# Patient Record
Sex: MALE | Race: WHITE | NOT HISPANIC OR LATINO | Employment: OTHER | URBAN - METROPOLITAN AREA
[De-identification: names, ages, dates, MRNs, and addresses within clinical notes are randomized per-mention and may not be internally consistent; named-entity substitution may affect disease eponyms.]

---

## 2017-01-09 ENCOUNTER — OFFICE VISIT (OUTPATIENT)
Dept: CARDIOLOGY | Facility: CLINIC | Age: 72
End: 2017-01-09

## 2017-01-09 VITALS
HEIGHT: 68 IN | HEART RATE: 61 BPM | BODY MASS INDEX: 25.76 KG/M2 | DIASTOLIC BLOOD PRESSURE: 80 MMHG | SYSTOLIC BLOOD PRESSURE: 120 MMHG | WEIGHT: 170 LBS

## 2017-01-09 DIAGNOSIS — I10 HYPERTENSION, ESSENTIAL: ICD-10-CM

## 2017-01-09 DIAGNOSIS — I77.810 DILATED AORTIC ROOT (HCC): Primary | ICD-10-CM

## 2017-01-09 DIAGNOSIS — I34.1 MVP (MITRAL VALVE PROLAPSE): ICD-10-CM

## 2017-01-09 DIAGNOSIS — I34.0 NON-RHEUMATIC MITRAL REGURGITATION: ICD-10-CM

## 2017-01-09 PROCEDURE — 93000 ELECTROCARDIOGRAM COMPLETE: CPT | Performed by: INTERNAL MEDICINE

## 2017-01-09 PROCEDURE — 99213 OFFICE O/P EST LOW 20 MIN: CPT | Performed by: INTERNAL MEDICINE

## 2017-01-09 NOTE — PROGRESS NOTES
Date of Office Visit: 2017  Encounter Provider: Adriana Vaughn MD  Place of Service: [facility] CARDIOLOGY  Patient Name: Mirza Zimmer  :1945    Chief complaint   follow-up of mitral valve prolapse thoracic aortic aneurysm     History of Present Illness  Patient is a delightful 71-year-old gentleman with hypertension, hyperlipidemia diagnosed with mitral valve prolapse over 15 years ago.  In 2015 he had an echocardiogram revealed normal left ventricle size and function with an ejection fraction 57% with moderate severe prolapse of the posterior leaflets with severe antral directed mitral regurgitation.  Aortic root was 4.2 cm.  By CT angiography was 4.1 cm. There was also aneurysmal enlargement of the left common iliac artery.  In 2016 he had a follow-up stress echocardiogram during which there was no evidence of ischemia and good left ventricular reserve at a good workload there is severe prolapse of the posterior mitral valve leaflet with severe regurgitation and anterior directed manner.  Left ventricular size was normal as was systolic function.  There is grade 2 diastolic dysfunction.    Since last visit he feels well he denies any chest pain, shortness of breath, palpitations, syncope.  He is doing 30 minutes on the bike on a regular basis occasionally walks 2 miles.  His blood pressures been controlled as it is today.  He saw Dr. Yost last month and an ultrasound of his left iliac artery was performed      Past Medical History   Diagnosis Date   • Dilated aortic root    • Heart murmur    • Heart murmur    • Hyperlipidemia    • Mitral regurgitation    • Mitral valve insufficiency    • Muscular aches    • MVP (mitral valve prolapse)        Past Surgical History   Procedure Laterality Date   • Tonsillectomy     • Colonoscopy     • Eye surgery       mole removed form eye       Current Outpatient Prescriptions on File Prior to Visit   Medication Sig  Dispense Refill   • aspirin 81 MG tablet Take by mouth daily.     • glucosamine-chondroitin 500-400 MG capsule capsule Take  by mouth. 2 po daily     • losartan (COZAAR) 25 MG tablet Take 1 tablet by mouth Daily. 90 tablet 1   • Multiple Vitamins-Minerals (MULTI COMPLETE PO) Take 1 capsule by mouth daily.     • simvastatin (ZOCOR) 20 MG tablet TAKE ONE TABLET BY MOUTH ONCE NIGHTLY 90 tablet 0     No current facility-administered medications on file prior to visit.      Allergies as of 01/09/2017 - Augustine as Reviewed 01/09/2017   Allergen Reaction Noted   • Sulfa antibiotics  02/18/2016     Social History     Social History   • Marital status:      Spouse name: N/A   • Number of children: N/A   • Years of education: N/A     Occupational History   • Not on file.     Social History Main Topics   • Smoking status: Never Smoker   • Smokeless tobacco: Never Used      Comment: Caffeine use   • Alcohol use Yes      Comment: social occasional use   • Drug use: No   • Sexual activity: Defer     Other Topics Concern   • Not on file     Social History Narrative     Family History   Problem Relation Age of Onset   • Diabetes Father    • Sudden death Father    • Cancer Sister      malignant neoplasm of breast     Review of Systems   Constitution: Negative for fever, malaise/fatigue, weight gain and weight loss.   HENT: Negative for ear pain, hearing loss, nosebleeds and sore throat.    Eyes: Negative for double vision, pain, vision loss in left eye and vision loss in right eye.   Cardiovascular:        See history of present illness.   Respiratory: Negative for cough, shortness of breath, sleep disturbances due to breathing, snoring and wheezing.    Endocrine: Negative for cold intolerance, heat intolerance and polyuria.   Skin: Negative for itching, poor wound healing and rash.   Musculoskeletal: Positive for joint pain. Negative for joint swelling and myalgias.   Gastrointestinal: Negative for abdominal pain, diarrhea,  "hematochezia, nausea and vomiting.   Genitourinary: Negative for hematuria and hesitancy.   Neurological: Negative for numbness, paresthesias and seizures.   Psychiatric/Behavioral: Negative for depression. The patient is not nervous/anxious.      Objective:     Vitals:    01/09/17 1031   BP: 120/80   Pulse: 61   Weight: 170 lb (77.1 kg)   Height: 68\" (172.7 cm)     Body mass index is 25.85 kg/(m^2).    Physical Exam   Constitutional: He is oriented to person, place, and time. He appears well-developed and well-nourished.   HENT:   Head: Normocephalic.   Nose: Nose normal.   Mouth/Throat: Oropharynx is clear and moist.   Eyes: Conjunctivae and EOM are normal. Pupils are equal, round, and reactive to light. Right eye exhibits no discharge. No scleral icterus.   Neck: Normal range of motion. Neck supple. No JVD present. No thyromegaly present.   Cardiovascular: Normal rate, regular rhythm, normal heart sounds and intact distal pulses.  Exam reveals no gallop and no friction rub.    No murmur heard.  Pulses:       Carotid pulses are 2+ on the right side, and 2+ on the left side.       Radial pulses are 2+ on the right side, and 2+ on the left side.        Femoral pulses are 2+ on the right side, and 2+ on the left side.       Popliteal pulses are 2+ on the right side, and 2+ on the left side.        Dorsalis pedis pulses are 2+ on the right side, and 2+ on the left side.        Posterior tibial pulses are 2+ on the right side, and 2+ on the left side.   Pulmonary/Chest: Effort normal and breath sounds normal. No respiratory distress. He has no wheezes. He has no rales.   Abdominal: Soft. Bowel sounds are normal. He exhibits no distension. There is no hepatosplenomegaly. There is no tenderness. There is no rebound.   Musculoskeletal: Normal range of motion. He exhibits no edema or tenderness.   Neurological: He is alert and oriented to person, place, and time.   Skin: Skin is warm and dry. No rash noted. No erythema. "   Psychiatric: He has a normal mood and affect. His behavior is normal. Judgment and thought content normal.   Vitals reviewed.    Lab Review:     ECG 12 Lead  Date/Time: 1/10/2017 9:40 PM  Performed by: SAMUEL RIVAS  Authorized by: SAMUEL RIVAS   Comparison: compared with previous ECG   Similar to previous ECG  Rhythm: sinus rhythm  Conduction comments: QTc = 411 msec  QRS axis: left  Other findings: PRWP  Clinical impression: abnormal ECG          Assessment:       Diagnosis Plan   1. Dilated aortic root     2. Hypertension, essential     3. Non-rheumatic mitral regurgitation     4. MVP (mitral valve prolapse)       Plan:       1.  Mitral prolapse with severe mitral regurgitation.  Clinically doing well murmur slightly improved we'll observe for now follow-up in 6 months at which time we'll consider an echocardiogram at rest.  Depending on these findings may consider stress imaging  2.  Dilated aortic root, plan on a resting echocardiogram in 6 months and depending on these results may need further CT imaging  3.  Dyslipidemia  4.  Left iliac artery aneurysm followed by Mirza Marshall   Home Medication Instructions TALITA:    Printed on:01/10/17 9435   Medication Information                      aspirin 81 MG tablet  Take by mouth daily.             glucosamine-chondroitin 500-400 MG capsule capsule  Take  by mouth. 2 po daily             losartan (COZAAR) 25 MG tablet  Take 1 tablet by mouth Daily.             Multiple Vitamins-Minerals (MULTI COMPLETE PO)  Take 1 capsule by mouth daily.             simvastatin (ZOCOR) 20 MG tablet  TAKE ONE TABLET BY MOUTH ONCE NIGHTLY               EMR Dragon/Transcription disclaimer:   Much of this encounter note is an electronic transcription/translation of spoken language to printed text. The electronic translation of spoken language may permit erroneous, or at times, nonsensical words or phrases to be inadvertently transcribed; Although I have reviewed the note  for such errors, some may still exist.

## 2017-01-10 PROBLEM — I77.810 DILATED AORTIC ROOT: Status: ACTIVE | Noted: 2017-01-10

## 2017-01-11 RX ORDER — SIMVASTATIN 20 MG
TABLET ORAL
Qty: 90 TABLET | Refills: 1 | Status: SHIPPED | OUTPATIENT
Start: 2017-01-11 | End: 2017-10-23 | Stop reason: SDUPTHER

## 2017-04-24 RX ORDER — LOSARTAN POTASSIUM 25 MG/1
TABLET ORAL
Qty: 90 TABLET | Refills: 0 | Status: SHIPPED | OUTPATIENT
Start: 2017-04-24 | End: 2017-07-24 | Stop reason: SDUPTHER

## 2017-05-01 ENCOUNTER — LAB (OUTPATIENT)
Dept: LAB | Facility: HOSPITAL | Age: 72
End: 2017-05-01

## 2017-05-01 DIAGNOSIS — E78.5 HYPERLIPIDEMIA, UNSPECIFIED HYPERLIPIDEMIA TYPE: ICD-10-CM

## 2017-05-01 DIAGNOSIS — Z00.00 HEALTH CARE MAINTENANCE: ICD-10-CM

## 2017-05-01 DIAGNOSIS — N40.0 BENIGN NON-NODULAR PROSTATIC HYPERPLASIA WITHOUT LOWER URINARY TRACT SYMPTOMS: Primary | ICD-10-CM

## 2017-05-01 DIAGNOSIS — E78.5 HYPERLIPIDEMIA, UNSPECIFIED HYPERLIPIDEMIA TYPE: Primary | ICD-10-CM

## 2017-05-01 LAB
ALBUMIN SERPL-MCNC: 3.9 G/DL (ref 3.5–5.2)
ALBUMIN/GLOB SERPL: 1.3 G/DL
ALP SERPL-CCNC: 67 U/L (ref 39–117)
ALT SERPL W P-5'-P-CCNC: 16 U/L (ref 1–41)
ANION GAP SERPL CALCULATED.3IONS-SCNC: 12 MMOL/L
AST SERPL-CCNC: 21 U/L (ref 1–40)
BASOPHILS # BLD AUTO: 0.01 10*3/MM3 (ref 0–0.2)
BASOPHILS NFR BLD AUTO: 0.2 % (ref 0–1.5)
BILIRUB SERPL-MCNC: 0.6 MG/DL (ref 0.1–1.2)
BUN BLD-MCNC: 13 MG/DL (ref 8–23)
BUN/CREAT SERPL: 14.8 (ref 7–25)
CALCIUM SPEC-SCNC: 9.1 MG/DL (ref 8.6–10.5)
CHLORIDE SERPL-SCNC: 103 MMOL/L (ref 98–107)
CHOLEST SERPL-MCNC: 174 MG/DL (ref 0–200)
CO2 SERPL-SCNC: 26 MMOL/L (ref 22–29)
CREAT BLD-MCNC: 0.88 MG/DL (ref 0.76–1.27)
DEPRECATED RDW RBC AUTO: 42.4 FL (ref 37–54)
EOSINOPHIL # BLD AUTO: 0.07 10*3/MM3 (ref 0–0.7)
EOSINOPHIL NFR BLD AUTO: 1.6 % (ref 0.3–6.2)
ERYTHROCYTE [DISTWIDTH] IN BLOOD BY AUTOMATED COUNT: 12.6 % (ref 11.5–14.5)
GFR SERPL CREATININE-BSD FRML MDRD: 85 ML/MIN/1.73
GLOBULIN UR ELPH-MCNC: 2.9 GM/DL
GLUCOSE BLD-MCNC: 95 MG/DL (ref 65–99)
HCT VFR BLD AUTO: 41.8 % (ref 40.4–52.2)
HDLC SERPL-MCNC: 46 MG/DL (ref 40–60)
HGB BLD-MCNC: 14.1 G/DL (ref 13.7–17.6)
IMM GRANULOCYTES # BLD: 0 10*3/MM3 (ref 0–0.03)
IMM GRANULOCYTES NFR BLD: 0 % (ref 0–0.5)
LDLC SERPL CALC-MCNC: 110 MG/DL (ref 0–100)
LDLC/HDLC SERPL: 2.4 {RATIO}
LYMPHOCYTES # BLD AUTO: 1.17 10*3/MM3 (ref 0.9–4.8)
LYMPHOCYTES NFR BLD AUTO: 27.4 % (ref 19.6–45.3)
MCH RBC QN AUTO: 31 PG (ref 27–32.7)
MCHC RBC AUTO-ENTMCNC: 33.7 G/DL (ref 32.6–36.4)
MCV RBC AUTO: 91.9 FL (ref 79.8–96.2)
MONOCYTES # BLD AUTO: 0.5 10*3/MM3 (ref 0.2–1.2)
MONOCYTES NFR BLD AUTO: 11.7 % (ref 5–12)
NEUTROPHILS # BLD AUTO: 2.52 10*3/MM3 (ref 1.9–8.1)
NEUTROPHILS NFR BLD AUTO: 59.1 % (ref 42.7–76)
PLATELET # BLD AUTO: 207 10*3/MM3 (ref 140–500)
PMV BLD AUTO: 10.1 FL (ref 6–12)
POTASSIUM BLD-SCNC: 4.2 MMOL/L (ref 3.5–5.2)
PROT SERPL-MCNC: 6.8 G/DL (ref 6–8.5)
PSA SERPL-MCNC: 3.89 NG/ML (ref 0–4)
RBC # BLD AUTO: 4.55 10*6/MM3 (ref 4.6–6)
SODIUM BLD-SCNC: 141 MMOL/L (ref 136–145)
TRIGL SERPL-MCNC: 88 MG/DL (ref 0–150)
VLDLC SERPL-MCNC: 17.6 MG/DL (ref 5–40)
WBC NRBC COR # BLD: 4.27 10*3/MM3 (ref 4.5–10.7)

## 2017-05-01 PROCEDURE — 80061 LIPID PANEL: CPT

## 2017-05-01 PROCEDURE — 80053 COMPREHEN METABOLIC PANEL: CPT | Performed by: INTERNAL MEDICINE

## 2017-05-01 PROCEDURE — 36415 COLL VENOUS BLD VENIPUNCTURE: CPT

## 2017-05-01 PROCEDURE — 84153 ASSAY OF PSA TOTAL: CPT | Performed by: INTERNAL MEDICINE

## 2017-05-01 PROCEDURE — 85025 COMPLETE CBC W/AUTO DIFF WBC: CPT | Performed by: INTERNAL MEDICINE

## 2017-05-08 ENCOUNTER — OFFICE VISIT (OUTPATIENT)
Dept: FAMILY MEDICINE CLINIC | Facility: CLINIC | Age: 72
End: 2017-05-08

## 2017-05-08 VITALS
DIASTOLIC BLOOD PRESSURE: 60 MMHG | OXYGEN SATURATION: 96 % | BODY MASS INDEX: 25.55 KG/M2 | SYSTOLIC BLOOD PRESSURE: 116 MMHG | HEIGHT: 68 IN | WEIGHT: 168.6 LBS | HEART RATE: 60 BPM | RESPIRATION RATE: 16 BRPM | TEMPERATURE: 97.8 F

## 2017-05-08 DIAGNOSIS — R97.20 INCREASED PROSTATE SPECIFIC ANTIGEN (PSA) VELOCITY: ICD-10-CM

## 2017-05-08 DIAGNOSIS — Z00.00 HEALTHCARE MAINTENANCE: Primary | ICD-10-CM

## 2017-05-08 DIAGNOSIS — I10 HYPERTENSION, ESSENTIAL: ICD-10-CM

## 2017-05-08 DIAGNOSIS — E78.5 HYPERLIPIDEMIA, UNSPECIFIED HYPERLIPIDEMIA TYPE: ICD-10-CM

## 2017-05-08 PROCEDURE — 99397 PER PM REEVAL EST PAT 65+ YR: CPT | Performed by: INTERNAL MEDICINE

## 2017-05-08 PROCEDURE — G0009 ADMIN PNEUMOCOCCAL VACCINE: HCPCS | Performed by: INTERNAL MEDICINE

## 2017-05-08 PROCEDURE — 90670 PCV13 VACCINE IM: CPT | Performed by: INTERNAL MEDICINE

## 2017-07-18 ENCOUNTER — OFFICE VISIT (OUTPATIENT)
Dept: CARDIOLOGY | Facility: CLINIC | Age: 72
End: 2017-07-18

## 2017-07-18 VITALS
HEART RATE: 58 BPM | HEIGHT: 68 IN | BODY MASS INDEX: 24.55 KG/M2 | SYSTOLIC BLOOD PRESSURE: 104 MMHG | DIASTOLIC BLOOD PRESSURE: 68 MMHG | WEIGHT: 162 LBS

## 2017-07-18 DIAGNOSIS — I34.0 NON-RHEUMATIC MITRAL REGURGITATION: ICD-10-CM

## 2017-07-18 DIAGNOSIS — I34.1 MVP (MITRAL VALVE PROLAPSE): Primary | ICD-10-CM

## 2017-07-18 DIAGNOSIS — I51.89 OTHER ILL-DEFINED HEART DISEASES: ICD-10-CM

## 2017-07-18 DIAGNOSIS — I77.810 DILATED AORTIC ROOT (HCC): ICD-10-CM

## 2017-07-18 PROCEDURE — 99213 OFFICE O/P EST LOW 20 MIN: CPT | Performed by: INTERNAL MEDICINE

## 2017-07-18 PROCEDURE — 93000 ELECTROCARDIOGRAM COMPLETE: CPT | Performed by: INTERNAL MEDICINE

## 2017-07-18 RX ORDER — FINASTERIDE 5 MG/1
TABLET, FILM COATED ORAL
COMMUNITY
Start: 2017-06-27 | End: 2018-09-27 | Stop reason: ALTCHOICE

## 2017-07-18 NOTE — PROGRESS NOTES
Date of Office Visit: 2017  Encounter Provider: Adriana Vaughn MD  Place of Service: [facility] CARDIOLOGY  Patient Name: Mirza Zimmer  :1945    Chief complaint  follow-up of mitral valve prolapse thoracic aortic aneurysm    History of Present Illness  Patient is a delightful 89-year-old gentleman with hypertension, hyperlipidemia diagnosed with mitral valve prolapse over 15 years ago.  In 2015 he had an echocardiogram revealed normal left ventricle size and function with an ejection fraction 57% with moderate severe prolapse of the posterior leaflets with severe antral directed mitral regurgitation.  Aortic root was 4.2 cm.  By CT angiography was 4.1 cm. There was also aneurysmal enlargement of the left common iliac artery.  In 2016 he had a follow-up stress echocardiogram during which there was no evidence of ischemia and good left ventricular reserve at a good workload there is severe prolapse of the posterior mitral valve leaflet with severe regurgitation and anterior directed manner.  Left ventricular size was normal as was systolic function.  There is grade 2 diastolic dysfunction.    Since last visit he is active and plays golf and walks regularly and works out on the treadmill for 30 minutes at a time. He denies any chest pain, shortness of breath, palpitations syncope near syncope.  Blood pressure home has been slightly higher than it is today    Past Medical History:   Diagnosis Date   • Dilated aortic root    • Hyperlipidemia    • Mitral regurgitation    • Mitral valve insufficiency    • Muscular aches    • MVP (mitral valve prolapse)      Past Surgical History:   Procedure Laterality Date   • COLONOSCOPY  2013   • EYE SURGERY      mole removed form eye   • TONSILLECTOMY       Outpatient Medications Prior to Visit   Medication Sig Dispense Refill   • aspirin 81 MG tablet Take 81 mg by mouth Daily.     • glucosamine-chondroitin 500-400 MG  capsule capsule Take  by mouth. 2 po daily     • losartan (COZAAR) 25 MG tablet TAKE ONE TABLET BY MOUTH DAILY 90 tablet 0   • Multiple Vitamins-Minerals (MULTI COMPLETE PO) Take 1 capsule by mouth daily.     • simvastatin (ZOCOR) 20 MG tablet TAKE ONE TABLET BY MOUTH ONCE NIGHTLY 90 tablet 1     No facility-administered medications prior to visit.      Allergies as of 07/18/2017 - Augustine as Reviewed 07/18/2017   Allergen Reaction Noted   • Sulfa antibiotics  02/18/2016     Social History     Social History   • Marital status:      Spouse name: N/A   • Number of children: N/A   • Years of education: N/A     Occupational History   • Not on file.     Social History Main Topics   • Smoking status: Never Smoker   • Smokeless tobacco: Never Used      Comment: Caffeine use   • Alcohol use Yes      Comment: social occasional use   • Drug use: No   • Sexual activity: Defer     Other Topics Concern   • Not on file     Social History Narrative     Family History   Problem Relation Age of Onset   • Diabetes Father    • Sudden death Father    • Cancer Sister      malignant neoplasm of breast     Review of Systems   Constitution: Negative for fever, malaise/fatigue, weight gain and weight loss.   HENT: Negative for ear pain, hearing loss, nosebleeds and sore throat.    Eyes: Negative for double vision, pain, vision loss in left eye and vision loss in right eye.   Cardiovascular:        See history of present illness.   Respiratory: Negative for cough, shortness of breath, sleep disturbances due to breathing, snoring and wheezing.    Endocrine: Negative for cold intolerance, heat intolerance and polyuria.   Skin: Negative for itching, poor wound healing and rash.   Musculoskeletal: Negative for joint pain, joint swelling and myalgias.   Gastrointestinal: Negative for abdominal pain, diarrhea, hematochezia, nausea and vomiting.   Genitourinary: Negative for hematuria and hesitancy.   Neurological: Negative for numbness,  "paresthesias and seizures.   Psychiatric/Behavioral: Negative for depression. The patient is not nervous/anxious.      Objective:     Vitals:    07/18/17 0802   BP: 104/68   Pulse: 58   Weight: 162 lb (73.5 kg)   Height: 68\" (172.7 cm)     Body mass index is 24.63 kg/(m^2).    Physical Exam   Constitutional: He is oriented to person, place, and time. He appears well-developed and well-nourished.   HENT:   Head: Normocephalic.   Nose: Nose normal.   Mouth/Throat: Oropharynx is clear and moist.   Eyes: Conjunctivae and EOM are normal. Pupils are equal, round, and reactive to light. Right eye exhibits no discharge. No scleral icterus.   Neck: Normal range of motion. Neck supple. No JVD present. No thyromegaly present.   Cardiovascular: Normal rate, regular rhythm and intact distal pulses.  Exam reveals no gallop and no friction rub.    Murmur heard.  High-pitched blowing holosystolic murmur is present with a grade of 2/6  at the apex radiating to the axilla  Pulses:       Carotid pulses are 2+ on the right side, and 2+ on the left side.       Radial pulses are 2+ on the right side, and 2+ on the left side.        Femoral pulses are 2+ on the right side, and 2+ on the left side.       Popliteal pulses are 2+ on the right side, and 2+ on the left side.        Dorsalis pedis pulses are 2+ on the right side, and 2+ on the left side.        Posterior tibial pulses are 2+ on the right side, and 2+ on the left side.   Pulmonary/Chest: Effort normal and breath sounds normal. No respiratory distress. He has no wheezes. He has no rales.   Abdominal: Soft. Bowel sounds are normal. He exhibits no distension. There is no hepatosplenomegaly. There is no tenderness. There is no rebound.   Musculoskeletal: Normal range of motion. He exhibits no edema or tenderness.   Neurological: He is alert and oriented to person, place, and time.   Skin: Skin is warm and dry. No rash noted. No erythema.   Psychiatric: He has a normal mood and " affect. His behavior is normal. Judgment and thought content normal.   Vitals reviewed.    Lab Review:     ECG 12 Lead  Date/Time: 7/18/2017 9:37 PM  Performed by: SAMUEL RIVAS  Authorized by: SAMUEL RIVAS   Comparison: compared with previous ECG   Similar to previous ECG  Rhythm: sinus rhythm  Conduction comments: QTc = 417msec  Clinical impression: normal ECG          Assessment:       Diagnosis Plan   1. MVP (mitral valve prolapse)  Adult Stress Echo With Color & Doppler   2. Non-rheumatic mitral regurgitation  Adult Stress Echo With Color & Doppler   3. Dilated aortic root  MRI Angiogram Chest With Contrast   4. Other ill-defined heart diseases   Adult Stress Echo With Color & Doppler     Plan:       1.  Mitral prolapse with severe mitral regurgitation.  Still sounds moderate to severe.  We'll check a stress echocardiogram to assess exercise tolerance and worsening regurgitation  2.  Dilated aortic root, we'll check an MRI angiogram of his chest  3.  Dyslipidemia  4.  Left iliac artery aneurysm followed by Dr. Yost  5.  Hypertension.  Blood pressure under good control     Mirza Zimmer   Home Medication Instructions TALITA:    Printed on:07/20/17 8535   Medication Information                      aspirin 81 MG tablet  Take 81 mg by mouth Daily.             finasteride (PROSCAR) 5 MG tablet               glucosamine-chondroitin 500-400 MG capsule capsule  Take  by mouth. 2 po daily             losartan (COZAAR) 25 MG tablet  TAKE ONE TABLET BY MOUTH DAILY             Multiple Vitamins-Minerals (MULTI COMPLETE PO)  Take 1 capsule by mouth daily.             simvastatin (ZOCOR) 20 MG tablet  TAKE ONE TABLET BY MOUTH ONCE NIGHTLY               Dictated utilizing Dragon dictation

## 2017-07-20 PROBLEM — I51.89 OTHER ILL-DEFINED HEART DISEASES: Status: ACTIVE | Noted: 2017-07-20

## 2017-07-24 RX ORDER — LOSARTAN POTASSIUM 25 MG/1
25 TABLET ORAL DAILY
Qty: 90 TABLET | Refills: 1 | Status: SHIPPED | OUTPATIENT
Start: 2017-07-24 | End: 2018-01-09 | Stop reason: SDUPTHER

## 2017-07-25 ENCOUNTER — HOSPITAL ENCOUNTER (OUTPATIENT)
Dept: MRI IMAGING | Facility: HOSPITAL | Age: 72
Discharge: HOME | End: 2017-07-25
Attending: INTERNAL MEDICINE | Admitting: INTERNAL MEDICINE

## 2017-07-25 DIAGNOSIS — I77.810 DILATED AORTIC ROOT (HCC): ICD-10-CM

## 2017-07-25 PROCEDURE — A9577 INJ MULTIHANCE: HCPCS | Performed by: INTERNAL MEDICINE

## 2017-07-25 PROCEDURE — C8911 MRA W/O FOL W/CONT, CHEST: HCPCS

## 2017-07-25 PROCEDURE — 0 GADOBENATE DIMEGLUMINE 529 MG/ML SOLUTION: Performed by: INTERNAL MEDICINE

## 2017-07-25 PROCEDURE — 82565 ASSAY OF CREATININE: CPT

## 2017-07-25 RX ADMIN — GADOBENATE DIMEGLUMINE 14 ML: 529 INJECTION, SOLUTION INTRAVENOUS at 07:30

## 2017-07-26 LAB — CREAT BLDA-MCNC: 0.9 MG/DL (ref 0.6–1.3)

## 2017-07-27 ENCOUNTER — HOSPITAL ENCOUNTER (OUTPATIENT)
Dept: CARDIOLOGY | Facility: HOSPITAL | Age: 72
Discharge: HOME | End: 2017-07-27
Attending: INTERNAL MEDICINE | Admitting: INTERNAL MEDICINE

## 2017-07-27 VITALS
HEART RATE: 60 BPM | HEIGHT: 68 IN | SYSTOLIC BLOOD PRESSURE: 138 MMHG | WEIGHT: 150 LBS | BODY MASS INDEX: 22.73 KG/M2 | DIASTOLIC BLOOD PRESSURE: 82 MMHG

## 2017-07-27 DIAGNOSIS — I34.0 NON-RHEUMATIC MITRAL REGURGITATION: ICD-10-CM

## 2017-07-27 DIAGNOSIS — I34.1 MVP (MITRAL VALVE PROLAPSE): ICD-10-CM

## 2017-07-27 DIAGNOSIS — I51.89 OTHER ILL-DEFINED HEART DISEASES: ICD-10-CM

## 2017-07-27 LAB
ASCENDING AORTA: 3.5 CM
BH CV ECHO MEAS - ACS: 2.3 CM
BH CV ECHO MEAS - AO MAX PG: 6.5 MMHG
BH CV ECHO MEAS - AO ROOT AREA (BSA CORRECTED): 2.3
BH CV ECHO MEAS - AO ROOT AREA: 14.1 CM^2
BH CV ECHO MEAS - AO ROOT DIAM: 4.2 CM
BH CV ECHO MEAS - AO V2 MAX: 127.6 CM/SEC
BH CV ECHO MEAS - ASC AORTA: 3.5 CM
BH CV ECHO MEAS - BSA(HAYCOCK): 1.8 M^2
BH CV ECHO MEAS - BSA: 1.8 M^2
BH CV ECHO MEAS - BZI_BMI: 22.8 KILOGRAMS/M^2
BH CV ECHO MEAS - BZI_METRIC_HEIGHT: 172.7 CM
BH CV ECHO MEAS - BZI_METRIC_WEIGHT: 68 KG
BH CV ECHO MEAS - CONTRAST EF 4CH: 61.9 ML/M^2
BH CV ECHO MEAS - EDV(MOD-SP4): 113 ML
BH CV ECHO MEAS - EDV(TEICH): 149.5 ML
BH CV ECHO MEAS - EF(CUBED): 72 %
BH CV ECHO MEAS - EF(MOD-SP4): 61.9 %
BH CV ECHO MEAS - EF(TEICH): 63.1 %
BH CV ECHO MEAS - ESV(MOD-SP4): 43 ML
BH CV ECHO MEAS - ESV(TEICH): 55.2 ML
BH CV ECHO MEAS - FS: 34.6 %
BH CV ECHO MEAS - IVS/LVPW: 1
BH CV ECHO MEAS - IVSD: 1.2 CM
BH CV ECHO MEAS - LAT PEAK E' VEL: 10 CM/SEC
BH CV ECHO MEAS - LV DIASTOLIC VOL/BSA (35-75): 62.5 ML/M^2
BH CV ECHO MEAS - LV MASS(C)D: 281.5 GRAMS
BH CV ECHO MEAS - LV MASS(C)DI: 155.6 GRAMS/M^2
BH CV ECHO MEAS - LV SYSTOLIC VOL/BSA (12-30): 23.8 ML/M^2
BH CV ECHO MEAS - LVIDD: 5.5 CM
BH CV ECHO MEAS - LVIDS: 3.6 CM
BH CV ECHO MEAS - LVLD AP4: 8.2 CM
BH CV ECHO MEAS - LVLS AP4: 6.8 CM
BH CV ECHO MEAS - LVPWD: 1.2 CM
BH CV ECHO MEAS - MED PEAK E' VEL: 7 CM/SEC
BH CV ECHO MEAS - MR MAX PG: 132.5 MMHG
BH CV ECHO MEAS - MR MAX VEL: 575.5 CM/SEC
BH CV ECHO MEAS - MV A DUR: 0.09 SEC
BH CV ECHO MEAS - MV A MAX VEL: 74.9 CM/SEC
BH CV ECHO MEAS - MV DEC SLOPE: 393.4 CM/SEC^2
BH CV ECHO MEAS - MV DEC TIME: 0.21 SEC
BH CV ECHO MEAS - MV E MAX VEL: 89.7 CM/SEC
BH CV ECHO MEAS - MV E/A: 1.2
BH CV ECHO MEAS - MV MAX PG: 5.1 MMHG
BH CV ECHO MEAS - MV MEAN PG: 1.7 MMHG
BH CV ECHO MEAS - MV P1/2T MAX VEL: 90.3 CM/SEC
BH CV ECHO MEAS - MV P1/2T: 67.2 MSEC
BH CV ECHO MEAS - MV V2 MAX: 112.7 CM/SEC
BH CV ECHO MEAS - MV V2 MEAN: 59.2 CM/SEC
BH CV ECHO MEAS - MV V2 VTI: 39.4 CM
BH CV ECHO MEAS - MVA P1/2T LCG: 2.4 CM^2
BH CV ECHO MEAS - MVA(P1/2T): 3.3 CM^2
BH CV ECHO MEAS - PULM A REVS DUR: 0.1 SEC
BH CV ECHO MEAS - PULM A REVS VEL: 32.5 CM/SEC
BH CV ECHO MEAS - PULM DIAS VEL: 36.9 CM/SEC
BH CV ECHO MEAS - PULM S/D: 1.8
BH CV ECHO MEAS - PULM SYS VEL: 66.5 CM/SEC
BH CV ECHO MEAS - SI(CUBED): 67.4 ML/M^2
BH CV ECHO MEAS - SI(MOD-SP4): 38.7 ML/M^2
BH CV ECHO MEAS - SI(TEICH): 52.1 ML/M^2
BH CV ECHO MEAS - SV(CUBED): 121.9 ML
BH CV ECHO MEAS - SV(MOD-SP4): 70 ML
BH CV ECHO MEAS - SV(TEICH): 94.3 ML
BH CV ECHO MEAS - TAPSE (>1.6): 2.6 CM2
BH CV ECHO MEAS - TR MAX VEL: 213.1 CM/SEC
BH CV STRESS BP STAGE 1: NORMAL
BH CV STRESS BP STAGE 2: NORMAL
BH CV STRESS BP STAGE 3: NORMAL
BH CV STRESS BP STAGE 4: NORMAL
BH CV STRESS DURATION MIN STAGE 1: 3
BH CV STRESS DURATION MIN STAGE 2: 3
BH CV STRESS DURATION MIN STAGE 3: 3
BH CV STRESS DURATION MIN STAGE 4: 3
BH CV STRESS DURATION MIN STAGE 5: 1
BH CV STRESS DURATION SEC STAGE 1: 0
BH CV STRESS DURATION SEC STAGE 2: 0
BH CV STRESS DURATION SEC STAGE 3: 0
BH CV STRESS DURATION SEC STAGE 4: 0
BH CV STRESS DURATION SEC STAGE 5: 30
BH CV STRESS ECHO POST STRESS EJECTION FRACTION EF: 79 %
BH CV STRESS GRADE STAGE 1: 10
BH CV STRESS GRADE STAGE 2: 12
BH CV STRESS GRADE STAGE 3: 14
BH CV STRESS GRADE STAGE 4: 16
BH CV STRESS GRADE STAGE 5: 18
BH CV STRESS HR STAGE 1: 80
BH CV STRESS HR STAGE 2: 90
BH CV STRESS HR STAGE 3: 103
BH CV STRESS HR STAGE 4: 120
BH CV STRESS HR STAGE 5: 136
BH CV STRESS METS STAGE 1: 5
BH CV STRESS METS STAGE 2: 7.5
BH CV STRESS METS STAGE 3: 10
BH CV STRESS METS STAGE 4: 13.5
BH CV STRESS METS STAGE 5: 15
BH CV STRESS PROTOCOL 1: NORMAL
BH CV STRESS SPEED STAGE 1: 1.7
BH CV STRESS SPEED STAGE 2: 2.5
BH CV STRESS SPEED STAGE 3: 3.4
BH CV STRESS SPEED STAGE 4: 4.2
BH CV STRESS SPEED STAGE 5: 5
BH CV STRESS STAGE 1: 1
BH CV STRESS STAGE 2: 2
BH CV STRESS STAGE 3: 3
BH CV STRESS STAGE 4: 4
BH CV STRESS STAGE 5: 5
BH CV XLRA - RV BASE: 2.7 CM
BH CV XLRA - TDI S': 12 CM/SEC
E/E' RATIO: 12
LEFT ATRIUM VOLUME INDEX: 26 ML/M2
MAXIMAL PREDICTED HEART RATE: 148 BPM
PERCENT MAX PREDICTED HR: 91.89 %
SINUS: 4 CM
STJ: 3.3 CM
STRESS BASELINE BP: NORMAL MMHG
STRESS BASELINE HR: 60 BPM
STRESS PERCENT HR: 108 %
STRESS POST ESTIMATED WORKLOAD: 14 METS
STRESS POST EXERCISE DUR MIN: 13 MIN
STRESS POST EXERCISE DUR SEC: 30 SEC
STRESS POST PEAK BP: NORMAL MMHG
STRESS POST PEAK HR: 136 BPM
STRESS TARGET HR: 126 BPM

## 2017-07-27 PROCEDURE — 93320 DOPPLER ECHO COMPLETE: CPT | Performed by: INTERNAL MEDICINE

## 2017-07-27 PROCEDURE — 93017 CV STRESS TEST TRACING ONLY: CPT

## 2017-07-27 PROCEDURE — 93320 DOPPLER ECHO COMPLETE: CPT

## 2017-07-27 PROCEDURE — 76376 3D RENDER W/INTRP POSTPROCES: CPT | Performed by: INTERNAL MEDICINE

## 2017-07-27 PROCEDURE — 93325 DOPPLER ECHO COLOR FLOW MAPG: CPT

## 2017-07-27 PROCEDURE — 93350 STRESS TTE ONLY: CPT

## 2017-07-27 PROCEDURE — 93325 DOPPLER ECHO COLOR FLOW MAPG: CPT | Performed by: INTERNAL MEDICINE

## 2017-07-27 PROCEDURE — 93350 STRESS TTE ONLY: CPT | Performed by: INTERNAL MEDICINE

## 2017-07-27 PROCEDURE — 93018 CV STRESS TEST I&R ONLY: CPT | Performed by: INTERNAL MEDICINE

## 2017-07-27 PROCEDURE — 93016 CV STRESS TEST SUPVJ ONLY: CPT | Performed by: INTERNAL MEDICINE

## 2017-08-03 ENCOUNTER — TELEPHONE (OUTPATIENT)
Dept: CARDIOLOGY | Facility: CLINIC | Age: 72
End: 2017-08-03

## 2017-08-03 NOTE — TELEPHONE ENCOUNTER
I talked to patient regarding echo and MRI results.  Stable aneurysm, stable mitral valve prolapse mitral regurgitation.  We'll continue with the current regimen and keep routine follow-up unless he develops new symptoms. nette

## 2017-08-08 ENCOUNTER — CLINICAL SUPPORT (OUTPATIENT)
Dept: FAMILY MEDICINE CLINIC | Facility: CLINIC | Age: 72
End: 2017-08-08

## 2017-08-08 DIAGNOSIS — Z23 NEED FOR PROPHYLACTIC VACCINATION AND INOCULATION AGAINST CHOLERA ALONE: Primary | ICD-10-CM

## 2017-08-08 DIAGNOSIS — Z23 NEED FOR TDAP VACCINATION: ICD-10-CM

## 2017-08-08 PROCEDURE — 90715 TDAP VACCINE 7 YRS/> IM: CPT | Performed by: INTERNAL MEDICINE

## 2017-08-08 PROCEDURE — 90471 IMMUNIZATION ADMIN: CPT | Performed by: INTERNAL MEDICINE

## 2017-10-23 RX ORDER — SIMVASTATIN 20 MG
20 TABLET ORAL NIGHTLY
Qty: 90 TABLET | Refills: 1 | Status: SHIPPED | OUTPATIENT
Start: 2017-10-23 | End: 2018-07-25 | Stop reason: SDUPTHER

## 2018-01-09 RX ORDER — LOSARTAN POTASSIUM 25 MG/1
25 TABLET ORAL DAILY
Qty: 90 TABLET | Refills: 1 | Status: SHIPPED | OUTPATIENT
Start: 2018-01-09 | End: 2018-07-11 | Stop reason: SDUPTHER

## 2018-04-18 ENCOUNTER — TELEPHONE (OUTPATIENT)
Dept: FAMILY MEDICINE CLINIC | Facility: CLINIC | Age: 73
End: 2018-04-18

## 2018-07-11 RX ORDER — LOSARTAN POTASSIUM 25 MG/1
25 TABLET ORAL DAILY
Qty: 90 TABLET | Refills: 0 | Status: SHIPPED | OUTPATIENT
Start: 2018-07-11 | End: 2018-10-19 | Stop reason: SDUPTHER

## 2018-07-25 RX ORDER — SIMVASTATIN 20 MG
20 TABLET ORAL NIGHTLY
Qty: 90 TABLET | Refills: 0 | Status: SHIPPED | OUTPATIENT
Start: 2018-07-25 | End: 2018-10-19 | Stop reason: SDUPTHER

## 2018-09-27 ENCOUNTER — OFFICE VISIT (OUTPATIENT)
Dept: CARDIOLOGY | Facility: CLINIC | Age: 73
End: 2018-09-27

## 2018-09-27 VITALS — DIASTOLIC BLOOD PRESSURE: 76 MMHG | SYSTOLIC BLOOD PRESSURE: 122 MMHG

## 2018-09-27 DIAGNOSIS — I34.0 NON-RHEUMATIC MITRAL REGURGITATION: ICD-10-CM

## 2018-09-27 DIAGNOSIS — I71.20 THORACIC AORTIC ANEURYSM WITHOUT RUPTURE (HCC): Primary | ICD-10-CM

## 2018-09-27 DIAGNOSIS — R06.09 DYSPNEA ON EXERTION: ICD-10-CM

## 2018-09-27 PROCEDURE — 99214 OFFICE O/P EST MOD 30 MIN: CPT | Performed by: INTERNAL MEDICINE

## 2018-09-27 NOTE — PROGRESS NOTES
Date of Office Visit: 2018  Encounter Provider: Adriana Vaughn MD  Place of Service: [facility] CARDIOLOGY  Patient Name: Mirza Zimmer  :1945    Chief complaint  followup of aortic aneurysm    History of Present Illness  Patient is a delightful 73-year-old gentleman with hypertension, hyperlipidemia diagnosed with mitral valve prolapse over 15 years ago.  In 2015 he had an echocardiogram revealed normal left ventricle size and function with an ejection fraction 57% with moderate severe prolapse of the posterior leaflets with severe antral directed mitral regurgitation.  Aortic root was 4.2 cm.  By CT angiography was 4.1 cm. There was also aneurysmal enlargement of the left common iliac artery.  In 2016 he had a follow-up stress echocardiogram during which there was no evidence of ischemia and good left ventricular reserve at a good workload there is severe prolapse of the posterior mitral valve leaflet with severe regurgitation and anterior directed manner.  Left ventricular size was normal as was systolic function.  There is grade 2 diastolic dysfunction.    Since the last visit he has noticed mild dyspnea on exertion the past 3-4 months ago that lasted for a month.  He also had an irregular pulse at the time.  He decrease his caffeine intake and both have resolved.  He is back to exercising regularly.     Past Medical History:   Diagnosis Date   • Dilated aortic root (CMS/HCC)    • Hyperlipidemia    • Mitral regurgitation    • Mitral valve insufficiency    • Muscular aches    • MVP (mitral valve prolapse)      Past Surgical History:   Procedure Laterality Date   • COLONOSCOPY  2013   • EYE SURGERY      mole removed form eye   • TONSILLECTOMY       Outpatient Medications Prior to Visit   Medication Sig Dispense Refill   • aspirin 81 MG tablet Take 81 mg by mouth Daily.     • glucosamine-chondroitin 500-400 MG capsule capsule Take  by mouth. 2 po daily      • losartan (COZAAR) 25 MG tablet Take 1 tablet by mouth Daily. Pt needs appointment asap with new provider no more refill after this 90 tablet 0   • Multiple Vitamins-Minerals (MULTI COMPLETE PO) Take 1 capsule by mouth daily.     • simvastatin (ZOCOR) 20 MG tablet Take 1 tablet by mouth Every Night. 90 tablet 0   • finasteride (PROSCAR) 5 MG tablet        No facility-administered medications prior to visit.        Allergies as of 09/27/2018 - Reviewed 09/27/2018   Allergen Reaction Noted   • Sulfa antibiotics  02/18/2016     Social History     Social History   • Marital status:      Spouse name: N/A   • Number of children: N/A   • Years of education: N/A     Occupational History   • Not on file.     Social History Main Topics   • Smoking status: Never Smoker   • Smokeless tobacco: Never Used      Comment: Caffeine use   • Alcohol use Yes      Comment: social occasional use   • Drug use: No   • Sexual activity: Defer     Other Topics Concern   • Not on file     Social History Narrative   • No narrative on file     Family History   Problem Relation Age of Onset   • Diabetes Father    • Sudden death Father    • Cancer Sister         malignant neoplasm of breast     Review of Systems   Constitution: Negative for fever, malaise/fatigue, weight gain and weight loss.   HENT: Negative for ear pain, hearing loss, nosebleeds and sore throat.    Eyes: Negative for double vision, pain, vision loss in left eye and vision loss in right eye.   Cardiovascular:        See history of present illness.   Respiratory: Negative for cough, shortness of breath, sleep disturbances due to breathing, snoring and wheezing.    Endocrine: Negative for cold intolerance, heat intolerance and polyuria.   Skin: Negative for itching, poor wound healing and rash.   Musculoskeletal: Positive for joint pain. Negative for joint swelling and myalgias.   Gastrointestinal: Negative for abdominal pain, diarrhea, hematochezia, nausea and vomiting.    Genitourinary: Negative for hematuria and hesitancy.   Neurological: Negative for numbness, paresthesias and seizures.   Psychiatric/Behavioral: Negative for depression. The patient is not nervous/anxious.         Objective:     Vitals:    09/27/18 1412   BP: 122/76     There is no height or weight on file to calculate BMI.    Physical Exam   Constitutional: He is oriented to person, place, and time. He appears well-developed and well-nourished.   HENT:   Head: Normocephalic.   Nose: Nose normal.   Mouth/Throat: Oropharynx is clear and moist.   Eyes: Pupils are equal, round, and reactive to light. Conjunctivae and EOM are normal. Right eye exhibits no discharge. No scleral icterus.   Neck: Normal range of motion. Neck supple. No JVD present. No thyromegaly present.   Cardiovascular: Normal rate, regular rhythm, normal heart sounds and intact distal pulses.  Exam reveals no gallop and no friction rub.    No murmur heard.  Pulses:       Carotid pulses are 2+ on the right side, and 2+ on the left side.       Radial pulses are 2+ on the right side, and 2+ on the left side.        Femoral pulses are 2+ on the right side, and 2+ on the left side.       Popliteal pulses are 2+ on the right side, and 2+ on the left side.        Dorsalis pedis pulses are 2+ on the right side, and 2+ on the left side.        Posterior tibial pulses are 2+ on the right side, and 2+ on the left side.   Pulmonary/Chest: Effort normal and breath sounds normal. No respiratory distress. He has no wheezes. He has no rales.   Abdominal: Soft. Bowel sounds are normal. He exhibits no distension. There is no hepatosplenomegaly. There is no tenderness. There is no rebound.   Musculoskeletal: Normal range of motion. He exhibits no edema or tenderness.   Neurological: He is alert and oriented to person, place, and time.   Skin: Skin is warm and dry. No rash noted. No erythema.   Psychiatric: He has a normal mood and affect. His behavior is normal.  Judgment and thought content normal.   Vitals reviewed.    Lab Review:     ECG 12 Lead  Date/Time: 9/30/2018 10:39 PM  Performed by: SAMUEL RIVAS  Authorized by: SAMUEL RIVAS   Comparison: compared with previous ECG   Similar to previous ECG  Rhythm: sinus rhythm  Conduction: 1st degree  Clinical impression: normal ECG          Assessment:       Diagnosis Plan   1. Thoracic aortic aneurysm without rupture (CMS/HCC)  MRI Angiogram Chest With & Without Contrast   2. Non-rheumatic mitral regurgitation  Adult Stress Echo W/ Cont or Stress Agent if Necessary Per Protocol   3. Dyspnea on exertion   Adult Stress Echo W/ Cont or Stress Agent if Necessary Per Protocol     Plan:       1.  Mitral prolapse with severe mitral regurgitation.  Will check a stress echocardiogram to assess further especially given recent dyspnea on exertion  2.  Dilated aortic root, we'll check an MRI angiogram of his chest  3.  Dyslipidemia  4.  Left iliac artery aneurysm followed by Dr. Yost  5.  Hypertension.  Blood pressure under good control       Your medication list          Accurate as of 9/27/18 11:59 PM. If you have any questions, ask your nurse or doctor.               CONTINUE taking these medications      Instructions Last Dose Given Next Dose Due   aspirin 81 MG tablet      Take 81 mg by mouth Daily.       glucosamine-chondroitin 500-400 MG capsule capsule      Take  by mouth. 2 po daily       losartan 25 MG tablet  Commonly known as:  COZAAR      Take 1 tablet by mouth Daily. Pt needs appointment asap with new provider no more refill after this       MULTI COMPLETE PO      Take 1 capsule by mouth daily.       simvastatin 20 MG tablet  Commonly known as:  ZOCOR      Take 1 tablet by mouth Every Night.          STOP taking these medications    finasteride 5 MG tablet  Commonly known as:  PROSCAR  Stopped by:  Samuel VILLAFUERTE. MD Roderick             Patient is no longer taking proscar.  I corrected the med list to reflect this.  I did not stop these  medications.    Dictated utilizing Dragon dictation

## 2018-09-30 PROCEDURE — 93000 ELECTROCARDIOGRAM COMPLETE: CPT | Performed by: INTERNAL MEDICINE

## 2018-10-08 RX ORDER — LOSARTAN POTASSIUM 25 MG/1
TABLET ORAL
Qty: 90 TABLET | Refills: 0 | OUTPATIENT
Start: 2018-10-08

## 2018-10-10 ENCOUNTER — HOSPITAL ENCOUNTER (OUTPATIENT)
Dept: MRI IMAGING | Facility: HOSPITAL | Age: 73
Discharge: HOME | End: 2018-10-10
Attending: INTERNAL MEDICINE | Admitting: INTERNAL MEDICINE

## 2018-10-10 DIAGNOSIS — I71.20 THORACIC AORTIC ANEURYSM WITHOUT RUPTURE (HCC): ICD-10-CM

## 2018-10-10 PROCEDURE — C8911 MRA W/O FOL W/CONT, CHEST: HCPCS

## 2018-10-10 PROCEDURE — 0 GADOBENATE DIMEGLUMINE 529 MG/ML SOLUTION: Performed by: INTERNAL MEDICINE

## 2018-10-10 PROCEDURE — A9577 INJ MULTIHANCE: HCPCS | Performed by: INTERNAL MEDICINE

## 2018-10-10 PROCEDURE — 82565 ASSAY OF CREATININE: CPT

## 2018-10-10 RX ADMIN — GADOBENATE DIMEGLUMINE 14 ML: 529 INJECTION, SOLUTION INTRAVENOUS at 15:43

## 2018-10-11 ENCOUNTER — HOSPITAL ENCOUNTER (OUTPATIENT)
Dept: CARDIOLOGY | Facility: HOSPITAL | Age: 73
Discharge: HOME | End: 2018-10-11
Attending: INTERNAL MEDICINE | Admitting: INTERNAL MEDICINE

## 2018-10-11 VITALS
DIASTOLIC BLOOD PRESSURE: 70 MMHG | HEART RATE: 68 BPM | HEIGHT: 68 IN | SYSTOLIC BLOOD PRESSURE: 132 MMHG | WEIGHT: 158 LBS | BODY MASS INDEX: 23.95 KG/M2

## 2018-10-11 DIAGNOSIS — R06.09 DYSPNEA ON EXERTION: ICD-10-CM

## 2018-10-11 DIAGNOSIS — I34.0 NON-RHEUMATIC MITRAL REGURGITATION: ICD-10-CM

## 2018-10-11 LAB — CREAT BLDA-MCNC: 0.9 MG/DL (ref 0.6–1.3)

## 2018-10-11 PROCEDURE — 93350 STRESS TTE ONLY: CPT

## 2018-10-11 PROCEDURE — 93325 DOPPLER ECHO COLOR FLOW MAPG: CPT | Performed by: INTERNAL MEDICINE

## 2018-10-11 PROCEDURE — 93320 DOPPLER ECHO COMPLETE: CPT | Performed by: INTERNAL MEDICINE

## 2018-10-11 PROCEDURE — 93016 CV STRESS TEST SUPVJ ONLY: CPT | Performed by: INTERNAL MEDICINE

## 2018-10-11 PROCEDURE — 93018 CV STRESS TEST I&R ONLY: CPT | Performed by: INTERNAL MEDICINE

## 2018-10-11 PROCEDURE — 93350 STRESS TTE ONLY: CPT | Performed by: INTERNAL MEDICINE

## 2018-10-11 PROCEDURE — 93017 CV STRESS TEST TRACING ONLY: CPT

## 2018-10-11 PROCEDURE — 93325 DOPPLER ECHO COLOR FLOW MAPG: CPT

## 2018-10-11 PROCEDURE — 93320 DOPPLER ECHO COMPLETE: CPT

## 2018-10-12 LAB
ASCENDING AORTA: 3.3 CM
BH CV ECHO MEAS - ACS: 2.4 CM
BH CV ECHO MEAS - AO MAX PG: 11.3 MMHG
BH CV ECHO MEAS - AO MEAN PG: 6.1 MMHG
BH CV ECHO MEAS - AO ROOT AREA (BSA CORRECTED): 2.3
BH CV ECHO MEAS - AO ROOT AREA: 14.4 CM^2
BH CV ECHO MEAS - AO ROOT DIAM: 4.3 CM
BH CV ECHO MEAS - AO V2 MAX: 167.8 CM/SEC
BH CV ECHO MEAS - AO V2 MEAN: 115.1 CM/SEC
BH CV ECHO MEAS - AO V2 VTI: 36.5 CM
BH CV ECHO MEAS - BSA(HAYCOCK): 1.9 M^2
BH CV ECHO MEAS - BSA: 1.9 M^2
BH CV ECHO MEAS - BZI_BMI: 24.3 KILOGRAMS/M^2
BH CV ECHO MEAS - BZI_METRIC_HEIGHT: 172.7 CM
BH CV ECHO MEAS - BZI_METRIC_WEIGHT: 72.6 KG
BH CV ECHO MEAS - EDV(CUBED): 200.6 ML
BH CV ECHO MEAS - EDV(MOD-SP2): 90 ML
BH CV ECHO MEAS - EDV(MOD-SP4): 113 ML
BH CV ECHO MEAS - EDV(TEICH): 170.1 ML
BH CV ECHO MEAS - EF(CUBED): 91.4 %
BH CV ECHO MEAS - EF(MOD-BP): 68 %
BH CV ECHO MEAS - EF(MOD-SP2): 70 %
BH CV ECHO MEAS - EF(MOD-SP4): 69 %
BH CV ECHO MEAS - EF(TEICH): 85.8 %
BH CV ECHO MEAS - ESV(CUBED): 17.2 ML
BH CV ECHO MEAS - ESV(MOD-SP2): 27 ML
BH CV ECHO MEAS - ESV(MOD-SP4): 35 ML
BH CV ECHO MEAS - ESV(TEICH): 24.2 ML
BH CV ECHO MEAS - IVS/LVPW: 1
BH CV ECHO MEAS - IVSD: 1.2 CM
BH CV ECHO MEAS - LAT PEAK E' VEL: 11 CM/SEC
BH CV ECHO MEAS - LV DIASTOLIC VOL/BSA (35-75): 60.8 ML/M^2
BH CV ECHO MEAS - LV MASS(C)D: 293.2 GRAMS
BH CV ECHO MEAS - LV MASS(C)DI: 157.7 GRAMS/M^2
BH CV ECHO MEAS - LV SYSTOLIC VOL/BSA (12-30): 18.8 ML/M^2
BH CV ECHO MEAS - LVIDD: 5.9 CM
BH CV ECHO MEAS - LVIDS: 2.6 CM
BH CV ECHO MEAS - LVLD AP2: 7.9 CM
BH CV ECHO MEAS - LVLD AP4: 7.7 CM
BH CV ECHO MEAS - LVLS AP2: 6.6 CM
BH CV ECHO MEAS - LVLS AP4: 6.9 CM
BH CV ECHO MEAS - LVPWD: 1.2 CM
BH CV ECHO MEAS - MED PEAK E' VEL: 7 CM/SEC
BH CV ECHO MEAS - MR MAX PG: 58.5 MMHG
BH CV ECHO MEAS - MR MAX VEL: 382.5 CM/SEC
BH CV ECHO MEAS - MV A DUR: 0.14 SEC
BH CV ECHO MEAS - MV A MAX VEL: 79.5 CM/SEC
BH CV ECHO MEAS - MV DEC SLOPE: 363.4 CM/SEC^2
BH CV ECHO MEAS - MV DEC TIME: 0.24 SEC
BH CV ECHO MEAS - MV E MAX VEL: 87.8 CM/SEC
BH CV ECHO MEAS - MV E/A: 1.1
BH CV ECHO MEAS - MV P1/2T MAX VEL: 89.7 CM/SEC
BH CV ECHO MEAS - MV P1/2T: 72.3 MSEC
BH CV ECHO MEAS - MVA P1/2T LCG: 2.5 CM^2
BH CV ECHO MEAS - MVA(P1/2T): 3 CM^2
BH CV ECHO MEAS - PI END-D VEL: 176.5 CM/SEC
BH CV ECHO MEAS - PULM A REVS DUR: 0.15 SEC
BH CV ECHO MEAS - PULM A REVS VEL: 28.6 CM/SEC
BH CV ECHO MEAS - PULM DIAS VEL: 26.7 CM/SEC
BH CV ECHO MEAS - PULM S/D: 2.5
BH CV ECHO MEAS - PULM SYS VEL: 65.5 CM/SEC
BH CV ECHO MEAS - RAP SYSTOLE: 8 MMHG
BH CV ECHO MEAS - RVSP: 23 MMHG
BH CV ECHO MEAS - SI(AO): 283.1 ML/M^2
BH CV ECHO MEAS - SI(CUBED): 98.7 ML/M^2
BH CV ECHO MEAS - SI(MOD-SP2): 33.9 ML/M^2
BH CV ECHO MEAS - SI(MOD-SP4): 42 ML/M^2
BH CV ECHO MEAS - SI(TEICH): 78.5 ML/M^2
BH CV ECHO MEAS - SV(AO): 526.2 ML
BH CV ECHO MEAS - SV(CUBED): 183.4 ML
BH CV ECHO MEAS - SV(MOD-SP2): 63 ML
BH CV ECHO MEAS - SV(MOD-SP4): 78 ML
BH CV ECHO MEAS - SV(TEICH): 146 ML
BH CV ECHO MEAS - TAPSE (>1.6): 2.4 CM2
BH CV ECHO MEAS - TR MAX VEL: 296.2 CM/SEC
BH CV ECHO MEASUREMENTS AVERAGE E/E' RATIO: 9.76
BH CV STRESS BP STAGE 1: NORMAL
BH CV STRESS BP STAGE 2: NORMAL
BH CV STRESS BP STAGE 3: NORMAL
BH CV STRESS BP STAGE 4: NORMAL
BH CV STRESS DURATION MIN STAGE 1: 3
BH CV STRESS DURATION MIN STAGE 2: 3
BH CV STRESS DURATION MIN STAGE 3: 3
BH CV STRESS DURATION MIN STAGE 4: 3
BH CV STRESS DURATION MIN STAGE 5: 1
BH CV STRESS DURATION SEC STAGE 1: 0
BH CV STRESS DURATION SEC STAGE 2: 0
BH CV STRESS DURATION SEC STAGE 3: 0
BH CV STRESS DURATION SEC STAGE 4: 0
BH CV STRESS DURATION SEC STAGE 5: 0
BH CV STRESS ECHO POST STRESS EJECTION FRACTION EF: 70 %
BH CV STRESS GRADE STAGE 1: 10
BH CV STRESS GRADE STAGE 2: 12
BH CV STRESS GRADE STAGE 3: 14
BH CV STRESS GRADE STAGE 4: 16
BH CV STRESS GRADE STAGE 5: 18
BH CV STRESS HR STAGE 1: 82
BH CV STRESS HR STAGE 2: 95
BH CV STRESS HR STAGE 3: 107
BH CV STRESS HR STAGE 4: 128
BH CV STRESS HR STAGE 5: 136
BH CV STRESS METS STAGE 1: 5
BH CV STRESS METS STAGE 2: 7.5
BH CV STRESS METS STAGE 3: 10
BH CV STRESS METS STAGE 4: 13.5
BH CV STRESS METS STAGE 5: 15
BH CV STRESS PROTOCOL 1: NORMAL
BH CV STRESS SPEED STAGE 1: 1.7
BH CV STRESS SPEED STAGE 2: 2.5
BH CV STRESS SPEED STAGE 3: 3.4
BH CV STRESS SPEED STAGE 4: 4.2
BH CV STRESS SPEED STAGE 5: 5
BH CV STRESS STAGE 1: 1
BH CV STRESS STAGE 2: 2
BH CV STRESS STAGE 3: 3
BH CV STRESS STAGE 4: 4
BH CV STRESS STAGE 5: 5
BH CV XLRA - RV BASE: 3.3 CM
BH CV XLRA - TDI S': 15 CM/SEC
LEFT ATRIUM VOLUME INDEX: 18 ML/M2
LEFT ATRIUM VOLUME: 33 CM3
LV EF 2D ECHO EST: 68 %
MAXIMAL PREDICTED HEART RATE: 147 BPM
PERCENT MAX PREDICTED HR: 92.52 %
SINUS: 3.3 CM
STJ: 3 CM
STRESS BASELINE BP: NORMAL MMHG
STRESS BASELINE HR: 68 BPM
STRESS PERCENT HR: 109 %
STRESS POST ESTIMATED WORKLOAD: 14 METS
STRESS POST EXERCISE DUR MIN: 13 MIN
STRESS POST EXERCISE DUR SEC: 0 SEC
STRESS POST PEAK BP: NORMAL MMHG
STRESS POST PEAK HR: 136 BPM
STRESS TARGET HR: 125 BPM

## 2018-10-18 ENCOUNTER — TELEPHONE (OUTPATIENT)
Dept: CARDIOLOGY | Facility: CLINIC | Age: 73
End: 2018-10-18

## 2018-10-18 NOTE — TELEPHONE ENCOUNTER
I talked to patient regarding stress echo results.  We'll defer further invasive testing or surgical options at this time.  He will pursue adequate blood pressure control and continue with a regular exercise regimen.  Will follow-up in 6 months and plan on repeat stress echo imaging in 1 year.    Okay to fill losartan and simvastatin appointment in January with Dr. Borrego. nette

## 2018-10-18 NOTE — TELEPHONE ENCOUNTER
Pt would like to know his results of the below echo, he also would like to know if you are willing to refill his losartan and simvastatin up until his appointment with his new PCP in January, his current PCP is retiring and this is the closest appointment he could find.

## 2018-10-18 NOTE — TELEPHONE ENCOUNTER
"1. Thoracic aortic aneurysm without rupture (CMS/HCC)  MRI Angiogram Chest With & Without Contrast   2. Non-rheumatic mitral regurgitation  Adult Stress Echo W/ Cont or Stress Agent if Necessary Per Protocol   3. Dyspnea on exertion   Adult Stress Echo W/ Cont or Stress Agent if Necessary Per Protocol      Plan:       1.  Mitral prolapse with severe mitral regurgitation.  Will check a stress echocardiogram to assess further especially given recent dyspnea on exertion  2.  Dilated aortic root, we'll check an MRI angiogram of his chest  3.  Dyslipidemia  4.  Left iliac artery aneurysm followed by Dr. Yost  5.  Hypertension.  Blood pressure under good control                       Mirza Zimmer MRN  [id number] Sex  Male  (Age)  1945 (73 y.o.)   Patient Hx Of Height, Weight, and Vitals     Height Weight BSA (Calculated - sq m) BMI (kg/m2) Pulse BP   172.7 cm (68\") 71.7 kg (158 lb) 1.85 sq meters 24.07 68 132/70   Patient Vitals     BP Pulse   132/70 68   Reason For Exam     Dyspnea   Dx: Dyspnea on exertion  [R06.09 (ICD-10-CM)]; Non-rheumatic mitral regurgitation [I34.0 (ICD-10-CM)]   Cardiac History     Diagnosis Date Comment Source   Hyperlipidemia   Provider   Social History     Tobacco Use     Never smoked or used smokeless tobacco.   Caffeine use   Family Cardiac History as of 10/11/2018     Problem Relation Age of Onset   Diabetes Father    Interpretation Summary     · Left ventricular systolic function is normal. Calculated EF = 68%. Estimated EF was in agreement with the calculated EF. Estimated EF appears to be in the range of 66 - 70%. Estimated EF = 68%. Normal left ventricular cavity size and wall thickness noted. All left ventricular wall segments contract normally. Left ventricular diastolic function is normal.  · There is severe prolapse located in the lateral scallop (P3) of the posterior leaflet present. There is mild anterior leaflet prolapse. . Moderate-to-severe mitral valve " regurgitation is present with an anteriorly-directed jet noted. No significant mitral valve stenosis is present.  · Mild tricuspid valve regurgitation is present. Estimated right ventricular systolic pressure from tricuspid regurgitation is normal (<35 mmHg). Calculated right ventricular systolic pressure from tricuspid regurgitation is 23 mmHg.  · Normal stress echo with no significant echocardiographic evidence for myocardial ischemia. The psot exercise LVEF is 70%.  · Calculated EXERCISE right ventricular systolic pressure from tricuspid regurgitation is 43 mmHg.      Study Description     A two-dimensional stress echocardiogram with Doppler and color flow was performed. Echocardiographic images were obtained before and after stress The study is technically good for diagnosis.REST EF 67%  POST EF 70%.   Echocardiogram Findings     Left Ventricle Left ventricular systolic function is normal. Calculated EF = 68%. Estimated EF was in agreement with the calculated EF. Estimated EF appears to be in the range of 66 - 70%. Estimated EF = 68%. Normal left ventricular cavity size and wall thickness noted. All left ventricular wall segments contract normally. Left ventricular diastolic function is normal.      Right Ventricle Normal right ventricular cavity size, wall thickness, systolic function and septal motion noted.      Left Atrium Normal left atrial volume noted.      Right Atrium Normal right atrial size noted. The inferior vena cava is normally sized. Partial IVC inspiratory collapse of less than 50% noted.      Aortic Valve The aortic valve is structurally normal. The valve appears trileaflet. No aortic valve regurgitation is present. No aortic valve stenosis is present.      Mitral Valve The mitral valve is abnormal in structure. There is severe prolapse located in the lateral scallop (P3) of the posterior leaflet present. There is mild anterior leaflet prolapse. . Moderate-to-severe mitral valve regurgitation  is present with an anteriorly-directed jet noted. No significant mitral valve stenosis is present.      Tricuspid Valve The tricuspid valve is normal. Mild tricuspid valve regurgitation is present. Estimated right ventricular systolic pressure from tricuspid regurgitation is normal (<35 mmHg). Calculated right ventricular systolic pressure from tricuspid regurgitation is 23 mmHg.      Pulmonic Valve The pulmonic valve is structurally normal. There is mild-to-moderate pulmonic valve regurgitation present.      Greater Vessels No dilation of the aortic root is present. No dilation of the proximal aorta is present. The aortic arch was not assessed. The inferior vena cava is dilated. Partial IVC inspiratory collapse of less than 50% noted.      Pericardium The pericardium is normal. There is no evidence of pericardial effusion.      Stress Procedure     Rest ECG Baseline ECG of normal sinus rhythm noted. Normal baseline ECG noted.   HI interval = 160 ms. QRS complex = 80 ms. There was no ST segment deviation noted.      Stress ECG Stress ECG of normal sinus rhythm noted. Normal ECG with no significant stress induced changes noted.   There was no ST segment deviation noted during stress.   Arrhythmias during stress: occasional PACs, occasional PVCs.   Arrhythmias during recovery: occasional PACs.   Arrhythmias were not significant.   ECG was interpretable and indicates a normal stress ECG.   Normal ECG stress ECG interpretation.      Stress Description A stress test was performed following the Shahab protocol.   The patient achieved the target heart rate. The patient requested the test to be stopped because the patient experienced fatigue.   The patient reported expected effects during the stress test. The patient experienced no angina during the stress test.   Low risk for ischemic heart disease.   Blood pressure demonstrated a normal response to stress. Heart rate demonstrated a normal response to stress. Overall, the  patient's exercise capacity was normal.      Stress Echo Findings     Ventricle Size / Description Segment augmentation had a normal response to stress. Cavity size behaved normally in response to stress. Left ventricular function is normal. Septal wall motion is normal.      Stress Echo - Peak Stress Findings Post stress images with adequate visualization of myocardium to assess for ischemia. Normal stress echo with no significant echocardiographic evidence for myocardial ischemia.      Stress Data     Stage Heart Rate (BPM) Blood Pressure (mmHg) Minutes Seconds Grade (%) Speed (MPH)   1        82        150/74        3        0        10        1.7          2        95        156/72        3        0        12        2.5          3        107        160/78        3        0        14        3.4          4        128        168/76        3        0        16        4.2          5        136            1        0        18        5          Stress Measurements     Baseline Vitals   Baseline HR 68 bpm      Baseline /70 mmHg       Peak Stress Vitals   Peak  bpm      Peak /76 mmHg       Exercise Data   Target HR (85%) 125 bpm      Max. Pred. HR (100%) 147 bpm      Percent Max Pred HR 92.52 %      Exercise duration (min) 13 min      Exercise duration (sec) 0 sec      Estimated workload 14 METS          LV Measurements     Dimensions   LVIDd 5.9 cm      IVSd 1.2 cm      ESV(cubed) 17.2 ml      EDV(cubed) 200.6 ml      EDV(MOD-sp2) 90 ml      ESV(MOD-sp2) 27 ml      Diastolic Filling   MV E max mitchell 87.8 cm/sec      MV A max mitchell 79.5 cm/sec      MV dec time 0.24 sec      MV E/A 1.1       Pulm A Revs Dur 0.15 sec      LA Volume Index 18 mL/m2      Med Peak E' Mitchell 7 cm/sec      Lat Peak E' Mitchell 11 cm/sec      Avg E/e' ratio 9.76        Dimensions   LVIDs 2.6 cm      LVPWd 1.2 cm      IVS/LVPW 1       LV Sys Vol (BSA corrected) 18.8 ml/m^2      LV Durham Vol (BSA corrected) 60.8 ml/m^2      LV mass(C)d 293.2  grams      EDV(MOD-sp4) 113 ml      ESV(MOD-sp4) 35 ml      Systolic Function   SV(MOD-sp2) 63 ml      SV(MOD-sp4) 78 ml      SI(MOD-sp2) 33.9 ml/m^2      SI(MOD-sp4) 42 ml/m^2      EF(MOD-sp2) 70 %      EF(MOD-sp4) 69 %      EF(MOD-bp) 68 %         Right Ventricle Measurements     TAPSE (>1.6) 2.4 cm2      RV Base 3.3 cm      TDI S' 15 cm/sec         LA Measurements     LA Dimensions   LA volume 33 cm3      LA Volume Index 18 mL/m2      Pulmonary Veins   Pulm Sys Mitchell 65.5 cm/sec      Pulm Durham Mitchell 26.7 cm/sec      Pulm S/D 2.5       Pulm A Revs Mitchell 28.6 cm/sec      Pulm A Revs Dur 0.15 sec         Aortic Valve Measurements     Stenosis   Ao pk mitchell 167.8 cm/sec       Stenosis   Ao max PG 11.3 mmHg      Ao mean PG 6.1 mmHg      Ao V2 VTI 36.5 cm         Mitral Valve Measurements     Stenosis   MV P1/2t 72.3 msec      MVA(P1/2t) 3 cm^2      MV dec slope 363.4 cm/sec^2      MV dec time 0.24 sec       Regurgitation   MR max mitchell 382.5 cm/sec      MR max PG 58.5 mmHg         Tricuspid Valve Measurements     Regurgitation   TR max mitchell 296.2 cm/sec      RVSP(TR) 23 mmHg      RAP systole 8 mmHg      PISA   TR max mitchell 296.2 cm/sec          Pulmonic Valve Measurements     Regurgitation   PI end-d mitchell 176.5 cm/sec          Greater Vessels Measurements     Ao root diam 4.3 cm      Ao root area (BSA corrected) 2.3       ACS 2.4 cm      Sinus 3.3 cm      STJ 3 cm      Ascending aorta 3.3 cm         Wall Scoring     Resting Score Index: 1.000 Percent Normal: 100.0%            The left ventricular wall motion is normal.         Stress Score Index: 1.000 Percent Normal: 100.0%            The left ventricular wall motion is normal.

## 2018-10-19 RX ORDER — LOSARTAN POTASSIUM 25 MG/1
25 TABLET ORAL DAILY
Qty: 90 TABLET | Refills: 0 | Status: SHIPPED | OUTPATIENT
Start: 2018-10-19 | End: 2019-01-14 | Stop reason: SDUPTHER

## 2018-10-19 RX ORDER — SIMVASTATIN 20 MG
20 TABLET ORAL NIGHTLY
Qty: 90 TABLET | Refills: 0 | Status: SHIPPED | OUTPATIENT
Start: 2018-10-19 | End: 2019-01-14 | Stop reason: SDUPTHER

## 2019-01-17 RX ORDER — SIMVASTATIN 20 MG
20 TABLET ORAL NIGHTLY
Qty: 90 TABLET | Refills: 0 | Status: SHIPPED | OUTPATIENT
Start: 2019-01-17 | End: 2019-04-29 | Stop reason: SDUPTHER

## 2019-01-17 RX ORDER — LOSARTAN POTASSIUM 25 MG/1
TABLET ORAL
Qty: 90 TABLET | Refills: 0 | Status: SHIPPED | OUTPATIENT
Start: 2019-01-17 | End: 2019-04-29 | Stop reason: SDUPTHER

## 2019-01-31 ENCOUNTER — CONSULT (OUTPATIENT)
Dept: ORTHOPEDIC SURGERY | Facility: CLINIC | Age: 74
End: 2019-01-31

## 2019-01-31 VITALS — BODY MASS INDEX: 24.25 KG/M2 | TEMPERATURE: 98.1 F | HEIGHT: 68 IN | WEIGHT: 160 LBS

## 2019-01-31 DIAGNOSIS — M25.561 ACUTE PAIN OF RIGHT KNEE: Primary | ICD-10-CM

## 2019-01-31 PROCEDURE — 20610 DRAIN/INJ JOINT/BURSA W/O US: CPT | Performed by: ORTHOPAEDIC SURGERY

## 2019-01-31 PROCEDURE — 73562 X-RAY EXAM OF KNEE 3: CPT | Performed by: ORTHOPAEDIC SURGERY

## 2019-01-31 PROCEDURE — 99203 OFFICE O/P NEW LOW 30 MIN: CPT | Performed by: ORTHOPAEDIC SURGERY

## 2019-01-31 RX ORDER — METHYLPREDNISOLONE ACETATE 80 MG/ML
80 INJECTION, SUSPENSION INTRA-ARTICULAR; INTRALESIONAL; INTRAMUSCULAR; SOFT TISSUE
Status: COMPLETED | OUTPATIENT
Start: 2019-01-31 | End: 2019-01-31

## 2019-01-31 RX ORDER — LIDOCAINE HYDROCHLORIDE 10 MG/ML
4 INJECTION, SOLUTION EPIDURAL; INFILTRATION; INTRACAUDAL; PERINEURAL
Status: COMPLETED | OUTPATIENT
Start: 2019-01-31 | End: 2019-01-31

## 2019-01-31 RX ADMIN — METHYLPREDNISOLONE ACETATE 80 MG: 80 INJECTION, SUSPENSION INTRA-ARTICULAR; INTRALESIONAL; INTRAMUSCULAR; SOFT TISSUE at 09:26

## 2019-01-31 RX ADMIN — LIDOCAINE HYDROCHLORIDE 4 ML: 10 INJECTION, SOLUTION EPIDURAL; INFILTRATION; INTRACAUDAL; PERINEURAL at 09:26

## 2019-01-31 NOTE — PROGRESS NOTES
New Right Knee      Patient: Mirza Zimmer        YOB: 1945    Medical Record Number: [id number]        Chief Complaints: right knee pain  Chief Complaint   Patient presents with   • Right Knee - Establish Care           History of Present Illness: This is a 73-year-old retired  presents complaining of right knee pain states he was on a safari 3 months ago when he missed a step but is unsure if that is really what bother it.  A couple days later did have some swelling and pain currently no swelling no night pain does have pain if he sits for prolonged period of time and then goes to get up he deftly hurts more with more weightbearing activity he rides his bike a lot and that does not bother him.  His current symptoms are moderate intermittent stabbing aching with clicking worse with sitting and getting up some better with anti-inflammatories past medical history smart for cardiac disease/mitral valve prolapse      Allergies:   Allergies   Allergen Reactions   • Sulfa Antibiotics        Medications:   Home Medications:  Current Outpatient Medications on File Prior to Visit   Medication Sig   • aspirin 81 MG tablet Take 81 mg by mouth Daily.   • glucosamine-chondroitin 500-400 MG capsule capsule Take  by mouth. 2 po daily   • losartan (COZAAR) 25 MG tablet TAKE 1 TABLET BY MOUTH DAILY   • Multiple Vitamins-Minerals (MULTI COMPLETE PO) Take 1 capsule by mouth daily.   • simvastatin (ZOCOR) 20 MG tablet TAKE 1 TABLET BY MOUTH EVERY NIGHT     No current facility-administered medications on file prior to visit.      Current Medications:  Scheduled Meds:  Continuous Infusions:  No current facility-administered medications for this visit.   PRN Meds:.    Past Medical History:   Diagnosis Date   • Dilated aortic root (CMS/HCC)    • Hyperlipidemia    • Mitral regurgitation    • Mitral valve insufficiency    • Muscular aches    • MVP (mitral valve prolapse)         Past Surgical History:   Procedure  Laterality Date   • COLONOSCOPY  05/28/2013   • EYE SURGERY      mole removed form eye   • TONSILLECTOMY          Social History     Occupational History   • Not on file   Tobacco Use   • Smoking status: Never Smoker   • Smokeless tobacco: Never Used   • Tobacco comment: Caffeine use   Substance and Sexual Activity   • Alcohol use: Yes     Comment: social occasional use   • Drug use: No   • Sexual activity: Defer    Social History     Social History Narrative   • Not on file        Family History   Problem Relation Age of Onset   • Diabetes Father    • Sudden death Father    • Cancer Sister         malignant neoplasm of breast             Review of Systems: 14 point review of systems are remarkable for the pertinent positives listed in the chart by the patient the remainder are negative    Review of Systems      Physical Exam: 73 y.o. male  General Appearance:    Alert, cooperative, in no acute distress                 There were no vitals filed for this visit.   Patient is alert and read ×3 no acute distress appears her above-listed at height weight and age.  Affect is normal respiratory rate is normal unlabored. Heart rate regular rate rhythm, sclera, dentition and hearing are normal for the purpose of this exam.        Ortho Exam  Physical exam of the right  knee reveals no effusion no redness.  The patient does have tenderness about the medial l joint line.  No tenderness about the lateral l joint line.  A negative bounce home and a positive l medial [place]akan.    Patient has a stable ligamentous exam.  The patient has a negative Lachman and negative anterior drawer and a negative pivot shift.  Quads are reasonable and symmetric bilaterally.  Calf is soft and nontender.  There is no overlying skin changes no lymphedema lymphadenopathy.  Patient has good hip range of motion full symmetric and asymptomatic and a normal ankle exam.  She has good distal pulses and sensation distally is intact      Large Joint  Arthrocentesis: R knee  Date/Time: 1/31/2019 9:26 AM  Consent given by: patient  Site marked: site marked  Timeout: Immediately prior to procedure a time out was called to verify the correct patient, procedure, equipment, support staff and site/side marked as required   Supporting Documentation  Indications: pain   Procedure Details  Location: knee - R knee  Preparation: Patient was prepped and draped in the usual sterile fashion  Needle size: 22 G  Approach: anteromedial  Medications administered: 80 mg methylPREDNISolone acetate 80 MG/ML; 4 mL lidocaine PF 1% 1 %  Patient tolerance: patient tolerated the procedure well with no immediate complications                   Radi no significant Oes have some very mild medial narrowing this is mild he does have some lipping of his lateral femoral condyle some squaring off the lateral tibial plateau moderate patellofemoral OA  Imaging Results (most recent)     Procedure Component Value Units Date/Time    XR Knee 3 View Right [[id number]] Resulted:  01/31/19 0851     Updated:  01/31/19 0851    Impression:       Ordering physician's impression is located in the Encounter Note dated 01/31/19. X-ray performed in the DR room.          Assessment/Plan:    Right knee pain my differential would be degenerative changes.  This could be degenerative from his patellofemoral joint could also be meniscal pathology plan is to proceed with an injection as a diagnostic and therapeutic to fails to improve we will pursue other means of testing

## 2019-03-27 ENCOUNTER — HOSPITAL ENCOUNTER (OUTPATIENT)
Dept: CARDIOLOGY | Facility: HOSPITAL | Age: 74
Discharge: HOME | End: 2019-03-27
Admitting: INTERNAL MEDICINE

## 2019-03-27 ENCOUNTER — OFFICE VISIT (OUTPATIENT)
Dept: CARDIOLOGY | Facility: CLINIC | Age: 74
End: 2019-03-27

## 2019-03-27 VITALS
HEIGHT: 68 IN | HEART RATE: 56 BPM | DIASTOLIC BLOOD PRESSURE: 78 MMHG | BODY MASS INDEX: 25.31 KG/M2 | WEIGHT: 167 LBS | SYSTOLIC BLOOD PRESSURE: 130 MMHG

## 2019-03-27 DIAGNOSIS — I34.1 MVP (MITRAL VALVE PROLAPSE): ICD-10-CM

## 2019-03-27 DIAGNOSIS — I34.0 NON-RHEUMATIC MITRAL REGURGITATION: Primary | ICD-10-CM

## 2019-03-27 DIAGNOSIS — I34.0 NON-RHEUMATIC MITRAL REGURGITATION: ICD-10-CM

## 2019-03-27 DIAGNOSIS — I77.810 DILATED AORTIC ROOT (HCC): ICD-10-CM

## 2019-03-27 DIAGNOSIS — I10 HYPERTENSION, ESSENTIAL: ICD-10-CM

## 2019-03-27 LAB
BH CV ECHO MEAS - ACS: 2.3 CM
BH CV ECHO MEAS - AO ROOT AREA (BSA CORRECTED): 2.1
BH CV ECHO MEAS - AO ROOT AREA: 11.9 CM^2
BH CV ECHO MEAS - AO ROOT DIAM: 3.9 CM
BH CV ECHO MEAS - BSA(HAYCOCK): 1.9 M^2
BH CV ECHO MEAS - BSA: 1.9 M^2
BH CV ECHO MEAS - BZI_BMI: 24.3 KILOGRAMS/M^2
BH CV ECHO MEAS - BZI_METRIC_HEIGHT: 172.7 CM
BH CV ECHO MEAS - BZI_METRIC_WEIGHT: 72.6 KG
BH CV ECHO MEAS - EDV(CUBED): 203 ML
BH CV ECHO MEAS - EDV(MOD-SP4): 99 ML
BH CV ECHO MEAS - EDV(TEICH): 171.7 ML
BH CV ECHO MEAS - EF(CUBED): 76.8 %
BH CV ECHO MEAS - EF(MOD-SP4): 60.6 %
BH CV ECHO MEAS - EF(TEICH): 68.1 %
BH CV ECHO MEAS - ESV(CUBED): 47.1 ML
BH CV ECHO MEAS - ESV(MOD-SP4): 39 ML
BH CV ECHO MEAS - ESV(TEICH): 54.8 ML
BH CV ECHO MEAS - FS: 38.6 %
BH CV ECHO MEAS - IVS/LVPW: 1.1
BH CV ECHO MEAS - IVSD: 1.1 CM
BH CV ECHO MEAS - LV DIASTOLIC VOL/BSA (35-75): 53.3 ML/M^2
BH CV ECHO MEAS - LV MASS(C)D: 249.9 GRAMS
BH CV ECHO MEAS - LV MASS(C)DI: 134.5 GRAMS/M^2
BH CV ECHO MEAS - LV SYSTOLIC VOL/BSA (12-30): 21 ML/M^2
BH CV ECHO MEAS - LVIDD: 5.9 CM
BH CV ECHO MEAS - LVIDS: 3.6 CM
BH CV ECHO MEAS - LVLD AP4: 7.7 CM
BH CV ECHO MEAS - LVLS AP4: 6.5 CM
BH CV ECHO MEAS - LVPWD: 1 CM
BH CV ECHO MEAS - PI END-D VEL: 120.8 CM/SEC
BH CV ECHO MEAS - RAP SYSTOLE: 3 MMHG
BH CV ECHO MEAS - RVSP: 27 MMHG
BH CV ECHO MEAS - SI(CUBED): 83.9 ML/M^2
BH CV ECHO MEAS - SI(MOD-SP4): 32.3 ML/M^2
BH CV ECHO MEAS - SI(TEICH): 62.9 ML/M^2
BH CV ECHO MEAS - SV(CUBED): 155.9 ML
BH CV ECHO MEAS - SV(MOD-SP4): 60 ML
BH CV ECHO MEAS - SV(TEICH): 116.8 ML
BH CV ECHO MEAS - TR MAX VEL: 245.7 CM/SEC
LV EF 2D ECHO EST: 60 %

## 2019-03-27 PROCEDURE — 99214 OFFICE O/P EST MOD 30 MIN: CPT | Performed by: INTERNAL MEDICINE

## 2019-03-27 PROCEDURE — 93321 DOPPLER ECHO F-UP/LMTD STD: CPT

## 2019-03-27 PROCEDURE — 93325 DOPPLER ECHO COLOR FLOW MAPG: CPT | Performed by: INTERNAL MEDICINE

## 2019-03-27 PROCEDURE — 93308 TTE F-UP OR LMTD: CPT

## 2019-03-27 PROCEDURE — 93000 ELECTROCARDIOGRAM COMPLETE: CPT | Performed by: INTERNAL MEDICINE

## 2019-03-27 PROCEDURE — 93321 DOPPLER ECHO F-UP/LMTD STD: CPT | Performed by: INTERNAL MEDICINE

## 2019-03-27 PROCEDURE — 93325 DOPPLER ECHO COLOR FLOW MAPG: CPT

## 2019-03-27 PROCEDURE — 93308 TTE F-UP OR LMTD: CPT | Performed by: INTERNAL MEDICINE

## 2019-03-27 NOTE — PROGRESS NOTES
Date of Office Visit: 2019  Encounter Provider: Adriana Vaughn MD  Place of Service: [facility] CARDIOLOGY  Patient Name: Mirza Zimmer  :1945    Chief complaint  Dilated aortic root and mitral valve prolapse with mitral regurgitation    History of Present Illness  Patient is a delightful 73-year-old gentleman with hypertension, hyperlipidemia diagnosed with mitral valve prolapse over 15 years ago.  In 2015 he had an echocardiogram revealed normal left ventricle size and function with an ejection fraction 57% with moderate severe prolapse of the posterior leaflets with severe antral directed mitral regurgitation.  Aortic root was 4.2 cm.  By CT angiography was 4.1 cm. There was also aneurysmal enlargement of the left common iliac artery.  MR angiogram of the chest showed a mildly dilated a sending thoracic aorta measuring 4.1 cm and was felt to be unchanged from 2017.  In 2018 he had a stress echocardiogram that showed normal left ventricular size and systolic function with an ejection fraction of 68% with severe prolapse of the posterior leaflet and mild prolapse of the anterior mitral leaflet.  There was moderate to severe anterior directed mitral regurgitation.  The resting RV systolic pressure was 23 mmHg.  Post exercise RV systolic pressure increased to 43 mmHg though he demonstrated excellent exercise tolerance and no ischemia at 14 METS    Since last visit he has had slightly more noticeable episodes of palpitations with exertion.  He denies any chest pain, palpitations syncope near syncope.  He has not been checking his blood pressure consistently at home    Past Medical History:   Diagnosis Date   • Dilated aortic root (CMS/HCC)    • Dyspnea on exertion    • Hyperlipidemia    • Mitral regurgitation    • Mitral valve insufficiency    • Muscular aches    • MVP (mitral valve prolapse)    • Thoracic aortic aneurysm without rupture (CMS/HCC)      Past  Surgical History:   Procedure Laterality Date   • COLONOSCOPY  05/28/2013   • EYE SURGERY      mole removed form eye   • TONSILLECTOMY       Outpatient Medications Prior to Visit   Medication Sig Dispense Refill   • aspirin 81 MG tablet Take 81 mg by mouth Daily.     • Fexofenadine HCl (ALLERGY 24-HR PO) Take  by mouth As Needed.     • glucosamine-chondroitin 500-400 MG capsule capsule Take  by mouth. 2 po daily     • losartan (COZAAR) 25 MG tablet TAKE 1 TABLET BY MOUTH DAILY 90 tablet 0   • simvastatin (ZOCOR) 20 MG tablet TAKE 1 TABLET BY MOUTH EVERY NIGHT 90 tablet 0   • Tadalafil (CIALIS PO) Take  by mouth As Needed.     • Multiple Vitamins-Minerals (MULTI COMPLETE PO) Take 1 capsule by mouth daily.       No facility-administered medications prior to visit.        Allergies as of 03/27/2019 - Reviewed 03/27/2019   Allergen Reaction Noted   • Sulfa antibiotics  02/18/2016     Social History     Socioeconomic History   • Marital status:      Spouse name: Not on file   • Number of children: Not on file   • Years of education: Not on file   • Highest education level: Not on file   Tobacco Use   • Smoking status: Never Smoker   • Smokeless tobacco: Never Used   • Tobacco comment: Caffeine use   Substance and Sexual Activity   • Alcohol use: Yes     Comment: social occasional use   • Drug use: No   • Sexual activity: Defer   Lifestyle   • Physical activity:     Days per week: 4 days     Minutes per session: 40 min   • Stress: Not on file     Family History   Problem Relation Age of Onset   • Diabetes Father    • Sudden death Father    • Cancer Sister         malignant neoplasm of breast     Review of Systems   Constitution: Negative for fever, malaise/fatigue, weight gain and weight loss.   HENT: Negative for ear pain, hearing loss, nosebleeds and sore throat.    Eyes: Negative for double vision, pain, vision loss in left eye and vision loss in right eye.   Cardiovascular:        See history of present  "illness.   Respiratory: Negative for cough, shortness of breath, sleep disturbances due to breathing, snoring and wheezing.    Endocrine: Negative for cold intolerance, heat intolerance and polyuria.   Skin: Negative for itching, poor wound healing and rash.   Musculoskeletal: Positive for joint pain. Negative for joint swelling and myalgias.   Gastrointestinal: Negative for abdominal pain, diarrhea, hematochezia, nausea and vomiting.   Genitourinary: Negative for hematuria and hesitancy.   Neurological: Negative for numbness, paresthesias and seizures.   Psychiatric/Behavioral: Negative for depression. The patient is not nervous/anxious.         Objective:     Vitals:    03/27/19 1149   BP: 130/78   Pulse: 56   Weight: 75.8 kg (167 lb)   Height: 172.7 cm (68\")     Body mass index is 25.39 kg/m².    Physical Exam   Constitutional: He is oriented to person, place, and time. He appears well-developed and well-nourished.   HENT:   Head: Normocephalic.   Nose: Nose normal.   Mouth/Throat: Oropharynx is clear and moist.   Eyes: Conjunctivae and EOM are normal. Pupils are equal, round, and reactive to light. Right eye exhibits no discharge. No scleral icterus.   Neck: Normal range of motion. Neck supple. No JVD present. No thyromegaly present.   Cardiovascular: Normal rate, regular rhythm and intact distal pulses. Exam reveals no gallop and no friction rub.   Murmur heard.  High-pitched blowing holosystolic murmur is present with a grade of 3/6 at the apex.  Pulses:       Carotid pulses are 2+ on the right side, and 2+ on the left side.       Radial pulses are 2+ on the right side, and 2+ on the left side.        Femoral pulses are 2+ on the right side, and 2+ on the left side.       Popliteal pulses are 2+ on the right side, and 2+ on the left side.        Dorsalis pedis pulses are 2+ on the right side, and 2+ on the left side.        Posterior tibial pulses are 2+ on the right side, and 2+ on the left side. "   Pulmonary/Chest: Effort normal and breath sounds normal. No respiratory distress. He has no wheezes. He has no rales.   Abdominal: Soft. Bowel sounds are normal. He exhibits no distension. There is no hepatosplenomegaly. There is no tenderness. There is no rebound.   Musculoskeletal: Normal range of motion. He exhibits no edema or tenderness.   Neurological: He is alert and oriented to person, place, and time.   Skin: Skin is warm and dry. No rash noted. No erythema.   Psychiatric: He has a normal mood and affect. His behavior is normal. Judgment and thought content normal.   Vitals reviewed.    Lab Review:     ECG 12 Lead  Date/Time: 3/27/2019 3:41 AM  Performed by: Adriana Vaughn MD  Authorized by: Adriana Vaughn MD   Comparison: compared with previous ECG   Similar to previous ECG  Rhythm: sinus rhythm  QRS axis: left  Other findings: non-specific ST-T wave changes    Clinical impression: abnormal EKG          Assessment:       Diagnosis Plan   1. Non-rheumatic mitral regurgitation  Adult Transthoracic Echo Limited W/ Cont if Necessary Per Protocol   2. MVP (mitral valve prolapse)     3. Hypertension, essential     4. Dilated aortic root (CMS/HCC)       Plan:           1.  Mitral prolapse with severe mitral regurgitation with mild exercise-induced pulmonary hypertension.  Check a limited echocardiogram.  Okay to stop aspirin at this time.  2.  Dilated aortic root, we'll check an MRI angiogram of his chest  3.  Dyslipidemia  4.  Left iliac artery aneurysm followed by Dr. Yost  5.  Hypertension.  He will check his blood pressure more frequently at home.       Your medication list           Accurate as of 3/27/19 11:59 PM. If you have any questions, ask your nurse or doctor.               CONTINUE taking these medications      Instructions Last Dose Given Next Dose Due   ALLERGY 24-HR PO      Take  by mouth As Needed.       aspirin 81 MG tablet      Take 81 mg by mouth Daily.       CIALIS PO      Take  by mouth As  Needed.       glucosamine-chondroitin 500-400 MG capsule capsule      Take  by mouth. 2 po daily       losartan 25 MG tablet  Commonly known as:  COZAAR      TAKE 1 TABLET BY MOUTH DAILY       simvastatin 20 MG tablet  Commonly known as:  ZOCOR      TAKE 1 TABLET BY MOUTH EVERY NIGHT          STOP taking these medications    MULTI COMPLETE PO  Stopped by:  Adriana Vaughn MD               Dictated utilizing Dragon dictation

## 2019-04-02 ENCOUNTER — TELEPHONE (OUTPATIENT)
Dept: CARDIOLOGY | Facility: CLINIC | Age: 74
End: 2019-04-02

## 2019-04-03 NOTE — TELEPHONE ENCOUNTER
Please let the patient know the heart looks strong and the mitral valve regurgitation is not well-seen and is at least moderate.  The pressures in his lungs are normal.  (previously elevated).  Please let him know to stay on the same regimen and call if blood pressure is greater than 130/80 or if he develops worsening shortness of breath palpitations or edema or chest pain otherwise stay on the same regimen.

## 2019-04-05 NOTE — TELEPHONE ENCOUNTER
Have made several attempts to reach this patient.    Breanna can you please send a letter?  Thanks  Yuliana Medina RN  Triage nurse

## 2019-04-09 NOTE — TELEPHONE ENCOUNTER
Yuliana  Pt returned your call regarding test results.  He states he has just returned from vacation from the past 2 weeks.  Thanks--Andressa    # [phone]

## 2019-04-09 NOTE — TELEPHONE ENCOUNTER
Called and discussed results.    In your note you mentioned that the pressure in his lungs was normal, they were abnormal in the past.  He is asking for value for the lung pressure.    Yuliana Medina RN  Triage nurse

## 2019-04-10 NOTE — TELEPHONE ENCOUNTER
On his last stress echo with exercises RV pressure increased to 43mmHg. On this echocardiogram the resting pressure is normal.  Had been concerned it would be higher and I am happy it is not.  Please let him know this mkd

## 2019-04-23 ENCOUNTER — OFFICE VISIT (OUTPATIENT)
Dept: INTERNAL MEDICINE | Facility: CLINIC | Age: 74
End: 2019-04-23

## 2019-04-23 VITALS
OXYGEN SATURATION: 98 % | WEIGHT: 163 LBS | BODY MASS INDEX: 24.71 KG/M2 | DIASTOLIC BLOOD PRESSURE: 70 MMHG | HEART RATE: 65 BPM | HEIGHT: 68 IN | SYSTOLIC BLOOD PRESSURE: 128 MMHG

## 2019-04-23 DIAGNOSIS — Z00.00 HEALTHCARE MAINTENANCE: Primary | ICD-10-CM

## 2019-04-23 DIAGNOSIS — Z11.59 ENCOUNTER FOR HEPATITIS C SCREENING TEST FOR LOW RISK PATIENT: ICD-10-CM

## 2019-04-23 DIAGNOSIS — Z12.5 SCREENING PSA (PROSTATE SPECIFIC ANTIGEN): ICD-10-CM

## 2019-04-23 DIAGNOSIS — E78.5 HYPERLIPIDEMIA, UNSPECIFIED HYPERLIPIDEMIA TYPE: ICD-10-CM

## 2019-04-23 DIAGNOSIS — N40.0 PROSTATIC HYPERTROPHY: ICD-10-CM

## 2019-04-23 DIAGNOSIS — R06.09 DOE (DYSPNEA ON EXERTION): ICD-10-CM

## 2019-04-23 DIAGNOSIS — I10 HYPERTENSION, ESSENTIAL: ICD-10-CM

## 2019-04-23 DIAGNOSIS — K62.1 RECTAL POLYP: ICD-10-CM

## 2019-04-23 DIAGNOSIS — I34.1 MVP (MITRAL VALVE PROLAPSE): ICD-10-CM

## 2019-04-23 DIAGNOSIS — R97.20 INCREASED PROSTATE SPECIFIC ANTIGEN (PSA) VELOCITY: ICD-10-CM

## 2019-04-23 DIAGNOSIS — I34.0 NON-RHEUMATIC MITRAL REGURGITATION: ICD-10-CM

## 2019-04-23 PROCEDURE — G0439 PPPS, SUBSEQ VISIT: HCPCS | Performed by: INTERNAL MEDICINE

## 2019-04-23 PROCEDURE — 99214 OFFICE O/P EST MOD 30 MIN: CPT | Performed by: INTERNAL MEDICINE

## 2019-04-24 LAB
ALBUMIN SERPL-MCNC: 4.6 G/DL (ref 3.5–5.2)
ALBUMIN/GLOB SERPL: 2 G/DL
ALP SERPL-CCNC: 68 U/L (ref 39–117)
ALT SERPL-CCNC: 19 U/L (ref 1–41)
APPEARANCE UR: CLEAR
AST SERPL-CCNC: 25 U/L (ref 1–40)
BACTERIA #/AREA URNS HPF: NORMAL /HPF
BASOPHILS # BLD AUTO: 0.04 10*3/MM3 (ref 0–0.2)
BASOPHILS NFR BLD AUTO: 1 % (ref 0–1.5)
BILIRUB SERPL-MCNC: 0.2 MG/DL (ref 0.2–1.2)
BILIRUB UR QL STRIP: NEGATIVE
BUN SERPL-MCNC: 19 MG/DL (ref 8–23)
BUN/CREAT SERPL: 23.5 (ref 7–25)
CALCIUM SERPL-MCNC: 9.5 MG/DL (ref 8.6–10.5)
CHLORIDE SERPL-SCNC: 101 MMOL/L (ref 98–107)
CHOLEST SERPL-MCNC: 163 MG/DL (ref 0–200)
CO2 SERPL-SCNC: 27 MMOL/L (ref 22–29)
COLOR UR: YELLOW
CREAT SERPL-MCNC: 0.81 MG/DL (ref 0.76–1.27)
EOSINOPHIL # BLD AUTO: 0.1 10*3/MM3 (ref 0–0.4)
EOSINOPHIL NFR BLD AUTO: 2.4 % (ref 0.3–6.2)
EPI CELLS #/AREA URNS HPF: NORMAL /HPF
ERYTHROCYTE [DISTWIDTH] IN BLOOD BY AUTOMATED COUNT: 12.7 % (ref 12.3–15.4)
GLOBULIN SER CALC-MCNC: 2.3 GM/DL
GLUCOSE SERPL-MCNC: 97 MG/DL (ref 65–99)
GLUCOSE UR QL: NEGATIVE
HCT VFR BLD AUTO: 44.1 % (ref 37.5–51)
HCV AB S/CO SERPL IA: <0.1 S/CO RATIO (ref 0–0.9)
HDLC SERPL-MCNC: 54 MG/DL (ref 40–60)
HGB BLD-MCNC: 14.2 G/DL (ref 13–17.7)
HGB UR QL STRIP: NEGATIVE
IMM GRANULOCYTES # BLD AUTO: 0.01 10*3/MM3 (ref 0–0.05)
IMM GRANULOCYTES NFR BLD AUTO: 0.2 % (ref 0–0.5)
KETONES UR QL STRIP: NEGATIVE
LDLC SERPL CALC-MCNC: 90 MG/DL (ref 0–100)
LDLC/HDLC SERPL: 1.67 {RATIO}
LEUKOCYTE ESTERASE UR QL STRIP: NEGATIVE
LYMPHOCYTES # BLD AUTO: 1.11 10*3/MM3 (ref 0.7–3.1)
LYMPHOCYTES NFR BLD AUTO: 26.5 % (ref 19.6–45.3)
MCH RBC QN AUTO: 31.3 PG (ref 26.6–33)
MCHC RBC AUTO-ENTMCNC: 32.2 G/DL (ref 31.5–35.7)
MCV RBC AUTO: 97.4 FL (ref 79–97)
MICRO URNS: NORMAL
MICRO URNS: NORMAL
MONOCYTES # BLD AUTO: 0.5 10*3/MM3 (ref 0.1–0.9)
MONOCYTES NFR BLD AUTO: 11.9 % (ref 5–12)
MUCOUS THREADS URNS QL MICRO: PRESENT /HPF
NEUTROPHILS # BLD AUTO: 2.43 10*3/MM3 (ref 1.7–7)
NEUTROPHILS NFR BLD AUTO: 58 % (ref 42.7–76)
NITRITE UR QL STRIP: NEGATIVE
NRBC BLD AUTO-RTO: 0 /100 WBC (ref 0–0.2)
PH UR STRIP: 5.5 [PH] (ref 5–7.5)
PLATELET # BLD AUTO: 168 10*3/MM3 (ref 140–450)
POTASSIUM SERPL-SCNC: 4.4 MMOL/L (ref 3.5–5.2)
PROT SERPL-MCNC: 6.9 G/DL (ref 6–8.5)
PROT UR QL STRIP: NEGATIVE
PSA SERPL-MCNC: 3.26 NG/ML (ref 0–4)
RBC # BLD AUTO: 4.53 10*6/MM3 (ref 4.14–5.8)
RBC #/AREA URNS HPF: NORMAL /HPF
SODIUM SERPL-SCNC: 141 MMOL/L (ref 136–145)
SP GR UR: 1.03 (ref 1–1.03)
TRIGL SERPL-MCNC: 93 MG/DL (ref 0–150)
TSH SERPL DL<=0.005 MIU/L-ACNC: 1.76 MIU/ML (ref 0.27–4.2)
URINALYSIS REFLEX: NORMAL
UROBILINOGEN UR STRIP-MCNC: 0.2 MG/DL (ref 0.2–1)
VLDLC SERPL CALC-MCNC: 18.6 MG/DL
WBC # BLD AUTO: 4.19 10*3/MM3 (ref 3.4–10.8)
WBC #/AREA URNS HPF: NORMAL /HPF

## 2019-04-25 NOTE — PROGRESS NOTES
Your laboratory results are NORMAL. We will discuss these results in detail at your next office visit. Please call with any questions or concerns.  Sincerely,  Radha Rollins MD

## 2019-04-29 ENCOUNTER — TELEPHONE (OUTPATIENT)
Dept: INTERNAL MEDICINE | Facility: CLINIC | Age: 74
End: 2019-04-29

## 2019-04-29 RX ORDER — TADALAFIL 5 MG/1
5 TABLET ORAL DAILY
Qty: 30 TABLET | Refills: 5 | Status: SHIPPED | OUTPATIENT
Start: 2019-04-29 | End: 2019-05-03 | Stop reason: SDUPTHER

## 2019-04-29 RX ORDER — LOSARTAN POTASSIUM 25 MG/1
TABLET ORAL
Qty: 90 TABLET | Refills: 1 | Status: SHIPPED | OUTPATIENT
Start: 2019-04-29 | End: 2019-10-21 | Stop reason: SDUPTHER

## 2019-04-30 RX ORDER — SIMVASTATIN 20 MG
20 TABLET ORAL NIGHTLY
Qty: 90 TABLET | Refills: 0 | Status: SHIPPED | OUTPATIENT
Start: 2019-04-30 | End: 2019-07-29 | Stop reason: SDUPTHER

## 2019-05-03 RX ORDER — TADALAFIL 5 MG/1
5 TABLET ORAL DAILY
Qty: 30 TABLET | Refills: 5 | Status: SHIPPED | OUTPATIENT
Start: 2019-05-03 | End: 2020-02-10

## 2019-05-09 ENCOUNTER — TELEPHONE (OUTPATIENT)
Dept: INTERNAL MEDICINE | Facility: CLINIC | Age: 74
End: 2019-05-09

## 2019-05-09 NOTE — TELEPHONE ENCOUNTER
He could try reducing to 1/2 tablet daily or even every other day  If symptoms persist then I would advise stopping the med

## 2019-05-09 NOTE — TELEPHONE ENCOUNTER
Called pt about his PA for Tadalafil  Pt bought it with [facility]  However while taking it it did give him a slight HA with indigestion.  Said it didn't last long but it did happen everyday. He is out of town and is not taking it at this time. He is asking should be resume when he returns and maybe the sx with ease of should he not take it at all?

## 2019-05-21 ENCOUNTER — OFFICE VISIT (OUTPATIENT)
Dept: SURGERY | Facility: CLINIC | Age: 74
End: 2019-05-21

## 2019-05-21 VITALS
DIASTOLIC BLOOD PRESSURE: 72 MMHG | WEIGHT: 163.9 LBS | HEART RATE: 59 BPM | HEIGHT: 68 IN | BODY MASS INDEX: 24.84 KG/M2 | SYSTOLIC BLOOD PRESSURE: 140 MMHG | OXYGEN SATURATION: 98 % | TEMPERATURE: 98 F

## 2019-05-21 DIAGNOSIS — Z86.010 HISTORY OF COLON POLYPS: ICD-10-CM

## 2019-05-21 DIAGNOSIS — K64.4 EXTERNAL HEMORRHOIDS WITH COMPLICATION: Primary | ICD-10-CM

## 2019-05-21 PROBLEM — Z86.0100 HISTORY OF COLON POLYPS: Status: ACTIVE | Noted: 2019-05-21

## 2019-05-21 PROCEDURE — 99204 OFFICE O/P NEW MOD 45 MIN: CPT | Performed by: COLON & RECTAL SURGERY

## 2019-05-21 RX ORDER — SODIUM CHLORIDE, SODIUM LACTATE, POTASSIUM CHLORIDE, CALCIUM CHLORIDE 600; 310; 30; 20 MG/100ML; MG/100ML; MG/100ML; MG/100ML
30 INJECTION, SOLUTION INTRAVENOUS CONTINUOUS
Status: CANCELLED | OUTPATIENT
Start: 2019-08-28

## 2019-05-21 NOTE — PROGRESS NOTES
Mirza Zimmer is a 74 y.o. male who is seen as a consult at the request of Radha Rollins MD for abnormal anal exam    HPI:    Pt went to PCP Dr. Radha Rollins, who found anorectal polyp on exam    He denies blood per rectum  No anorectal pain    He has regular, daily BMs  He does not take any fiber supplements or stool softener    He has not been applying any creams to his bottom    Most recent colonoscopy 2013 Dr. Conley: +small adenomatous polyp 50 cm from anus    FamHx: no known hx colon polyps or colon cancer    Cards Dr. Rivas  Vascular surgery Dr. Edgard Lee monitoring iliac artery aneurysm    He is a retired .  Considering going to [facility] in the fall    Past Medical History:   Diagnosis Date   • Arthritis    • BPH (benign prostatic hyperplasia)     FOLLOWED BY DR. MARK BALL   • Cardiomegaly     FOLLOWED BY DR. SAMUEL RIVAS   • Chronic prostatitis     FOLLOWED BY DR. MARK BALL   • Colon polyps    • Dilated aortic root (CMS/HCC)    • Dyspnea on exertion    • Elevated PSA    • Heart murmur    • Hyperlipidemia    • Hypertension    • Iliac artery aneurysm (CMS/HCC) 03/2018    FOLLOWED BY DR. EDGARD LEE   • Mitral regurgitation    • Mitral valve insufficiency    • Muscular aches    • MVP (mitral valve prolapse)    • Seborrheic keratosis    • Thoracic aortic aneurysm without rupture (CMS/HCC)    • Thoracic aortic ectasia (CMS/HCC) 07/2017       Past Surgical History:   Procedure Laterality Date   • COLONOSCOPY W/ POLYPECTOMY N/A 09/03/2004    5 MM ADENOMATOUS POLYP IN SIGMOID, OTHERWISE WNL, RESCOPE IN 3 YRS, DR. ANGI CONLEY AT [facility]   • COLONOSCOPY W/ POLYPECTOMY N/A 05/28/2013    3 MM SESSILE HYPERPLASTIC POLYP 50CM FROM ANUS, A FEW SMALL DIVERTICULA IN SIGMOID, NON BLEEDING INTERNAL HEMORRHOIDS GRADE II, DR. ANGI CONLEY AT [facility]   • COLONOSCOPY W/ POLYPECTOMY N/A 02/29/2008    5 MM HYPERPLASTIC POLYP AT 60 CM FROM ANUS, RESCOPE IN 5 YRS, DR. ANGI CONLEY AT [facility]   • EYE SURGERY  2003    ATYPICAL  MOLE EXCSION   • OTHER SURGICAL HISTORY N/A 08/15/2018    UROLIFT, PROSTATE VOLUME 48CC, NO ABNORMALITIES, DR. COLLIN GARVIN   • TONSILLECTOMY Bilateral 1958       Social History:   reports that he has never smoked. He has never used smokeless tobacco. He reports that he drinks alcohol. He reports that he does not use drugs.    Marriage status:     Family History   Problem Relation Age of Onset   • Dementia Mother    • Diabetes Father    • Sudden death Father    • Heart attack Father    • Cancer Sister         malignant neoplasm of breast   • Breast cancer Sister          Current Outpatient Medications:   •  glucosamine-chondroitin 500-400 MG capsule capsule, Take  by mouth. 2 po daily, Disp: , Rfl:   •  losartan (COZAAR) 25 MG tablet, TAKE 1 TABLET BY MOUTH DAILY, Disp: 90 tablet, Rfl: 1  •  simvastatin (ZOCOR) 20 MG tablet, TAKE 1 TABLET BY MOUTH EVERY NIGHT, Disp: 90 tablet, Rfl: 0  •  tadalafil (CIALIS) 5 MG tablet, Take 1 tablet by mouth Daily., Disp: 30 tablet, Rfl: 5    Allergy  Sulfa antibiotics    Review of Systems   Constitution: Negative for decreased appetite, weakness and weight gain.   HENT: Negative for congestion, hearing loss and hoarse voice.    Eyes: Negative for blurred vision, discharge and visual disturbance.   Cardiovascular: Negative for chest pain, cyanosis and leg swelling.   Respiratory: Negative for cough, shortness of breath, sleep disturbances due to breathing and snoring.    Endocrine: Negative for cold intolerance and heat intolerance.   Hematologic/Lymphatic: Does not bruise/bleed easily.   Skin: Negative for itching, poor wound healing and skin cancer.   Musculoskeletal: Positive for arthritis and joint pain. Negative for back pain and joint swelling.   Gastrointestinal: Negative for abdominal pain, change in bowel habit, bowel incontinence and constipation.   Genitourinary: Negative for bladder incontinence, dysuria and hematuria.   Neurological: Negative for brief  paralysis, excessive daytime sleepiness, dizziness, focal weakness, headaches and light-headedness.   Psychiatric/Behavioral: Negative for altered mental status and hallucinations. The patient does not have insomnia.    Allergic/Immunologic: Negative for HIV exposure and persistent infections.   All other systems reviewed and are negative.      Vitals:    05/21/19 1059   BP: 140/72   Pulse: 59   Temp: 98 °F (36.7 °C)   SpO2: 98%     Body mass index is 24.92 kg/m².    Physical Exam   Constitutional: He is oriented to person, place, and time. He appears well-developed and well-nourished. No distress.   HENT:   Head: Normocephalic and atraumatic.   Nose: Nose normal.   Mouth/Throat: Oropharynx is clear and moist.   Eyes: Conjunctivae and EOM are normal. Pupils are equal, round, and reactive to light.   Neck: Normal range of motion. No tracheal deviation present.   Pulmonary/Chest: Effort normal and breath sounds normal. No respiratory distress.   Abdominal: Soft. Bowel sounds are normal. He exhibits no distension.   Genitourinary:   Genitourinary Comments: Perianal exam: external hem - slightly enlarged RA hemorrhoid  PAMELA- good tone, no masses   Musculoskeletal: Normal range of motion. He exhibits no edema or deformity.   Neurological: He is alert and oriented to person, place, and time. No cranial nerve deficit. Coordination and gait normal.   Skin: Skin is warm and dry.   Psychiatric: He has a normal mood and affect. His behavior is normal. Judgment normal.       Review of Medical Record:  I reviewed PCP Dr. Rollins records: found anorectal polyp on exam    Assessment:  1. External hemorrhoids with complication    2. History of colon polyps        Plan:    Discussion with patient that extra tissue at anus is a benign external hemorrhoid; no surgical intervention needed.    For hx colon polyps, I recommend doing a colonoscopy.  I described the patient risks benefits and alternatives and he wishes to  proceed.      Scribed for Carolin Mercado MD by Renée Fisher PA-C 5/21/2019  This patient was evaluated by me, recommendations made, documentation reviewed, edited, and revised by me, Carolin Mercado MD

## 2019-07-23 ENCOUNTER — OFFICE VISIT (OUTPATIENT)
Dept: INTERNAL MEDICINE | Facility: CLINIC | Age: 74
End: 2019-07-23

## 2019-07-23 VITALS
HEART RATE: 56 BPM | DIASTOLIC BLOOD PRESSURE: 70 MMHG | OXYGEN SATURATION: 98 % | WEIGHT: 164 LBS | SYSTOLIC BLOOD PRESSURE: 130 MMHG | BODY MASS INDEX: 24.94 KG/M2

## 2019-07-23 DIAGNOSIS — R35.0 BENIGN PROSTATIC HYPERPLASIA WITH URINARY FREQUENCY: Primary | ICD-10-CM

## 2019-07-23 DIAGNOSIS — N52.9 ERECTILE DYSFUNCTION, UNSPECIFIED ERECTILE DYSFUNCTION TYPE: ICD-10-CM

## 2019-07-23 DIAGNOSIS — N40.1 BENIGN PROSTATIC HYPERPLASIA WITH URINARY FREQUENCY: Primary | ICD-10-CM

## 2019-07-23 DIAGNOSIS — I10 HYPERTENSION, ESSENTIAL: ICD-10-CM

## 2019-07-23 DIAGNOSIS — E78.5 HYPERLIPIDEMIA, UNSPECIFIED HYPERLIPIDEMIA TYPE: ICD-10-CM

## 2019-07-23 PROCEDURE — 99214 OFFICE O/P EST MOD 30 MIN: CPT | Performed by: INTERNAL MEDICINE

## 2019-07-23 NOTE — PROGRESS NOTES
Chief Complaint   Patient presents with   • Hypertension   • Hyperlipidemia   • Benign Prostatic Hypertrophy       History of Present Illness   Mirza Zimmer is a 74 y.o. male presents for follow up evaluation. Patient has hypertension, hyperlipidemia, BPH, and ED. He is doing fairly well today. He has urinary frequency. Has tried flomax and finasteride without much relief. He also has some challenges with ED. He started cialis 5 mg daily. Developed a headache with this. Reduced to 1/2 tablet a night. Continued urinary frequency about 2-3 times nightly. He has medically managed hypertension and hyperlipidemia. He is at goal for both bp and lipids. Labs from April reviewed and wnl.       The following portions of the patient's history were reviewed and updated as appropriate: allergies, current medications, past family history, past medical history, past social history, past surgical history and problem list.  Current Outpatient Medications on File Prior to Visit   Medication Sig Dispense Refill   • glucosamine-chondroitin 500-400 MG capsule capsule Take  by mouth. 2 po daily     • losartan (COZAAR) 25 MG tablet TAKE 1 TABLET BY MOUTH DAILY 90 tablet 1   • simvastatin (ZOCOR) 20 MG tablet TAKE 1 TABLET BY MOUTH EVERY NIGHT 90 tablet 0   • tadalafil (CIALIS) 5 MG tablet Take 1 tablet by mouth Daily. 30 tablet 5     No current facility-administered medications on file prior to visit.      Review of Systems   Constitutional: Negative.    HENT: Negative.    Eyes: Negative.    Respiratory: Negative.    Cardiovascular: Negative.    Gastrointestinal: Negative.    Endocrine: Negative.    Genitourinary:        Nocturia     Musculoskeletal: Negative.    Skin: Negative.    Allergic/Immunologic: Negative.    Neurological: Negative.    Hematological: Negative.    Psychiatric/Behavioral: Negative.        Objective   Physical Exam   Constitutional: He is oriented to person, place, and time. He appears well-developed and  well-nourished.   HENT:   Head: Normocephalic and atraumatic.   Right Ear: External ear normal.   Left Ear: External ear normal.   Nose: Nose normal.   Mouth/Throat: Oropharynx is clear and moist.   Eyes: Conjunctivae and EOM are normal. Pupils are equal, round, and reactive to light.   Neck: Normal range of motion. Neck supple.   Cardiovascular: Normal rate, regular rhythm, normal heart sounds and intact distal pulses.   Pulmonary/Chest: Effort normal and breath sounds normal.   Abdominal: Soft. Bowel sounds are normal.   Musculoskeletal: Normal range of motion.   Neurological: He is alert and oriented to person, place, and time.   Skin: Skin is warm and dry.   Psychiatric: He has a normal mood and affect. His behavior is normal. Judgment and thought content normal.   Nursing note and vitals reviewed.       /70   Pulse 56   Wt 74.4 kg (164 lb)   SpO2 98%   BMI 24.94 kg/m²     Assessment/Plan   Diagnoses and all orders for this visit:    Benign prostatic hyperplasia with urinary frequency    Erectile dysfunction, unspecified erectile dysfunction type    Hypertension, essential    Hyperlipidemia, unspecified hyperlipidemia type      Patient with BPH and ED. He will try dosing up cialis to 5 mg nightly. If this is not effective may try myrbetriq 25- 50 mg nightly. He will monitor bp and lipids and continue current medications for this. May need to follow up w/ urology in the future. psa wnl as well. He will follow up in 10 months or prn.

## 2019-07-29 RX ORDER — SIMVASTATIN 20 MG
20 TABLET ORAL NIGHTLY
Qty: 90 TABLET | Refills: 0 | OUTPATIENT
Start: 2019-07-29

## 2019-07-29 RX ORDER — SIMVASTATIN 20 MG
20 TABLET ORAL NIGHTLY
Qty: 90 TABLET | Refills: 0 | Status: SHIPPED | OUTPATIENT
Start: 2019-07-29 | End: 2019-10-19 | Stop reason: SDUPTHER

## 2019-08-26 ENCOUNTER — TELEPHONE (OUTPATIENT)
Dept: INTERNAL MEDICINE | Facility: CLINIC | Age: 74
End: 2019-08-26

## 2019-08-26 NOTE — TELEPHONE ENCOUNTER
If we have the vaccine he may have it here. He may want to verify w/ his insurance carrier if the cost is greater in office or pharmacy.

## 2019-08-28 ENCOUNTER — HOSPITAL ENCOUNTER (OUTPATIENT)
Facility: HOSPITAL | Age: 74
Setting detail: HOSPITAL OUTPATIENT SURGERY
Discharge: HOME | End: 2019-08-28
Attending: COLON & RECTAL SURGERY | Admitting: COLON & RECTAL SURGERY

## 2019-08-28 ENCOUNTER — ANESTHESIA (OUTPATIENT)
Dept: GASTROENTEROLOGY | Facility: HOSPITAL | Age: 74
End: 2019-08-28

## 2019-08-28 ENCOUNTER — ANESTHESIA EVENT (OUTPATIENT)
Dept: GASTROENTEROLOGY | Facility: HOSPITAL | Age: 74
End: 2019-08-28

## 2019-08-28 VITALS
TEMPERATURE: 97.7 F | SYSTOLIC BLOOD PRESSURE: 117 MMHG | RESPIRATION RATE: 16 BRPM | HEIGHT: 68 IN | WEIGHT: 159.19 LBS | OXYGEN SATURATION: 98 % | HEART RATE: 59 BPM | DIASTOLIC BLOOD PRESSURE: 73 MMHG | BODY MASS INDEX: 24.13 KG/M2

## 2019-08-28 DIAGNOSIS — Z86.010 HISTORY OF COLON POLYPS: ICD-10-CM

## 2019-08-28 PROCEDURE — 45385 COLONOSCOPY W/LESION REMOVAL: CPT | Performed by: COLON & RECTAL SURGERY

## 2019-08-28 PROCEDURE — 25010000002 PROPOFOL 10 MG/ML EMULSION: Performed by: NURSE ANESTHETIST, CERTIFIED REGISTERED

## 2019-08-28 PROCEDURE — 88305 TISSUE EXAM BY PATHOLOGIST: CPT | Performed by: COLON & RECTAL SURGERY

## 2019-08-28 RX ORDER — SODIUM CHLORIDE, SODIUM LACTATE, POTASSIUM CHLORIDE, CALCIUM CHLORIDE 600; 310; 30; 20 MG/100ML; MG/100ML; MG/100ML; MG/100ML
30 INJECTION, SOLUTION INTRAVENOUS CONTINUOUS
Status: DISCONTINUED | OUTPATIENT
Start: 2019-08-28 | End: 2019-08-28 | Stop reason: HOSPADM

## 2019-08-28 RX ORDER — PROPOFOL 10 MG/ML
VIAL (ML) INTRAVENOUS AS NEEDED
Status: DISCONTINUED | OUTPATIENT
Start: 2019-08-28 | End: 2019-08-28 | Stop reason: SURG

## 2019-08-28 RX ORDER — LIDOCAINE HYDROCHLORIDE 20 MG/ML
INJECTION, SOLUTION INFILTRATION; PERINEURAL AS NEEDED
Status: DISCONTINUED | OUTPATIENT
Start: 2019-08-28 | End: 2019-08-28 | Stop reason: SURG

## 2019-08-28 RX ORDER — PROPOFOL 10 MG/ML
VIAL (ML) INTRAVENOUS CONTINUOUS PRN
Status: DISCONTINUED | OUTPATIENT
Start: 2019-08-28 | End: 2019-08-28 | Stop reason: SURG

## 2019-08-28 RX ADMIN — PROPOFOL 250 MCG/KG/MIN: 10 INJECTION, EMULSION INTRAVENOUS at 10:27

## 2019-08-28 RX ADMIN — LIDOCAINE HYDROCHLORIDE 60 MG: 20 INJECTION, SOLUTION INFILTRATION; PERINEURAL at 10:27

## 2019-08-28 RX ADMIN — PROPOFOL 50 MG: 10 INJECTION, EMULSION INTRAVENOUS at 10:27

## 2019-08-28 RX ADMIN — SODIUM CHLORIDE, POTASSIUM CHLORIDE, SODIUM LACTATE AND CALCIUM CHLORIDE 30 ML/HR: 600; 310; 30; 20 INJECTION, SOLUTION INTRAVENOUS at 09:46

## 2019-08-28 NOTE — ANESTHESIA POSTPROCEDURE EVALUATION
Patient: Mirza Zimmer    Procedure Summary     Date:  08/28/19 Room / Location:  [facility] ENDOSCOPY 8 /  [facility]    Anesthesia Start:  1023 Anesthesia Stop:  1057    Procedure:  COLONOSCOPY to cecum with hot snare polypectomies (N/A ) Diagnosis:       History of colon polyps      (History of colon polyps [Z86.010])    Surgeon:  Carolin Mercado MD Provider:  Yuliana Meeks MD    Anesthesia Type:  MAC ASA Status:  2          Anesthesia Type: MAC  Last vitals  BP   117/73 (08/28/19 1126)   Temp   36.5 °C (97.7 °F) (08/28/19 0939)   Pulse   59 (08/28/19 1126)   Resp   16 (08/28/19 1126)     SpO2   98 % (08/28/19 1126)     Post Anesthesia Care and Evaluation    Patient location during evaluation: bedside  Patient participation: complete - patient participated  Level of consciousness: awake  Pain management: adequate  Airway patency: patent  Anesthetic complications: No anesthetic complications    Cardiovascular status: acceptable  Respiratory status: acceptable  Hydration status: acceptable

## 2019-08-28 NOTE — ANESTHESIA PREPROCEDURE EVALUATION
Anesthesia Evaluation                  Airway   Mallampati: II  TM distance: >3 FB  Neck ROM: full  No difficulty expected  Dental - normal exam     Pulmonary - normal exam   Cardiovascular - normal exam    (+) hypertension less than 2 medications, MOFFETT, PVD, hyperlipidemia,     ROS comment: Thoracic aortic aneurysm    Neuro/Psych  GI/Hepatic/Renal/Endo      Musculoskeletal     Abdominal    Substance History      OB/GYN          Other   (+) arthritis                     Anesthesia Plan    ASA 2     MAC     Anesthetic plan, all risks, benefits, and alternatives have been provided, discussed and informed consent has been obtained with: patient.

## 2019-08-29 LAB
CYTO UR: NORMAL
LAB AP CASE REPORT: NORMAL
PATH REPORT.FINAL DX SPEC: NORMAL
PATH REPORT.GROSS SPEC: NORMAL

## 2019-09-04 NOTE — PROGRESS NOTES
Subjective   AWV  Hypertension  hyperlipdiemia    Mirza Zimmer is a 74 y.o. male who presents to establish new patient care with this office. He was a former patient of another [facility] provider but is new to both myself and this office. He will have an annual wellness visit and discuss prior/ chronic medical issues. Patient is doing well today. He has a history of aortic root dilation, dyslipidemia, htn, mvp, mr. He follows with cardiology for this and had a recent echo and MRI of the chest that were wnl. He is normotensive. He is feeling well with some OA of the right knee. Recent injection w/ benefit. Finds that knee is less if he uses exercises outside of running and occasionally some discomfort w/ walking. History of elevated PSA. He went to urology (Dr. Retana) for this and prostatic hypertrophy. Prior record lists cystoscopy and prostate u/s but these are not available. He has taken finasteride with minimal benefit and then discontinued this.              Review of Systems   Constitutional: Negative.    HENT: Negative.    Eyes: Negative.    Respiratory: Negative.    Cardiovascular: Negative.    Gastrointestinal: Negative.    Endocrine: Negative.    Genitourinary: Positive for frequency.   Musculoskeletal: Positive for arthralgias.        Occasional knee pain   Skin: Negative.    Allergic/Immunologic: Negative.    Neurological: Negative.    Hematological: Negative.    Psychiatric/Behavioral: Negative.        The following portions of the patient's history were reviewed and updated as appropriate: allergies, current medications, past family history, past medical history, past social history, past surgical history and problem list.     Patient Active Problem List   Diagnosis   • Muscle pain   • Arthritis   • Hyperlipidemia   • Hypertension, essential   • MOFFETT (dyspnea on exertion)   • Mitral regurgitation   • MVP (mitral valve prolapse)   • Dilated aortic root (CMS/HCC)   • Increased prostate specific  antigen (PSA) velocity   • Other ill-defined heart diseases        Past Medical History:   Diagnosis Date   • Dilated aortic root (CMS/HCC)    • Dyspnea on exertion    • Hyperlipidemia    • Mitral regurgitation    • Mitral valve insufficiency    • Muscular aches    • MVP (mitral valve prolapse)    • Thoracic aortic aneurysm without rupture (CMS/HCC)        Past Surgical History:   Procedure Laterality Date   • COLONOSCOPY  05/28/2013   • EYE SURGERY      mole removed form eye   • TONSILLECTOMY         Family History   Problem Relation Age of Onset   • Diabetes Father    • Sudden death Father    • Cancer Sister         malignant neoplasm of breast       Social History     Socioeconomic History   • Marital status:      Spouse name: Not on file   • Number of children: Not on file   • Years of education: Not on file   • Highest education level: Not on file   Tobacco Use   • Smoking status: Never Smoker   • Smokeless tobacco: Never Used   • Tobacco comment: Caffeine use   Substance and Sexual Activity   • Alcohol use: Yes     Comment: social occasional use   • Drug use: No   • Sexual activity: Defer   Lifestyle   • Physical activity:     Days per week: 4 days     Minutes per session: 40 min   • Stress: Not on file       Current Outpatient Medications on File Prior to Visit   Medication Sig Dispense Refill   • Fexofenadine HCl (ALLERGY 24-HR PO) Take  by mouth As Needed.     • glucosamine-chondroitin 500-400 MG capsule capsule Take  by mouth. 2 po daily     • losartan (COZAAR) 25 MG tablet TAKE 1 TABLET BY MOUTH DAILY 90 tablet 0   • simvastatin (ZOCOR) 20 MG tablet TAKE 1 TABLET BY MOUTH EVERY NIGHT 90 tablet 0   • Tadalafil (CIALIS PO) Take  by mouth As Needed.     • aspirin 81 MG tablet Take 81 mg by mouth Daily.       No current facility-administered medications on file prior to visit.        Allergies   Allergen Reactions   • Sulfa Antibiotics        Immunization History   Administered Date(s) Administered   •  "Pneumococcal Conjugate 13-Valent (PCV13) 05/05/2016, 05/08/2017   • Tdap 08/08/2017       Objective     /70   Pulse 65   Ht 172.7 cm (68\")   Wt 73.9 kg (163 lb)   SpO2 98%   BMI 24.78 kg/m²     Physical Exam   Constitutional: He is oriented to person, place, and time. He appears well-developed and well-nourished.   HENT:   Head: Normocephalic and atraumatic.   Right Ear: External ear normal.   Left Ear: External ear normal.   Mouth/Throat: Oropharynx is clear and moist.   B cerumen impaction. Removed with a lighted loop.    Eyes: Conjunctivae and EOM are normal. Pupils are equal, round, and reactive to light.   Neck: Normal range of motion. Neck supple.   Cardiovascular: Normal rate, regular rhythm and normal heart sounds.   Pulmonary/Chest: Effort normal and breath sounds normal.   Abdominal: Soft. Bowel sounds are normal.   Genitourinary: Rectum normal and penis normal.   Genitourinary Comments: Prostatic smooth and enlarged.   Anal polyp v other   Musculoskeletal: Normal range of motion.   Neurological: He is alert and oriented to person, place, and time.   Skin: Skin is warm and dry.   Psychiatric: He has a normal mood and affect. His behavior is normal. Judgment and thought content normal.   Nursing note and vitals reviewed.      Assessment/Plan   Mirza was seen today for annual exam.    Diagnoses and all orders for this visit:    Healthcare maintenance  -     CBC & Differential  -     Comprehensive Metabolic Panel  -     Lipid Panel With LDL / HDL Ratio  -     PSA Screen  -     Hepatitis C Antibody    Rectal polyp  -     Ambulatory Referral to Colorectal Surgery    Prostatic hypertrophy  -     PSA Screen  -     Urinalysis With Culture If Indicated - Urine, Clean Catch    Hypertension, essential  -     CBC & Differential  -     Comprehensive Metabolic Panel  -     Urinalysis With Culture If Indicated - Urine, Clean Catch  -     TSH    Hyperlipidemia, unspecified hyperlipidemia type  -     Lipid " Panel With LDL / HDL Ratio  -     TSH    Non-rheumatic mitral regurgitation    MVP (mitral valve prolapse)    MOFFETT (dyspnea on exertion)    Increased prostate specific antigen (PSA) velocity    Encounter for hepatitis C screening test for low risk patient  -     Hepatitis C Antibody    Screening PSA (prostate specific antigen)  -     PSA Screen    Other orders  -     Microscopic Examination -        Discussion    AWV.    New patient to this office.   Direct observation of cognitive ability was evaluated.  Patient was given health advice or handouts on the following:  nutrition, fall prevention, exercise, weight management. He will have a screening PSA and Hep C antibody. He is referred for colon cancer screening and will have listed labs.    patient has hypertension. He is normotensive w current meds and will continue these medication and will test lipids and adjust meds if needed. He will continue to have routine follow up with his listed specialists. He will try cialis for BPH symptoms as well as ED and may f/u w/ urology if this is not effected. He has a small rectal polyp on exam that will have further evaluated. This is likely benign. He will continue to f/u w/ cardiology in regards to valvular dysfunction. He will f/u in 3 mo or prn. Total visit 60 min w/ 70% counseling.          Future Appointments   Date Time Provider Department Center   5/21/2019 10:50 AM Carolin Mercado MD [facility]  [facility] None   7/23/2019 10:15 AM Radha Rollins MD [facility] None   9/18/2019 11:30 AM Adriana Vaughn MD [facility] None          denies pain/discomfort

## 2019-09-18 ENCOUNTER — OFFICE VISIT (OUTPATIENT)
Dept: CARDIOLOGY | Facility: CLINIC | Age: 74
End: 2019-09-18

## 2019-09-18 VITALS
HEART RATE: 64 BPM | DIASTOLIC BLOOD PRESSURE: 68 MMHG | SYSTOLIC BLOOD PRESSURE: 128 MMHG | WEIGHT: 160 LBS | HEIGHT: 68 IN | BODY MASS INDEX: 24.25 KG/M2

## 2019-09-18 DIAGNOSIS — I34.1 MVP (MITRAL VALVE PROLAPSE): Primary | ICD-10-CM

## 2019-09-18 DIAGNOSIS — I71.20 THORACIC AORTIC ANEURYSM WITHOUT RUPTURE (HCC): ICD-10-CM

## 2019-09-18 DIAGNOSIS — I51.89 OTHER ILL-DEFINED HEART DISEASES: ICD-10-CM

## 2019-09-18 DIAGNOSIS — I34.0 NON-RHEUMATIC MITRAL REGURGITATION: ICD-10-CM

## 2019-09-18 PROCEDURE — 93000 ELECTROCARDIOGRAM COMPLETE: CPT | Performed by: INTERNAL MEDICINE

## 2019-09-18 PROCEDURE — 99214 OFFICE O/P EST MOD 30 MIN: CPT | Performed by: INTERNAL MEDICINE

## 2019-09-18 NOTE — PROGRESS NOTES
Date of Office Visit: 2019  Encounter Provider: Adriana Vaughn MD  Place of Service: [facility] CARDIOLOGY  Patient Name: Mirza Zimmer  :1945    Chief complaint  Dilated aortic root and mitral valve prolapse with mitral regurgitation    History of Present Illness  Patient is a delightful 74-year-old gentleman with hypertension, hyperlipidemia diagnosed with mitral valve prolapse over 15 years ago.  In 2015 he had an echocardiogram revealed normal left ventricle size and function with an ejection fraction 57% with moderate severe prolapse of the posterior leaflets with severe antral directed mitral regurgitation.  Aortic root was 4.2 cm.  By CT angiography was 4.1 cm. There was also aneurysmal enlargement of the left common iliac artery.  MR angiogram of the chest showed a mildly dilated a sending thoracic aorta measuring 4.1 cm and was felt to be unchanged from 2017.  In 2018 he had a stress echocardiogram that showed normal left ventricular size and systolic function with an ejection fraction of 68% with severe prolapse of the posterior leaflet and mild prolapse of the anterior mitral leaflet.  There was moderate to severe anterior directed mitral regurgitation.  The resting RV systolic pressure was 23 mmHg.  Post exercise RV systolic pressure increased to 43 mmHg though he demonstrated excellent exercise tolerance and no ischemia at 14 METS and he had a follow-up limited echocardiogram in 2019 that showed an ejection fraction of 60% with normal left ventricular cavity size with severe bileaflet prolapse with mild but likely underestimated mitral regurgitation.  RV systolic pressure was normal.  MR angiogram and 2018 showed an ascending aorta measuring 4.0 cm that was unchanged from study dated May 2017.  The aortic root measured 4.1 cm and was mildly dilated.  There was no dissection.    Since the last visit he is bicycling 30 minutes almost  daily achieving 9 miles he denies any chest pain, palpitations syncope near syncope.  His chronic dyspnea on exertion is unchanged.    Past Medical History:   Diagnosis Date   • Arthritis    • BPH (benign prostatic hyperplasia)     FOLLOWED BY DR. MARK BALL   • Cardiomegaly     FOLLOWED BY DR. SAMUEL RIVAS   • Chronic prostatitis     FOLLOWED BY DR. MARK BALL   • Colon polyps     FOLLOWED BY DR. KEVIN LUDWIG   • Dilated aortic root (CMS/HCC)    • Dyspnea on exertion    • Elevated PSA    • Heart murmur    • Hyperlipidemia    • Hypertension    • Iliac artery aneurysm (CMS/HCC) 03/2018    FOLLOWED BY DR. EDGARD LEE   • Mitral regurgitation    • Mitral valve insufficiency    • Muscular aches    • MVP (mitral valve prolapse)    • Seborrheic keratosis    • Thoracic aortic aneurysm without rupture (CMS/HCC)    • Thoracic aortic ectasia (CMS/HCC) 07/2017     Past Surgical History:   Procedure Laterality Date   • COLONOSCOPY N/A 8/28/2019    5 MM TUBULAR ADENOMA POLYP IN HEPATIC FLEXURE, 6 MM SERRATED ADENOMA POLYP IN TRANSVERSE, MULTIPLE DIVERTICULA IN SIGMOID, RESCOPE IN 2 YRS, DR. KEVIN LUDWIG AT [facility]   • COLONOSCOPY W/ POLYPECTOMY N/A 09/03/2004    5 MM ADENOMATOUS POLYP IN SIGMOID, OTHERWISE WNL, RESCOPE IN 3 YRS, DR. ANGI CONLEY AT [facility]   • COLONOSCOPY W/ POLYPECTOMY N/A 05/28/2013    3 MM SESSILE HYPERPLASTIC POLYP 50CM FROM ANUS, A FEW SMALL DIVERTICULA IN SIGMOID, NON BLEEDING INTERNAL HEMORRHOIDS GRADE II, DR. ANGI CONLEY AT [facility]   • COLONOSCOPY W/ POLYPECTOMY N/A 02/29/2008    5 MM HYPERPLASTIC POLYP AT 60 CM FROM ANUS, RESCOPE IN 5 YRS, DR. ANGI CONLEY AT [facility]   • EYE SURGERY  2003    ATYPICAL MOLE EXCSION   • OTHER SURGICAL HISTORY N/A 08/15/2018    UROLIFT, PROSTATE VOLUME 48CC, NO ABNORMALITIES, DR. COLLIN GARVIN   • TONSILLECTOMY Bilateral 1958     Outpatient Medications Prior to Visit   Medication Sig Dispense Refill   • glucosamine-chondroitin 500-400 MG capsule capsule Take  by mouth. 2 po daily     •  losartan (COZAAR) 25 MG tablet TAKE 1 TABLET BY MOUTH DAILY 90 tablet 1   • simvastatin (ZOCOR) 20 MG tablet TAKE 1 TABLET BY MOUTH EVERY NIGHT 90 tablet 0   • tadalafil (CIALIS) 5 MG tablet Take 1 tablet by mouth Daily. (Patient taking differently: Take 2.5 mg by mouth Daily.) 30 tablet 5     No facility-administered medications prior to visit.        Allergies as of 09/18/2019 - Reviewed 09/18/2019   Allergen Reaction Noted   • Sulfa antibiotics  02/18/2016     Social History     Socioeconomic History   • Marital status:      Spouse name: Not on file   • Number of children: Not on file   • Years of education: Not on file   • Highest education level: Not on file   Tobacco Use   • Smoking status: Never Smoker   • Smokeless tobacco: Never Used   • Tobacco comment: Caffeine use   Substance and Sexual Activity   • Alcohol use: Yes     Comment: social occasional use   • Drug use: No   • Sexual activity: Yes     Partners: Female   Lifestyle   • Physical activity:     Days per week: 4 days     Minutes per session: 40 min   • Stress: Not on file     Family History   Problem Relation Age of Onset   • Dementia Mother    • Diabetes Father    • Sudden death Father    • Heart attack Father    • Cancer Sister         malignant neoplasm of breast   • Breast cancer Sister      Review of Systems   Constitution: Negative for fever, malaise/fatigue, weight gain and weight loss.   HENT: Negative for ear pain, hearing loss, nosebleeds and sore throat.    Eyes: Negative for double vision, pain, vision loss in left eye and vision loss in right eye.   Cardiovascular:        See history of present illness.   Respiratory: Negative for cough, shortness of breath, sleep disturbances due to breathing, snoring and wheezing.    Endocrine: Negative for cold intolerance, heat intolerance and polyuria.   Skin: Negative for itching, poor wound healing and rash.   Musculoskeletal: Positive for joint pain. Negative for joint swelling and  "myalgias.   Gastrointestinal: Negative for abdominal pain, diarrhea, hematochezia, nausea and vomiting.   Genitourinary: Negative for hematuria and hesitancy.   Neurological: Positive for excessive daytime sleepiness. Negative for numbness, paresthesias and seizures.   Psychiatric/Behavioral: Negative for depression. The patient is not nervous/anxious.         Objective:     Vitals:    09/18/19 1151   BP: 128/68   Pulse: 64   Weight: 72.6 kg (160 lb)   Height: 172.7 cm (68\")     Body mass index is 24.33 kg/m².    Physical Exam   Constitutional: He is oriented to person, place, and time. He appears well-developed and well-nourished.   HENT:   Head: Normocephalic.   Nose: Nose normal.   Mouth/Throat: Oropharynx is clear and moist.   Eyes: Conjunctivae and EOM are normal. Pupils are equal, round, and reactive to light. Right eye exhibits no discharge. No scleral icterus.   Neck: Normal range of motion. Neck supple. No JVD present. No thyromegaly present.   Cardiovascular: Normal rate, regular rhythm and intact distal pulses. Exam reveals no gallop and no friction rub.   Murmur heard.  High-pitched blowing holosystolic murmur is present with a grade of 3/6 at the apex.  Pulses:       Carotid pulses are 2+ on the right side, and 2+ on the left side.       Radial pulses are 2+ on the right side, and 2+ on the left side.        Femoral pulses are 2+ on the right side, and 2+ on the left side.       Popliteal pulses are 2+ on the right side, and 2+ on the left side.        Dorsalis pedis pulses are 2+ on the right side, and 2+ on the left side.        Posterior tibial pulses are 2+ on the right side, and 2+ on the left side.   Pulmonary/Chest: Effort normal and breath sounds normal. No respiratory distress. He has no wheezes. He has no rales.   Abdominal: Soft. Bowel sounds are normal. He exhibits no distension. There is no hepatosplenomegaly. There is no tenderness. There is no rebound.   Musculoskeletal: Normal range of " motion. He exhibits no edema or tenderness.   Neurological: He is alert and oriented to person, place, and time.   Skin: Skin is warm and dry. No rash noted. No erythema.   Psychiatric: He has a normal mood and affect. His behavior is normal. Judgment and thought content normal.   Vitals reviewed.    Lab Review:     ECG 12 Lead  Date/Time: 9/18/2019 11:58 AM  Performed by: Adriana Vaughn MD  Authorized by: Adriana Vaughn MD   Comparison: compared with previous ECG   Similar to previous ECG  Rhythm: sinus rhythm    Clinical impression: normal ECG          Assessment:       Diagnosis Plan   1. MVP (mitral valve prolapse)  ECG 12 Lead    Adult Stress Echo W/ Cont or Stress Agent if Necessary Per Protocol   2. Non-rheumatic mitral regurgitation  ECG 12 Lead    Adult Stress Echo W/ Cont or Stress Agent if Necessary Per Protocol   3. Thoracic aortic aneurysm without rupture (CMS/HCC)     4. Other ill-defined heart diseases   Adult Stress Echo W/ Cont or Stress Agent if Necessary Per Protocol     Plan:       1.  Mitral prolapse with moderate to severe severe mitral regurgitation with mild exercise-induced pulmonary hypertension.  We will check a stress echocardiogram to reassess for pulmonary hypertension with exercise and contractile reserve.  The echocardiogram done in March likely underestimated severity regurgitation.  Clinically it is at least moderate and remains eccentric.  2.  Dilated aortic root, stable by MR imaging 1 year ago.  We will try to visualize on the stress echocardiogram as above  3.  Dyslipidemia.  Blood work in April 2019 showed improvement in LDL to 90.  HDL and triglycerides were acceptable  4.  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  5.  Hypertension.  Controlled       Your medication list           Accurate as of 9/18/19 11:59 PM. If you have any questions, ask your nurse or doctor.               CHANGE how you take these medications      Instructions Last Dose Given  Next Dose Due   tadalafil 5 MG tablet  Commonly known as:  CIALIS  What changed:  how much to take      Take 1 tablet by mouth Daily.          CONTINUE taking these medications      Instructions Last Dose Given Next Dose Due   glucosamine-chondroitin 500-400 MG capsule capsule      Take  by mouth. 2 po daily       losartan 25 MG tablet  Commonly known as:  COZAAR      TAKE 1 TABLET BY MOUTH DAILY       simvastatin 20 MG tablet  Commonly known as:  ZOCOR      TAKE 1 TABLET BY MOUTH EVERY NIGHT              Patient is no longer taking -.  I corrected the med list to reflect this.  I did not stop these medications.    Dictated utilizing Dragon dictation

## 2019-09-20 PROBLEM — I71.20 THORACIC AORTIC ANEURYSM WITHOUT RUPTURE (HCC): Status: ACTIVE | Noted: 2019-09-20

## 2019-09-24 ENCOUNTER — HOSPITAL ENCOUNTER (OUTPATIENT)
Dept: CARDIOLOGY | Facility: HOSPITAL | Age: 74
Discharge: HOME | End: 2019-09-24
Admitting: INTERNAL MEDICINE

## 2019-09-24 VITALS
HEIGHT: 68 IN | SYSTOLIC BLOOD PRESSURE: 120 MMHG | BODY MASS INDEX: 24.25 KG/M2 | WEIGHT: 160 LBS | DIASTOLIC BLOOD PRESSURE: 62 MMHG

## 2019-09-24 DIAGNOSIS — I51.89 OTHER ILL-DEFINED HEART DISEASES: ICD-10-CM

## 2019-09-24 DIAGNOSIS — I34.1 MVP (MITRAL VALVE PROLAPSE): ICD-10-CM

## 2019-09-24 DIAGNOSIS — I34.0 NON-RHEUMATIC MITRAL REGURGITATION: ICD-10-CM

## 2019-09-24 PROCEDURE — 93017 CV STRESS TEST TRACING ONLY: CPT

## 2019-09-24 PROCEDURE — 93350 STRESS TTE ONLY: CPT | Performed by: INTERNAL MEDICINE

## 2019-09-24 PROCEDURE — 93320 DOPPLER ECHO COMPLETE: CPT | Performed by: INTERNAL MEDICINE

## 2019-09-24 PROCEDURE — 93350 STRESS TTE ONLY: CPT

## 2019-09-24 PROCEDURE — 93325 DOPPLER ECHO COLOR FLOW MAPG: CPT

## 2019-09-24 PROCEDURE — 93018 CV STRESS TEST I&R ONLY: CPT | Performed by: INTERNAL MEDICINE

## 2019-09-24 PROCEDURE — 93016 CV STRESS TEST SUPVJ ONLY: CPT | Performed by: INTERNAL MEDICINE

## 2019-09-24 PROCEDURE — 93325 DOPPLER ECHO COLOR FLOW MAPG: CPT | Performed by: INTERNAL MEDICINE

## 2019-09-24 PROCEDURE — 93320 DOPPLER ECHO COMPLETE: CPT

## 2019-09-26 ENCOUNTER — TELEPHONE (OUTPATIENT)
Dept: CARDIOLOGY | Facility: CLINIC | Age: 74
End: 2019-09-26

## 2019-09-26 LAB
AORTIC ROOT ANNULUS: 2.2 CM
ASCENDING AORTA: 3.2 CM
BH CV ECHO MEAS - ACS: 2.5 CM
BH CV ECHO MEAS - AO MAX PG (FULL): 2.6 MMHG
BH CV ECHO MEAS - AO MAX PG: 8.2 MMHG
BH CV ECHO MEAS - AO MEAN PG (FULL): 1.2 MMHG
BH CV ECHO MEAS - AO MEAN PG: 5.2 MMHG
BH CV ECHO MEAS - AO ROOT AREA (BSA CORRECTED): 2.1
BH CV ECHO MEAS - AO ROOT AREA: 11.8 CM^2
BH CV ECHO MEAS - AO ROOT DIAM: 3.9 CM
BH CV ECHO MEAS - AO V2 MAX: 143.6 CM/SEC
BH CV ECHO MEAS - AO V2 MEAN: 107.8 CM/SEC
BH CV ECHO MEAS - AO V2 VTI: 33.8 CM
BH CV ECHO MEAS - AVA(I,A): 3.1 CM^2
BH CV ECHO MEAS - AVA(I,D): 3.1 CM^2
BH CV ECHO MEAS - AVA(V,A): 3.8 CM^2
BH CV ECHO MEAS - AVA(V,D): 3.8 CM^2
BH CV ECHO MEAS - BSA(HAYCOCK): 1.9 M^2
BH CV ECHO MEAS - BSA: 1.9 M^2
BH CV ECHO MEAS - BZI_BMI: 24.2 KILOGRAMS/M^2
BH CV ECHO MEAS - BZI_METRIC_HEIGHT: 172.7 CM
BH CV ECHO MEAS - BZI_METRIC_WEIGHT: 72.1 KG
BH CV ECHO MEAS - EDV(MOD-SP2): 120 ML
BH CV ECHO MEAS - EDV(MOD-SP4): 108 ML
BH CV ECHO MEAS - EDV(TEICH): 195.6 ML
BH CV ECHO MEAS - EF(CUBED): 75.8 %
BH CV ECHO MEAS - EF(MOD-BP): 58 %
BH CV ECHO MEAS - EF(MOD-SP2): 56.7 %
BH CV ECHO MEAS - EF(MOD-SP4): 75 %
BH CV ECHO MEAS - EF(TEICH): 66.7 %
BH CV ECHO MEAS - ESV(MOD-SP2): 52 ML
BH CV ECHO MEAS - ESV(MOD-SP4): 27 ML
BH CV ECHO MEAS - ESV(TEICH): 65.1 ML
BH CV ECHO MEAS - FS: 37.7 %
BH CV ECHO MEAS - IVS/LVPW: 0.88
BH CV ECHO MEAS - IVSD: 0.81 CM
BH CV ECHO MEAS - LAT PEAK E' VEL: 8 CM/SEC
BH CV ECHO MEAS - LV DIASTOLIC VOL/BSA (35-75): 58.3 ML/M^2
BH CV ECHO MEAS - LV MASS(C)D: 218.4 GRAMS
BH CV ECHO MEAS - LV MASS(C)DI: 117.8 GRAMS/M^2
BH CV ECHO MEAS - LV MAX PG: 5.7 MMHG
BH CV ECHO MEAS - LV MEAN PG: 4 MMHG
BH CV ECHO MEAS - LV SYSTOLIC VOL/BSA (12-30): 14.6 ML/M^2
BH CV ECHO MEAS - LV V1 MAX: 119.3 CM/SEC
BH CV ECHO MEAS - LV V1 MEAN: 95.6 CM/SEC
BH CV ECHO MEAS - LV V1 VTI: 23.2 CM
BH CV ECHO MEAS - LVIDD: 6.2 CM
BH CV ECHO MEAS - LVIDS: 3.9 CM
BH CV ECHO MEAS - LVLD AP2: 8.4 CM
BH CV ECHO MEAS - LVLD AP4: 7.5 CM
BH CV ECHO MEAS - LVLS AP2: 6.7 CM
BH CV ECHO MEAS - LVLS AP4: 5.4 CM
BH CV ECHO MEAS - LVOT AREA (M): 4.5 CM^2
BH CV ECHO MEAS - LVOT AREA: 4.6 CM^2
BH CV ECHO MEAS - LVOT DIAM: 2.4 CM
BH CV ECHO MEAS - LVPWD: 0.92 CM
BH CV ECHO MEAS - MED PEAK E' VEL: 8 CM/SEC
BH CV ECHO MEAS - MR MAX PG: 84 MMHG
BH CV ECHO MEAS - MR MAX VEL: 458.2 CM/SEC
BH CV ECHO MEAS - MV A DUR: 0.13 SEC
BH CV ECHO MEAS - MV A MAX VEL: 92.4 CM/SEC
BH CV ECHO MEAS - MV DEC SLOPE: 899.5 CM/SEC^2
BH CV ECHO MEAS - MV DEC TIME: 0.18 SEC
BH CV ECHO MEAS - MV E MAX VEL: 134 CM/SEC
BH CV ECHO MEAS - MV E/A: 1.4
BH CV ECHO MEAS - MV MAX PG: 11.4 MMHG
BH CV ECHO MEAS - MV MEAN PG: 2.2 MMHG
BH CV ECHO MEAS - MV P1/2T MAX VEL: 176.2 CM/SEC
BH CV ECHO MEAS - MV P1/2T: 57.4 MSEC
BH CV ECHO MEAS - MV V2 MAX: 168.9 CM/SEC
BH CV ECHO MEAS - MV V2 MEAN: 57.7 CM/SEC
BH CV ECHO MEAS - MV V2 VTI: 45.9 CM
BH CV ECHO MEAS - MVA P1/2T LCG: 1.2 CM^2
BH CV ECHO MEAS - MVA(P1/2T): 3.8 CM^2
BH CV ECHO MEAS - MVA(VTI): 2.3 CM^2
BH CV ECHO MEAS - PA MAX PG (FULL): 8.2 MMHG
BH CV ECHO MEAS - PA MAX PG: 8.7 MMHG
BH CV ECHO MEAS - PA V2 MAX: 147.2 CM/SEC
BH CV ECHO MEAS - PI END-D VEL: 102.6 CM/SEC
BH CV ECHO MEAS - PULM SYS VEL: 51.4 CM/SEC
BH CV ECHO MEAS - PVA(V,A): 1.6 CM^2
BH CV ECHO MEAS - PVA(V,D): 1.6 CM^2
BH CV ECHO MEAS - QP/QS: 0.48
BH CV ECHO MEAS - RAP SYSTOLE: 8 MMHG
BH CV ECHO MEAS - RV MAX PG: 0.42 MMHG
BH CV ECHO MEAS - RV MEAN PG: 0.27 MMHG
BH CV ECHO MEAS - RV V1 MAX: 32.5 CM/SEC
BH CV ECHO MEAS - RV V1 MEAN: 24 CM/SEC
BH CV ECHO MEAS - RV V1 VTI: 6.9 CM
BH CV ECHO MEAS - RVOT AREA: 7.3 CM^2
BH CV ECHO MEAS - RVOT DIAM: 3 CM
BH CV ECHO MEAS - RVSP: 32 MMHG
BH CV ECHO MEAS - SI(AO): 215.3 ML/M^2
BH CV ECHO MEAS - SI(CUBED): 98.5 ML/M^2
BH CV ECHO MEAS - SI(LVOT): 57.2 ML/M^2
BH CV ECHO MEAS - SI(MOD-SP2): 36.7 ML/M^2
BH CV ECHO MEAS - SI(MOD-SP4): 43.7 ML/M^2
BH CV ECHO MEAS - SI(TEICH): 70.4 ML/M^2
BH CV ECHO MEAS - SV(AO): 399.2 ML
BH CV ECHO MEAS - SV(CUBED): 182.6 ML
BH CV ECHO MEAS - SV(LVOT): 106 ML
BH CV ECHO MEAS - SV(MOD-SP2): 68 ML
BH CV ECHO MEAS - SV(MOD-SP4): 81 ML
BH CV ECHO MEAS - SV(RVOT): 50.5 ML
BH CV ECHO MEAS - SV(TEICH): 130.5 ML
BH CV ECHO MEAS - TAPSE (>1.6): 2.7 CM2
BH CV ECHO MEAS - TR MAX VEL: 244.3 CM/SEC
BH CV ECHO MEASUREMENTS AVERAGE E/E' RATIO: 16.75
BH CV STRESS BP STAGE 1: NORMAL
BH CV STRESS BP STAGE 2: NORMAL
BH CV STRESS BP STAGE 3: NORMAL
BH CV STRESS BP STAGE 4: NORMAL
BH CV STRESS DURATION MIN STAGE 1: 3
BH CV STRESS DURATION MIN STAGE 2: 3
BH CV STRESS DURATION MIN STAGE 3: 3
BH CV STRESS DURATION MIN STAGE 4: 3
BH CV STRESS DURATION SEC STAGE 1: 0
BH CV STRESS DURATION SEC STAGE 2: 0
BH CV STRESS DURATION SEC STAGE 3: 0
BH CV STRESS DURATION SEC STAGE 4: 0
BH CV STRESS ECHO POST STRESS EJECTION FRACTION EF: 66 %
BH CV STRESS GRADE STAGE 1: 10
BH CV STRESS GRADE STAGE 2: 12
BH CV STRESS GRADE STAGE 3: 14
BH CV STRESS GRADE STAGE 4: 16
BH CV STRESS HR STAGE 1: 76
BH CV STRESS HR STAGE 2: 90
BH CV STRESS HR STAGE 3: 104
BH CV STRESS HR STAGE 4: 127
BH CV STRESS METS STAGE 1: 5
BH CV STRESS METS STAGE 2: 7.5
BH CV STRESS METS STAGE 3: 10
BH CV STRESS METS STAGE 4: 13.5
BH CV STRESS PROTOCOL 1: NORMAL
BH CV STRESS SPEED STAGE 1: 1.7
BH CV STRESS SPEED STAGE 2: 2.5
BH CV STRESS SPEED STAGE 3: 3.4
BH CV STRESS SPEED STAGE 4: 4.2
BH CV STRESS STAGE 1: 1
BH CV STRESS STAGE 2: 2
BH CV STRESS STAGE 3: 3
BH CV STRESS STAGE 4: 4
BH CV XLRA - RV BASE: 4 CM
BH CV XLRA - TDI S': 18 CM/SEC
LEFT ATRIUM VOLUME INDEX: 35 ML/M2
LV EF 2D ECHO EST: 58 %
MAXIMAL PREDICTED HEART RATE: 146 BPM
PERCENT MAX PREDICTED HR: 86.99 %
SINUS: 3.6 CM
STJ: 3.5 CM
STRESS BASELINE BP: NORMAL MMHG
STRESS BASELINE HR: 57 BPM
STRESS PERCENT HR: 102 %
STRESS POST ESTIMATED WORKLOAD: 13 METS
STRESS POST EXERCISE DUR MIN: 12 MIN
STRESS POST EXERCISE DUR SEC: 0 SEC
STRESS POST PEAK BP: NORMAL MMHG
STRESS POST PEAK HR: 127 BPM
STRESS TARGET HR: 124 BPM

## 2019-09-26 NOTE — TELEPHONE ENCOUNTER
I talked to patient regarding stress echocardiogram results.  Exercise tolerance may be a little less than last year though he states he could have gone longer but was stopped by the technicians after reaching target heart rate.  He remains active without any symptoms.  However both ventricle is slightly larger as is the left atrium.  Diastolic function is worse.  Recommended that we consider further close follow-up and will return in 3 months to see me.  In addition we will present him in valve conference.  Overall I favor     Deniz, please arrange for follow-up with me in 3 months   Katie, I really need to present this patient at valve conference.Gabi

## 2019-09-30 NOTE — TELEPHONE ENCOUNTER
"Test was terminated   \"The patient achieved the target heart rate. The test was stopped because the patient complained of fatigue and shortness of breath.\"  He is on the list of for conference  "

## 2019-10-21 RX ORDER — SIMVASTATIN 20 MG
20 TABLET ORAL NIGHTLY
Qty: 90 TABLET | Refills: 0 | Status: SHIPPED | OUTPATIENT
Start: 2019-10-21 | End: 2020-01-17

## 2019-10-22 RX ORDER — LOSARTAN POTASSIUM 25 MG/1
TABLET ORAL
Qty: 90 TABLET | Refills: 1 | Status: SHIPPED | OUTPATIENT
Start: 2019-10-22 | End: 2020-05-05

## 2019-10-22 RX ORDER — LOSARTAN POTASSIUM 25 MG/1
TABLET ORAL
Qty: 90 TABLET | Refills: 1 | Status: SHIPPED | OUTPATIENT
Start: 2019-10-22 | End: 2019-10-22 | Stop reason: SDUPTHER

## 2020-01-17 RX ORDER — SIMVASTATIN 20 MG
20 TABLET ORAL NIGHTLY
Qty: 90 TABLET | Refills: 0 | Status: SHIPPED | OUTPATIENT
Start: 2020-01-17 | End: 2020-05-06

## 2020-02-10 ENCOUNTER — OFFICE VISIT (OUTPATIENT)
Dept: CARDIOLOGY | Facility: CLINIC | Age: 75
End: 2020-02-10

## 2020-02-10 VITALS
DIASTOLIC BLOOD PRESSURE: 78 MMHG | HEART RATE: 70 BPM | WEIGHT: 169 LBS | SYSTOLIC BLOOD PRESSURE: 132 MMHG | BODY MASS INDEX: 25.61 KG/M2 | HEIGHT: 68 IN

## 2020-02-10 DIAGNOSIS — I10 HYPERTENSION, ESSENTIAL: ICD-10-CM

## 2020-02-10 DIAGNOSIS — I77.810 DILATED AORTIC ROOT (HCC): ICD-10-CM

## 2020-02-10 DIAGNOSIS — I34.1 MVP (MITRAL VALVE PROLAPSE): Primary | ICD-10-CM

## 2020-02-10 DIAGNOSIS — I34.0 NON-RHEUMATIC MITRAL REGURGITATION: ICD-10-CM

## 2020-02-10 PROCEDURE — 99213 OFFICE O/P EST LOW 20 MIN: CPT | Performed by: INTERNAL MEDICINE

## 2020-02-10 PROCEDURE — 93000 ELECTROCARDIOGRAM COMPLETE: CPT | Performed by: INTERNAL MEDICINE

## 2020-02-10 RX ORDER — TADALAFIL 5 MG/1
TABLET ORAL
Qty: 60 TABLET | Refills: 4 | Status: SHIPPED | OUTPATIENT
Start: 2020-02-10 | End: 2020-05-26 | Stop reason: SDUPTHER

## 2020-02-10 NOTE — PROGRESS NOTES
Date of Office Visit: 02/10/2020  Encounter Provider: Adriana Vaughn MD  Place of Service: [facility] CARDIOLOGY  Patient Name: Mirza Zimmer  :1945    Chief complaint  Mitral valve regurgitation, aortic aneurysm    History of Present Illness  Patient is a pleasant 74-year-old gentleman history of hypertension, hyperlipidemia with a mitral valve prolapse mild regurgitation and aortic root dilatation.  In  an echocardiogram showed an ejection fraction of 57% with moderate severe prolapse the posterior leaflet with severe anterior directed mitral gravitation.  Aortic root measured 4.2 cm.  MR angiogram of the chest also showed a mildly dilated a sending aorta measuring 4.1 cm.  Last MR angiogram of the chest dated 2018 showed a mildly dilated aortic arch measuring 4.1 cm clinically he is doing well and we have opted to follow him with serial echocardiographic studies including stress imaging.  Last stress test in 2019 showed an ejection fraction 58% with moderately dilated left ventricle with grade 2 diastolic dysfunction, moderate left atrial margin, mild right atrial enlargement, severe prolapse of the posterior leaflet with severe anterior directed mitral regurgitation at rest.  There is moderate tricuspid regurgitation with an RV systolic pressure 32 mmHg.  Following exercise (12 minutes on the Shahab protocol achieving 13 METS) blood pressure response was appropriate and there was worse regurgitation diastolic function.  Unable to assess RV systolic pressure post exercise.  Ejection fraction did increased to 66%.    Since the last visit he has not been checking his blood pressure regularly.  He denies any chest pain, syncope near syncope shortness of breath or palpitations.  On occasion when he stands suddenly he gets lightheadedness.  He is active around his home.    Past Medical History:   Diagnosis Date   • Arthritis    • BPH (benign prostatic  hyperplasia)     FOLLOWED BY DR. MARK BALL   • Cardiomegaly     FOLLOWED BY DR. SAMUEL RIVAS   • Chronic prostatitis     FOLLOWED BY DR. MARK BALL   • Colon polyps     FOLLOWED BY DR. KEVIN LUDWIG   • Dilated aortic root (CMS/HCC)    • Dyspnea on exertion    • Elevated PSA    • Heart murmur    • Hyperlipidemia    • Hypertension    • Iliac artery aneurysm (CMS/HCC) 03/2018    FOLLOWED BY DR. EDGARD LEE   • Mitral regurgitation    • Mitral valve insufficiency    • Muscular aches    • MVP (mitral valve prolapse)    • Seborrheic keratosis    • Thoracic aortic aneurysm without rupture (CMS/HCC)    • Thoracic aortic ectasia (CMS/HCC) 07/2017     Past Surgical History:   Procedure Laterality Date   • COLONOSCOPY N/A 8/28/2019    5 MM TUBULAR ADENOMA POLYP IN HEPATIC FLEXURE, 6 MM SERRATED ADENOMA POLYP IN TRANSVERSE, MULTIPLE DIVERTICULA IN SIGMOID, RESCOPE IN 2 YRS, DR. KEVIN LUDWIG AT [facility]   • COLONOSCOPY W/ POLYPECTOMY N/A 09/03/2004    5 MM ADENOMATOUS POLYP IN SIGMOID, OTHERWISE WNL, RESCOPE IN 3 YRS, DR. ANGI CONLEY AT [facility]   • COLONOSCOPY W/ POLYPECTOMY N/A 05/28/2013    3 MM SESSILE HYPERPLASTIC POLYP 50CM FROM ANUS, A FEW SMALL DIVERTICULA IN SIGMOID, NON BLEEDING INTERNAL HEMORRHOIDS GRADE II, DR. ANGI CONLEY AT [facility]   • COLONOSCOPY W/ POLYPECTOMY N/A 02/29/2008    5 MM HYPERPLASTIC POLYP AT 60 CM FROM ANUS, RESCOPE IN 5 YRS, DR. ANGI CONLEY AT [facility]   • EYE SURGERY  2003    ATYPICAL MOLE EXCSION   • OTHER SURGICAL HISTORY N/A 08/15/2018    UROLIFT, PROSTATE VOLUME 48CC, NO ABNORMALITIES, DR. COLLIN GARVIN   • TONSILLECTOMY Bilateral 1958     Outpatient Medications Prior to Visit   Medication Sig Dispense Refill   • glucosamine-chondroitin 500-400 MG capsule capsule Take  by mouth. 2 po daily     • losartan (COZAAR) 25 MG tablet TAKE 1 TABLET BY MOUTH DAILY 90 tablet 1   • simvastatin (ZOCOR) 20 MG tablet TAKE 1 TABLET BY MOUTH EVERY NIGHT 90 tablet 0   • tadalafil (CIALIS) 5 MG tablet TAKE ONE TABLET BY MOUTH  DAILY 60 tablet 4     No facility-administered medications prior to visit.        Allergies as of 02/10/2020 - Reviewed 02/10/2020   Allergen Reaction Noted   • Sulfa antibiotics  02/18/2016     Social History     Socioeconomic History   • Marital status:      Spouse name: Not on file   • Number of children: Not on file   • Years of education: Not on file   • Highest education level: Not on file   Tobacco Use   • Smoking status: Never Smoker   • Smokeless tobacco: Never Used   • Tobacco comment: Caffeine use   Substance and Sexual Activity   • Alcohol use: Yes     Comment: social occasional use   • Drug use: No   • Sexual activity: Yes     Partners: Female   Lifestyle   • Physical activity:     Days per week: 4 days     Minutes per session: 40 min   • Stress: Not on file     Family History   Problem Relation Age of Onset   • Dementia Mother    • Diabetes Father    • Sudden death Father    • Heart attack Father    • Cancer Sister         malignant neoplasm of breast   • Breast cancer Sister      Review of Systems   Constitution: Negative for fever, malaise/fatigue, weight gain and weight loss.   HENT: Negative for ear pain, hearing loss, nosebleeds and sore throat.    Eyes: Negative for double vision, pain, vision loss in left eye and vision loss in right eye.   Cardiovascular:        See history of present illness.   Respiratory: Negative for cough, shortness of breath, sleep disturbances due to breathing, snoring and wheezing.    Endocrine: Negative for cold intolerance, heat intolerance and polyuria.   Skin: Negative for itching, poor wound healing and rash.   Musculoskeletal: Negative for joint pain, joint swelling and myalgias.   Gastrointestinal: Negative for abdominal pain, diarrhea, hematochezia, nausea and vomiting.   Genitourinary: Negative for hematuria and hesitancy.   Neurological: Negative for numbness, paresthesias and seizures.   Psychiatric/Behavioral: Negative for depression. The patient is  "not nervous/anxious.         Objective:     Vitals:    02/10/20 1349   BP: 132/78   BP Location: Right arm   Patient Position: Sitting   Pulse: 70   Weight: 76.7 kg (169 lb)   Height: 172.7 cm (68\")     Body mass index is 25.7 kg/m².    Physical Exam   Constitutional: He is oriented to person, place, and time. He appears well-developed and well-nourished.   HENT:   Head: Normocephalic.   Nose: Nose normal.   Mouth/Throat: Oropharynx is clear and moist.   Eyes: Pupils are equal, round, and reactive to light. Conjunctivae and EOM are normal. Right eye exhibits no discharge. No scleral icterus.   Neck: Normal range of motion. Neck supple. No JVD present. No thyromegaly present.   Cardiovascular: Normal rate, regular rhythm, normal heart sounds and intact distal pulses. Exam reveals no gallop and no friction rub.   No murmur heard.  Pulses:       Carotid pulses are 2+ on the right side, and 2+ on the left side.       Radial pulses are 2+ on the right side, and 2+ on the left side.        Femoral pulses are 2+ on the right side, and 2+ on the left side.       Popliteal pulses are 2+ on the right side, and 2+ on the left side.        Dorsalis pedis pulses are 2+ on the right side, and 2+ on the left side.        Posterior tibial pulses are 2+ on the right side, and 2+ on the left side.   Pulmonary/Chest: Effort normal and breath sounds normal. No respiratory distress. He has no wheezes. He has no rales.   Abdominal: Soft. Bowel sounds are normal. He exhibits no distension. There is no hepatosplenomegaly. There is no tenderness. There is no rebound.   Musculoskeletal: Normal range of motion. He exhibits no edema or tenderness.   Neurological: He is alert and oriented to person, place, and time.   Skin: Skin is warm and dry. No rash noted. No erythema.   Psychiatric: He has a normal mood and affect. His behavior is normal. Judgment and thought content normal.   Vitals reviewed.    Lab Review:     ECG 12 Lead  Date/Time: " 2/10/2020 11:08 PM  Performed by: Adriana Vaughn MD  Authorized by: Adriana Vaughn MD   Comparison: compared with previous ECG   Comparison to previous ECG: Alternate lead placement  Rhythm: sinus rhythm  Other findings comments: Consider prior anterior wall infarction    Clinical impression: abnormal EKG          Assessment:       Diagnosis Plan   1. MVP (mitral valve prolapse)  Adult Transthoracic Echo Complete W/ Cont if Necessary Per Protocol   2. Non-rheumatic mitral regurgitation  Adult Transthoracic Echo Complete W/ Cont if Necessary Per Protocol   3. Hypertension, essential     4. Dilated aortic root (CMS/HCC)       Plan:       1.  Mitral prolapse with moderate to severe severe mitral regurgitation.  Clinically stable and murmur actually sounds slightly softer.  We will proceed with echo to be done in the next 3 to 4 weeks.  2.  Dilated aortic root, stable by MR imaging 10/2018.   Reassess by echocardiography but may need MR imaging depending on how well visualized aorta.  3.  Dyslipidemia.  Work to be checked in the near future.  4.  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  5.  Hypertension.  Home readings are lower typically in the 120s.           Your medication list           Accurate as of February 10, 2020 11:59 PM. If you have any questions, ask your nurse or doctor.               CONTINUE taking these medications      Instructions Last Dose Given Next Dose Due   glucosamine-chondroitin 500-400 MG capsule capsule      Take  by mouth. 2 po daily       losartan 25 MG tablet  Commonly known as:  COZAAR      TAKE 1 TABLET BY MOUTH DAILY       simvastatin 20 MG tablet  Commonly known as:  ZOCOR      TAKE 1 TABLET BY MOUTH EVERY NIGHT       tadalafil 5 MG tablet  Commonly known as:  CIALIS      TAKE ONE TABLET BY MOUTH DAILY              Patient is no longer taking -.  I corrected the med list to reflect this.  I did not stop these medications.    Dictated utilizing Dragon  dictation

## 2020-03-10 ENCOUNTER — HOSPITAL ENCOUNTER (OUTPATIENT)
Dept: CARDIOLOGY | Facility: HOSPITAL | Age: 75
Discharge: HOME | End: 2020-03-10
Admitting: INTERNAL MEDICINE

## 2020-03-10 VITALS
WEIGHT: 169.09 LBS | HEIGHT: 68 IN | HEART RATE: 62 BPM | SYSTOLIC BLOOD PRESSURE: 110 MMHG | DIASTOLIC BLOOD PRESSURE: 60 MMHG | BODY MASS INDEX: 25.63 KG/M2

## 2020-03-10 DIAGNOSIS — I34.1 MVP (MITRAL VALVE PROLAPSE): ICD-10-CM

## 2020-03-10 DIAGNOSIS — I34.0 NON-RHEUMATIC MITRAL REGURGITATION: ICD-10-CM

## 2020-03-10 LAB
AORTIC ROOT ANNULUS: 2.4 CM
ASCENDING AORTA: 3.6 CM
BH CV ECHO MEAS - ACS: 2.2 CM
BH CV ECHO MEAS - AO MAX PG (FULL): 2.9 MMHG
BH CV ECHO MEAS - AO MAX PG: 10.3 MMHG
BH CV ECHO MEAS - AO MEAN PG (FULL): 0.97 MMHG
BH CV ECHO MEAS - AO MEAN PG: 5.7 MMHG
BH CV ECHO MEAS - AO ROOT AREA (BSA CORRECTED): 2.1
BH CV ECHO MEAS - AO ROOT AREA: 12.4 CM^2
BH CV ECHO MEAS - AO ROOT DIAM: 4 CM
BH CV ECHO MEAS - AO V2 MAX: 160.2 CM/SEC
BH CV ECHO MEAS - AO V2 MEAN: 110.1 CM/SEC
BH CV ECHO MEAS - AO V2 VTI: 26.2 CM
BH CV ECHO MEAS - AVA(I,A): 3 CM^2
BH CV ECHO MEAS - AVA(I,D): 3 CM^2
BH CV ECHO MEAS - AVA(V,A): 3 CM^2
BH CV ECHO MEAS - AVA(V,D): 3 CM^2
BH CV ECHO MEAS - BSA(HAYCOCK): 1.9 M^2
BH CV ECHO MEAS - BSA: 1.9 M^2
BH CV ECHO MEAS - BZI_BMI: 24.3 KILOGRAMS/M^2
BH CV ECHO MEAS - BZI_METRIC_HEIGHT: 172.7 CM
BH CV ECHO MEAS - BZI_METRIC_WEIGHT: 72.6 KG
BH CV ECHO MEAS - EDV(MOD-SP2): 85 ML
BH CV ECHO MEAS - EDV(MOD-SP4): 76 ML
BH CV ECHO MEAS - EDV(TEICH): 137.2 ML
BH CV ECHO MEAS - EF(CUBED): 81.8 %
BH CV ECHO MEAS - EF(MOD-BP): 56 %
BH CV ECHO MEAS - EF(MOD-SP2): 57.6 %
BH CV ECHO MEAS - EF(MOD-SP4): 53.9 %
BH CV ECHO MEAS - EF(TEICH): 74 %
BH CV ECHO MEAS - ESV(MOD-SP2): 36 ML
BH CV ECHO MEAS - ESV(MOD-SP4): 35 ML
BH CV ECHO MEAS - ESV(TEICH): 35.6 ML
BH CV ECHO MEAS - FS: 43.3 %
BH CV ECHO MEAS - IVS/LVPW: 0.9
BH CV ECHO MEAS - IVSD: 1.2 CM
BH CV ECHO MEAS - LAT PEAK E' VEL: 10 CM/SEC
BH CV ECHO MEAS - LV DIASTOLIC VOL/BSA (35-75): 40.9 ML/M^2
BH CV ECHO MEAS - LV MASS(C)D: 263.5 GRAMS
BH CV ECHO MEAS - LV MASS(C)DI: 141.7 GRAMS/M^2
BH CV ECHO MEAS - LV MAX PG: 7.3 MMHG
BH CV ECHO MEAS - LV MEAN PG: 4.7 MMHG
BH CV ECHO MEAS - LV SYSTOLIC VOL/BSA (12-30): 18.8 ML/M^2
BH CV ECHO MEAS - LV V1 MAX: 135.3 CM/SEC
BH CV ECHO MEAS - LV V1 MEAN: 102.4 CM/SEC
BH CV ECHO MEAS - LV V1 VTI: 21.6 CM
BH CV ECHO MEAS - LVIDD: 5.3 CM
BH CV ECHO MEAS - LVIDS: 3 CM
BH CV ECHO MEAS - LVLD AP2: 7.6 CM
BH CV ECHO MEAS - LVLD AP4: 6.7 CM
BH CV ECHO MEAS - LVLS AP2: 6.6 CM
BH CV ECHO MEAS - LVLS AP4: 6 CM
BH CV ECHO MEAS - LVOT AREA (M): 3.5 CM^2
BH CV ECHO MEAS - LVOT AREA: 3.6 CM^2
BH CV ECHO MEAS - LVOT DIAM: 2.1 CM
BH CV ECHO MEAS - LVPWD: 1.3 CM
BH CV ECHO MEAS - MED PEAK E' VEL: 6 CM/SEC
BH CV ECHO MEAS - MV A DUR: 0.1 SEC
BH CV ECHO MEAS - MV A MAX VEL: 85.9 CM/SEC
BH CV ECHO MEAS - MV DEC SLOPE: 556.3 CM/SEC^2
BH CV ECHO MEAS - MV DEC TIME: 0.27 SEC
BH CV ECHO MEAS - MV E MAX VEL: 92.7 CM/SEC
BH CV ECHO MEAS - MV E/A: 1.1
BH CV ECHO MEAS - MV MAX PG: 6.4 MMHG
BH CV ECHO MEAS - MV MEAN PG: 2.5 MMHG
BH CV ECHO MEAS - MV P1/2T MAX VEL: 130.3 CM/SEC
BH CV ECHO MEAS - MV P1/2T: 68.6 MSEC
BH CV ECHO MEAS - MV V2 MAX: 126.2 CM/SEC
BH CV ECHO MEAS - MV V2 MEAN: 72.5 CM/SEC
BH CV ECHO MEAS - MV V2 VTI: 42.1 CM
BH CV ECHO MEAS - MVA P1/2T LCG: 1.7 CM^2
BH CV ECHO MEAS - MVA(P1/2T): 3.2 CM^2
BH CV ECHO MEAS - MVA(VTI): 1.9 CM^2
BH CV ECHO MEAS - PA MAX PG (FULL): 6.7 MMHG
BH CV ECHO MEAS - PA MAX PG: 7.7 MMHG
BH CV ECHO MEAS - PA V2 MAX: 138.5 CM/SEC
BH CV ECHO MEAS - PI END-D VEL: 104.5 CM/SEC
BH CV ECHO MEAS - PULM A REVS DUR: 0.1 SEC
BH CV ECHO MEAS - PULM A REVS VEL: 36.2 CM/SEC
BH CV ECHO MEAS - PULM DIAS VEL: 42.8 CM/SEC
BH CV ECHO MEAS - PULM S/D: 1.7
BH CV ECHO MEAS - PULM SYS VEL: 71.4 CM/SEC
BH CV ECHO MEAS - PVA(V,A): 1.4 CM^2
BH CV ECHO MEAS - PVA(V,D): 1.4 CM^2
BH CV ECHO MEAS - QP/QS: 0.37
BH CV ECHO MEAS - RAP SYSTOLE: 3 MMHG
BH CV ECHO MEAS - RV MAX PG: 0.99 MMHG
BH CV ECHO MEAS - RV MEAN PG: 0.53 MMHG
BH CV ECHO MEAS - RV V1 MAX: 49.7 CM/SEC
BH CV ECHO MEAS - RV V1 MEAN: 33.4 CM/SEC
BH CV ECHO MEAS - RV V1 VTI: 7.4 CM
BH CV ECHO MEAS - RVOT AREA: 3.9 CM^2
BH CV ECHO MEAS - RVOT DIAM: 2.2 CM
BH CV ECHO MEAS - RVSP: 20 MMHG
BH CV ECHO MEAS - SI(AO): 174.3 ML/M^2
BH CV ECHO MEAS - SI(CUBED): 66.7 ML/M^2
BH CV ECHO MEAS - SI(LVOT): 41.9 ML/M^2
BH CV ECHO MEAS - SI(MOD-SP2): 26.4 ML/M^2
BH CV ECHO MEAS - SI(MOD-SP4): 22.1 ML/M^2
BH CV ECHO MEAS - SI(TEICH): 54.7 ML/M^2
BH CV ECHO MEAS - SUP REN AO DIAM: 1.9 CM
BH CV ECHO MEAS - SV(AO): 324 ML
BH CV ECHO MEAS - SV(CUBED): 124 ML
BH CV ECHO MEAS - SV(LVOT): 78 ML
BH CV ECHO MEAS - SV(MOD-SP2): 49 ML
BH CV ECHO MEAS - SV(MOD-SP4): 41 ML
BH CV ECHO MEAS - SV(RVOT): 28.7 ML
BH CV ECHO MEAS - SV(TEICH): 101.6 ML
BH CV ECHO MEAS - TAPSE (>1.6): 1.9 CM2
BH CV ECHO MEAS - TR MAX VEL: 207.7 CM/SEC
BH CV ECHO MEASUREMENTS AVERAGE E/E' RATIO: 11.59
BH CV XLRA - RV BASE: 3.5 CM
BH CV XLRA - RV LENGTH: 7.4 CM
BH CV XLRA - RV MID: 3.6 CM
BH CV XLRA - TDI S': 14 CM/SEC
LEFT ATRIUM VOLUME INDEX: 32 ML/M2
LV EF 2D ECHO EST: 56 %
SINUS: 3.6 CM
STJ: 3.3 CM

## 2020-03-10 PROCEDURE — 93306 TTE W/DOPPLER COMPLETE: CPT

## 2020-03-10 PROCEDURE — 93306 TTE W/DOPPLER COMPLETE: CPT | Performed by: INTERNAL MEDICINE

## 2020-03-18 ENCOUNTER — TELEPHONE (OUTPATIENT)
Dept: CARDIOLOGY | Facility: CLINIC | Age: 75
End: 2020-03-18

## 2020-03-18 NOTE — TELEPHONE ENCOUNTER
Laney---    Please call patient let him know that echo overall unchanged from previous.  He continues to have mitral valve prolapse and regurgitation.  He continues to have mild dilation of the ascending thoracic aorta.    These have him continue to monitor heart rate and blood pressure and call if blood pressure staying greater than 120s/70s on average.      Schedule 6-month follow-up appointment with SILVIA in 1 year follow-up appointment with Dr. Vaughn but call sooner for any new or worsening issues before that time.    _________________________________________________      Dr. Vaughn--just wanted to confirm that patient does not need repeat MRA of the chest as it appears that thoracic aorta was well visualized on echo and stable from previous MRI imaging.

## 2020-03-19 NOTE — TELEPHONE ENCOUNTER
EKG showed some borderline ST-T wave changes however nothing significant.  Given that patient has had a recent normal stress echo then if he is not having any new or worsening symptoms then we will continue to monitor.  During his recent office visit with Dr. Donis it was noted that he denied any chest pain, shortness of breath, etc.  However if he develops any increased shortness of breath with exertion or chest pain with exertion then please have him let us know right away or go to the ER for any severe or unrelieved symptoms.  I do not expect this to happen.  We can touch base with Dr. Vaughn on EKG when she gets back in the office as well as she recently saw the patient in the office.    As for the ACE inhibitor/ ARB therapy, the [employer] College of cardi[employer] has issued a statement saying that there is not enough information one way or another to say whether ACE inhibitor is and ARB therapy act as a benefit or risk as far as COVID-19 goes.  They are not recommending us to change these therapies at this time.

## 2020-03-19 NOTE — TELEPHONE ENCOUNTER
Spoke with pt.  Verbalized understanding.  No other questions.  They will await further direction on if and when further imaging is to be completed.  (done)

## 2020-05-05 RX ORDER — LOSARTAN POTASSIUM 25 MG/1
TABLET ORAL
Qty: 90 TABLET | Refills: 1 | Status: SHIPPED | OUTPATIENT
Start: 2020-05-05 | End: 2020-11-01

## 2020-05-06 RX ORDER — SIMVASTATIN 20 MG
20 TABLET ORAL NIGHTLY
Qty: 90 TABLET | Refills: 0 | Status: SHIPPED | OUTPATIENT
Start: 2020-05-06 | End: 2021-08-17

## 2020-05-18 DIAGNOSIS — I10 HYPERTENSION, ESSENTIAL: ICD-10-CM

## 2020-05-18 DIAGNOSIS — E78.5 HYPERLIPIDEMIA, UNSPECIFIED HYPERLIPIDEMIA TYPE: ICD-10-CM

## 2020-05-18 DIAGNOSIS — Z00.00 HEALTHCARE MAINTENANCE: Primary | ICD-10-CM

## 2020-05-19 DIAGNOSIS — Z00.00 HEALTHCARE MAINTENANCE: Primary | ICD-10-CM

## 2020-05-19 DIAGNOSIS — E78.5 HYPERLIPIDEMIA, UNSPECIFIED HYPERLIPIDEMIA TYPE: ICD-10-CM

## 2020-05-19 DIAGNOSIS — I10 HYPERTENSION, ESSENTIAL: ICD-10-CM

## 2020-05-19 LAB
ALBUMIN SERPL-MCNC: 4.5 G/DL (ref 3.5–5.2)
ALBUMIN/GLOB SERPL: 2 G/DL
ALP SERPL-CCNC: 72 U/L (ref 39–117)
ALT SERPL-CCNC: 20 U/L (ref 1–41)
APPEARANCE UR: CLEAR
AST SERPL-CCNC: 22 U/L (ref 1–40)
BACTERIA #/AREA URNS HPF: NORMAL /HPF
BASOPHILS # BLD AUTO: 0.03 10*3/MM3 (ref 0–0.2)
BASOPHILS NFR BLD AUTO: 0.6 % (ref 0–1.5)
BILIRUB SERPL-MCNC: 0.5 MG/DL (ref 0.2–1.2)
BILIRUB UR QL STRIP: NEGATIVE
BUN SERPL-MCNC: 13 MG/DL (ref 8–23)
BUN/CREAT SERPL: 15.5 (ref 7–25)
CALCIUM SERPL-MCNC: 9.1 MG/DL (ref 8.6–10.5)
CASTS URNS MICRO: NORMAL
CHLORIDE SERPL-SCNC: 102 MMOL/L (ref 98–107)
CHOLEST SERPL-MCNC: 166 MG/DL (ref 0–200)
CO2 SERPL-SCNC: 27.2 MMOL/L (ref 22–29)
COLOR UR: YELLOW
CREAT SERPL-MCNC: 0.84 MG/DL (ref 0.76–1.27)
EOSINOPHIL # BLD AUTO: 0.11 10*3/MM3 (ref 0–0.4)
EOSINOPHIL NFR BLD AUTO: 2.3 % (ref 0.3–6.2)
EPI CELLS #/AREA URNS HPF: NORMAL /HPF
ERYTHROCYTE [DISTWIDTH] IN BLOOD BY AUTOMATED COUNT: 12.2 % (ref 12.3–15.4)
GLOBULIN SER CALC-MCNC: 2.2 GM/DL
GLUCOSE SERPL-MCNC: 97 MG/DL (ref 65–99)
GLUCOSE UR QL: NEGATIVE
HCT VFR BLD AUTO: 41.7 % (ref 37.5–51)
HDLC SERPL-MCNC: 50 MG/DL (ref 40–60)
HGB BLD-MCNC: 14.2 G/DL (ref 13–17.7)
HGB UR QL STRIP: NEGATIVE
IMM GRANULOCYTES # BLD AUTO: 0.01 10*3/MM3 (ref 0–0.05)
IMM GRANULOCYTES NFR BLD AUTO: 0.2 % (ref 0–0.5)
KETONES UR QL STRIP: NEGATIVE
LDLC SERPL CALC-MCNC: 98 MG/DL (ref 0–100)
LDLC/HDLC SERPL: 1.96 {RATIO}
LEUKOCYTE ESTERASE UR QL STRIP: NEGATIVE
LYMPHOCYTES # BLD AUTO: 1.05 10*3/MM3 (ref 0.7–3.1)
LYMPHOCYTES NFR BLD AUTO: 21.8 % (ref 19.6–45.3)
MCH RBC QN AUTO: 31.3 PG (ref 26.6–33)
MCHC RBC AUTO-ENTMCNC: 34.1 G/DL (ref 31.5–35.7)
MCV RBC AUTO: 92.1 FL (ref 79–97)
MONOCYTES # BLD AUTO: 0.64 10*3/MM3 (ref 0.1–0.9)
MONOCYTES NFR BLD AUTO: 13.3 % (ref 5–12)
NEUTROPHILS # BLD AUTO: 2.98 10*3/MM3 (ref 1.7–7)
NEUTROPHILS NFR BLD AUTO: 61.8 % (ref 42.7–76)
NITRITE UR QL STRIP: NEGATIVE
NRBC BLD AUTO-RTO: 0 /100 WBC (ref 0–0.2)
PH UR STRIP: 5.5 [PH] (ref 5–8)
PLATELET # BLD AUTO: 173 10*3/MM3 (ref 140–450)
POTASSIUM SERPL-SCNC: 4.2 MMOL/L (ref 3.5–5.2)
PROT SERPL-MCNC: 6.7 G/DL (ref 6–8.5)
PROT UR QL STRIP: NEGATIVE
RBC # BLD AUTO: 4.53 10*6/MM3 (ref 4.14–5.8)
RBC #/AREA URNS HPF: NORMAL /HPF
SODIUM SERPL-SCNC: 139 MMOL/L (ref 136–145)
SP GR UR: 1.02 (ref 1–1.03)
TRIGL SERPL-MCNC: 91 MG/DL (ref 0–150)
TSH SERPL DL<=0.005 MIU/L-ACNC: 3.48 UIU/ML (ref 0.27–4.2)
UROBILINOGEN UR STRIP-MCNC: NORMAL MG/DL
VLDLC SERPL CALC-MCNC: 18.2 MG/DL
WBC # BLD AUTO: 4.82 10*3/MM3 (ref 3.4–10.8)
WBC #/AREA URNS HPF: NORMAL /HPF

## 2020-05-26 ENCOUNTER — OFFICE VISIT (OUTPATIENT)
Dept: INTERNAL MEDICINE | Facility: CLINIC | Age: 75
End: 2020-05-26

## 2020-05-26 VITALS
OXYGEN SATURATION: 98 % | SYSTOLIC BLOOD PRESSURE: 122 MMHG | WEIGHT: 165 LBS | HEIGHT: 68 IN | DIASTOLIC BLOOD PRESSURE: 69 MMHG | TEMPERATURE: 97.7 F | HEART RATE: 55 BPM | BODY MASS INDEX: 25.01 KG/M2

## 2020-05-26 DIAGNOSIS — Z12.5 PROSTATE CANCER SCREENING: ICD-10-CM

## 2020-05-26 DIAGNOSIS — N52.9 ERECTILE DYSFUNCTION, UNSPECIFIED ERECTILE DYSFUNCTION TYPE: ICD-10-CM

## 2020-05-26 DIAGNOSIS — E78.5 HYPERLIPIDEMIA, UNSPECIFIED HYPERLIPIDEMIA TYPE: ICD-10-CM

## 2020-05-26 DIAGNOSIS — Z00.00 HEALTHCARE MAINTENANCE: Primary | ICD-10-CM

## 2020-05-26 DIAGNOSIS — Z12.5 SCREENING PSA (PROSTATE SPECIFIC ANTIGEN): ICD-10-CM

## 2020-05-26 DIAGNOSIS — I10 HYPERTENSION, ESSENTIAL: ICD-10-CM

## 2020-05-26 DIAGNOSIS — Z23 NEED FOR PNEUMOCOCCAL VACCINATION: ICD-10-CM

## 2020-05-26 PROCEDURE — 99213 OFFICE O/P EST LOW 20 MIN: CPT | Performed by: INTERNAL MEDICINE

## 2020-05-26 PROCEDURE — G0009 ADMIN PNEUMOCOCCAL VACCINE: HCPCS | Performed by: INTERNAL MEDICINE

## 2020-05-26 PROCEDURE — G0439 PPPS, SUBSEQ VISIT: HCPCS | Performed by: INTERNAL MEDICINE

## 2020-05-26 PROCEDURE — 90732 PPSV23 VACC 2 YRS+ SUBQ/IM: CPT | Performed by: INTERNAL MEDICINE

## 2020-05-26 RX ORDER — TADALAFIL 5 MG/1
5 TABLET ORAL DAILY
Qty: 90 TABLET | Refills: 3 | Status: SHIPPED | OUTPATIENT
Start: 2020-05-26 | End: 2021-01-24

## 2020-05-26 NOTE — PROGRESS NOTES
Subjective   AWV  Hyperlipidemia  Hypertension  BPH  ED  MR    Mirza Zimmer is a 75 y.o. male who presents for an annual wellness visit.  He is doing well today. He has BPH. He is taking cialis for this and ED. He reports about 2 times/ night nocturia in general but can be 4 times/ night. Med Started qod and now is every evening full tab. He has been to urology in the past but has not been back recently. He has tried flomax and finasteride unsuccessfully in the past. He has been offered surgical corrective procedure for the prostate but has declined this. He has hypertension that is medically managed with losartan. Patient has mvp that was diagnosed 15 years ago. He has mod-severe mitral regurg. He follows w/ cardiology and this is stable.           Review of Systems   Constitutional: Negative.    HENT: Negative.    Eyes: Negative.    Respiratory: Negative.    Cardiovascular: Negative.    Gastrointestinal: Negative.    Endocrine: Negative.    Genitourinary:        Urinary frequency  Nocturia     Musculoskeletal: Negative.    Skin: Negative.    Allergic/Immunologic: Negative.    Neurological: Negative.    Hematological: Negative.    Psychiatric/Behavioral: Negative.        The following portions of the patient's history were reviewed and updated as appropriate: allergies, current medications, past family history, past medical history, past social history, past surgical history and problem list.     Patient Active Problem List   Diagnosis   • Arthritis   • Hyperlipidemia   • Hypertension, essential   • MOFFETT (dyspnea on exertion)   • Non-rheumatic mitral regurgitation   • MVP (mitral valve prolapse)   • Dilated aortic root (CMS/HCC)   • Increased prostate specific antigen (PSA) velocity   • Other ill-defined heart diseases    • History of colon polyps   • Thoracic aortic aneurysm without rupture (CMS/HCC)       Past Medical History:   Diagnosis Date   • Arthritis    • BPH (benign prostatic hyperplasia)     FOLLOWED  BY DR. MARK BALL   • Cardiomegaly     FOLLOWED BY DR. SAMUEL RIVAS   • Chronic prostatitis     FOLLOWED BY DR. MARK BALL   • Colon polyps     FOLLOWED BY DR. KEVIN LUDWIG   • Dilated aortic root (CMS/HCC)    • Dyspnea on exertion    • Elevated PSA    • Heart murmur    • Hyperlipidemia    • Hypertension    • Iliac artery aneurysm (CMS/HCC) 03/2018    FOLLOWED BY DR. EDGARD LEE   • Mitral regurgitation    • Mitral valve insufficiency    • Muscular aches    • MVP (mitral valve prolapse)    • Seborrheic keratosis    • Thoracic aortic aneurysm without rupture (CMS/HCC)    • Thoracic aortic ectasia (CMS/HCC) 07/2017       Past Surgical History:   Procedure Laterality Date   • COLONOSCOPY N/A 8/28/2019    5 MM TUBULAR ADENOMA POLYP IN HEPATIC FLEXURE, 6 MM SERRATED ADENOMA POLYP IN TRANSVERSE, MULTIPLE DIVERTICULA IN SIGMOID, RESCOPE IN 2 YRS, DR. KEVIN LUDWIG AT [facility]   • COLONOSCOPY W/ POLYPECTOMY N/A 09/03/2004    5 MM ADENOMATOUS POLYP IN SIGMOID, OTHERWISE WNL, RESCOPE IN 3 YRS, DR. ANGI CONLEY AT [facility]   • COLONOSCOPY W/ POLYPECTOMY N/A 05/28/2013    3 MM SESSILE HYPERPLASTIC POLYP 50CM FROM ANUS, A FEW SMALL DIVERTICULA IN SIGMOID, NON BLEEDING INTERNAL HEMORRHOIDS GRADE II, DR. ANGI CONLEY AT [facility]   • COLONOSCOPY W/ POLYPECTOMY N/A 02/29/2008    5 MM HYPERPLASTIC POLYP AT 60 CM FROM ANUS, RESCOPE IN 5 YRS, DR. ANGI CONLEY AT [facility]   • EYE SURGERY  2003    ATYPICAL MOLE EXCSION   • OTHER SURGICAL HISTORY N/A 08/15/2018    UROLIFT, PROSTATE VOLUME 48CC, NO ABNORMALITIES, DR. COLLIN GARVIN   • TONSILLECTOMY Bilateral 1958       Family History   Problem Relation Age of Onset   • Dementia Mother    • Diabetes Father    • Sudden death Father    • Heart attack Father    • Cancer Sister         malignant neoplasm of breast   • Breast cancer Sister        Social History     Socioeconomic History   • Marital status:      Spouse name: Not on file   • Number of children: Not on file   • Years of education: Not on file   •  "Highest education level: Not on file   Tobacco Use   • Smoking status: Never Smoker   • Smokeless tobacco: Never Used   • Tobacco comment: Caffeine use   Substance and Sexual Activity   • Alcohol use: Yes     Comment: social occasional use   • Drug use: No   • Sexual activity: Yes     Partners: Female   Lifestyle   • Physical activity:     Days per week: 4 days     Minutes per session: 40 min   • Stress: Not on file       Current Outpatient Medications on File Prior to Visit   Medication Sig Dispense Refill   • Cetirizine HCl 10 MG capsule      • glucosamine-chondroitin 500-400 MG capsule capsule Take  by mouth. 2 po daily     • losartan (COZAAR) 25 MG tablet TAKE 1 TABLET BY MOUTH DAILY 90 tablet 1   • simvastatin (ZOCOR) 20 MG tablet TAKE 1 TABLET BY MOUTH EVERY NIGHT 90 tablet 0   • [DISCONTINUED] tadalafil (CIALIS) 5 MG tablet TAKE ONE TABLET BY MOUTH DAILY 60 tablet 4     No current facility-administered medications on file prior to visit.        Allergies   Allergen Reactions   • Sulfa Antibiotics        Immunization History   Administered Date(s) Administered   • Fluzone High Dose =>65 Years ([employer] ONLY) 10/01/2019   • MMR 09/17/2019, 10/16/2019   • Pneumococcal Conjugate 13-Valent (PCV13) 05/05/2016, 05/08/2017   • Shingrix 08/26/2019   • Tdap 08/08/2017       Objective     /69 (BP Location: Left arm, Patient Position: Sitting, Cuff Size: Adult)   Pulse 55   Temp 97.7 °F (36.5 °C) (Temporal)   Ht 172.7 cm (68\")   Wt 74.8 kg (165 lb)   SpO2 98%   BMI 25.09 kg/m²     Physical Exam   Constitutional: He is oriented to person, place, and time. He appears well-developed and well-nourished.   HENT:   Head: Normocephalic and atraumatic.   Right Ear: External ear normal.   Left Ear: External ear normal.   Mouth/Throat: Oropharynx is clear and moist.   Eyes: Pupils are equal, round, and reactive to light. EOM are normal.   Neck: Normal range of motion. Neck supple.   Cardiovascular: Normal rate and " regular rhythm.   Murmur heard.  Pulmonary/Chest: Effort normal and breath sounds normal.   Abdominal: Soft. Bowel sounds are normal.   Genitourinary: Rectum normal and penis normal.   Genitourinary Comments: Prostate smooth   Enlarged     Musculoskeletal: Normal range of motion.   Neurological: He is alert and oriented to person, place, and time.   Skin: Skin is warm and dry.   Psychiatric: He has a normal mood and affect. His behavior is normal. Judgment and thought content normal.   Nursing note and vitals reviewed.      Assessment/Plan   Mirza was seen today for annual exam.    Diagnoses and all orders for this visit:    Healthcare maintenance    Prostate cancer screening  -     PSA Screen; Future    Erectile dysfunction, unspecified erectile dysfunction type  -     Testosterone, Free, Total    Hyperlipidemia, unspecified hyperlipidemia type    Hypertension, essential    Other orders  -     tadalafil (CIALIS) 5 MG tablet; Take 1 tablet by mouth Daily.        Discussion    AWV.      Direct observation of cognitive ability was evaluated.  Patient was given health advice or handouts on the following:  nutrition, fall prevention, exercise, weight management  Patient with htn, hyperlip, bph, ed, and mr. He will continue to follow w/ cardiology and will report if having worseing shortness of air or decreased exercise tolerance. He will have psa and testosterone levels tested today. Will continue cialis for now. He declines f/u w/ urology at this time but will consider in the future if he does desire a procedure. He may try myrbetriq to see if he has a componenet of oab along w/ bph. He will continue current meds for bp and lipid regulation. Labs reviewed w/ patient. F/u w/ cardiology routinely to monitor valvular function. F/u here in 1 year or prn. Requested patient submit advance care directives. Also advise shingrix #2 and hep A vac x 2.             No future appointments.

## 2020-05-29 LAB
Lab: NORMAL
PSA SERPL-MCNC: 3.42 NG/ML (ref 0–4)
TESTOST FREE SERPL-MCNC: 6 PG/ML (ref 6.6–18.1)
TESTOST SERPL-MCNC: 352 NG/DL (ref 264–916)
WRITTEN AUTHORIZATION: NORMAL

## 2020-05-31 ENCOUNTER — RESULTS ENCOUNTER (OUTPATIENT)
Dept: INTERNAL MEDICINE | Facility: CLINIC | Age: 75
End: 2020-05-31

## 2020-05-31 DIAGNOSIS — Z12.5 PROSTATE CANCER SCREENING: ICD-10-CM

## 2020-06-03 ENCOUNTER — TELEPHONE (OUTPATIENT)
Dept: INTERNAL MEDICINE | Facility: CLINIC | Age: 75
End: 2020-06-03

## 2020-06-03 NOTE — TELEPHONE ENCOUNTER
[facility] called and stated they faxed over paperwork yesterday regarding an appeal for patient's tadalafil (CIALIS) 5 MG tablet and would like it completed and sent back. It is due tomorrow.    Please advise       [phone]  Ext [phone]

## 2020-11-01 RX ORDER — LOSARTAN POTASSIUM 25 MG/1
TABLET ORAL
Qty: 90 TABLET | Refills: 3 | Status: SHIPPED | OUTPATIENT
Start: 2020-11-01 | End: 2021-10-26

## 2021-01-12 ENCOUNTER — OFFICE VISIT (OUTPATIENT)
Dept: ORTHOPEDIC SURGERY | Facility: CLINIC | Age: 76
End: 2021-01-12

## 2021-01-12 VITALS — HEIGHT: 68 IN | WEIGHT: 165 LBS | BODY MASS INDEX: 25.01 KG/M2 | TEMPERATURE: 97.2 F

## 2021-01-12 DIAGNOSIS — M25.552 LEFT HIP PAIN: Primary | ICD-10-CM

## 2021-01-12 DIAGNOSIS — M70.62 TROCHANTERIC BURSITIS OF LEFT HIP: ICD-10-CM

## 2021-01-12 PROCEDURE — 73502 X-RAY EXAM HIP UNI 2-3 VIEWS: CPT | Performed by: NURSE PRACTITIONER

## 2021-01-12 PROCEDURE — 20610 DRAIN/INJ JOINT/BURSA W/O US: CPT | Performed by: NURSE PRACTITIONER

## 2021-01-12 PROCEDURE — 99213 OFFICE O/P EST LOW 20 MIN: CPT | Performed by: NURSE PRACTITIONER

## 2021-01-12 RX ORDER — METHYLPREDNISOLONE ACETATE 80 MG/ML
80 INJECTION, SUSPENSION INTRA-ARTICULAR; INTRALESIONAL; INTRAMUSCULAR; SOFT TISSUE
Status: COMPLETED | OUTPATIENT
Start: 2021-01-12 | End: 2021-01-12

## 2021-01-12 RX ADMIN — METHYLPREDNISOLONE ACETATE 80 MG: 80 INJECTION, SUSPENSION INTRA-ARTICULAR; INTRALESIONAL; INTRAMUSCULAR; SOFT TISSUE at 11:37

## 2021-01-12 NOTE — PROGRESS NOTES
Patient: Mirza Zimmer  YOB: 1945 75 y.o. male  Medical Record Number: [id number]    Chief Complaints:   Chief Complaint   Patient presents with   • Left Hip - Initial Evaluation, Pain       History of Present Illness:Mirza Zimmer is a 75 y.o. male who presents with complaints of having left-sided hip pain that originated approximately 6 months ago.  He denies any injury to the hip or having any previous problems with his left hip.  He reports the pain to the anterior aspect of his left hip he describes it is deep.  Patient denies any groin pain or any back pain.  Patient describes the pain as a achy pain that comes and goes.  He rates his current pain is a 4 out of 10.  Patient reports that the pain is exacerbated with prolonged walking.  Patient states resting eases the pain.  Patient is ambulatory full weightbearing and walks unassisted in clinic today.    Allergies:   Allergies   Allergen Reactions   • Sulfa Antibiotics        Medications:   Current Outpatient Medications   Medication Sig Dispense Refill   • Cetirizine HCl 10 MG capsule      • glucosamine-chondroitin 500-400 MG capsule capsule Take  by mouth. 2 po daily     • losartan (COZAAR) 25 MG tablet TAKE 1 TABLET BY MOUTH DAILY 90 tablet 3   • simvastatin (ZOCOR) 20 MG tablet TAKE 1 TABLET BY MOUTH EVERY NIGHT 90 tablet 0   • tadalafil (CIALIS) 5 MG tablet Take 1 tablet by mouth Daily. 90 tablet 3     No current facility-administered medications for this visit.          The following portions of the patient's history were reviewed and updated as appropriate: allergies, current medications, past family history, past medical history, past social history, past surgical history and problem list.    Review of Systems:   A 14 point review of systems was performed. All systems negative except pertinent positives/negative listed in HPI above    Physical Exam:   Vitals:    01/12/21 0917   Temp: 97.2 °F (36.2 °C)   Weight: 74.8 kg (165 lb)  "  Height: 172.7 cm (68\")   PainSc:   2       General: A and O x 3, ASA, NAD    SCLERA:    Normal    DENTITION:   Normal    Hip:  left    LEG ALIGNMENT:     Neutral, equal leg lengths    GAIT:     Nonantalgic    SKIN:     No abnormality    RANGE OF MOTION:      Full without joint irritability    STRENGTH:     5 / 5    hip flexion and abduction    DISTAL PULSES:    Paplable    DISTAL SENSATION :   Intact    LYMPHATICS:     No   lymphadenopathy    OTHER:          - Negative Stinchfeld test      - Negative log roll      +Tenderness to palpation trochanteric bursa              Radiology:  Xrays 2views (AP bilateral hips, and lateral of the hip) ordered and reviewed for evaluation of hip pain  demonstrating  minimal arthritic findings.  No x-rays to review and comparison.    Assessment/Plan: Left hip trochanteric bursitis    Treatment options as well as x-ray results were discussed with the patient. After physical examination I believe the patient has left sided hip trochanteric bursitis. We are going to give the patient a steroid bursal injection today into his left hip.  Muscular sent him for a one-time PT visit for fascial stretching exercises.  We instructed the patient if he is not better in 6 weeks to give us a call for a follow-up visit for further treatment options at this time.  He voices understanding satisfaction with this plan today.    Large Joint Arthrocentesis: L greater trochanteric bursa  Date/Time: 1/12/2021 11:37 AM  Consent given by: patient  Site marked: site marked  Timeout: Immediately prior to procedure a time out was called to verify the correct patient, procedure, equipment, support staff and site/side marked as required   Supporting Documentation  Indications: pain   Procedure Details  Location: hip - L greater trochanteric bursa  Needle size: 22 G  Approach: lateral  Medications administered: 80 mg methylPREDNISolone acetate 80 MG/ML; 2 mL lidocaine (cardiac)              Mike Eaton" APRN  1/12/2021     This patient was seen in conjunction today with Dr. Beau Gonzalez.  Dr. Gonzalez agrees with the above-stated assessment and plan.

## 2021-01-15 ENCOUNTER — TELEPHONE (OUTPATIENT)
Dept: ORTHOPEDIC SURGERY | Facility: CLINIC | Age: 76
End: 2021-01-15

## 2021-01-15 ENCOUNTER — TREATMENT (OUTPATIENT)
Dept: PHYSICAL THERAPY | Facility: CLINIC | Age: 76
End: 2021-01-15

## 2021-01-15 DIAGNOSIS — M25.559 PAIN, HIP: Primary | ICD-10-CM

## 2021-01-15 DIAGNOSIS — M70.62 TROCHANTERIC BURSITIS OF LEFT HIP: ICD-10-CM

## 2021-01-15 PROCEDURE — 97162 PT EVAL MOD COMPLEX 30 MIN: CPT | Performed by: PHYSICAL THERAPIST

## 2021-01-15 PROCEDURE — 97110 THERAPEUTIC EXERCISES: CPT | Performed by: PHYSICAL THERAPIST

## 2021-01-15 NOTE — TELEPHONE ENCOUNTER
I called and spoke with the patient in regards to his concerns.  I am unfamiliar with the [facility] patient view but on our and it shows a detailed hip exam explaining he is tender over his bursal region.  It also has assessment with trochanteric bursitis noted.  It also on the procedure note shows that I did not aspirate any fluid but however did give him a steroid injection.  Patient voices understanding.  I have encouraged him that I will reach out to IT to see if there view is different than our viewing.  If patient is unable to see the detailed note I will provide him a detailed note on his next visit.

## 2021-01-15 NOTE — TELEPHONE ENCOUNTER
----- Message from Mirza Zimmer sent at 1/14/2021  9:21 PM EST -----  Regarding: Visit Follow-Up Question  Contact: [phone]  Patricia just sent a message that I got an arthro- centesis at my visit in your office.I have no awareness of having gotten my joint aspirated. I did get a steroid shot for my bursitis in my left hip, however. Does my record need to be corrected?  Also the record in my chart for Dr. Gonzalez's visit only mentions that my hip was examined.   Bursitis is never mentioned. I would like to have a more complete and accurate record in my chart, please.  Thank you, Mirza Zimmer, 1945

## 2021-01-15 NOTE — PROGRESS NOTES
Physical Therapy Initial Evaluation and Plan of Care      Patient: Mirza Zimmer   : 1945  Diagnosis/ICD-10 Code:  Pain, hip [M25.559]  Referring practitioner: SILVIA Kenny  Date of Initial Visit: 1/15/2021  Today's Date: 1/15/2021  Patient seen for 1 sessions           Subjective Evaluation    History of Present Illness  Date of onset: 6/15/2020  Mechanism of injury: Pt started with L hip pain about 6 months ago. He could start feeling it at night. He has been walking 30 min daily during covid, riding a bike and lifting weights at home. About a month ago, he started having some pain with walking, which he has stopped for now and the pain has improved. He does change up his walking route. He got an injection yesterday.      Patient Occupation: retired Quality of life: good    Pain  Current pain ratin  At best pain ratin  At worst pain ratin  Location: L hip  Quality: dull ache  Relieving factors: ice, heat and medications (ibuprofen or tylenol, stretching)  Aggravating factors: ambulation and sleeping    Social Support  Lives in: multiple-level home  Lives with: spouse    Treatments  Current treatment: injection treatment  Patient Goals  Patient goals for therapy: decreased pain and return to sport/leisure activities  Patient goal: knowing what to do about symptoms           Objective          Static Posture     Comments  Level pelvis    Palpation   Left   Hypertonic in the gluteus medius and piriformis.   Tenderness of the gluteus medius and piriformis.   Trigger point to piriformis.     Tenderness     Left Hip   Tenderness in the greater trochanter.     Strength/Myotome Testing     Left Hip   Planes of Motion   Flexion: 5  Abduction: 4  Adduction: 5  External rotation: 4+    Right Hip   Planes of Motion   Flexion: 5  Abduction: 5  Adduction: 5  External rotation: 5    Tests     Left Hip   Positive SAYRA.   Negative FADIR.     Right Hip   Negative SAYRA.     Left Hip Flexibility  Comments:   Mild to moderate tightness of the hip rotators, moderate hamstring tightness    Right Hip Flexibility Comments:   Mild tightness of the hip rotators, moderate hamstring tightness    Functional Assessment     Single Leg Stance   Left: 5 (mild to moderate compensation, pain) seconds  Right: 10 (mild compensation) seconds        See Exercise, Manual, and Modality Logs for complete treatment.     Functional outcome score: LEFS=72/80 or 90%    Exercises:  -bridge x10  -SL hip abd and clam, x10  -hip abd/Er with blue band, B and single leg, x10  -piriformis and ITBand stretch        Assessment & Plan     Assessment  Impairments: abnormal or restricted ROM, activity intolerance, impaired physical strength, lacks appropriate home exercise program and pain with function  Assessment details: Mirza Zimmer is a 75 y.o. year-old male referred to physical therapy for left hip pain. He presents with a stable clinical presentation.  He has no comorbidities and personal factors that may affect his progress in the plan of care.  Pt has decreased strength 4/5, decreased balance in SLS, and decreased flexibility of the HS and hip rotators. He has pain over the L greater trochanteric bursa and tenderness along the ITband and piriformis. Signs and symptoms are consistent with hip bursitis. Pt would benefit from therapy to help improve his ability to walk, sleep and return to exercises without increased pain.   Prognosis: good  Functional Limitations: sleeping, walking, uncomfortable because of pain and moving in bed  Goals  Plan Goals: ST wks  1. Patient will be independent with education for symptom management, joint protection and strategies to minimize stress on affected tissues  2. Patient will demonstrate an independent HEP for core and knee strength and flexibility/ROM.        Plan  Therapy options: will be seen for skilled physical therapy services  Planned modality interventions: cryotherapy, ultrasound, TENS  and dry needling  Planned therapy interventions: ADL retraining, balance/weight-bearing training, flexibility, functional ROM exercises, gait training, home exercise program, IADL retraining, joint mobilization, manual therapy, motor coordination training, neuromuscular re-education, soft tissue mobilization, strengthening, stretching, therapeutic activities and transfer training  Frequency: 1x week  Duration in weeks: 12  Treatment plan discussed with: patient  Plan details: Pt wanted a 1x visit to be issued a program he could do at home        Timed:  Manual Therapy:         mins  16465;  Therapeutic Exercise:    15     mins  64025;     Neuromuscular Constantin:        mins  98631;    Therapeutic Activity:          mins  33467;     Gait Training:           mins  90042;     Ultrasound:          mins  47162;    Electrical Stimulation:         mins  40460 ( );  Iontophoresis         mins 16324  Dry Needling        mins      Untimed:  Electrical Stimulation:         mins  31577 ( );  Mechanical Traction:         mins  28023;     Timed Treatment:   15   mins   Total Treatment:     40   mins    PT SIGNATURE: Yuliana Madrid, PT   DATE TREATMENT INITIATED: 1/15/2021    Initial Certification  Certification Period: 4/15/2021  I certify that the therapy services are furnished while this patient is under my care.  The services outlined above are required by this patient, and will be reviewed every 90 days.     PHYSICIAN: Mike Eaton APRN      DATE:     Please sign and return via fax to [phone] Thank you, [facility].

## 2021-01-24 RX ORDER — TADALAFIL 5 MG/1
TABLET ORAL
Qty: 60 TABLET | Refills: 3 | Status: SHIPPED | OUTPATIENT
Start: 2021-01-24 | End: 2021-10-11

## 2021-02-10 ENCOUNTER — DOCUMENTATION (OUTPATIENT)
Dept: PHYSICAL THERAPY | Facility: CLINIC | Age: 76
End: 2021-02-10

## 2021-02-10 DIAGNOSIS — M70.62 TROCHANTERIC BURSITIS OF LEFT HIP: ICD-10-CM

## 2021-02-10 DIAGNOSIS — M25.559 PAIN, HIP: Primary | ICD-10-CM

## 2021-02-10 NOTE — PROGRESS NOTES
Discharge Summary  Discharge Summary from Physical Therapy Report      Mirza Zimmer was seen for 1 physical therapy sessions for L hip pain.  Treatment included therapeutic exercise and patient education with home exercise program . Progress to physical therapy goals was good.  He was discharged to an independent HEP and provided patient education to self-manage condition.      Goals:   ST wks  1. Patient will be independent with education for symptom management, joint protection and strategies to minimize stress on affected tissues  2. Patient will demonstrate an independent HEP for core and knee strength and flexibility/ROM    Discharge Plan: Continue with current home exercise program as instructed    Date of Last Physical Therapy Visit  1/15/2021        Yuliana Madrid, PT  Physical Therapist

## 2021-03-09 DIAGNOSIS — Z23 IMMUNIZATION DUE: ICD-10-CM

## 2021-05-10 ENCOUNTER — DOCUMENTATION (OUTPATIENT)
Dept: SURGERY | Facility: CLINIC | Age: 76
End: 2021-05-10

## 2021-05-10 NOTE — PROGRESS NOTES
2 YR CY, last done 08/28/2019.     05/11/2021, mailed colonoscopy recall letter to patient.    Thank you.

## 2021-05-19 DIAGNOSIS — E78.5 HYPERLIPIDEMIA, UNSPECIFIED HYPERLIPIDEMIA TYPE: ICD-10-CM

## 2021-05-19 DIAGNOSIS — Z12.5 SCREENING FOR PROSTATE CANCER: Primary | ICD-10-CM

## 2021-05-19 DIAGNOSIS — Z00.00 HEALTHCARE MAINTENANCE: ICD-10-CM

## 2021-05-19 DIAGNOSIS — I10 HYPERTENSION, ESSENTIAL: ICD-10-CM

## 2021-05-25 LAB
ALBUMIN SERPL-MCNC: 4.4 G/DL (ref 3.5–5.2)
ALBUMIN/GLOB SERPL: 2.1 G/DL
ALP SERPL-CCNC: 77 U/L (ref 39–117)
ALT SERPL-CCNC: 14 U/L (ref 1–41)
APPEARANCE UR: CLEAR
AST SERPL-CCNC: 20 U/L (ref 1–40)
BACTERIA #/AREA URNS HPF: NORMAL /HPF
BASOPHILS # BLD AUTO: 0.03 10*3/MM3 (ref 0–0.2)
BASOPHILS NFR BLD AUTO: 0.7 % (ref 0–1.5)
BILIRUB SERPL-MCNC: 0.6 MG/DL (ref 0–1.2)
BILIRUB UR QL STRIP: NEGATIVE
BUN SERPL-MCNC: 15 MG/DL (ref 8–23)
BUN/CREAT SERPL: 17.9 (ref 7–25)
CALCIUM SERPL-MCNC: 9.3 MG/DL (ref 8.6–10.5)
CASTS URNS MICRO: NORMAL
CHLORIDE SERPL-SCNC: 102 MMOL/L (ref 98–107)
CHOLEST SERPL-MCNC: 160 MG/DL (ref 0–200)
CO2 SERPL-SCNC: 26.5 MMOL/L (ref 22–29)
COLOR UR: YELLOW
CREAT SERPL-MCNC: 0.84 MG/DL (ref 0.76–1.27)
EOSINOPHIL # BLD AUTO: 0.15 10*3/MM3 (ref 0–0.4)
EOSINOPHIL NFR BLD AUTO: 3.6 % (ref 0.3–6.2)
EPI CELLS #/AREA URNS HPF: NORMAL /HPF
ERYTHROCYTE [DISTWIDTH] IN BLOOD BY AUTOMATED COUNT: 12.1 % (ref 12.3–15.4)
GLOBULIN SER CALC-MCNC: 2.1 GM/DL
GLUCOSE SERPL-MCNC: 92 MG/DL (ref 65–99)
GLUCOSE UR QL: NEGATIVE
HCT VFR BLD AUTO: 42.2 % (ref 37.5–51)
HDLC SERPL-MCNC: 48 MG/DL (ref 40–60)
HGB BLD-MCNC: 14.4 G/DL (ref 13–17.7)
HGB UR QL STRIP: NEGATIVE
IMM GRANULOCYTES # BLD AUTO: 0.01 10*3/MM3 (ref 0–0.05)
IMM GRANULOCYTES NFR BLD AUTO: 0.2 % (ref 0–0.5)
KETONES UR QL STRIP: NEGATIVE
LDLC SERPL CALC-MCNC: 97 MG/DL (ref 0–100)
LEUKOCYTE ESTERASE UR QL STRIP: NEGATIVE
LYMPHOCYTES # BLD AUTO: 0.93 10*3/MM3 (ref 0.7–3.1)
LYMPHOCYTES NFR BLD AUTO: 22.5 % (ref 19.6–45.3)
MCH RBC QN AUTO: 31.9 PG (ref 26.6–33)
MCHC RBC AUTO-ENTMCNC: 34.1 G/DL (ref 31.5–35.7)
MCV RBC AUTO: 93.6 FL (ref 79–97)
MONOCYTES # BLD AUTO: 0.5 10*3/MM3 (ref 0.1–0.9)
MONOCYTES NFR BLD AUTO: 12.1 % (ref 5–12)
NEUTROPHILS # BLD AUTO: 2.51 10*3/MM3 (ref 1.7–7)
NEUTROPHILS NFR BLD AUTO: 60.9 % (ref 42.7–76)
NITRITE UR QL STRIP: NEGATIVE
NRBC BLD AUTO-RTO: 0 /100 WBC (ref 0–0.2)
PH UR STRIP: 6 [PH] (ref 5–8)
PLATELET # BLD AUTO: 158 10*3/MM3 (ref 140–450)
POTASSIUM SERPL-SCNC: 4.3 MMOL/L (ref 3.5–5.2)
PROT SERPL-MCNC: 6.5 G/DL (ref 6–8.5)
PROT UR QL STRIP: NEGATIVE
PSA SERPL-MCNC: 3.96 NG/ML (ref 0–4)
RBC # BLD AUTO: 4.51 10*6/MM3 (ref 4.14–5.8)
RBC #/AREA URNS HPF: NORMAL /HPF
SODIUM SERPL-SCNC: 138 MMOL/L (ref 136–145)
SP GR UR: 1.01 (ref 1–1.03)
TRIGL SERPL-MCNC: 78 MG/DL (ref 0–150)
TSH SERPL DL<=0.005 MIU/L-ACNC: 2.34 UIU/ML (ref 0.27–4.2)
UROBILINOGEN UR STRIP-MCNC: NORMAL MG/DL
VLDLC SERPL CALC-MCNC: 15 MG/DL (ref 5–40)
WBC # BLD AUTO: 4.13 10*3/MM3 (ref 3.4–10.8)
WBC #/AREA URNS HPF: NORMAL /HPF

## 2021-05-28 ENCOUNTER — OFFICE VISIT (OUTPATIENT)
Dept: INTERNAL MEDICINE | Facility: CLINIC | Age: 76
End: 2021-05-28

## 2021-05-28 VITALS
WEIGHT: 165 LBS | DIASTOLIC BLOOD PRESSURE: 53 MMHG | TEMPERATURE: 98 F | BODY MASS INDEX: 25.01 KG/M2 | SYSTOLIC BLOOD PRESSURE: 102 MMHG | HEIGHT: 68 IN | HEART RATE: 68 BPM | OXYGEN SATURATION: 96 %

## 2021-05-28 DIAGNOSIS — D12.6 ADENOMATOUS POLYP OF COLON, UNSPECIFIED PART OF COLON: ICD-10-CM

## 2021-05-28 DIAGNOSIS — N40.1 BENIGN PROSTATIC HYPERPLASIA WITH URINARY FREQUENCY: ICD-10-CM

## 2021-05-28 DIAGNOSIS — R97.20 INCREASED PROSTATE SPECIFIC ANTIGEN (PSA) VELOCITY: ICD-10-CM

## 2021-05-28 DIAGNOSIS — R35.0 BENIGN PROSTATIC HYPERPLASIA WITH URINARY FREQUENCY: ICD-10-CM

## 2021-05-28 DIAGNOSIS — I77.810 DILATED AORTIC ROOT (HCC): ICD-10-CM

## 2021-05-28 DIAGNOSIS — Z12.11 COLON CANCER SCREENING: ICD-10-CM

## 2021-05-28 DIAGNOSIS — E78.5 HYPERLIPIDEMIA, UNSPECIFIED HYPERLIPIDEMIA TYPE: ICD-10-CM

## 2021-05-28 DIAGNOSIS — M19.90 ARTHRITIS: ICD-10-CM

## 2021-05-28 DIAGNOSIS — Z00.00 HEALTHCARE MAINTENANCE: Primary | ICD-10-CM

## 2021-05-28 PROCEDURE — 99214 OFFICE O/P EST MOD 30 MIN: CPT | Performed by: INTERNAL MEDICINE

## 2021-05-28 PROCEDURE — G0439 PPPS, SUBSEQ VISIT: HCPCS | Performed by: INTERNAL MEDICINE

## 2021-05-28 NOTE — PROGRESS NOTES
The ABCs of the Annual Wellness Visit  Subsequent Medicare Wellness Visit    Chief Complaint   Patient presents with   • Annual Exam     AWV   • Joint Pain   • Colon Polyps   • Mitral Valve Prolapse     with dilated aortic root       Subjective   History of Present Illness:  Mirza Zimmer is a 76 y.o. male who presents for a Subsequent Medicare Wellness Visit. He is doing well today. He did get notification that he requires a repeat colonoscopy at this time. He has a history of a tubular and serrated adenoma on last cscope. Patient has BPH with nocturia. He has been to urology in the past. Notes about 1-2 times a night nocturia down from 3-4 on average. Tried flomax with limited benefit. He is now on nightly cialis and this also helps w/ erections. His PSA is now 3.96 last year 3.42 (3.26 2 years ago 3.89 4 years ago).   Current on hcm including eye dental derm.         HEALTH RISK ASSESSMENT    Recent Hospitalizations:  No hospitalization(s) within the last year.    Current Medical Providers:  Patient Care Team:  Radha Rollins MD as PCP - General (Internal Medicine)  Ritika Vaughn MD (Cardiology)    Smoking Status:  Social History     Tobacco Use   Smoking Status Never Smoker   Smokeless Tobacco Never Used   Tobacco Comment    Caffeine use       Alcohol Consumption:  Social History     Substance and Sexual Activity   Alcohol Use Yes    Comment: social occasional use       Depression Screen:   PHQ-2/PHQ-9 Depression Screening 5/28/2021   Little interest or pleasure in doing things 0   Feeling down, depressed, or hopeless 0   Total Score 0       Fall Risk Screen:  STEADI Fall Risk Assessment was completed, and patient is at LOW risk for falls.Assessment completed on:5/28/2021    Health Habits and Functional and Cognitive Screening:  Functional & Cognitive Status 5/28/2021   Do you have difficulty preparing food and eating? No   Do you have difficulty bathing yourself, getting dressed or grooming yourself? No    Do you have difficulty using the toilet? No   Do you have difficulty moving around from place to place? No   Do you have trouble with steps or getting out of a bed or a chair? No   Current Diet Well Balanced Diet   Dental Exam Up to date   Eye Exam Up to date   Exercise (times per week) 7 times per week   Current Exercise Activities Include Stationary Bicycling/Spin Class   Do you need help using the phone?  No   Are you deaf or do you have serious difficulty hearing?  No   Do you need help with transportation? No   Do you need help shopping? No   Do you need help preparing meals?  No   Do you need help with housework?  No   Do you need help with laundry? No   Do you need help taking your medications? No   Do you need help managing money? No   Do you ever drive or ride in a car without wearing a seat belt? No   Have you felt unusual stress, anger or loneliness in the last month? No   Who do you live with? Spouse   If you need help, do you have trouble finding someone available to you? No   Have you been bothered in the last four weeks by sexual problems? No   Do you have difficulty concentrating, remembering or making decisions? No         Does the patient have evidence of cognitive impairment? No    Asprin use counseling:Does not need ASA (and currently is not on it)    Age-appropriate Screening Schedule:  Refer to the list below for future screening recommendations based on patient's age, sex and/or medical conditions. Orders for these recommended tests are listed in the plan section. The patient has been provided with a written plan.    Health Maintenance   Topic Date Due   • INFLUENZA VACCINE  08/01/2021   • PROSTATE CANCER SCREENING  05/25/2022   • LIPID PANEL  05/25/2022   • TDAP/TD VACCINES (3 - Td or Tdap) 08/08/2027   • ZOSTER VACCINE  Completed          The following portions of the patient's history were reviewed and updated as appropriate: allergies, current medications, past family history, past  medical history, past social history, past surgical history and problem list.    Outpatient Medications Prior to Visit   Medication Sig Dispense Refill   • Cetirizine HCl 10 MG capsule      • glucosamine-chondroitin 500-400 MG capsule capsule Take  by mouth. 2 po daily     • losartan (COZAAR) 25 MG tablet TAKE 1 TABLET BY MOUTH DAILY 90 tablet 3   • simvastatin (ZOCOR) 20 MG tablet TAKE 1 TABLET BY MOUTH EVERY NIGHT 90 tablet 0   • tadalafil (CIALIS) 5 MG tablet TAKE ONE TABLET BY MOUTH DAILY 60 tablet 3     No facility-administered medications prior to visit.       Patient Active Problem List   Diagnosis   • Arthritis   • Hyperlipidemia   • Hypertension, essential   • MOFFETT (dyspnea on exertion)   • Non-rheumatic mitral regurgitation   • MVP (mitral valve prolapse)   • Dilated aortic root (CMS/HCC)   • Increased prostate specific antigen (PSA) velocity   • Other ill-defined heart diseases    • History of colon polyps   • Thoracic aortic aneurysm without rupture (CMS/HCC)       Advanced Care Planning:  ACP discussion was held with the patient during this visit. Patient has an advance directive (not in EMR), copy requested.    Review of Systems   Constitutional: Negative.    HENT: Negative.    Eyes: Negative.    Respiratory: Negative.    Cardiovascular: Negative.    Gastrointestinal: Negative.    Endocrine: Negative.    Genitourinary: Positive for frequency.        Nocturia     Musculoskeletal: Negative.    Skin: Negative.    Allergic/Immunologic: Negative.    Neurological: Negative.    Hematological: Negative.    Psychiatric/Behavioral: Negative.        Compared to one year ago, the patient feels his physical health is the same.  Compared to one year ago, the patient feels his mental health is the same.    Reviewed chart for potential of high risk medication in the elderly: yes  Reviewed chart for potential of harmful drug interactions in the elderly:yes    Objective         Vitals:    05/28/21 1054   BP: 102/53   BP  "Location: Left arm   Patient Position: Sitting   Cuff Size: Adult   Pulse: 68   Temp: 98 °F (36.7 °C)   TempSrc: Temporal   SpO2: 96%   Weight: 74.8 kg (165 lb)   Height: 172.7 cm (67.99\")   PainSc: 0-No pain       Body mass index is 25.09 kg/m².  Discussed the patient's BMI with him. The BMI is in the acceptable range.    Physical Exam  Vitals and nursing note reviewed.   Constitutional:       Appearance: Normal appearance.   HENT:      Head: Normocephalic and atraumatic.      Right Ear: Tympanic membrane normal.      Left Ear: Tympanic membrane normal.      Nose: Nose normal.      Mouth/Throat:      Mouth: Mucous membranes are moist.   Eyes:      Extraocular Movements: Extraocular movements intact.      Pupils: Pupils are equal, round, and reactive to light.   Cardiovascular:      Rate and Rhythm: Normal rate and regular rhythm.      Pulses: Normal pulses.   Pulmonary:      Effort: Pulmonary effort is normal.      Breath sounds: Normal breath sounds.   Abdominal:      General: Abdomen is flat.      Palpations: Abdomen is soft.   Genitourinary:     Penis: Normal.       Testes: Normal.      Rectum: Normal.      Comments: Prostatic hypertrophy    Musculoskeletal:         General: Normal range of motion.      Cervical back: Normal range of motion.      Comments: OA changes fingers    Skin:     General: Skin is warm and dry.   Neurological:      General: No focal deficit present.      Mental Status: He is alert and oriented to person, place, and time.   Psychiatric:         Mood and Affect: Mood normal.         Behavior: Behavior normal.         Thought Content: Thought content normal.         Judgment: Judgment normal.         Lab Results   Component Value Date    GLU 92 05/25/2021    CHLPL 160 05/25/2021    TRIG 78 05/25/2021    HDL 48 05/25/2021    LDL 97 05/25/2021    VLDL 15 05/25/2021        Assessment/Plan   Medicare Risks and Personalized Health Plan  CMS Preventative Services Quick Reference  Advance Directive " Discussion  Cardiovascular risk  Colon Cancer Screening  Glaucoma Risk    The above risks/problems have been discussed with the patient.  Pertinent information has been shared with the patient in the After Visit Summary.  Follow up plans and orders are seen below in the Assessment/Plan Section.    Diagnoses and all orders for this visit:    1. Healthcare maintenance (Primary)    2. Hypertension, essential    3. Hyperlipidemia, unspecified hyperlipidemia type    4. Arthritis    5. Dilated aortic root (CMS/HCC)    6. Increased prostate specific antigen (PSA) velocity    7. Benign prostatic hyperplasia with urinary frequency      Follow Up:  No follow-ups on file.     An After Visit Summary and PPPS were given to the patient. He is current on HCM. He will continue to follow w/ his cardiologist regarding dilated aortic root. He has OA changes hand, knees, and hips. Will r/o inflammatory arthropathy. He will continue meds for bp and lipids. He will continue healthy nutrition w/ routine physical activity. PSA is increased from last 2 years. He does have prostatic hypertrophy w/ nocturia. He will f/u w/ urology in regards to this. He will follow up in 1 year or prn.

## 2021-05-31 LAB
ANA SER QL: NEGATIVE
CCP IGA+IGG SERPL IA-ACNC: 4 UNITS (ref 0–19)
CRP SERPL-MCNC: <0.3 MG/DL (ref 0–0.5)
ERYTHROCYTE [SEDIMENTATION RATE] IN BLOOD BY WESTERGREN METHOD: 12 MM/HR (ref 0–20)
RHEUMATOID FACT SERPL-ACNC: <10 IU/ML (ref 0–13.9)

## 2021-06-01 ENCOUNTER — TELEPHONE (OUTPATIENT)
Dept: INTERNAL MEDICINE | Facility: CLINIC | Age: 76
End: 2021-06-01

## 2021-06-01 NOTE — TELEPHONE ENCOUNTER
Pt informed and understands      ----- Message from Radha Rollins MD sent at 5/31/2021  8:22 PM EDT -----  Please advise pateint that his inflammatory markers are NEGATIVE suggesting his arthritis is osteoarthritis not inflammatory. Tylenol or topical diclofenac are both good for this but he should call if needs anything else for pain.

## 2021-07-01 ENCOUNTER — PREP FOR SURGERY (OUTPATIENT)
Dept: OTHER | Facility: HOSPITAL | Age: 76
End: 2021-07-01

## 2021-07-01 DIAGNOSIS — Z86.010 HISTORY OF COLON POLYPS: Primary | ICD-10-CM

## 2021-07-14 ENCOUNTER — OFFICE VISIT (OUTPATIENT)
Dept: CARDIOLOGY | Facility: CLINIC | Age: 76
End: 2021-07-14

## 2021-07-14 VITALS
WEIGHT: 166 LBS | HEART RATE: 60 BPM | BODY MASS INDEX: 25.16 KG/M2 | HEIGHT: 68 IN | DIASTOLIC BLOOD PRESSURE: 64 MMHG | SYSTOLIC BLOOD PRESSURE: 114 MMHG

## 2021-07-14 DIAGNOSIS — I10 HYPERTENSION, ESSENTIAL: ICD-10-CM

## 2021-07-14 DIAGNOSIS — I51.89 OTHER ILL-DEFINED HEART DISEASES: ICD-10-CM

## 2021-07-14 DIAGNOSIS — I77.810 DILATED AORTIC ROOT (HCC): ICD-10-CM

## 2021-07-14 DIAGNOSIS — I34.0 NON-RHEUMATIC MITRAL REGURGITATION: ICD-10-CM

## 2021-07-14 DIAGNOSIS — I34.1 MVP (MITRAL VALVE PROLAPSE): Primary | ICD-10-CM

## 2021-07-14 PROCEDURE — 99214 OFFICE O/P EST MOD 30 MIN: CPT | Performed by: INTERNAL MEDICINE

## 2021-07-14 PROCEDURE — 93000 ELECTROCARDIOGRAM COMPLETE: CPT | Performed by: INTERNAL MEDICINE

## 2021-07-14 RX ORDER — VIT C/B6/B5/MAGNESIUM/HERB 173 50-5-6-5MG
CAPSULE ORAL DAILY
COMMUNITY

## 2021-07-14 NOTE — PROGRESS NOTES
Date of Office Visit: 2021  Encounter Provider: Adriana Vaughn MD  Place of Service: [facility] CARDIOLOGY  Patient Name: Mirza Zimmer  :1945    Chief complaint  Valvular heart disease, hypertension, dilated ascending aorta    History of Present Illness  Patient is a pleasant 76-year-old gentleman history of hypertension, hyperlipidemia with a mitral valve prolapse mild regurgitation and aortic root dilatation.  In  an echocardiogram showed an ejection fraction of 57% with moderate severe prolapse the posterior leaflet with severe anterior directed mitral gravitation.  Aortic root measured 4.2 cm.  MR angiogram of the chest also showed a mildly dilated asending aorta measuring 4.1 cm.  Last MR angiogram of the chest dated 2018 showed a mildly dilated aortic arch measuring 4.1 cm clinically he is doing well and we have opted to follow him with serial echocardiographic studies including stress imaging.  Last stress test in 2019 showed an ejection fraction 58% with moderately dilated left ventricle with grade 2 diastolic dysfunction, moderate left atrial margin, mild right atrial enlargement, severe prolapse of the posterior leaflet with severe anterior directed mitral regurgitation at rest.  There is moderate tricuspid regurgitation with an RV systolic pressure 32 mmHg.  Following exercise (12 minutes on the Shahab protocol achieving 13 METS) blood pressure response was appropriate and there was worse regurgitation diastolic function.  Unable to assess RV systolic pressure post exercise.  Ejection fraction did increased to 66%.  In 3/20 echocardiogram showed an ejection fraction of 56% with mild left ventricular hypertrophy, normal diastolic function normal left ventricular cavity size with moderate to severe prolapse of the mitral valve with moderate severe anterior directed mitral valve regurgitation, RV systolic pressure was normal.  A sending aortic  again dilated    Since last visit he is riding his bicycle lifting weights and doing yoga daily for 30 minutes he denies any chest pain palpitations syncope near syncope or shortness of breath.  He has had edema of both legs towards the end of the day over the past 2 months.  He is scheduled for colonoscopy with Dr. Mercado on September.  Blood work on 5/21 normal CMP, LDL 97, HDL and triglycerides acceptableThyroid studies normal sed rate rheumatoid factor C-reactive protein DODIE normal.    Past Medical History:   Diagnosis Date   • Arthritis    • BPH (benign prostatic hyperplasia)     FOLLOWED BY DR. MARK BALL   • Cardiomegaly     FOLLOWED BY DR. SAMUEL RIVAS   • Chronic prostatitis     FOLLOWED BY DR. MARK BALL   • Colon polyps     FOLLOWED BY DR. 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 EDGARD LEE   • Mitral regurgitation    • Mitral valve insufficiency    • Muscular aches    • MVP (mitral valve prolapse)    • Seborrheic keratosis    • Thoracic aortic aneurysm without rupture (CMS/HCC)    • Thoracic aortic ectasia (CMS/HCC) 07/2017     Past Surgical History:   Procedure Laterality Date   • COLONOSCOPY N/A 8/28/2019    5 MM TUBULAR ADENOMA POLYP IN HEPATIC FLEXURE, 6 MM SERRATED ADENOMA POLYP IN TRANSVERSE, MULTIPLE DIVERTICULA IN SIGMOID, RESCOPE IN 2 YRS, DR. KEVIN MERCADO AT [facility]   • COLONOSCOPY W/ POLYPECTOMY N/A 09/03/2004    5 MM ADENOMATOUS POLYP IN SIGMOID, OTHERWISE WNL, RESCOPE IN 3 YRS, DR. ANGI CONLEY AT [facility]   • COLONOSCOPY W/ POLYPECTOMY N/A 05/28/2013    3 MM SESSILE HYPERPLASTIC POLYP 50CM FROM ANUS, A FEW SMALL DIVERTICULA IN SIGMOID, NON BLEEDING INTERNAL HEMORRHOIDS GRADE II, DR. ANGI CONLEY AT [facility]   • COLONOSCOPY W/ POLYPECTOMY N/A 02/29/2008    5 MM HYPERPLASTIC POLYP AT 60 CM FROM ANUS, RESCOPE IN 5 YRS, DR. ANGI CONLEY AT [facility]    • EYE SURGERY  2003    ATYPICAL MOLE EXCSION   • OTHER SURGICAL HISTORY N/A 08/15/2018    UROLIFT, PROSTATE VOLUME 48CC, NO ABNORMALITIES, DR. COLLIN GARVIN   • TONSILLECTOMY Bilateral 1958     Outpatient Medications Prior to Visit   Medication Sig Dispense Refill   • Cetirizine HCl 10 MG capsule      • glucosamine-chondroitin 500-400 MG capsule capsule Take  by mouth. 2 po daily     • losartan (COZAAR) 25 MG tablet TAKE 1 TABLET BY MOUTH DAILY 90 tablet 3   • simvastatin (ZOCOR) 20 MG tablet TAKE 1 TABLET BY MOUTH EVERY NIGHT 90 tablet 0   • tadalafil (CIALIS) 5 MG tablet TAKE ONE TABLET BY MOUTH DAILY 60 tablet 3   • Turmeric 500 MG capsule Take  by mouth.       No facility-administered medications prior to visit.       Allergies as of 07/14/2021 - Reviewed 07/14/2021   Allergen Reaction Noted   • Sulfa antibiotics  02/18/2016     Social History     Socioeconomic History   • Marital status:      Spouse name: Not on file   • Number of children: Not on file   • Years of education: Not on file   • Highest education level: Not on file   Tobacco Use   • Smoking status: Never Smoker   • Smokeless tobacco: Never Used   • Tobacco comment: Caffeine use   Substance and Sexual Activity   • Alcohol use: Yes     Comment: social occasional use   • Drug use: No   • Sexual activity: Yes     Partners: Female     Family History   Problem Relation Age of Onset   • Dementia Mother    • Diabetes Father    • Sudden death Father    • Heart attack Father    • Cancer Sister         malignant neoplasm of breast   • Breast cancer Sister      Review of Systems   Constitutional: Negative for chills, fever, weight gain and weight loss.   Cardiovascular: Negative for leg swelling.   Respiratory: Negative for cough, snoring and wheezing.    Hematologic/Lymphatic: Negative for bleeding problem. Does not bruise/bleed easily.   Skin: Negative for color change.   Musculoskeletal: Positive for joint pain. Negative for falls and myalgias.  "  Gastrointestinal: Negative for melena.   Genitourinary: Negative for hematuria.   Neurological: Negative for excessive daytime sleepiness.   Psychiatric/Behavioral: Negative for depression. The patient is not nervous/anxious.         Objective:     Vitals:    07/14/21 0942   BP: 114/64   Pulse: 60   Weight: 75.3 kg (166 lb)   Height: 172.7 cm (68\")     Body mass index is 25.24 kg/m².    Vitals reviewed.   Constitutional:       Appearance: Well-developed.   Eyes:      General: No scleral icterus.        Right eye: No discharge.      Conjunctiva/sclera: Conjunctivae normal.      Pupils: Pupils are equal, round, and reactive to light.   HENT:      Head: Normocephalic.      Nose: Nose normal.   Neck:      Thyroid: No thyromegaly.      Vascular: No JVD.   Pulmonary:      Effort: Pulmonary effort is normal. No respiratory distress.      Breath sounds: Normal breath sounds. No wheezing. No rales.   Cardiovascular:      Normal rate. Regular rhythm. Normal S1. Normal S2.      Murmurs: There is a grade 3/6 crescendo-decrescendo, mid to late systolic murmur at the apex.      No gallop.   Pulses:     Intact distal pulses.   Edema:     Peripheral edema absent.   Abdominal:      General: Bowel sounds are normal. There is no distension.      Palpations: Abdomen is soft.      Tenderness: There is no abdominal tenderness. There is no rebound.   Musculoskeletal: Normal range of motion.         General: No tenderness.      Cervical back: Normal range of motion and neck supple. Skin:     General: Skin is warm and dry.      Findings: No erythema or rash.   Neurological:      Mental Status: Alert and oriented to person, place, and time.   Psychiatric:         Behavior: Behavior normal.         Thought Content: Thought content normal.         Judgment: Judgment normal.       Lab Review:     ECG 12 Lead    Date/Time: 7/14/2021 9:51 AM  Performed by: Adriana Vaughn MD  Authorized by: Adriana Vaughn MD   Comparison: compared with previous ECG "   Comparison to previous ECG: MN interval is longer.  Rhythm: sinus rhythm  Conduction: 1st degree AV block    Clinical impression: abnormal EKG          Assessment:       Diagnosis Plan   1. MVP (mitral valve prolapse)  ECG 12 Lead    Adult Stress Echo W/ Cont or Stress Agent if Necessary Per Protocol   2. Non-rheumatic mitral regurgitation  ECG 12 Lead    Adult Stress Echo W/ Cont or Stress Agent if Necessary Per Protocol   3. Dilated aortic root (CMS/HCC)  ECG 12 Lead    MRI Angiogram Chest With & Without Contrast   4. Other ill-defined heart diseases   Adult Stress Echo W/ Cont or Stress Agent if Necessary Per Protocol   5. Hypertension, essential       Plan:       1.  Mitral prolapse with moderate to severe severe mitral regurgitation.  Clinically stable but given severity of regurgitation we will check a stress echocardiogram  2.  Dilated aortic root, stable by MR imaging 10/2018, measuring 4.0 cm in the ascending aorta and 4.1 cm in the aortic root.  Echo last year and ascending aortic measurement of 3.6 cm.  Will reassess by MR angiography this time.  3.  Dyslipidemia.  Lipid panel checked 5/2021 with LDL of 97, HDL 40, triglycerides 78  4.  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  5.  Hypertension.  Controlled      Time Spent: I spent 30 minutes caring for Mirza on this date of service. This time includes time spent by me in the following activities: preparing for the visit, reviewing tests, obtaining and/or reviewing a separately obtained history, performing a medically appropriate examination and/or evaluation, counseling and educating the patient/family/caregiver, ordering medications, tests, or procedures, documenting information in the medical record and independently interpreting results and communicating that information with the patient/family/caregiver.   I spent 1 minutes on the separately reported service of ECG. This time is not included in the time used to  support the E/M service also reported today.        Your medication list          Accurate as of July 14, 2021 11:59 PM. If you have any questions, ask your nurse or doctor.            CONTINUE taking these medications      Instructions Last Dose Given Next Dose Due   Cetirizine HCl 10 MG capsule           glucosamine-chondroitin 500-400 MG capsule capsule      Take  by mouth. 2 po daily       losartan 25 MG tablet  Commonly known as: COZAAR      TAKE 1 TABLET BY MOUTH DAILY       simvastatin 20 MG tablet  Commonly known as: ZOCOR      TAKE 1 TABLET BY MOUTH EVERY NIGHT       tadalafil 5 MG tablet  Commonly known as: CIALIS      TAKE ONE TABLET BY MOUTH DAILY       Turmeric 500 MG capsule      Take  by mouth.              Patient is no longer taking -.  I corrected the med list to reflect this.  I did not stop these medications.      Dictated utilizing Dragon dictation

## 2021-08-03 ENCOUNTER — HOSPITAL ENCOUNTER (OUTPATIENT)
Dept: CARDIOLOGY | Facility: HOSPITAL | Age: 76
Discharge: HOME | End: 2021-08-03
Admitting: INTERNAL MEDICINE

## 2021-08-03 VITALS
WEIGHT: 166.01 LBS | SYSTOLIC BLOOD PRESSURE: 130 MMHG | DIASTOLIC BLOOD PRESSURE: 72 MMHG | HEIGHT: 68 IN | HEART RATE: 57 BPM | BODY MASS INDEX: 25.16 KG/M2

## 2021-08-03 DIAGNOSIS — I51.89 OTHER ILL-DEFINED HEART DISEASES: ICD-10-CM

## 2021-08-03 DIAGNOSIS — I34.0 NON-RHEUMATIC MITRAL REGURGITATION: ICD-10-CM

## 2021-08-03 DIAGNOSIS — I34.1 MVP (MITRAL VALVE PROLAPSE): ICD-10-CM

## 2021-08-03 PROCEDURE — 93356 MYOCRD STRAIN IMG SPCKL TRCK: CPT

## 2021-08-03 PROCEDURE — 93325 DOPPLER ECHO COLOR FLOW MAPG: CPT | Performed by: INTERNAL MEDICINE

## 2021-08-03 PROCEDURE — 93356 MYOCRD STRAIN IMG SPCKL TRCK: CPT | Performed by: INTERNAL MEDICINE

## 2021-08-03 PROCEDURE — 93017 CV STRESS TEST TRACING ONLY: CPT

## 2021-08-03 PROCEDURE — 93016 CV STRESS TEST SUPVJ ONLY: CPT | Performed by: INTERNAL MEDICINE

## 2021-08-03 PROCEDURE — 93320 DOPPLER ECHO COMPLETE: CPT

## 2021-08-03 PROCEDURE — 93352 ADMIN ECG CONTRAST AGENT: CPT | Performed by: INTERNAL MEDICINE

## 2021-08-03 PROCEDURE — 93320 DOPPLER ECHO COMPLETE: CPT | Performed by: INTERNAL MEDICINE

## 2021-08-03 PROCEDURE — 93018 CV STRESS TEST I&R ONLY: CPT | Performed by: INTERNAL MEDICINE

## 2021-08-03 PROCEDURE — 93325 DOPPLER ECHO COLOR FLOW MAPG: CPT

## 2021-08-03 PROCEDURE — 93350 STRESS TTE ONLY: CPT

## 2021-08-03 PROCEDURE — 25010000002 PERFLUTREN (DEFINITY) 8.476 MG IN SODIUM CHLORIDE (PF) 0.9 % 10 ML INJECTION: Performed by: INTERNAL MEDICINE

## 2021-08-03 PROCEDURE — 93350 STRESS TTE ONLY: CPT | Performed by: INTERNAL MEDICINE

## 2021-08-03 RX ADMIN — PERFLUTREN 3 ML: 6.52 INJECTION, SUSPENSION INTRAVENOUS at 09:00

## 2021-08-04 LAB
ASCENDING AORTA: 3.6 CM
BH CV ECHO MEAS - ACS: 2.3 CM
BH CV ECHO MEAS - AO MAX PG (FULL): 1.1 MMHG
BH CV ECHO MEAS - AO MAX PG: 6 MMHG
BH CV ECHO MEAS - AO MEAN PG (FULL): 0.34 MMHG
BH CV ECHO MEAS - AO MEAN PG: 3.4 MMHG
BH CV ECHO MEAS - AO ROOT AREA (BSA CORRECTED): 2
BH CV ECHO MEAS - AO ROOT AREA: 11.5 CM^2
BH CV ECHO MEAS - AO ROOT DIAM: 3.8 CM
BH CV ECHO MEAS - AO V2 MAX: 122.2 CM/SEC
BH CV ECHO MEAS - AO V2 MEAN: 86.8 CM/SEC
BH CV ECHO MEAS - AO V2 VTI: 27.7 CM
BH CV ECHO MEAS - ASC AORTA: 3.6 CM
BH CV ECHO MEAS - AVA(I,A): 3 CM^2
BH CV ECHO MEAS - AVA(I,D): 3 CM^2
BH CV ECHO MEAS - AVA(V,A): 3.2 CM^2
BH CV ECHO MEAS - AVA(V,D): 3.2 CM^2
BH CV ECHO MEAS - BSA(HAYCOCK): 1.9 M^2
BH CV ECHO MEAS - BSA: 1.9 M^2
BH CV ECHO MEAS - BZI_BMI: 25.2 KILOGRAMS/M^2
BH CV ECHO MEAS - BZI_METRIC_HEIGHT: 172.7 CM
BH CV ECHO MEAS - BZI_METRIC_WEIGHT: 75.3 KG
BH CV ECHO MEAS - EDV(MOD-SP2): 177 ML
BH CV ECHO MEAS - EDV(MOD-SP4): 187 ML
BH CV ECHO MEAS - EDV(TEICH): 139.3 ML
BH CV ECHO MEAS - EF(CUBED): 76.9 %
BH CV ECHO MEAS - EF(MOD-BP): 69 %
BH CV ECHO MEAS - EF(MOD-SP2): 88.1 %
BH CV ECHO MEAS - EF(MOD-SP4): 85.6 %
BH CV ECHO MEAS - EF(TEICH): 68.5 %
BH CV ECHO MEAS - ESV(MOD-SP2): 21 ML
BH CV ECHO MEAS - ESV(MOD-SP4): 27 ML
BH CV ECHO MEAS - ESV(TEICH): 43.9 ML
BH CV ECHO MEAS - FS: 38.7 %
BH CV ECHO MEAS - IVS/LVPW: 0.98
BH CV ECHO MEAS - IVSD: 1.1 CM
BH CV ECHO MEAS - LAT PEAK E' VEL: 7.1 CM/SEC
BH CV ECHO MEAS - LV DIASTOLIC VOL/BSA (35-75): 99 ML/M^2
BH CV ECHO MEAS - LV MASS(C)D: 232.7 GRAMS
BH CV ECHO MEAS - LV MASS(C)DI: 123.2 GRAMS/M^2
BH CV ECHO MEAS - LV MAX PG: 4.8 MMHG
BH CV ECHO MEAS - LV MEAN PG: 3 MMHG
BH CV ECHO MEAS - LV SYSTOLIC VOL/BSA (12-30): 14.3 ML/M^2
BH CV ECHO MEAS - LV V1 MAX: 110.1 CM/SEC
BH CV ECHO MEAS - LV V1 MEAN: 83.4 CM/SEC
BH CV ECHO MEAS - LV V1 VTI: 22.9 CM
BH CV ECHO MEAS - LVIDD: 5.4 CM
BH CV ECHO MEAS - LVIDS: 3.3 CM
BH CV ECHO MEAS - LVLD AP2: 8.5 CM
BH CV ECHO MEAS - LVLD AP4: 8.8 CM
BH CV ECHO MEAS - LVLS AP2: 6.3 CM
BH CV ECHO MEAS - LVLS AP4: 7.5 CM
BH CV ECHO MEAS - LVOT AREA (M): 3.5 CM^2
BH CV ECHO MEAS - LVOT AREA: 3.6 CM^2
BH CV ECHO MEAS - LVOT DIAM: 2.1 CM
BH CV ECHO MEAS - LVPWD: 1.1 CM
BH CV ECHO MEAS - MED PEAK E' VEL: 6.9 CM/SEC
BH CV ECHO MEAS - MR MAX PG: 92.4 MMHG
BH CV ECHO MEAS - MR MAX VEL: 480.5 CM/SEC
BH CV ECHO MEAS - MV DEC SLOPE: 925 CM/SEC^2
BH CV ECHO MEAS - MV MAX PG: 10 MMHG
BH CV ECHO MEAS - MV MEAN PG: 2.7 MMHG
BH CV ECHO MEAS - MV P1/2T MAX VEL: 173.4 CM/SEC
BH CV ECHO MEAS - MV P1/2T: 54.9 MSEC
BH CV ECHO MEAS - MV V2 MAX: 158.4 CM/SEC
BH CV ECHO MEAS - MV V2 MEAN: 71.5 CM/SEC
BH CV ECHO MEAS - MV V2 VTI: 47.8 CM
BH CV ECHO MEAS - MVA P1/2T LCG: 1.3 CM^2
BH CV ECHO MEAS - MVA(P1/2T): 4 CM^2
BH CV ECHO MEAS - MVA(VTI): 1.7 CM^2
BH CV ECHO MEAS - PA ACC TIME: 0.18 SEC
BH CV ECHO MEAS - PA MAX PG (FULL): -0.25 MMHG
BH CV ECHO MEAS - PA MAX PG: 1.4 MMHG
BH CV ECHO MEAS - PA PR(ACCEL): -0.62 MMHG
BH CV ECHO MEAS - PA V2 MAX: 59.7 CM/SEC
BH CV ECHO MEAS - PI END-D VEL: 131.7 CM/SEC
BH CV ECHO MEAS - PULM A REVS DUR: 0.16 SEC
BH CV ECHO MEAS - PULM A REVS VEL: 30.9 CM/SEC
BH CV ECHO MEAS - PULM DIAS VEL: 61.4 CM/SEC
BH CV ECHO MEAS - PULM S/D: 1.2
BH CV ECHO MEAS - PULM SYS VEL: 76.2 CM/SEC
BH CV ECHO MEAS - PVA(V,A): 7.9 CM^2
BH CV ECHO MEAS - PVA(V,D): 7.9 CM^2
BH CV ECHO MEAS - QP/QS: 1.6
BH CV ECHO MEAS - RAP SYSTOLE: 8 MMHG
BH CV ECHO MEAS - RV MAX PG: 1.7 MMHG
BH CV ECHO MEAS - RV MEAN PG: 0.78 MMHG
BH CV ECHO MEAS - RV V1 MAX: 64.7 CM/SEC
BH CV ECHO MEAS - RV V1 MEAN: 40.8 CM/SEC
BH CV ECHO MEAS - RV V1 VTI: 17.8 CM
BH CV ECHO MEAS - RVOT AREA: 7.3 CM^2
BH CV ECHO MEAS - RVOT DIAM: 3 CM
BH CV ECHO MEAS - RVSP: 33.2 MMHG
BH CV ECHO MEAS - SI(AO): 168.6 ML/M^2
BH CV ECHO MEAS - SI(CUBED): 63 ML/M^2
BH CV ECHO MEAS - SI(LVOT): 43.3 ML/M^2
BH CV ECHO MEAS - SI(MOD-SP2): 82.6 ML/M^2
BH CV ECHO MEAS - SI(MOD-SP4): 84.7 ML/M^2
BH CV ECHO MEAS - SI(TEICH): 50.5 ML/M^2
BH CV ECHO MEAS - SV(AO): 318.4 ML
BH CV ECHO MEAS - SV(CUBED): 118.9 ML
BH CV ECHO MEAS - SV(LVOT): 81.9 ML
BH CV ECHO MEAS - SV(MOD-SP2): 156 ML
BH CV ECHO MEAS - SV(MOD-SP4): 160 ML
BH CV ECHO MEAS - SV(RVOT): 129.4 ML
BH CV ECHO MEAS - SV(TEICH): 95.4 ML
BH CV ECHO MEAS - TAPSE (>1.6): 2.8 CM
BH CV ECHO MEAS - TR MAX VEL: 251.1 CM/SEC
BH CV STRESS BP STAGE 1: NORMAL
BH CV STRESS BP STAGE 2: NORMAL
BH CV STRESS BP STAGE 3: NORMAL
BH CV STRESS DURATION MIN STAGE 1: 3
BH CV STRESS DURATION MIN STAGE 2: 3
BH CV STRESS DURATION MIN STAGE 3: 3
BH CV STRESS DURATION MIN STAGE 4: 1
BH CV STRESS DURATION SEC STAGE 1: 0
BH CV STRESS DURATION SEC STAGE 2: 0
BH CV STRESS DURATION SEC STAGE 3: 0
BH CV STRESS DURATION SEC STAGE 4: 30
BH CV STRESS ECHO POST STRESS EJECTION FRACTION EF: 85 %
BH CV STRESS GRADE STAGE 1: 10
BH CV STRESS GRADE STAGE 2: 12
BH CV STRESS GRADE STAGE 3: 14
BH CV STRESS GRADE STAGE 4: 16
BH CV STRESS HR STAGE 1: 83
BH CV STRESS HR STAGE 2: 97
BH CV STRESS HR STAGE 3: 118
BH CV STRESS HR STAGE 4: 125
BH CV STRESS METS STAGE 1: 5
BH CV STRESS METS STAGE 2: 7.5
BH CV STRESS METS STAGE 3: 10
BH CV STRESS METS STAGE 4: 13.5
BH CV STRESS PROTOCOL 1: NORMAL
BH CV STRESS SPEED STAGE 1: 1.7
BH CV STRESS SPEED STAGE 2: 2.5
BH CV STRESS SPEED STAGE 3: 3.4
BH CV STRESS SPEED STAGE 4: 4.2
BH CV STRESS STAGE 1: 1
BH CV STRESS STAGE 2: 2
BH CV STRESS STAGE 3: 3
BH CV STRESS STAGE 4: 4
BH CV XLRA - RV BASE: 3.2 CM
BH CV XLRA - RV LENGTH: 7.5 CM
BH CV XLRA - RV MID: 2.4 CM
BH CV XLRA - TDI S': 16 CM/SEC
LEFT ATRIUM VOLUME INDEX: 28 ML/M2
LV EF 2D ECHO EST: 69 %
MAXIMAL PREDICTED HEART RATE: 144 BPM
PERCENT MAX PREDICTED HR: 86.81 %
SINUS: 3.8 CM
STJ: 2.9 CM
STRESS BASELINE BP: NORMAL MMHG
STRESS BASELINE HR: 57 BPM
STRESS PERCENT HR: 102 %
STRESS POST ESTIMATED WORKLOAD: 12 METS
STRESS POST EXERCISE DUR MIN: 10 MIN
STRESS POST EXERCISE DUR SEC: 30 SEC
STRESS POST PEAK BP: NORMAL MMHG
STRESS POST PEAK HR: 125 BPM
STRESS TARGET HR: 122 BPM

## 2021-08-11 ENCOUNTER — HOSPITAL ENCOUNTER (OUTPATIENT)
Dept: MRI IMAGING | Facility: HOSPITAL | Age: 76
Discharge: HOME | End: 2021-08-11
Admitting: INTERNAL MEDICINE

## 2021-08-11 ENCOUNTER — TELEPHONE (OUTPATIENT)
Dept: CARDIOLOGY | Facility: CLINIC | Age: 76
End: 2021-08-11

## 2021-08-11 DIAGNOSIS — I77.810 DILATED AORTIC ROOT (HCC): ICD-10-CM

## 2021-08-11 PROCEDURE — 82565 ASSAY OF CREATININE: CPT

## 2021-08-11 PROCEDURE — A9577 INJ MULTIHANCE: HCPCS | Performed by: INTERNAL MEDICINE

## 2021-08-11 PROCEDURE — C8911 MRA W/O FOL W/CONT, CHEST: HCPCS

## 2021-08-11 PROCEDURE — 0 GADOBENATE DIMEGLUMINE 529 MG/ML SOLUTION: Performed by: INTERNAL MEDICINE

## 2021-08-11 RX ADMIN — GADOBENATE DIMEGLUMINE 15 ML: 529 INJECTION, SOLUTION INTRAVENOUS at 08:43

## 2021-08-12 DIAGNOSIS — I34.1 MVP (MITRAL VALVE PROLAPSE): Primary | ICD-10-CM

## 2021-08-12 DIAGNOSIS — I34.0 NON-RHEUMATIC MITRAL REGURGITATION: ICD-10-CM

## 2021-08-12 LAB — CREAT BLDA-MCNC: 1 MG/DL (ref 0.6–1.3)

## 2021-08-12 NOTE — TELEPHONE ENCOUNTER
I talked to patient regarding test results.  He feels great exercising regularly and no symptoms.  Will return f at 6 months with check a transthoracic echocardiogram in 6 months at follow-up.  Please arrange this.

## 2021-08-13 NOTE — TELEPHONE ENCOUNTER
Patient is scheduled for echo and follow up with Dr. Vaughn 2/7/2022.     Thank you   Kamilla

## 2021-08-17 RX ORDER — SIMVASTATIN 20 MG
TABLET ORAL
Qty: 90 TABLET | Refills: 0 | Status: SHIPPED | OUTPATIENT
Start: 2021-08-17 | End: 2021-12-08

## 2021-09-29 ENCOUNTER — ANESTHESIA EVENT (OUTPATIENT)
Dept: GASTROENTEROLOGY | Facility: HOSPITAL | Age: 76
End: 2021-09-29

## 2021-09-29 ENCOUNTER — ANESTHESIA (OUTPATIENT)
Dept: GASTROENTEROLOGY | Facility: HOSPITAL | Age: 76
End: 2021-09-29

## 2021-09-29 ENCOUNTER — HOSPITAL ENCOUNTER (OUTPATIENT)
Facility: HOSPITAL | Age: 76
Setting detail: HOSPITAL OUTPATIENT SURGERY
Discharge: HOME | End: 2021-09-29
Attending: COLON & RECTAL SURGERY | Admitting: COLON & RECTAL SURGERY

## 2021-09-29 ENCOUNTER — HOSPITAL ENCOUNTER (EMERGENCY)
Facility: HOSPITAL | Age: 76
Discharge: LEFT BEFORE DISCHARGE | End: 2021-09-29

## 2021-09-29 ENCOUNTER — DOCUMENTATION (OUTPATIENT)
Dept: SURGERY | Facility: CLINIC | Age: 76
End: 2021-09-29

## 2021-09-29 ENCOUNTER — PREP FOR SURGERY (OUTPATIENT)
Dept: OTHER | Facility: HOSPITAL | Age: 76
End: 2021-09-29

## 2021-09-29 VITALS — RESPIRATION RATE: 18 BRPM | TEMPERATURE: 98.8 F | HEIGHT: 68 IN | BODY MASS INDEX: 24.63 KG/M2

## 2021-09-29 VITALS
BODY MASS INDEX: 24.55 KG/M2 | HEART RATE: 53 BPM | TEMPERATURE: 97.6 F | HEIGHT: 68 IN | OXYGEN SATURATION: 97 % | RESPIRATION RATE: 16 BRPM | WEIGHT: 162 LBS | SYSTOLIC BLOOD PRESSURE: 129 MMHG | DIASTOLIC BLOOD PRESSURE: 85 MMHG

## 2021-09-29 DIAGNOSIS — Z86.010 HISTORY OF COLON POLYPS: ICD-10-CM

## 2021-09-29 DIAGNOSIS — C18.7 MALIGNANT NEOPLASM OF SIGMOID COLON (HCC): Primary | ICD-10-CM

## 2021-09-29 LAB
ALBUMIN SERPL-MCNC: 3.6 G/DL (ref 3.5–5.2)
ALBUMIN/GLOB SERPL: 1.7 G/DL
ALP SERPL-CCNC: 67 U/L (ref 39–117)
ALT SERPL W P-5'-P-CCNC: 14 U/L (ref 1–41)
ANION GAP SERPL CALCULATED.3IONS-SCNC: 8.4 MMOL/L (ref 5–15)
AST SERPL-CCNC: 20 U/L (ref 1–40)
BILIRUB SERPL-MCNC: 0.5 MG/DL (ref 0–1.2)
BUN SERPL-MCNC: 10 MG/DL (ref 8–23)
BUN/CREAT SERPL: 11.1 (ref 7–25)
CALCIUM SPEC-SCNC: 8.9 MG/DL (ref 8.6–10.5)
CEA SERPL-MCNC: 3.29 NG/ML
CHLORIDE SERPL-SCNC: 106 MMOL/L (ref 98–107)
CO2 SERPL-SCNC: 26.6 MMOL/L (ref 22–29)
CREAT SERPL-MCNC: 0.9 MG/DL (ref 0.76–1.27)
DEPRECATED RDW RBC AUTO: 42.1 FL (ref 37–54)
ERYTHROCYTE [DISTWIDTH] IN BLOOD BY AUTOMATED COUNT: 12.3 % (ref 12.3–15.4)
GFR SERPL CREATININE-BSD FRML MDRD: 82 ML/MIN/1.73
GLOBULIN UR ELPH-MCNC: 2.1 GM/DL
GLUCOSE SERPL-MCNC: 98 MG/DL (ref 65–99)
HCT VFR BLD AUTO: 36.4 % (ref 37.5–51)
HGB BLD-MCNC: 12.2 G/DL (ref 13–17.7)
MCH RBC QN AUTO: 31.4 PG (ref 26.6–33)
MCHC RBC AUTO-ENTMCNC: 33.5 G/DL (ref 31.5–35.7)
MCV RBC AUTO: 93.6 FL (ref 79–97)
PLATELET # BLD AUTO: 146 10*3/MM3 (ref 140–450)
PMV BLD AUTO: 9.5 FL (ref 6–12)
POTASSIUM SERPL-SCNC: 4.2 MMOL/L (ref 3.5–5.2)
PROT SERPL-MCNC: 5.7 G/DL (ref 6–8.5)
RBC # BLD AUTO: 3.89 10*6/MM3 (ref 4.14–5.8)
SODIUM SERPL-SCNC: 141 MMOL/L (ref 136–145)
WBC # BLD AUTO: 2.89 10*3/MM3 (ref 3.4–10.8)

## 2021-09-29 PROCEDURE — 25010000002 PROPOFOL 10 MG/ML EMULSION: Performed by: ANESTHESIOLOGY

## 2021-09-29 PROCEDURE — 85027 COMPLETE CBC AUTOMATED: CPT | Performed by: COLON & RECTAL SURGERY

## 2021-09-29 PROCEDURE — 82378 CARCINOEMBRYONIC ANTIGEN: CPT | Performed by: COLON & RECTAL SURGERY

## 2021-09-29 PROCEDURE — 80053 COMPREHEN METABOLIC PANEL: CPT | Performed by: COLON & RECTAL SURGERY

## 2021-09-29 PROCEDURE — 88305 TISSUE EXAM BY PATHOLOGIST: CPT | Performed by: COLON & RECTAL SURGERY

## 2021-09-29 PROCEDURE — 99211 OFF/OP EST MAY X REQ PHY/QHP: CPT

## 2021-09-29 RX ORDER — GABAPENTIN 300 MG/1
600 CAPSULE ORAL 3 TIMES DAILY
Status: CANCELLED | OUTPATIENT
Start: 2021-10-12

## 2021-09-29 RX ORDER — ACETAMINOPHEN 500 MG
1000 TABLET ORAL EVERY 6 HOURS
Status: CANCELLED | OUTPATIENT
Start: 2021-10-12

## 2021-09-29 RX ORDER — CHLORHEXIDINE GLUCONATE 4 G/100ML
SOLUTION TOPICAL 2 TIMES DAILY
Qty: 236 ML | Refills: 0 | Status: SHIPPED | OUTPATIENT
Start: 2021-09-29 | End: 2021-10-04

## 2021-09-29 RX ORDER — SODIUM CHLORIDE, SODIUM LACTATE, POTASSIUM CHLORIDE, CALCIUM CHLORIDE 600; 310; 30; 20 MG/100ML; MG/100ML; MG/100ML; MG/100ML
INJECTION, SOLUTION INTRAVENOUS CONTINUOUS PRN
Status: DISCONTINUED | OUTPATIENT
Start: 2021-09-29 | End: 2021-09-29 | Stop reason: SURG

## 2021-09-29 RX ORDER — PROPOFOL 10 MG/ML
VIAL (ML) INTRAVENOUS CONTINUOUS PRN
Status: DISCONTINUED | OUTPATIENT
Start: 2021-09-29 | End: 2021-09-29 | Stop reason: SURG

## 2021-09-29 RX ORDER — CEFAZOLIN SODIUM 2 G/100ML
2 INJECTION, SOLUTION INTRAVENOUS ONCE
Status: CANCELLED | OUTPATIENT
Start: 2021-10-12 | End: 2021-09-29

## 2021-09-29 RX ORDER — ALVIMOPAN 12 MG/1
12 CAPSULE ORAL ONCE
Status: CANCELLED | OUTPATIENT
Start: 2021-10-12 | End: 2021-09-29

## 2021-09-29 RX ORDER — PROPOFOL 10 MG/ML
VIAL (ML) INTRAVENOUS AS NEEDED
Status: DISCONTINUED | OUTPATIENT
Start: 2021-09-29 | End: 2021-09-29 | Stop reason: SURG

## 2021-09-29 RX ORDER — SODIUM CHLORIDE 0.9 % (FLUSH) 0.9 %
3 SYRINGE (ML) INJECTION EVERY 12 HOURS SCHEDULED
Status: DISCONTINUED | OUTPATIENT
Start: 2021-09-29 | End: 2021-09-29 | Stop reason: HOSPADM

## 2021-09-29 RX ORDER — SODIUM CHLORIDE, SODIUM LACTATE, POTASSIUM CHLORIDE, CALCIUM CHLORIDE 600; 310; 30; 20 MG/100ML; MG/100ML; MG/100ML; MG/100ML
30 INJECTION, SOLUTION INTRAVENOUS CONTINUOUS PRN
Status: DISCONTINUED | OUTPATIENT
Start: 2021-09-29 | End: 2021-09-29 | Stop reason: HOSPADM

## 2021-09-29 RX ORDER — SODIUM CHLORIDE 0.9 % (FLUSH) 0.9 %
10 SYRINGE (ML) INJECTION AS NEEDED
Status: DISCONTINUED | OUTPATIENT
Start: 2021-09-29 | End: 2021-09-29 | Stop reason: HOSPADM

## 2021-09-29 RX ORDER — LIDOCAINE HYDROCHLORIDE 20 MG/ML
INJECTION, SOLUTION INFILTRATION; PERINEURAL AS NEEDED
Status: DISCONTINUED | OUTPATIENT
Start: 2021-09-29 | End: 2021-09-29 | Stop reason: SURG

## 2021-09-29 RX ORDER — CELECOXIB 200 MG/1
200 CAPSULE ORAL EVERY 12 HOURS SCHEDULED
Status: CANCELLED | OUTPATIENT
Start: 2021-10-12

## 2021-09-29 RX ADMIN — SODIUM CHLORIDE, POTASSIUM CHLORIDE, SODIUM LACTATE AND CALCIUM CHLORIDE: 600; 310; 30; 20 INJECTION, SOLUTION INTRAVENOUS at 09:39

## 2021-09-29 RX ADMIN — Medication 160 MCG/KG/MIN: at 09:39

## 2021-09-29 RX ADMIN — LIDOCAINE HYDROCHLORIDE 60 MG: 20 INJECTION, SOLUTION INFILTRATION; PERINEURAL at 09:39

## 2021-09-29 RX ADMIN — PROPOFOL 100 MG: 10 INJECTION, EMULSION INTRAVENOUS at 09:39

## 2021-09-29 RX ADMIN — SODIUM CHLORIDE, POTASSIUM CHLORIDE, SODIUM LACTATE AND CALCIUM CHLORIDE 30 ML/HR: 600; 310; 30; 20 INJECTION, SOLUTION INTRAVENOUS at 08:33

## 2021-09-29 NOTE — ED TRIAGE NOTES
Pt arrived to ER via pv. Pt had a colonoscopy this morning at approx 0930. Pt started running a fever 1700 and hasn't passed gas in hours. Pt called his GI doctor and was told to come to the ER to rule out a bowel perforation. Pt wife states his temperature at home was 101.1.   Pt in mask & this RN in eye protection during care.

## 2021-09-29 NOTE — ANESTHESIA POSTPROCEDURE EVALUATION
"Patient: Mirza Zimmer    Procedure Summary     Date: 09/29/21 Room / Location: [facility] ENDOSCOPY 9 /  [facility]    Anesthesia Start: 0938 Anesthesia Stop: 1007    Procedure: COLONOSCOPY to cecum with biopsy / polypectomy and tattoo injection (N/A ) Diagnosis:       History of colon polyps      (History of colon polyps [Z86.010])    Surgeons: Carolin Mercado MD Provider: Williams Pires MD    Anesthesia Type: MAC ASA Status: 2          Anesthesia Type: MAC    Vitals  Vitals Value Taken Time   /68 09/29/21 1006   Temp     Pulse 54 09/29/21 1006   Resp 16 09/29/21 1006   SpO2 97 % 09/29/21 1006           Post Anesthesia Care and Evaluation    Patient location during evaluation: bedside  Patient participation: complete - patient participated  Level of consciousness: awake and alert  Pain management: adequate  Airway patency: patent  Anesthetic complications: No anesthetic complications    Cardiovascular status: acceptable  Respiratory status: acceptable  Hydration status: acceptable    Comments: /68 (BP Location: Left arm, Patient Position: Lying)   Pulse 54   Temp 36.4 °C (97.6 °F) (Oral)   Resp 16   Ht 172.7 cm (68\")   Wt 73.5 kg (162 lb)   SpO2 97%   BMI 24.63 kg/m²       "

## 2021-09-29 NOTE — ANESTHESIA PREPROCEDURE EVALUATION
Anesthesia Evaluation                  Airway   Mallampati: II  TM distance: >3 FB  Neck ROM: full  No difficulty expected  Dental - normal exam     Pulmonary - normal exam   Cardiovascular - normal exam    (+) hypertension less than 2 medications, MOFFETT, PVD, hyperlipidemia,     ROS comment: Thoracic aortic aneurysm    Neuro/Psych  GI/Hepatic/Renal/Endo      Musculoskeletal     Abdominal    Substance History      OB/GYN          Other   arthritis,                        Anesthesia Plan    ASA 2     MAC       Anesthetic plan, all risks, benefits, and alternatives have been provided, discussed and informed consent has been obtained with: patient.

## 2021-09-30 ENCOUNTER — TELEPHONE (OUTPATIENT)
Dept: CARDIOLOGY | Facility: CLINIC | Age: 76
End: 2021-09-30

## 2021-09-30 ENCOUNTER — TELEPHONE (OUTPATIENT)
Dept: SURGERY | Facility: CLINIC | Age: 76
End: 2021-09-30

## 2021-09-30 LAB
LAB AP CASE REPORT: NORMAL
LAB AP CLINICAL INFORMATION: NORMAL
LAB AP DIAGNOSIS COMMENT: NORMAL
PATH REPORT.FINAL DX SPEC: NORMAL
PATH REPORT.GROSS SPEC: NORMAL

## 2021-09-30 NOTE — TELEPHONE ENCOUNTER
Wife called and wanted you to know that patient is doing better. No fever and bowels are back to normal. Wanted to thank you again for taking care of him last night.

## 2021-09-30 NOTE — TELEPHONE ENCOUNTER
No, he does not need SBE prophylaxis but please find out if he is having any chest pain, worsening shortness of breath or palpitations if so.  If so he does need to be seen preoperatively

## 2021-09-30 NOTE — TELEPHONE ENCOUNTER
Pt called re: Is an abx needed?      He has to have a colon resection with Dr Mercado on 10/12/2021.    He wants to verify this is ok and also does he need an abx?

## 2021-09-30 NOTE — TELEPHONE ENCOUNTER
Spoke with pt.  Verbalized understanding.  No CP, no SOA, or no palpitations.  He will call if this changes.  (Done)

## 2021-10-01 ENCOUNTER — HOSPITAL ENCOUNTER (OUTPATIENT)
Dept: CT IMAGING | Facility: HOSPITAL | Age: 76
Discharge: HOME | End: 2021-10-01
Admitting: COLON & RECTAL SURGERY

## 2021-10-01 ENCOUNTER — TRANSCRIBE ORDERS (OUTPATIENT)
Dept: PREADMISSION TESTING | Facility: HOSPITAL | Age: 76
End: 2021-10-01

## 2021-10-01 DIAGNOSIS — C18.7 MALIGNANT NEOPLASM OF SIGMOID COLON (HCC): ICD-10-CM

## 2021-10-01 PROCEDURE — 0 DIATRIZOATE MEGLUMINE & SODIUM PER 1 ML: Performed by: COLON & RECTAL SURGERY

## 2021-10-01 PROCEDURE — 25010000002 IOPAMIDOL 61 % SOLUTION: Performed by: COLON & RECTAL SURGERY

## 2021-10-01 PROCEDURE — 74177 CT ABD & PELVIS W/CONTRAST: CPT

## 2021-10-01 PROCEDURE — 71260 CT THORAX DX C+: CPT

## 2021-10-01 RX ADMIN — DIATRIZOATE MEGLUMINE AND DIATRIZOATE SODIUM 30 ML: 660; 100 LIQUID ORAL; RECTAL at 17:50

## 2021-10-01 RX ADMIN — IOPAMIDOL 90 ML: 612 INJECTION, SOLUTION INTRAVENOUS at 18:59

## 2021-10-04 ENCOUNTER — OFFICE VISIT (OUTPATIENT)
Dept: SURGERY | Facility: CLINIC | Age: 76
End: 2021-10-04

## 2021-10-04 VITALS — WEIGHT: 162 LBS | BODY MASS INDEX: 24.55 KG/M2 | HEIGHT: 68 IN

## 2021-10-04 DIAGNOSIS — C18.7 CANCER OF SIGMOID COLON (HCC): Primary | ICD-10-CM

## 2021-10-04 PROCEDURE — 99204 OFFICE O/P NEW MOD 45 MIN: CPT | Performed by: SURGERY

## 2021-10-04 RX ORDER — METRONIDAZOLE 500 MG/1
500 TABLET ORAL 3 TIMES DAILY
Qty: 3 TABLET | Refills: 0 | Status: SHIPPED | OUTPATIENT
Start: 2021-10-04 | End: 2021-10-05

## 2021-10-04 RX ORDER — NEOMYCIN SULFATE 500 MG/1
1000 TABLET ORAL 3 TIMES DAILY
Qty: 6 TABLET | Refills: 0 | Status: SHIPPED | OUTPATIENT
Start: 2021-10-04 | End: 2021-10-05

## 2021-10-04 NOTE — H&P (VIEW-ONLY)
SUMMARY (A/P):    76-year-old gentleman with newly diagnosed sigmoid colon cancer.  I recommended and he wishes to proceed with laparoscopic sigmoid colectomy.  He understands the nature of the procedure and the risks including but not limited to bleeding, infection, conversion to open procedure, and anastomotic leak requiring reoperation and temporary colostomy.  Instructions for mechanical bowel preparation given and prescriptions for oral antibiotic preparation sent to his pharmacy.      CC:    Sigmoid colon cancer    HPI:    76-year-old gentleman underwent surveillance colonoscopy on 9/29/2021 which demonstrated sigmoid colon cancer.  He has no symptoms referable to this.    ENDOSCOPY:   • Colonoscopy 9/29/2021: Malignant appearing mass sigmoid colon biopsied and tattooed.  Pathology demonstrated tubular adenoma of ascending colon and sigmoid colon mass biopsy demonstrated tubulovillous adenoma with at least intramucosal adenocarcinoma.  Grossly, the lesion appears malignant on endoscopic images.    RADIOLOGY:   • CT chest, abdomen, pelvis 10/1/2021:    LABS:    • CMP 9/29/2021: Normal  • CBC 9/29/2021: Mild anemia with hemoglobin 12.2  • CEA 9/29/2021: 3.29    PREVIOUS ABDOMINAL SURGERY    • None    PMH:    • Stable dilation of ascending aorta  • Iliac artery aneurysm  • BPH  • Hyperlipidemia  • Hypertension    ALLERGIES:   • Sulfa-unknown reaction    MEDICATIONS:   • Cetirizine  • Glucosamine  • Cozaar  • Zocor  • Cialis    FAMILY HISTORY:    • Colorectal cancer: Negative    SOCIAL HISTORY:   • Denies tobacco use  • Occasional alcohol use    PHYSICAL EXAM:   • Constitutional: Well-developed well-nourished, no acute distress  • Vital signs: Weight 162 pounds, height 68 inches, BMI 24.6  • Eyes: Conjunctiva normal, sclera nonicteric  • ENMT: Hearing grossly normal, oral mucosa moist  • Neck: Supple  • Respiratory: Normal inspiratory effort  • Cardiovascular: Regular rate, no murmur  • Gastrointestinal: Soft,  nontender, no palpable mass, no hepatosplenomegaly, negative for hernia, bowel sounds normal  • Skin:  Warm, dry, no rash on visualized skin surfaces  • Musculoskeletal: Symmetric strength, normal gait  • Psychiatric: Alert and oriented ×3, normal affect     ROS:    No cough, chest pain, shortness of air.  All other systems reviewed and negative other than presenting complaints.    HERACLIO FABIAN M.D.

## 2021-10-06 ENCOUNTER — APPOINTMENT (OUTPATIENT)
Dept: PREADMISSION TESTING | Facility: HOSPITAL | Age: 76
End: 2021-10-06

## 2021-10-06 ENCOUNTER — TELEPHONE (OUTPATIENT)
Dept: SURGERY | Facility: CLINIC | Age: 76
End: 2021-10-06

## 2021-10-06 ENCOUNTER — PRE-ADMISSION TESTING (OUTPATIENT)
Dept: PREADMISSION TESTING | Facility: HOSPITAL | Age: 76
End: 2021-10-06

## 2021-10-06 VITALS
DIASTOLIC BLOOD PRESSURE: 72 MMHG | RESPIRATION RATE: 16 BRPM | BODY MASS INDEX: 24.64 KG/M2 | WEIGHT: 162.6 LBS | HEART RATE: 59 BPM | HEIGHT: 68 IN | SYSTOLIC BLOOD PRESSURE: 124 MMHG | TEMPERATURE: 97.9 F | OXYGEN SATURATION: 98 %

## 2021-10-06 DIAGNOSIS — C18.7 CANCER OF SIGMOID COLON (HCC): ICD-10-CM

## 2021-10-06 LAB
ALBUMIN SERPL-MCNC: 4.1 G/DL (ref 3.5–5.2)
ALBUMIN/GLOB SERPL: 1.8 G/DL
ALP SERPL-CCNC: 73 U/L (ref 39–117)
ALT SERPL W P-5'-P-CCNC: 17 U/L (ref 1–41)
ANION GAP SERPL CALCULATED.3IONS-SCNC: 8 MMOL/L (ref 5–15)
AST SERPL-CCNC: 21 U/L (ref 1–40)
BASOPHILS # BLD AUTO: 0.03 10*3/MM3 (ref 0–0.2)
BASOPHILS NFR BLD AUTO: 0.7 % (ref 0–1.5)
BILIRUB SERPL-MCNC: 0.5 MG/DL (ref 0–1.2)
BUN SERPL-MCNC: 14 MG/DL (ref 8–23)
BUN/CREAT SERPL: 18.2 (ref 7–25)
CALCIUM SPEC-SCNC: 8.9 MG/DL (ref 8.6–10.5)
CHLORIDE SERPL-SCNC: 104 MMOL/L (ref 98–107)
CO2 SERPL-SCNC: 26 MMOL/L (ref 22–29)
CREAT SERPL-MCNC: 0.77 MG/DL (ref 0.76–1.27)
DEPRECATED RDW RBC AUTO: 44.4 FL (ref 37–54)
EOSINOPHIL # BLD AUTO: 0.13 10*3/MM3 (ref 0–0.4)
EOSINOPHIL NFR BLD AUTO: 3 % (ref 0.3–6.2)
ERYTHROCYTE [DISTWIDTH] IN BLOOD BY AUTOMATED COUNT: 12.9 % (ref 12.3–15.4)
GFR SERPL CREATININE-BSD FRML MDRD: 98 ML/MIN/1.73
GLOBULIN UR ELPH-MCNC: 2.3 GM/DL
GLUCOSE SERPL-MCNC: 99 MG/DL (ref 65–99)
HCT VFR BLD AUTO: 41.1 % (ref 37.5–51)
HGB BLD-MCNC: 13.8 G/DL (ref 13–17.7)
IMM GRANULOCYTES # BLD AUTO: 0.01 10*3/MM3 (ref 0–0.05)
IMM GRANULOCYTES NFR BLD AUTO: 0.2 % (ref 0–0.5)
LYMPHOCYTES # BLD AUTO: 1.03 10*3/MM3 (ref 0.7–3.1)
LYMPHOCYTES NFR BLD AUTO: 24.1 % (ref 19.6–45.3)
MCH RBC QN AUTO: 31.3 PG (ref 26.6–33)
MCHC RBC AUTO-ENTMCNC: 33.6 G/DL (ref 31.5–35.7)
MCV RBC AUTO: 93.2 FL (ref 79–97)
MONOCYTES # BLD AUTO: 0.49 10*3/MM3 (ref 0.1–0.9)
MONOCYTES NFR BLD AUTO: 11.4 % (ref 5–12)
NEUTROPHILS NFR BLD AUTO: 2.59 10*3/MM3 (ref 1.7–7)
NEUTROPHILS NFR BLD AUTO: 60.6 % (ref 42.7–76)
NRBC BLD AUTO-RTO: 0 /100 WBC (ref 0–0.2)
PLATELET # BLD AUTO: 157 10*3/MM3 (ref 140–450)
PMV BLD AUTO: 9.8 FL (ref 6–12)
POTASSIUM SERPL-SCNC: 4 MMOL/L (ref 3.5–5.2)
PROT SERPL-MCNC: 6.4 G/DL (ref 6–8.5)
RBC # BLD AUTO: 4.41 10*6/MM3 (ref 4.14–5.8)
SODIUM SERPL-SCNC: 138 MMOL/L (ref 136–145)
WBC # BLD AUTO: 4.28 10*3/MM3 (ref 3.4–10.8)

## 2021-10-06 PROCEDURE — 80053 COMPREHEN METABOLIC PANEL: CPT

## 2021-10-06 PROCEDURE — 85025 COMPLETE CBC W/AUTO DIFF WBC: CPT

## 2021-10-06 PROCEDURE — 36415 COLL VENOUS BLD VENIPUNCTURE: CPT

## 2021-10-06 RX ORDER — CHLORHEXIDINE GLUCONATE 500 MG/1
CLOTH TOPICAL
COMMUNITY
End: 2022-01-05

## 2021-10-06 NOTE — TELEPHONE ENCOUNTER
----- Message from Cesar Rinaldi MD sent at 10/6/2021  9:05 AM EDT -----  Please let him know that his preoperative labs look good and his CT scan looked good.

## 2021-10-06 NOTE — DISCHARGE INSTRUCTIONS
Take the following medications the morning of surgery:    NONE    ARRIVE AT 1130.      If you are on prescription narcotic pain medication to control your pain you may also take that medication the morning of surgery.    FOLLOW DR FABIAN INSTRUCTIONS FOR BOWEL PREP      General Instructions:  • Infants may have breast milk up to four hours before surgery.  • Infants drinking formula may drink formula up to six hours before surgery.   • Patients who avoid smoking, chewing tobacco and alcohol for 4 weeks prior to surgery have a reduced risk of post-operative complications.  Quit smoking as many days before surgery as you can.  • Do not smoke, use chewing tobacco or drink alcohol the day of surgery.   • If applicable bring your C-PAP/ BI-PAP machine.  • Bring any papers given to you in the doctor’s office.  • Wear clean comfortable clothes.  • Do not wear contact lenses, false eyelashes or make-up.  Bring a case for your glasses.   • Bring crutches or walker if applicable.  • Remove all piercings.  Leave jewelry and any other valuables at home.  • Hair extensions with metal clips must be removed prior to surgery.  • The Pre-Admission Testing nurse will instruct you to bring medications if unable to obtain an accurate list in Pre-Admission Testing.        If you were given a blood bank ID arm band remember to bring it with you the day of surgery.    Preventing a Surgical Site Infection:  • For 2 to 3 days before surgery, avoid shaving with a razor because the razor can irritate skin and make it easier to develop an infection.    • Any areas of open skin can increase the risk of a post-operative wound infection by allowing bacteria to enter and travel throughout the body.  Notify your surgeon if you have any skin wounds / rashes even if it is not near the expected surgical site.  The area will need assessed to determine if surgery should be delayed until it is healed.  • The night prior to surgery shower using a fresh  bar of anti-bacterial soap (such as Dial) and clean washcloth.  Sleep in a clean bed with clean clothing.  Do not allow pets to sleep with you.  • Shower on the morning of surgery using a fresh bar of anti-bacterial soap (such as Dial) and clean washcloth.  Dry with a clean towel and dress in clean clothing.  • Ask your surgeon if you will be receiving antibiotics prior to surgery.  • Make sure you, your family, and all healthcare providers clean their hands with soap and water or an alcohol based hand  before caring for you or your wound.    Day of surgery:  Your arrival time is approximately two hours before your scheduled surgery time.  Upon arrival, a Pre-op nurse and Anesthesiologist will review your health history, obtain vital signs, and answer questions you may have.  The only belongings needed at this time will be a list of your home medications and if applicable your C-PAP/BI-PAP machine.  A Pre-op nurse will start an IV and you may receive medication in preparation for surgery, including something to help you relax.     Please be aware that surgery does come with discomfort.  We want to make every effort to control your discomfort so please discuss any uncontrolled symptoms with your nurse.   Your doctor will most likely have prescribed pain medications.      If you are going home after surgery you will receive individualized written care instructions before being discharged.  A responsible adult must drive you to and from the hospital on the day of your surgery and stay with you for 24 hours.  Discharge prescriptions can be filled by the hospital pharmacy during regular pharmacy hours.  If you are having surgery late in the day/evening your prescription may be e-prescribed to your pharmacy.  Please verify your pharmacy hours or chose a 24 hour pharmacy to avoid not having access to your prescription because your pharmacy has closed for the day.    If you are staying overnight following surgery,  you will be transported to your hospital room following the recovery period.  [facility] has all private rooms.    If you have any questions please call Pre-Admission Testing at [phone].  Deductibles and co-payments are collected on the day of service. Please be prepared to pay the required co-pay, deductible or deposit on the day of service as defined by your plan.    Patient Education for Self-Quarantine Process    • Following your COVID testing, we strongly recommend that you wear a mask when you are with other people and practice social distancing.   • Limit your activities to only required outings.  • Wash your hands with soap and water frequently for at least 20 seconds.   • Avoid touching your eyes, nose and mouth with unwashed hands.  • Do not share anything - utensils, drinking glasses, food from the same bowl.   • Sanitize household surfaces daily. Include all high touch areas (door handles, light switches, phones, countertops, etc.)    Call your surgeon immediately if you experience any of the following symptoms:  • Sore Throat  • Shortness of Breath or difficulty breathing  • Cough  • Chills  • Body soreness or muscle pain  • Headache  • Fever  • New loss of taste or smell  • Do not arrive for your surgery ill.  Your procedure will need to be rescheduled to another time.  You will need to call your physician before the day of surgery to avoid any unnecessary exposure to hospital staff as well as other patients.      CHLORHEXIDINE CLOTH INSTRUCTIONS  The morning of surgery follow these instructions using the Chlorhexidine cloths you've been given.  These steps reduce bacteria on the body.  Do not use the cloths near your eyes, ears mouth, genitalia or on open wounds.  Throw the cloths away after use but do not try to flush them down a toilet.      • Open and remove one cloth at a time from the package.    • Leave the cloth unfolded and begin the bathing.  • Massage the skin with the  cloths using gentle pressure to remove bacteria.  Do not scrub harshly.   • Follow the steps below with one 2% CHG cloth per area (6 total cloths).  • One cloth for neck, shoulders and chest.  • One cloth for both arms, hands, fingers and underarms (do underarms last).  • One cloth for the abdomen followed by groin.  • One cloth for right leg and foot including between the toes.  • One cloth for left leg and foot including between the toes.  • The last cloth is to be used for the back of the neck, back and buttocks.    Allow the CHG to air dry 3 minutes on the skin which will give it time to work and decrease the chance of irritation.  The skin may feel sticky until it is dry.  Do not rinse with water or any other liquid or you will lose the beneficial effects of the CHG.  If mild skin irritation occurs, do rinse the skin to remove the CHG.  Report this to the nurse at time of admission.  Do not apply lotions, creams, ointments, deodorants or perfumes after using the clothes. Dress in clean clothes before coming to the hospital.

## 2021-10-08 ENCOUNTER — APPOINTMENT (OUTPATIENT)
Dept: PREADMISSION TESTING | Facility: HOSPITAL | Age: 76
End: 2021-10-08

## 2021-10-09 ENCOUNTER — LAB (OUTPATIENT)
Dept: LAB | Facility: HOSPITAL | Age: 76
End: 2021-10-09

## 2021-10-09 LAB — SARS-COV-2 ORF1AB RESP QL NAA+PROBE: NOT DETECTED

## 2021-10-09 PROCEDURE — U0005 INFEC AGEN DETEC AMPLI PROBE: HCPCS

## 2021-10-09 PROCEDURE — C9803 HOPD COVID-19 SPEC COLLECT: HCPCS

## 2021-10-09 PROCEDURE — U0004 COV-19 TEST NON-CDC HGH THRU: HCPCS

## 2021-10-11 RX ORDER — TADALAFIL 5 MG/1
TABLET ORAL
Qty: 60 TABLET | Refills: 3 | Status: SHIPPED | OUTPATIENT
Start: 2021-10-11 | End: 2022-06-03 | Stop reason: SDUPTHER

## 2021-10-12 ENCOUNTER — ANESTHESIA EVENT (OUTPATIENT)
Dept: PERIOP | Facility: HOSPITAL | Age: 76
End: 2021-10-12

## 2021-10-12 ENCOUNTER — ANESTHESIA (OUTPATIENT)
Dept: PERIOP | Facility: HOSPITAL | Age: 76
End: 2021-10-12

## 2021-10-12 ENCOUNTER — HOSPITAL ENCOUNTER (INPATIENT)
Facility: HOSPITAL | Age: 76
LOS: 1 days | Discharge: HOME | End: 2021-10-13
Attending: SURGERY | Admitting: SURGERY

## 2021-10-12 DIAGNOSIS — C18.7 CANCER OF SIGMOID COLON (HCC): ICD-10-CM

## 2021-10-12 PROCEDURE — 25010000002 ONDANSETRON PER 1 MG: Performed by: NURSE ANESTHETIST, CERTIFIED REGISTERED

## 2021-10-12 PROCEDURE — 25010000002 FENTANYL CITRATE (PF) 50 MCG/ML SOLUTION: Performed by: NURSE ANESTHETIST, CERTIFIED REGISTERED

## 2021-10-12 PROCEDURE — 0DTJ4ZZ RESECTION OF APPENDIX, PERCUTANEOUS ENDOSCOPIC APPROACH: ICD-10-PCS | Performed by: SURGERY

## 2021-10-12 PROCEDURE — 0DTG4ZZ RESECTION OF LEFT LARGE INTESTINE, PERCUTANEOUS ENDOSCOPIC APPROACH: ICD-10-PCS | Performed by: SURGERY

## 2021-10-12 PROCEDURE — 25010000003 CEFAZOLIN IN DEXTROSE 2-4 GM/100ML-% SOLUTION: Performed by: SURGERY

## 2021-10-12 PROCEDURE — 88304 TISSUE EXAM BY PATHOLOGIST: CPT | Performed by: SURGERY

## 2021-10-12 PROCEDURE — C1889 IMPLANT/INSERT DEVICE, NOC: HCPCS | Performed by: SURGERY

## 2021-10-12 PROCEDURE — 25010000003 BUPIVACAINE LIPOSOME 1.3 % SUSPENSION 20 ML VIAL: Performed by: SURGERY

## 2021-10-12 PROCEDURE — C9290 INJ, BUPIVACAINE LIPOSOME: HCPCS | Performed by: SURGERY

## 2021-10-12 PROCEDURE — 88309 TISSUE EXAM BY PATHOLOGIST: CPT | Performed by: SURGERY

## 2021-10-12 PROCEDURE — 44204 LAPARO PARTIAL COLECTOMY: CPT | Performed by: SURGERY

## 2021-10-12 PROCEDURE — 44213 LAP MOBIL SPLENIC FL ADD-ON: CPT | Performed by: SURGERY

## 2021-10-12 PROCEDURE — 25010000002 HYDROMORPHONE PER 4 MG: Performed by: NURSE ANESTHETIST, CERTIFIED REGISTERED

## 2021-10-12 PROCEDURE — 25010000002 PROPOFOL 10 MG/ML EMULSION: Performed by: NURSE ANESTHETIST, CERTIFIED REGISTERED

## 2021-10-12 PROCEDURE — 88341 IMHCHEM/IMCYTCHM EA ADD ANTB: CPT

## 2021-10-12 PROCEDURE — 25010000002 KETOROLAC TROMETHAMINE PER 15 MG: Performed by: SURGERY

## 2021-10-12 PROCEDURE — 88342 IMHCHEM/IMCYTCHM 1ST ANTB: CPT

## 2021-10-12 PROCEDURE — 81301 MICROSATELLITE INSTABILITY: CPT

## 2021-10-12 DEVICE — ENDOSCOPIC LINEAR CUTTER RELOADS GRAY 2.0MM, 6 ROWS
Type: IMPLANTABLE DEVICE | Site: ABDOMEN | Status: FUNCTIONAL
Brand: ECHELON ENDOPATH

## 2021-10-12 DEVICE — ENDOPATH ECHELON ENDOSCOPIC LINEAR CUTTER RELOADS, BLUE, 60MM
Type: IMPLANTABLE DEVICE | Site: ABDOMEN | Status: FUNCTIONAL
Brand: ECHELON ENDOPATH

## 2021-10-12 DEVICE — CLIP LIGAT VASC HORIZON TI MD/LG GRN 6CT: Type: IMPLANTABLE DEVICE | Site: ABDOMEN | Status: FUNCTIONAL

## 2021-10-12 DEVICE — ENDOPATH ECHELON ENDOSCOPIC LINEAR CUTTER RELOADS, WHITE, 60MM
Type: IMPLANTABLE DEVICE | Site: ABDOMEN | Status: FUNCTIONAL
Brand: ECHELON ENDOPATH

## 2021-10-12 RX ORDER — ONDANSETRON 2 MG/ML
INJECTION INTRAMUSCULAR; INTRAVENOUS AS NEEDED
Status: DISCONTINUED | OUTPATIENT
Start: 2021-10-12 | End: 2021-10-12 | Stop reason: SURG

## 2021-10-12 RX ORDER — OXYCODONE AND ACETAMINOPHEN 10; 325 MG/1; MG/1
1 TABLET ORAL EVERY 4 HOURS PRN
Status: DISCONTINUED | OUTPATIENT
Start: 2021-10-12 | End: 2021-10-12 | Stop reason: HOSPADM

## 2021-10-12 RX ORDER — ALVIMOPAN 12 MG/1
12 CAPSULE ORAL 2 TIMES DAILY
Status: DISCONTINUED | OUTPATIENT
Start: 2021-10-12 | End: 2021-10-13 | Stop reason: HOSPADM

## 2021-10-12 RX ORDER — DIPHENHYDRAMINE HYDROCHLORIDE 50 MG/ML
12.5 INJECTION INTRAMUSCULAR; INTRAVENOUS
Status: DISCONTINUED | OUTPATIENT
Start: 2021-10-12 | End: 2021-10-12 | Stop reason: HOSPADM

## 2021-10-12 RX ORDER — DIPHENHYDRAMINE HCL 25 MG
25 CAPSULE ORAL
Status: DISCONTINUED | OUTPATIENT
Start: 2021-10-12 | End: 2021-10-12 | Stop reason: HOSPADM

## 2021-10-12 RX ORDER — PROMETHAZINE HYDROCHLORIDE 25 MG/1
25 TABLET ORAL ONCE AS NEEDED
Status: DISCONTINUED | OUTPATIENT
Start: 2021-10-12 | End: 2021-10-12 | Stop reason: HOSPADM

## 2021-10-12 RX ORDER — MIDAZOLAM HYDROCHLORIDE 1 MG/ML
1 INJECTION INTRAMUSCULAR; INTRAVENOUS
Status: DISCONTINUED | OUTPATIENT
Start: 2021-10-12 | End: 2021-10-12 | Stop reason: HOSPADM

## 2021-10-12 RX ORDER — EPHEDRINE SULFATE 50 MG/ML
5 INJECTION, SOLUTION INTRAVENOUS ONCE AS NEEDED
Status: DISCONTINUED | OUTPATIENT
Start: 2021-10-12 | End: 2021-10-12 | Stop reason: HOSPADM

## 2021-10-12 RX ORDER — SODIUM CHLORIDE 9 MG/ML
INJECTION, SOLUTION INTRAVENOUS AS NEEDED
Status: DISCONTINUED | OUTPATIENT
Start: 2021-10-12 | End: 2021-10-12 | Stop reason: HOSPADM

## 2021-10-12 RX ORDER — MAGNESIUM HYDROXIDE 1200 MG/15ML
LIQUID ORAL AS NEEDED
Status: DISCONTINUED | OUTPATIENT
Start: 2021-10-12 | End: 2021-10-12 | Stop reason: HOSPADM

## 2021-10-12 RX ORDER — NALOXONE HCL 0.4 MG/ML
0.1 VIAL (ML) INJECTION
Status: DISCONTINUED | OUTPATIENT
Start: 2021-10-12 | End: 2021-10-13 | Stop reason: HOSPADM

## 2021-10-12 RX ORDER — PROMETHAZINE HYDROCHLORIDE 25 MG/1
25 SUPPOSITORY RECTAL ONCE AS NEEDED
Status: DISCONTINUED | OUTPATIENT
Start: 2021-10-12 | End: 2021-10-12 | Stop reason: HOSPADM

## 2021-10-12 RX ORDER — NALOXONE HCL 0.4 MG/ML
0.2 VIAL (ML) INJECTION AS NEEDED
Status: DISCONTINUED | OUTPATIENT
Start: 2021-10-12 | End: 2021-10-12 | Stop reason: HOSPADM

## 2021-10-12 RX ORDER — HYDROCODONE BITARTRATE AND ACETAMINOPHEN 5; 325 MG/1; MG/1
1 TABLET ORAL EVERY 4 HOURS PRN
Status: DISCONTINUED | OUTPATIENT
Start: 2021-10-12 | End: 2021-10-13 | Stop reason: HOSPADM

## 2021-10-12 RX ORDER — IBUPROFEN 600 MG/1
600 TABLET ORAL ONCE AS NEEDED
Status: DISCONTINUED | OUTPATIENT
Start: 2021-10-12 | End: 2021-10-12 | Stop reason: HOSPADM

## 2021-10-12 RX ORDER — FENTANYL CITRATE 50 UG/ML
50 INJECTION, SOLUTION INTRAMUSCULAR; INTRAVENOUS
Status: DISCONTINUED | OUTPATIENT
Start: 2021-10-12 | End: 2021-10-12 | Stop reason: HOSPADM

## 2021-10-12 RX ORDER — ONDANSETRON 2 MG/ML
4 INJECTION INTRAMUSCULAR; INTRAVENOUS EVERY 6 HOURS PRN
Status: DISCONTINUED | OUTPATIENT
Start: 2021-10-12 | End: 2021-10-13 | Stop reason: HOSPADM

## 2021-10-12 RX ORDER — LIDOCAINE HYDROCHLORIDE 20 MG/ML
INJECTION, SOLUTION INFILTRATION; PERINEURAL AS NEEDED
Status: DISCONTINUED | OUTPATIENT
Start: 2021-10-12 | End: 2021-10-12 | Stop reason: SURG

## 2021-10-12 RX ORDER — KETOROLAC TROMETHAMINE 30 MG/ML
15 INJECTION, SOLUTION INTRAMUSCULAR; INTRAVENOUS EVERY 6 HOURS
Status: DISCONTINUED | OUTPATIENT
Start: 2021-10-12 | End: 2021-10-13 | Stop reason: HOSPADM

## 2021-10-12 RX ORDER — CEFAZOLIN SODIUM 2 G/100ML
2 INJECTION, SOLUTION INTRAVENOUS EVERY 8 HOURS
Status: COMPLETED | OUTPATIENT
Start: 2021-10-12 | End: 2021-10-13

## 2021-10-12 RX ORDER — FAMOTIDINE 10 MG/ML
20 INJECTION, SOLUTION INTRAVENOUS
Status: COMPLETED | OUTPATIENT
Start: 2021-10-12 | End: 2021-10-12

## 2021-10-12 RX ORDER — CEFAZOLIN SODIUM 2 G/100ML
2 INJECTION, SOLUTION INTRAVENOUS ONCE
Status: COMPLETED | OUTPATIENT
Start: 2021-10-12 | End: 2021-10-12

## 2021-10-12 RX ORDER — ONDANSETRON 2 MG/ML
4 INJECTION INTRAMUSCULAR; INTRAVENOUS ONCE AS NEEDED
Status: DISCONTINUED | OUTPATIENT
Start: 2021-10-12 | End: 2021-10-12 | Stop reason: HOSPADM

## 2021-10-12 RX ORDER — HYDRALAZINE HYDROCHLORIDE 20 MG/ML
5 INJECTION INTRAMUSCULAR; INTRAVENOUS
Status: DISCONTINUED | OUTPATIENT
Start: 2021-10-12 | End: 2021-10-12 | Stop reason: HOSPADM

## 2021-10-12 RX ORDER — FLUMAZENIL 0.1 MG/ML
0.2 INJECTION INTRAVENOUS AS NEEDED
Status: DISCONTINUED | OUTPATIENT
Start: 2021-10-12 | End: 2021-10-12 | Stop reason: HOSPADM

## 2021-10-12 RX ORDER — SODIUM CHLORIDE, SODIUM LACTATE, POTASSIUM CHLORIDE, CALCIUM CHLORIDE 600; 310; 30; 20 MG/100ML; MG/100ML; MG/100ML; MG/100ML
9 INJECTION, SOLUTION INTRAVENOUS CONTINUOUS PRN
Status: DISCONTINUED | OUTPATIENT
Start: 2021-10-12 | End: 2021-10-12 | Stop reason: HOSPADM

## 2021-10-12 RX ORDER — PROPOFOL 10 MG/ML
VIAL (ML) INTRAVENOUS AS NEEDED
Status: DISCONTINUED | OUTPATIENT
Start: 2021-10-12 | End: 2021-10-12 | Stop reason: SURG

## 2021-10-12 RX ORDER — HYDROCODONE BITARTRATE AND ACETAMINOPHEN 7.5; 325 MG/1; MG/1
1 TABLET ORAL ONCE AS NEEDED
Status: DISCONTINUED | OUTPATIENT
Start: 2021-10-12 | End: 2021-10-12 | Stop reason: HOSPADM

## 2021-10-12 RX ORDER — HYDROMORPHONE HYDROCHLORIDE 1 MG/ML
0.5 INJECTION, SOLUTION INTRAMUSCULAR; INTRAVENOUS; SUBCUTANEOUS
Status: DISCONTINUED | OUTPATIENT
Start: 2021-10-12 | End: 2021-10-13 | Stop reason: HOSPADM

## 2021-10-12 RX ORDER — LOSARTAN POTASSIUM 25 MG/1
25 TABLET ORAL NIGHTLY
Status: DISCONTINUED | OUTPATIENT
Start: 2021-10-12 | End: 2021-10-13 | Stop reason: HOSPADM

## 2021-10-12 RX ORDER — HYDROMORPHONE HYDROCHLORIDE 1 MG/ML
0.5 INJECTION, SOLUTION INTRAMUSCULAR; INTRAVENOUS; SUBCUTANEOUS
Status: DISCONTINUED | OUTPATIENT
Start: 2021-10-12 | End: 2021-10-12 | Stop reason: HOSPADM

## 2021-10-12 RX ORDER — DEXMEDETOMIDINE HYDROCHLORIDE 4 UG/ML
INJECTION, SOLUTION INTRAVENOUS CONTINUOUS PRN
Status: DISCONTINUED | OUTPATIENT
Start: 2021-10-12 | End: 2021-10-12 | Stop reason: SURG

## 2021-10-12 RX ORDER — HYDROMORPHONE HCL 110MG/55ML
PATIENT CONTROLLED ANALGESIA SYRINGE INTRAVENOUS AS NEEDED
Status: DISCONTINUED | OUTPATIENT
Start: 2021-10-12 | End: 2021-10-12 | Stop reason: SURG

## 2021-10-12 RX ORDER — BUPIVACAINE HYDROCHLORIDE AND EPINEPHRINE 5; 5 MG/ML; UG/ML
INJECTION, SOLUTION EPIDURAL; INTRACAUDAL; PERINEURAL AS NEEDED
Status: DISCONTINUED | OUTPATIENT
Start: 2021-10-12 | End: 2021-10-12 | Stop reason: HOSPADM

## 2021-10-12 RX ORDER — ROCURONIUM BROMIDE 10 MG/ML
INJECTION, SOLUTION INTRAVENOUS AS NEEDED
Status: DISCONTINUED | OUTPATIENT
Start: 2021-10-12 | End: 2021-10-12 | Stop reason: SURG

## 2021-10-12 RX ORDER — HYDROCODONE BITARTRATE AND ACETAMINOPHEN 5; 325 MG/1; MG/1
2 TABLET ORAL EVERY 4 HOURS PRN
Status: DISCONTINUED | OUTPATIENT
Start: 2021-10-12 | End: 2021-10-13 | Stop reason: HOSPADM

## 2021-10-12 RX ORDER — SODIUM CHLORIDE 0.9 % (FLUSH) 0.9 %
10 SYRINGE (ML) INJECTION EVERY 12 HOURS SCHEDULED
Status: DISCONTINUED | OUTPATIENT
Start: 2021-10-12 | End: 2021-10-12 | Stop reason: HOSPADM

## 2021-10-12 RX ORDER — FENTANYL CITRATE 50 UG/ML
INJECTION, SOLUTION INTRAMUSCULAR; INTRAVENOUS AS NEEDED
Status: DISCONTINUED | OUTPATIENT
Start: 2021-10-12 | End: 2021-10-12 | Stop reason: SURG

## 2021-10-12 RX ORDER — LABETALOL HYDROCHLORIDE 5 MG/ML
5 INJECTION, SOLUTION INTRAVENOUS
Status: DISCONTINUED | OUTPATIENT
Start: 2021-10-12 | End: 2021-10-12 | Stop reason: HOSPADM

## 2021-10-12 RX ORDER — SODIUM CHLORIDE 0.9 % (FLUSH) 0.9 %
10 SYRINGE (ML) INJECTION AS NEEDED
Status: DISCONTINUED | OUTPATIENT
Start: 2021-10-12 | End: 2021-10-12 | Stop reason: HOSPADM

## 2021-10-12 RX ADMIN — HYDROCODONE BITARTRATE AND ACETAMINOPHEN 2 TABLET: 5; 325 TABLET ORAL at 22:32

## 2021-10-12 RX ADMIN — KETOROLAC TROMETHAMINE 15 MG: 30 INJECTION, SOLUTION INTRAMUSCULAR; INTRAVENOUS at 20:11

## 2021-10-12 RX ADMIN — SUGAMMADEX 200 MG: 100 INJECTION, SOLUTION INTRAVENOUS at 16:34

## 2021-10-12 RX ADMIN — FENTANYL CITRATE 50 MCG: 0.05 INJECTION, SOLUTION INTRAMUSCULAR; INTRAVENOUS at 15:20

## 2021-10-12 RX ADMIN — ALVIMOPAN 12 MG: 12 CAPSULE ORAL at 21:28

## 2021-10-12 RX ADMIN — ROCURONIUM BROMIDE 50 MG: 50 INJECTION INTRAVENOUS at 14:34

## 2021-10-12 RX ADMIN — FAMOTIDINE 20 MG: 10 INJECTION, SOLUTION INTRAVENOUS at 13:04

## 2021-10-12 RX ADMIN — CEFAZOLIN SODIUM 2 G: 2 INJECTION, SOLUTION INTRAVENOUS at 22:32

## 2021-10-12 RX ADMIN — LOSARTAN POTASSIUM 25 MG: 25 TABLET, FILM COATED ORAL at 21:27

## 2021-10-12 RX ADMIN — SODIUM CHLORIDE, POTASSIUM CHLORIDE, SODIUM LACTATE AND CALCIUM CHLORIDE: 600; 310; 30; 20 INJECTION, SOLUTION INTRAVENOUS at 16:10

## 2021-10-12 RX ADMIN — EPHEDRINE SULFATE 10 MG: 50 INJECTION INTRAMUSCULAR; INTRAVENOUS; SUBCUTANEOUS at 15:55

## 2021-10-12 RX ADMIN — METRONIDAZOLE 500 MG: 500 INJECTION, SOLUTION INTRAVENOUS at 14:14

## 2021-10-12 RX ADMIN — CEFAZOLIN SODIUM 2 G: 2 INJECTION, SOLUTION INTRAVENOUS at 14:14

## 2021-10-12 RX ADMIN — PROPOFOL 150 MG: 10 INJECTION, EMULSION INTRAVENOUS at 14:34

## 2021-10-12 RX ADMIN — FENTANYL CITRATE 50 MCG: 0.05 INJECTION, SOLUTION INTRAMUSCULAR; INTRAVENOUS at 14:35

## 2021-10-12 RX ADMIN — DEXMEDETOMIDINE HYDROCHLORIDE 0.4 MCG/KG/HR: 4 INJECTION, SOLUTION INTRAVENOUS at 14:36

## 2021-10-12 RX ADMIN — ONDANSETRON 4 MG: 2 INJECTION INTRAMUSCULAR; INTRAVENOUS at 16:35

## 2021-10-12 RX ADMIN — LIDOCAINE HYDROCHLORIDE 60 MG: 20 INJECTION, SOLUTION INFILTRATION; PERINEURAL at 14:34

## 2021-10-12 RX ADMIN — HYDROMORPHONE HYDROCHLORIDE 0.5 MG: 2 INJECTION, SOLUTION INTRAMUSCULAR; INTRAVENOUS; SUBCUTANEOUS at 16:24

## 2021-10-12 RX ADMIN — ALVIMOPAN 12 MG: 12 CAPSULE ORAL at 13:04

## 2021-10-12 RX ADMIN — SODIUM CHLORIDE, POTASSIUM CHLORIDE, SODIUM LACTATE AND CALCIUM CHLORIDE 9 ML/HR: 600; 310; 30; 20 INJECTION, SOLUTION INTRAVENOUS at 12:54

## 2021-10-12 RX ADMIN — PROPOFOL 160 MCG/KG/MIN: 10 INJECTION, EMULSION INTRAVENOUS at 14:35

## 2021-10-12 RX ADMIN — ROCURONIUM BROMIDE 10 MG: 50 INJECTION INTRAVENOUS at 15:20

## 2021-10-12 RX ADMIN — METRONIDAZOLE 500 MG: 500 INJECTION, SOLUTION INTRAVENOUS at 21:20

## 2021-10-12 NOTE — ANESTHESIA PROCEDURE NOTES
Airway  Urgency: elective    Date/Time: 10/12/2021 2:35 PM  Airway not difficult    General Information and Staff    Patient location during procedure: OR  Anesthesiologist: Emeterio Pérez MD  CRNA: Kevin Cooley CRNA    Indications and Patient Condition  Indications for airway management: airway protection    Preoxygenated: yes  MILS maintained throughout  Mask difficulty assessment: 2 - vent by mask + OA or adjuvant +/- NMBA    Final Airway Details  Final airway type: endotracheal airway      Successful airway: ETT  Cuffed: yes   Successful intubation technique: direct laryngoscopy  Endotracheal tube insertion site: oral  Blade: Victor Hugo  Blade size: 4  ETT size (mm): 7.5  Cormack-Lehane Classification: grade I - full view of glottis  Placement verified by: chest auscultation and capnometry   Measured from: teeth  ETT/EBT  to teeth (cm): 23  Number of attempts at approach: 1  Assessment: lips, teeth, and gum same as pre-op and atraumatic intubation

## 2021-10-12 NOTE — ANESTHESIA PREPROCEDURE EVALUATION
Anesthesia Evaluation     Patient summary reviewed   NPO Solid Status: > 8 hours             Airway   Mallampati: II  Neck ROM: full  No difficulty expected  Dental      Pulmonary    (+) shortness of breath,   Cardiovascular     ECG reviewed  Rhythm: regular    (+) hypertension, valvular problems/murmurs (moderate MR) MR,       Neuro/Psych  GI/Hepatic/Renal/Endo      Musculoskeletal     Abdominal    Substance History      OB/GYN          Other   arthritis,    history of cancer active                    Anesthesia Plan    ASA 3     general   total IV anesthesia    Anesthetic plan, all risks, benefits, and alternatives have been provided, discussed and informed consent has been obtained with: patient.  Use of blood products discussed with patient .

## 2021-10-12 NOTE — PLAN OF CARE
Goal Outcome Evaluation:  Plan of Care Reviewed With: patient        Progress: no change  Outcome Summary: Patient arrived to floor 1855. Resting comfortably in bed. A&O, 2L nasal cannula. VSS, will continue to monitor.

## 2021-10-12 NOTE — OP NOTE
PREOPERATIVE DIAGNOSIS:  Sigmoid colon cancer    POSTOPERATIVE DIAGNOSIS (FINDINGS):  Proximal to mid sigmoid colon cancer    PROCEDURE:  Laparoscopic left colectomy with mobilization of splenic flexure, incidental appendectomy    SURGEON:  Cesar Rinaldi MD    ASSISTANT:  Keely Brasher, was responsible for performing the following activities: suction, irrigation, suturing, closing, retraction, camera holding, and placing dressing, and their skilled assistance was necessary for the success of this case.    ANESTHESIA:  General    EBL:  Minimal    SPECIMEN(S):  Left colon    DESCRIPTION:  In supine position under general anesthetic prepped and draped usual sterile manner.  Half percent Marcaine with with epinephrine mixed with Exparel infiltrated at all incision sites.  Small transverse incision made above the umbilicus and Veress needle inserted with upper extraction on the abdominal wall.  Abdomen insufflated 15 mmHg and a 5 mm Optiview trocar inserted followed by right lower quadrant 12, right midabdomen 5, and left lower quadrant 5 mm trochars.  Sigmoid colon was mobilized along peritoneal attachments noting the tattoo louisa in the proximal to mid sigmoid region.  Descending colon was completely mobilized.  Splenic flexure was mobilized with the harmonic scalpel.  Inferior mesenteric vein was identified, isolated, clipped and then divided with the harmonic scalpel.  Remaining retroperitoneal attachments of the descending colon were divided with the harmonic scalpel.  Inferior mesenteric artery was isolated and divided with the vascular load stapler after identifying and carefully avoiding the left ureter.  Peritoneum was opened on either side of the rectum.  Presacral plane was developed with the harmonic scalpel.  At this point excellent mobilization of the colon and rectum were achieved and the left lower quadrant incision was extended, peritoneal cavity entered, and small wound protector inserted.  Bowel was  exteriorized and I divided the proximal descending colon with the blue loaded stapler and the remaining mesentery with the harmonic scalpel.  Strong dopplerable arterial flow was confirmed within the marginal vessel.  The distal sigmoid colon was then divided with the blue load stapler and mesentery taken with the harmonic scalpel.  Again strong dopplerable arterial flow was confirmed within the marginal vessel.  Handsewn end to end 2 layer anastomosis was fashioned with outer interrupted 2-0 silk and running interlocking 3-0 chromic.  Bowel was returned to the peritoneal cavity.  Fascia was closed in 2 layers of running 0 PDS.  Abdomen was reinsufflated and abdomen was irrigated and good hemostasis was noted.  Bowel was appropriately oriented.  Because the appendix was described as thickened on CT scan I elected to proceed with appendectomy.  The appendix grossly appeared normal.  The appendix was dissected and then transected flush with the cecum using the Continental white loaded stapler.  The appendiceal mesentery was taken with the harmonic scalpel.  Good hemostasis was noted.  Appendix was placed in an Endo Catch bag and removed through the 12 mm trocar site.  CO2 is released.  Fascia of the 12 mm trocar site closed with 0 Vicryl.  Skin edges 5-0 Vicryl subcuticular followed by Dermabond.  Tolerated well, stable to recovery room.    Cesar Rinaldi M.D.

## 2021-10-12 NOTE — ANESTHESIA POSTPROCEDURE EVALUATION
"Patient: Mirza Zimmer    Procedure Summary     Date: 10/12/21 Room / Location: [facility] OR  / [facility] MAIN OR    Anesthesia Start: 1425 Anesthesia Stop: 1659    Procedure: LAPAROSCOPIC LEFT COLECTOMY, APPENDECTOMY (N/A Abdomen) Diagnosis:       Cancer of sigmoid colon (HCC)      (Cancer of sigmoid colon (HCC) [C18.7])    Surgeons: Cesar Rinaldi MD Provider: Emeterio Pérez MD    Anesthesia Type: general ASA Status: 3          Anesthesia Type: general    Vitals  Vitals Value Taken Time   /66 10/12/21 1741   Temp 36.3 °C (97.3 °F) 10/12/21 1657   Pulse 53 10/12/21 1752   Resp 16 10/12/21 1740   SpO2 96 % 10/12/21 1752   Vitals shown include unvalidated device data.        Post Anesthesia Care and Evaluation    Patient location during evaluation: bedside  Patient participation: complete - patient participated  Level of consciousness: awake and alert  Pain management: adequate  Airway patency: patent  Anesthetic complications: No anesthetic complications    Cardiovascular status: acceptable  Respiratory status: acceptable  Hydration status: acceptable    Comments: /66   Pulse 53   Temp 36.3 °C (97.3 °F) (Oral)   Resp 16   Ht 172.7 cm (68\")   Wt 72.3 kg (159 lb 4.8 oz)   SpO2 94%   BMI 24.22 kg/m²     Patient is stable postoperatively and has adequately recovered from anesthesia as described above unless otherwise noted      "

## 2021-10-12 NOTE — ADDENDUM NOTE
Addendum  created 10/12/21 1802 by Adonis Zuluaga MD    Attestation recorded in Intraprocedure, Intraprocedure Attestations filed

## 2021-10-13 ENCOUNTER — READMISSION MANAGEMENT (OUTPATIENT)
Dept: CALL CENTER | Facility: HOSPITAL | Age: 76
End: 2021-10-13

## 2021-10-13 VITALS
OXYGEN SATURATION: 97 % | SYSTOLIC BLOOD PRESSURE: 109 MMHG | WEIGHT: 159.3 LBS | BODY MASS INDEX: 24.14 KG/M2 | HEIGHT: 68 IN | TEMPERATURE: 97.7 F | RESPIRATION RATE: 16 BRPM | HEART RATE: 57 BPM | DIASTOLIC BLOOD PRESSURE: 61 MMHG

## 2021-10-13 LAB
ANION GAP SERPL CALCULATED.3IONS-SCNC: 9.5 MMOL/L (ref 5–15)
BASOPHILS # BLD AUTO: 0.01 10*3/MM3 (ref 0–0.2)
BASOPHILS NFR BLD AUTO: 0.1 % (ref 0–1.5)
BUN SERPL-MCNC: 11 MG/DL (ref 8–23)
BUN/CREAT SERPL: 12.5 (ref 7–25)
CALCIUM SPEC-SCNC: 8.4 MG/DL (ref 8.6–10.5)
CHLORIDE SERPL-SCNC: 102 MMOL/L (ref 98–107)
CO2 SERPL-SCNC: 25.5 MMOL/L (ref 22–29)
CREAT SERPL-MCNC: 0.88 MG/DL (ref 0.76–1.27)
DEPRECATED RDW RBC AUTO: 41.6 FL (ref 37–54)
EOSINOPHIL # BLD AUTO: 0 10*3/MM3 (ref 0–0.4)
EOSINOPHIL NFR BLD AUTO: 0 % (ref 0.3–6.2)
ERYTHROCYTE [DISTWIDTH] IN BLOOD BY AUTOMATED COUNT: 12.7 % (ref 12.3–15.4)
GFR SERPL CREATININE-BSD FRML MDRD: 84 ML/MIN/1.73
GLUCOSE SERPL-MCNC: 104 MG/DL (ref 65–99)
HCT VFR BLD AUTO: 38.5 % (ref 37.5–51)
HGB BLD-MCNC: 13.2 G/DL (ref 13–17.7)
IMM GRANULOCYTES # BLD AUTO: 0.03 10*3/MM3 (ref 0–0.05)
IMM GRANULOCYTES NFR BLD AUTO: 0.4 % (ref 0–0.5)
LYMPHOCYTES # BLD AUTO: 0.4 10*3/MM3 (ref 0.7–3.1)
LYMPHOCYTES NFR BLD AUTO: 5.5 % (ref 19.6–45.3)
MCH RBC QN AUTO: 31.1 PG (ref 26.6–33)
MCHC RBC AUTO-ENTMCNC: 34.3 G/DL (ref 31.5–35.7)
MCV RBC AUTO: 90.6 FL (ref 79–97)
MONOCYTES # BLD AUTO: 0.54 10*3/MM3 (ref 0.1–0.9)
MONOCYTES NFR BLD AUTO: 7.5 % (ref 5–12)
NEUTROPHILS NFR BLD AUTO: 6.24 10*3/MM3 (ref 1.7–7)
NEUTROPHILS NFR BLD AUTO: 86.5 % (ref 42.7–76)
NRBC BLD AUTO-RTO: 0 /100 WBC (ref 0–0.2)
PLATELET # BLD AUTO: 162 10*3/MM3 (ref 140–450)
PMV BLD AUTO: 9.9 FL (ref 6–12)
POTASSIUM SERPL-SCNC: 4.2 MMOL/L (ref 3.5–5.2)
RBC # BLD AUTO: 4.25 10*6/MM3 (ref 4.14–5.8)
SODIUM SERPL-SCNC: 137 MMOL/L (ref 136–145)
WBC # BLD AUTO: 7.22 10*3/MM3 (ref 3.4–10.8)

## 2021-10-13 PROCEDURE — 85025 COMPLETE CBC W/AUTO DIFF WBC: CPT | Performed by: SURGERY

## 2021-10-13 PROCEDURE — 80048 BASIC METABOLIC PNL TOTAL CA: CPT | Performed by: SURGERY

## 2021-10-13 PROCEDURE — 25010000003 CEFAZOLIN IN DEXTROSE 2-4 GM/100ML-% SOLUTION: Performed by: SURGERY

## 2021-10-13 PROCEDURE — 25010000002 KETOROLAC TROMETHAMINE PER 15 MG: Performed by: SURGERY

## 2021-10-13 PROCEDURE — 25010000002 ENOXAPARIN PER 10 MG: Performed by: SURGERY

## 2021-10-13 RX ORDER — ONDANSETRON 4 MG/1
4 TABLET, FILM COATED ORAL EVERY 6 HOURS PRN
Qty: 10 TABLET | Refills: 1 | Status: SHIPPED | OUTPATIENT
Start: 2021-10-13 | End: 2021-10-21

## 2021-10-13 RX ORDER — HYDROCODONE BITARTRATE AND ACETAMINOPHEN 5; 325 MG/1; MG/1
TABLET ORAL
Qty: 24 TABLET | Refills: 0 | Status: SHIPPED | OUTPATIENT
Start: 2021-10-13 | End: 2021-10-21

## 2021-10-13 RX ADMIN — ENOXAPARIN SODIUM 40 MG: 40 INJECTION SUBCUTANEOUS at 09:24

## 2021-10-13 RX ADMIN — CEFAZOLIN SODIUM 2 G: 2 INJECTION, SOLUTION INTRAVENOUS at 04:07

## 2021-10-13 RX ADMIN — KETOROLAC TROMETHAMINE 15 MG: 30 INJECTION, SOLUTION INTRAMUSCULAR; INTRAVENOUS at 06:42

## 2021-10-13 RX ADMIN — KETOROLAC TROMETHAMINE 15 MG: 30 INJECTION, SOLUTION INTRAMUSCULAR; INTRAVENOUS at 00:50

## 2021-10-13 RX ADMIN — KETOROLAC TROMETHAMINE 15 MG: 30 INJECTION, SOLUTION INTRAMUSCULAR; INTRAVENOUS at 11:35

## 2021-10-13 RX ADMIN — ALVIMOPAN 12 MG: 12 CAPSULE ORAL at 09:24

## 2021-10-13 RX ADMIN — SODIUM CHLORIDE 1000 ML: 9 INJECTION, SOLUTION INTRAVENOUS at 00:49

## 2021-10-13 NOTE — PLAN OF CARE
Goal Outcome Evaluation:      Patient is being discharged. He has had a bowel movement, tolerating food, and no complaints of pain or nausea.

## 2021-10-13 NOTE — DISCHARGE SUMMARY
DATE OF ADMIT: 10/12/2021    DATE OF DISCHARGE: 10/13/2021    DIAGNOSIS: Sigmoid colon cancer    FINAL PATHOLOGY: Pending at time of dictation    PROCEDURES: Laparoscopic left colectomy with mobilization of splenic flexure and incidental appendectomy 10/12/2021    SUMMARY OF HOSPITAL COURSE:   Uneventful postop course. Tolerating diet, incisions in good order and afebrile with stable vital signs at discharge. Prescribed hydrocodone for pain.    DIET: Regular    ACTIVITY: Walking encouraged, no lifting or strenuous activity    MEDICATIONS: Refer to MAR    FOLLOW-UP: To call office and schedule 1 week follow-up appointment    Cesar Rinaldi M.D.

## 2021-10-13 NOTE — CASE MANAGEMENT/SOCIAL WORK
Case Management Discharge Note      Final Note: Home---no needs         Selected Continued Care - Admitted Since 10/12/2021     Destination    No services have been selected for the patient.              Durable Medical Equipment    No services have been selected for the patient.              Dialysis/Infusion    No services have been selected for the patient.              Home Medical Care    No services have been selected for the patient.              Therapy    No services have been selected for the patient.              Community Resources    No services have been selected for the patient.              Community & DME    No services have been selected for the patient.                       Final Discharge Disposition Code: 01 - home or self-care

## 2021-10-13 NOTE — PLAN OF CARE
Goal Outcome Evaluation:  Plan of Care Reviewed With: patient        Progress: improving  Outcome Summary: From PACU yesterday. Sap sites to abd with Dermabond intact. Ambulated to bathroom tonight. Denies nausea. Norco x1 for abd discomfort.  room air, cont. pulse ox in place.  VSS wife at bedside.

## 2021-10-14 ENCOUNTER — TRANSITIONAL CARE MANAGEMENT TELEPHONE ENCOUNTER (OUTPATIENT)
Dept: CALL CENTER | Facility: HOSPITAL | Age: 76
End: 2021-10-14

## 2021-10-14 NOTE — OUTREACH NOTE
Prep Survey      Responses   [facility] [place] patient discharged from? [place]   Is LACE score < 7 ? No   Emergency Room discharge w/ pulse ox? No   Eligibility [facility]   Date of Admission 10/12/21   Date of Discharge 10/13/21   Discharge Disposition Home or Self Care   Discharge diagnosis Laparoscopic left colectomy with mobilization of splenic flexure and incidental appendectomy    Does the patient have one of the following disease processes/diagnoses(primary or secondary)? General Surgery   Does the patient have Home health ordered? No   Is there a DME ordered? No   Prep survey completed? Yes          Eladia Milton RN

## 2021-10-14 NOTE — OUTREACH NOTE
Call Center TCM Note      Responses   [facility] patient discharged from? [place]   Does the patient have one of the following disease processes/diagnoses(primary or secondary)? General Surgery   TCM attempt successful? Yes   Call start time 1558   Call end time 1600   Discharge diagnosis Laparoscopic left colectomy with mobilization of splenic flexure and incidental appendectomy    Is patient permission given to speak with other caregiver? Yes   List who call center can speak with spouse- Selena   Person spoke with today (if not patient) and relationship spouse- Selena   Meds reviewed with patient/caregiver? Yes   Is the patient having any side effects they believe may be caused by any medication additions or changes? No   Does the patient have all medications related to this admission filled (includes all antibiotics, pain medications, etc.) Yes   Is the patient taking all medications as directed (includes completed medication regime)? Yes   Does the patient have a follow up appointment scheduled with their surgeon? No   What is preventing the patient from scheduling follow up appointments? Waiting on return call   Nursing Interventions Advised patient to make appointment   Has the patient kept scheduled appointments due by today? N/A   Has home health visited the patient within 72 hours of discharge? N/A   Psychosocial issues? No   Did the patient receive a copy of their discharge instructions? Yes   Nursing interventions Reviewed instructions with patient   What is the patient's perception of their health status since discharge? Improving  [with spouse]   Nursing interventions Nurse provided patient education   Is the patient /caregiver able to teach back basic post-op care? Continue use of incentive spirometry at least 1 week post discharge,  Practice 'cough and deep breath',  Drive as instructed by MD in discharge instructions,  Take showers only when approved by MD-sponge bathe until then,  No tub  bath, swimming, or hot tub until instructed by MD,  Do not remove steri-strips,  Lifting as instructed by MD in discharge instructions,  Keep incision areas clean,dry and protected   Is the patient/caregiver able to teach back signs and symptoms of incisional infection? Fever   Is the patient/caregiver able to teach back the hierarchy of who to call/visit for symptoms/problems? PCP, Specialist, Home health nurse, Urgent Care, ED, 911 Yes   TCM call completed? Yes   Wrap up additional comments Per spouse, patient is doing well, no questions or concerns at this time, he will be following up with Dr. Rinaldi and seeing PCP on 10/26.          Vicky Lucas RN    10/14/2021, 16:01 EDT

## 2021-10-20 LAB
LAB AP CASE REPORT: NORMAL
LAB AP DIAGNOSIS COMMENT: NORMAL
LAB AP SYNOPTIC CHECKLIST: NORMAL
Lab: NORMAL
PATH REPORT.ADDENDUM SPEC: NORMAL
PATH REPORT.FINAL DX SPEC: NORMAL
PATH REPORT.GROSS SPEC: NORMAL

## 2021-10-21 ENCOUNTER — OFFICE VISIT (OUTPATIENT)
Dept: SURGERY | Facility: CLINIC | Age: 76
End: 2021-10-21

## 2021-10-21 VITALS — BODY MASS INDEX: 24.83 KG/M2 | HEIGHT: 68 IN | WEIGHT: 163.8 LBS

## 2021-10-21 DIAGNOSIS — C18.9 ADENOCARCINOMA OF COLON (HCC): Primary | ICD-10-CM

## 2021-10-21 DIAGNOSIS — Z09 FOLLOW UP: Primary | ICD-10-CM

## 2021-10-21 PROCEDURE — 99024 POSTOP FOLLOW-UP VISIT: CPT | Performed by: SURGERY

## 2021-10-22 ENCOUNTER — READMISSION MANAGEMENT (OUTPATIENT)
Dept: CALL CENTER | Facility: HOSPITAL | Age: 76
End: 2021-10-22

## 2021-10-22 ENCOUNTER — TELEPHONE (OUTPATIENT)
Dept: SURGERY | Facility: CLINIC | Age: 76
End: 2021-10-22

## 2021-10-22 ENCOUNTER — TELEPHONE (OUTPATIENT)
Dept: INTERNAL MEDICINE | Facility: CLINIC | Age: 76
End: 2021-10-22

## 2021-10-22 NOTE — PROGRESS NOTES
POSTOPERATIVE VISIT    Procedure  Laparoscopic left colectomy with mobilization of splenic flexure and incidental appendectomy 10/12/2021    Pathology:  Invasive moderately differentiated adenocarcinoma  Superficially invades muscularis propria  Margins negative  15 benign lymph nodes  T2 N0 tumor    Office visit:  Doing very well.  Good bowel function.  Tolerating regular diet.  Pain minimal.  Incision is healing well.    Recommendations:  Based on pathology, no further treatment is warranted.  Colonoscopy in 1 year.

## 2021-10-22 NOTE — TELEPHONE ENCOUNTER
Patient called and stated that he is having some fluid coming out of the longer incision that is located on the left side of abdomen. States fluid and blood was noted on his underpants. Patient is going to send picture of his incision area so that it can be assessed. Also, advised patient to cover with bubba.

## 2021-10-22 NOTE — TELEPHONE ENCOUNTER
PATIENT CALLED AND WANTED A CALL BACK FROM THE OFFICE REGARDING ONCOLOGY THEY CAN SCHEDULE HIM ON TUES BUT HE CANCELLED.     SURGEON SAID ALL IS CLEAR AND NO CANCER- AND NO NEED TO FOLLOW UP FOR FURTHER TREATMENT       PLEASE CALL AND ADVISE   Mirza Zimmer (Self) [phone] (M)

## 2021-10-22 NOTE — OUTREACH NOTE
General Surgery Week 2 Survey      Responses   [facility], [place], operated by [facility] patient discharged from? [place]   Does the patient have one of the following disease processes/diagnoses(primary or secondary)? General Surgery   Week 2 attempt successful? Yes   Call start time 1442   Call end time 1500   Discharge diagnosis Laparoscopic left colectomy with mobilization of splenic flexure and incidental appendectomy    Meds reviewed with patient/caregiver? Yes   Is the patient having any side effects they believe may be caused by any medication additions or changes? No   Does the patient have all medications related to this admission filled (includes all antibiotics, pain medications, etc.) Yes   Is the patient taking all medications as directed (includes completed medication regime)? Yes   Does the patient have a follow up appointment scheduled with their surgeon? Yes   Has the patient kept scheduled appointments due by today? Yes   Has home health visited the patient within 72 hours of discharge? N/A   Psychosocial issues? 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 Yes   Nursing interventions Reviewed instructions with patient   What is the patient's perception of their health status since discharge? Improving   Nursing interventions Nurse provided patient education   Is the patient /caregiver able to teach back basic post-op care? Lifting as instructed by MD in discharge instructions,  Do not remove steri-strips,  Keep incision areas clean,dry and protected,  No tub bath, swimming, or hot tub until instructed by MD,  Take showers only when approved by MD-sponge bathe until then   Is the patient/caregiver able to teach back signs and symptoms of incisional infection? Increased redness, swelling or pain at the incisonal site,  Increased drainage or bleeding,  Incisional warmth,  Pus or odor from incision,  Fever   Is the  patient/caregiver able to teach back steps to recovery at home? Set small, achievable goals for return to baseline health,  Rest and rebuild strength, gradually increase activity,  Eat a well-balance diet   If the patient is a current smoker, are they able to teach back resources for cessation? Not a smoker   Is the patient/caregiver able to teach back the hierarchy of who to call/visit for symptoms/problems? PCP, Specialist, Home health nurse, Urgent Care, ED, 911 Yes   Week 2 call completed? Yes          Samantha Meier RN

## 2021-10-25 ENCOUNTER — TELEPHONE (OUTPATIENT)
Dept: INTERNAL MEDICINE | Facility: CLINIC | Age: 76
End: 2021-10-25

## 2021-10-25 NOTE — TELEPHONE ENCOUNTER
PATIENT CALLING ABOUT FOLLOW UP QUESTIONS ON MY CHART.     WOULD LIKE TO SPEAK WITH NURSE.     PLEASE ADVISE PATIENT. [phone]

## 2021-10-26 ENCOUNTER — OFFICE VISIT (OUTPATIENT)
Dept: INTERNAL MEDICINE | Facility: CLINIC | Age: 76
End: 2021-10-26

## 2021-10-26 ENCOUNTER — APPOINTMENT (OUTPATIENT)
Dept: OTHER | Facility: HOSPITAL | Age: 76
End: 2021-10-26

## 2021-10-26 VITALS
DIASTOLIC BLOOD PRESSURE: 68 MMHG | WEIGHT: 162.6 LBS | BODY MASS INDEX: 24.64 KG/M2 | TEMPERATURE: 98.3 F | SYSTOLIC BLOOD PRESSURE: 104 MMHG | HEIGHT: 68 IN

## 2021-10-26 DIAGNOSIS — R39.11 BENIGN PROSTATIC HYPERPLASIA WITH URINARY HESITANCY: ICD-10-CM

## 2021-10-26 DIAGNOSIS — N40.1 BENIGN PROSTATIC HYPERPLASIA WITH URINARY HESITANCY: ICD-10-CM

## 2021-10-26 DIAGNOSIS — I10 HYPERTENSION, ESSENTIAL: ICD-10-CM

## 2021-10-26 DIAGNOSIS — R30.0 DYSURIA: ICD-10-CM

## 2021-10-26 DIAGNOSIS — C18.7 CANCER OF SIGMOID COLON (HCC): Primary | ICD-10-CM

## 2021-10-26 LAB
BILIRUB BLD-MCNC: NEGATIVE MG/DL
CLARITY, POC: CLEAR
COLOR UR: YELLOW
EXPIRATION DATE: ABNORMAL
GLUCOSE UR STRIP-MCNC: NEGATIVE MG/DL
KETONES UR QL: NEGATIVE
LEUKOCYTE EST, POC: NEGATIVE
Lab: ABNORMAL
NITRITE UR-MCNC: NEGATIVE MG/ML
PH UR: 5 [PH] (ref 5–8)
PROT UR STRIP-MCNC: NEGATIVE MG/DL
RBC # UR STRIP: ABNORMAL /UL
SP GR UR: 1.03 (ref 1–1.03)
UROBILINOGEN UR QL: NORMAL

## 2021-10-26 PROCEDURE — 99214 OFFICE O/P EST MOD 30 MIN: CPT | Performed by: INTERNAL MEDICINE

## 2021-10-26 PROCEDURE — 81003 URINALYSIS AUTO W/O SCOPE: CPT | Performed by: INTERNAL MEDICINE

## 2021-10-26 RX ORDER — TAMSULOSIN HYDROCHLORIDE 0.4 MG/1
1 CAPSULE ORAL DAILY
Qty: 30 CAPSULE | Refills: 5 | Status: SHIPPED | OUTPATIENT
Start: 2021-10-26 | End: 2022-01-05

## 2021-10-26 NOTE — PROGRESS NOTES
Chief Complaint   Patient presents with   • Post-op     Colon resection/ colon cancer   • urinary retension       History of Present Illness   Mirza Zimmer is a 76 y.o. male presents for hospital follow up. Patient w/ history of tubular and serrated adenoma. Had a f/u colonoscopy this year that did reveal a colon cancer. This has now been resected. He had negative lymph node testing w/ appropriate margins. No further treatment has been advised. He notes that bm are a little loose in nature. He does have some defication when urinating. Has urinary hesitancy. Has known enlarged prostate. On cialis for this. Has followed w/ urology and has stable psa in may.   He has lost about 5 pounds related to decreased appetite post op. On losartan for bp regulation.         The following portions of the patient's history were reviewed and updated as appropriate: allergies, current medications, past family history, past medical history, past social history, past surgical history and problem list.  Current Outpatient Medications on File Prior to Visit   Medication Sig Dispense Refill   • Cetirizine HCl 10 MG capsule Take 10 mg by mouth As Needed.     • glucosamine-chondroitin 500-400 MG capsule capsule Take 1 capsule by mouth Daily. 2 po daily  HOLD PER MD INSTR     • mupirocin (BACTROBAN) 2 % ointment Apply 1 application topically to the appropriate area as directed As Needed.     • simvastatin (ZOCOR) 20 MG tablet TAKE ONE TABLET BY MOUTH ONCE NIGHTLY (Patient taking differently: Take 20 mg by mouth Every Night.) 90 tablet 0   • tadalafil (CIALIS) 5 MG tablet TAKE ONE TABLET BY MOUTH DAILY 60 tablet 3   • Turmeric 500 MG capsule Take  by mouth. HOLD PER MD INSTR     • [DISCONTINUED] losartan (COZAAR) 25 MG tablet TAKE 1 TABLET BY MOUTH DAILY (Patient taking differently: Take 25 mg by mouth Every Night.) 90 tablet 3   • Chlorhexidine Gluconate Cloth 2 % pads Apply  topically.       No current facility-administered medications  "on file prior to visit.     Review of Systems   Constitutional: Negative.    HENT: Negative.    Eyes: Negative.    Respiratory: Negative.    Cardiovascular: Negative.    Gastrointestinal:        Loose bm     Endocrine: Negative.    Genitourinary: Negative.    Musculoskeletal: Negative.    Skin: Negative.    Allergic/Immunologic: Negative.    Neurological: Negative.    Hematological: Negative.    Psychiatric/Behavioral: Negative.        Objective   Physical Exam  Vitals and nursing note reviewed.   Constitutional:       Appearance: Normal appearance.   HENT:      Head: Normocephalic and atraumatic.      Right Ear: Tympanic membrane normal.      Left Ear: Tympanic membrane normal.      Nose: Nose normal.      Mouth/Throat:      Mouth: Mucous membranes are moist.   Eyes:      Extraocular Movements: Extraocular movements intact.      Pupils: Pupils are equal, round, and reactive to light.   Cardiovascular:      Rate and Rhythm: Normal rate and regular rhythm.      Pulses: Normal pulses.      Heart sounds: Normal heart sounds.   Pulmonary:      Effort: Pulmonary effort is normal.      Breath sounds: Normal breath sounds.   Abdominal:      General: Abdomen is flat. Bowel sounds are normal.      Palpations: Abdomen is soft.   Musculoskeletal:         General: Normal range of motion.      Cervical back: Normal range of motion.   Skin:     General: Skin is warm and dry.   Neurological:      General: No focal deficit present.      Mental Status: He is alert and oriented to person, place, and time.   Psychiatric:         Mood and Affect: Mood normal.         Behavior: Behavior normal.         Thought Content: Thought content normal.         Judgment: Judgment normal.          /68   Temp 98.3 °F (36.8 °C)   Ht 172.7 cm (67.99\")   Wt 73.8 kg (162 lb 9.6 oz)   BMI 24.73 kg/m²     Assessment/Plan   Diagnoses and all orders for this visit:    Cancer of sigmoid colon (HCC)    Hypertension, essential    Benign prostatic " hyperplasia with urinary hesitancy    Other orders  -     tamsulosin (Flomax) 0.4 MG capsule 24 hr capsule; Take 1 capsule by mouth Daily.      Patient s/p partial sigmoid resection for colon ca. He is doing well today. He has lost weight and now is a little hypotensive. Will d/c losartan for now. He will increase hydration with water. If continued urinary hesitancy then can start flomax but will use caution to monitor for othostatic hypotension. He will have a repeat cscope in 1 year w/ Dr. Rinaldi. F/u in 7 mo for AWV.            Answers for HPI/ROS submitted by the patient on 10/26/2021  What is the primary reason for your visit?: Other  Please describe your symptoms.: Surgery follow up.  Slight pain, occasional difficulty urinating.  Have you had these symptoms before?: No  How long have you been having these symptoms?: 1-2 weeks  Please describe any probable cause for these symptoms. : Surgery

## 2021-10-28 LAB
BACTERIA UR CULT: NO GROWTH
BACTERIA UR CULT: NORMAL

## 2021-10-29 ENCOUNTER — READMISSION MANAGEMENT (OUTPATIENT)
Dept: CALL CENTER | Facility: HOSPITAL | Age: 76
End: 2021-10-29

## 2021-10-29 NOTE — OUTREACH NOTE
General Surgery Week 3 Survey      Responses   [facility] patient discharged from? [place]   Does the patient have one of the following disease processes/diagnoses(primary or secondary)? General Surgery   Week 3 attempt successful? No   Unsuccessful attempts Attempt 1          Sharyn Curiel RN

## 2021-11-03 ENCOUNTER — READMISSION MANAGEMENT (OUTPATIENT)
Dept: CALL CENTER | Facility: HOSPITAL | Age: 76
End: 2021-11-03

## 2021-11-03 NOTE — OUTREACH NOTE
General Surgery Week 3 Survey      Responses   [facility] patient discharged from? [place]   Does the patient have one of the following disease processes/diagnoses(primary or secondary)? General Surgery   Week 3 attempt successful? No   Unsuccessful attempts Attempt 2          Rosa Isela Aguilar RN

## 2021-12-08 RX ORDER — SIMVASTATIN 20 MG
TABLET ORAL
Qty: 90 TABLET | Refills: 0 | Status: SHIPPED | OUTPATIENT
Start: 2021-12-08 | End: 2022-04-07

## 2021-12-08 RX ORDER — LOSARTAN POTASSIUM 25 MG/1
TABLET ORAL
Qty: 90 TABLET | Refills: 0 | Status: SHIPPED | OUTPATIENT
Start: 2021-12-08 | End: 2022-03-14

## 2022-01-07 ENCOUNTER — DOCUMENTATION (OUTPATIENT)
Dept: INTERNAL MEDICINE | Facility: CLINIC | Age: 77
End: 2022-01-07

## 2022-01-07 RX ORDER — DOXYCYCLINE 100 MG/1
100 CAPSULE ORAL 2 TIMES DAILY
Qty: 14 CAPSULE | Refills: 0 | Status: SHIPPED | OUTPATIENT
Start: 2022-01-07 | End: 2022-03-04

## 2022-01-07 NOTE — PROGRESS NOTES
Patient with 7 day history of febrile illness. Negative for covid and flu. Nightsweats, faint rash lateral aspect of abdomen but more apparent for heat based rash. Has had fevers to 102. Today is 101.7. some sinus congestion and cough. No gi or gu symptoms. No other symptoms. Appears apparent uri. Will empyrically treat with docycyclin and f/u on Monday or prn.

## 2022-01-10 ENCOUNTER — OFFICE VISIT (OUTPATIENT)
Dept: INTERNAL MEDICINE | Facility: CLINIC | Age: 77
End: 2022-01-10

## 2022-01-10 VITALS
SYSTOLIC BLOOD PRESSURE: 106 MMHG | OXYGEN SATURATION: 98 % | DIASTOLIC BLOOD PRESSURE: 58 MMHG | WEIGHT: 155 LBS | HEART RATE: 69 BPM | BODY MASS INDEX: 23.49 KG/M2 | TEMPERATURE: 97.9 F | HEIGHT: 68 IN

## 2022-01-10 DIAGNOSIS — L30.9 DERMATITIS: ICD-10-CM

## 2022-01-10 DIAGNOSIS — R50.9 FEVER, UNSPECIFIED FEVER CAUSE: Primary | ICD-10-CM

## 2022-01-10 PROCEDURE — 99213 OFFICE O/P EST LOW 20 MIN: CPT | Performed by: INTERNAL MEDICINE

## 2022-01-10 PROCEDURE — 71046 X-RAY EXAM CHEST 2 VIEWS: CPT | Performed by: INTERNAL MEDICINE

## 2022-01-10 NOTE — PROGRESS NOTES
Chief Complaint   Patient presents with   • Fever   • Fatigue       History of Present Illness   Mirza Zimmer is a 76 y.o. male presents for follow up evaluation. Patient with a febrile illness. Has been alternating tylenol and ibuprofen and afebrile while taking this. However, most recent dosing was 6 am. Has not had a fever in 48 hours but was on meds. Tested neg for flu and covid x multiple. He had cbc, cmp, u/a tested. Profile essentially normal. He was mildly dehydrated and has increased hydration from that time. On doxycycline twice daily and is feeling improvement with this. Appetite mildly suppressed but improving. Slight headache today.   Did develop rash after sweat and fever. Trunk only.   Recent colectomy but no pain or issues.        The following portions of the patient's history were reviewed and updated as appropriate: allergies, current medications, past family history, past medical history, past social history, past surgical history and problem list.  Current Outpatient Medications on File Prior to Visit   Medication Sig Dispense Refill   • Cetirizine HCl 10 MG capsule Take 10 mg by mouth As Needed.     • doxycycline (MONODOX) 100 MG capsule Take 1 capsule by mouth 2 (Two) Times a Day. 14 capsule 0   • glucosamine-chondroitin 500-400 MG capsule capsule Take 1 capsule by mouth Daily. 2 po daily  HOLD PER MD INSTR     • losartan (COZAAR) 25 MG tablet TAKE ONE TABLET BY MOUTH DAILY 90 tablet 0   • simvastatin (ZOCOR) 20 MG tablet TAKE ONE TABLET BY MOUTH ONCE NIGHTLY 90 tablet 0   • tadalafil (CIALIS) 5 MG tablet TAKE ONE TABLET BY MOUTH DAILY 60 tablet 3   • Turmeric 500 MG capsule Take  by mouth. HOLD PER MD INSTR     • mupirocin (BACTROBAN) 2 % ointment Apply 1 application topically to the appropriate area as directed As Needed.       No current facility-administered medications on file prior to visit.     Review of Systems   Constitutional: Positive for fever.   HENT: Negative for congestion,  "ear pain and sore throat.    Eyes: Negative.    Respiratory: Positive for cough. Negative for wheezing.    Cardiovascular: Negative for chest pain.   Gastrointestinal: Negative for abdominal pain, diarrhea, nausea and vomiting.   Endocrine: Negative.    Genitourinary: Negative for dysuria.   Musculoskeletal: Negative.    Skin: Negative.  Negative for rash.   Allergic/Immunologic: Negative.    Neurological: Positive for headaches.   Hematological: Negative.    Psychiatric/Behavioral: Negative.        Objective   Physical Exam  Vitals and nursing note reviewed.   Constitutional:       Appearance: Normal appearance. He is well-developed.   HENT:      Head: Normocephalic and atraumatic.      Right Ear: Tympanic membrane and external ear normal.      Left Ear: Tympanic membrane and external ear normal.      Nose: Nose normal.      Mouth/Throat:      Mouth: Mucous membranes are moist.   Eyes:      Extraocular Movements: Extraocular movements intact.      Pupils: Pupils are equal, round, and reactive to light.   Cardiovascular:      Rate and Rhythm: Normal rate and regular rhythm.      Pulses: Normal pulses.      Heart sounds: Normal heart sounds.   Pulmonary:      Effort: Pulmonary effort is normal. No respiratory distress.      Breath sounds: Normal breath sounds.   Abdominal:      General: Abdomen is flat. Bowel sounds are normal.      Palpations: Abdomen is soft.   Musculoskeletal:         General: Normal range of motion.      Cervical back: Normal range of motion and neck supple.   Skin:     General: Skin is warm and dry.   Neurological:      General: No focal deficit present.      Mental Status: He is alert and oriented to person, place, and time.   Psychiatric:         Mood and Affect: Mood normal.         Behavior: Behavior normal.         Thought Content: Thought content normal.         Judgment: Judgment normal.      cxr for fever.     /58   Pulse 69   Temp 97.9 °F (36.6 °C)   Ht 172.7 cm (68\")   Wt " 70.3 kg (155 lb)   SpO2 98%   BMI 23.57 kg/m²     Assessment/Plan   Diagnoses and all orders for this visit:    Fever, unspecified fever cause    Dermatitis      Patient with febrile illness. U/a negative. Cbc actually more c/w viral illnes. However, will complete doxycycline. Will switch ibuprofen and tylenol to prn dosing. Can take antihistamine as needed for rash with steroid ointment as needed.                Answers for HPI/ROS submitted by the patient on 1/8/2022  What is the primary reason for your visit?: Fever

## 2022-03-04 ENCOUNTER — OFFICE VISIT (OUTPATIENT)
Dept: CARDIOLOGY | Facility: CLINIC | Age: 77
End: 2022-03-04

## 2022-03-04 ENCOUNTER — HOSPITAL ENCOUNTER (OUTPATIENT)
Dept: CARDIOLOGY | Facility: HOSPITAL | Age: 77
Discharge: HOME | End: 2022-03-04
Admitting: INTERNAL MEDICINE

## 2022-03-04 VITALS
WEIGHT: 155 LBS | BODY MASS INDEX: 23.49 KG/M2 | SYSTOLIC BLOOD PRESSURE: 127 MMHG | DIASTOLIC BLOOD PRESSURE: 72 MMHG | HEIGHT: 68 IN | HEART RATE: 70 BPM

## 2022-03-04 VITALS
HEIGHT: 68 IN | HEART RATE: 61 BPM | WEIGHT: 161 LBS | DIASTOLIC BLOOD PRESSURE: 64 MMHG | SYSTOLIC BLOOD PRESSURE: 118 MMHG | BODY MASS INDEX: 24.4 KG/M2

## 2022-03-04 DIAGNOSIS — I34.1 MVP (MITRAL VALVE PROLAPSE): ICD-10-CM

## 2022-03-04 DIAGNOSIS — I77.810 DILATED AORTIC ROOT: ICD-10-CM

## 2022-03-04 DIAGNOSIS — I10 HYPERTENSION, ESSENTIAL: ICD-10-CM

## 2022-03-04 DIAGNOSIS — I34.0 NON-RHEUMATIC MITRAL REGURGITATION: ICD-10-CM

## 2022-03-04 DIAGNOSIS — I34.1 MVP (MITRAL VALVE PROLAPSE): Primary | ICD-10-CM

## 2022-03-04 LAB
AORTIC ARCH: 3.1 CM
ASCENDING AORTA: 3.5 CM
BH CV ECHO LEFT VENTRICLE GLOBAL LONGITUDINAL STRAIN: -19.3 %
BH CV ECHO MEAS - ACS: 2.37 CM
BH CV ECHO MEAS - AO MAX PG: 10.1 MMHG
BH CV ECHO MEAS - AO MEAN PG: 4.8 MMHG
BH CV ECHO MEAS - AO ROOT DIAM: 4.1 CM
BH CV ECHO MEAS - AO V2 MAX: 158.7 CM/SEC
BH CV ECHO MEAS - AO V2 VTI: 36.7 CM
BH CV ECHO MEAS - AVA(I,D): 2.33 CM2
BH CV ECHO MEAS - EDV(CUBED): 136.4 ML
BH CV ECHO MEAS - EDV(MOD-SP2): 105 ML
BH CV ECHO MEAS - EDV(MOD-SP4): 119 ML
BH CV ECHO MEAS - EF(MOD-BP): 67.3 %
BH CV ECHO MEAS - EF(MOD-SP2): 66.7 %
BH CV ECHO MEAS - EF(MOD-SP4): 65.5 %
BH CV ECHO MEAS - ESV(CUBED): 35.4 ML
BH CV ECHO MEAS - ESV(MOD-SP2): 35 ML
BH CV ECHO MEAS - ESV(MOD-SP4): 41 ML
BH CV ECHO MEAS - FS: 36.2 %
BH CV ECHO MEAS - IVS/LVPW: 0.92 CM
BH CV ECHO MEAS - IVSD: 1.07 CM
BH CV ECHO MEAS - LAT PEAK E' VEL: 10.3 CM/SEC
BH CV ECHO MEAS - LV DIASTOLIC VOL/BSA (35-75): 64.9 CM2
BH CV ECHO MEAS - LV MASS(C)D: 223.2 GRAMS
BH CV ECHO MEAS - LV MAX PG: 6.7 MMHG
BH CV ECHO MEAS - LV MEAN PG: 3.1 MMHG
BH CV ECHO MEAS - LV SYSTOLIC VOL/BSA (12-30): 22.4 CM2
BH CV ECHO MEAS - LV V1 MAX: 129.5 CM/SEC
BH CV ECHO MEAS - LV V1 VTI: 28.1 CM
BH CV ECHO MEAS - LVIDD: 5.1 CM
BH CV ECHO MEAS - LVIDS: 3.3 CM
BH CV ECHO MEAS - LVOT AREA: 3 CM2
BH CV ECHO MEAS - LVOT DIAM: 1.97 CM
BH CV ECHO MEAS - LVPWD: 1.17 CM
BH CV ECHO MEAS - MED PEAK E' VEL: 8.2 CM/SEC
BH CV ECHO MEAS - MR MAX PG: 116.5 MMHG
BH CV ECHO MEAS - MR MAX VEL: 539.6 CM/SEC
BH CV ECHO MEAS - MV A MAX VEL: 97.3 CM/SEC
BH CV ECHO MEAS - MV DEC SLOPE: 350.4 CM/SEC2
BH CV ECHO MEAS - MV DEC TIME: 0.26 MSEC
BH CV ECHO MEAS - MV E MAX VEL: 93 CM/SEC
BH CV ECHO MEAS - MV E/A: 0.96
BH CV ECHO MEAS - MV MAX PG: 4.9 MMHG
BH CV ECHO MEAS - MV MEAN PG: 2.5 MMHG
BH CV ECHO MEAS - MV V2 VTI: 43.1 CM
BH CV ECHO MEAS - MVA(VTI): 1.98 CM2
BH CV ECHO MEAS - PA ACC TIME: 0.07 SEC
BH CV ECHO MEAS - PA PR(ACCEL): 49.3 MMHG
BH CV ECHO MEAS - PA V2 MAX: 148 CM/SEC
BH CV ECHO MEAS - PI END-D VEL: 103.2 CM/SEC
BH CV ECHO MEAS - PULM A REVS DUR: 0.14 SEC
BH CV ECHO MEAS - PULM A REVS VEL: 39.6 CM/SEC
BH CV ECHO MEAS - PULM DIAS VEL: 42.5 CM/SEC
BH CV ECHO MEAS - PULM SYS VEL: 103.3 CM/SEC
BH CV ECHO MEAS - RAP SYSTOLE: 8 MMHG
BH CV ECHO MEAS - RV MAX PG: 1.3 MMHG
BH CV ECHO MEAS - RV V1 MAX: 57 CM/SEC
BH CV ECHO MEAS - RV V1 VTI: 11.1 CM
BH CV ECHO MEAS - RVOT DIAM: 2.5 CM
BH CV ECHO MEAS - RVSP: 41.8 MMHG
BH CV ECHO MEAS - SI(MOD-SP2): 38.2 ML/M2
BH CV ECHO MEAS - SI(MOD-SP4): 42.5 ML/M2
BH CV ECHO MEAS - SV(LVOT): 85.6 ML
BH CV ECHO MEAS - SV(MOD-SP2): 70 ML
BH CV ECHO MEAS - SV(MOD-SP4): 78 ML
BH CV ECHO MEAS - SV(RVOT): 54.9 ML
BH CV ECHO MEAS - TAPSE (>1.6): 3.2 CM
BH CV ECHO MEAS - TR MAX PG: 33.8 MMHG
BH CV ECHO MEAS - TR MAX VEL: 290.7 CM/SEC
BH CV ECHO MEASUREMENTS AVERAGE E/E' RATIO: 10.05
BH CV XLRA - RV BASE: 3.7 CM
BH CV XLRA - RV LENGTH: 6.2 CM
BH CV XLRA - RV MID: 3.4 CM
BH CV XLRA - TDI S': 24.2 CM/SEC
LEFT ATRIUM VOLUME INDEX: 34.2 ML/M2
LV DP/DT: 757 MMHG/SEC
LV EF 2D ECHO EST: 67 %
MAXIMAL PREDICTED HEART RATE: 144 BPM
SINUS: 4 CM
STJ: 2.6 CM
STRESS TARGET HR: 122 BPM

## 2022-03-04 PROCEDURE — 93306 TTE W/DOPPLER COMPLETE: CPT | Performed by: INTERNAL MEDICINE

## 2022-03-04 PROCEDURE — 99214 OFFICE O/P EST MOD 30 MIN: CPT | Performed by: NURSE PRACTITIONER

## 2022-03-04 PROCEDURE — 93356 MYOCRD STRAIN IMG SPCKL TRCK: CPT

## 2022-03-04 PROCEDURE — 93356 MYOCRD STRAIN IMG SPCKL TRCK: CPT | Performed by: INTERNAL MEDICINE

## 2022-03-04 PROCEDURE — 93000 ELECTROCARDIOGRAM COMPLETE: CPT | Performed by: NURSE PRACTITIONER

## 2022-03-04 PROCEDURE — 93306 TTE W/DOPPLER COMPLETE: CPT

## 2022-03-04 NOTE — PROGRESS NOTES
[facility]   [address], [address]  [place], [place], [address]    [phone]  [email or web address]           OUTPATIENT CLINIC FOLLOW UP NOTE    Patient Care Team:  Patient Care Team:  Radha Rollins MD as PCP - General (Internal Medicine)  Ritika Vaughn MD (Cardiology)  Radha Rollins MD as Referring Physician (Internal Medicine)    Subjective:      Chief Complaint   Patient presents with   • Mitral Valve Prolapse   • Hypertension   • dilated ascending aorta       HPI:    Mirza Zimmer is a 76 y.o. male.  Problem list:  1. Mitral valve prolapse with moderate to severe mitral regurgitation  a. TTE 2015: EF 57%, moderate to severe prolapse of the posterior leaflet with severe anterior directed mitral regurgitation, aortic root measured 4.2 cm.  b. Stress echocardiogram 9/2019: EF 58%, moderately dilated left ventricle with grade 2 diastolic dysfunction, moderate left atrial enlargement, mild right atrial enlargement, severe prolapse of the posterior leaflet with severe anterior directed mitral regurgitation at rest.  Moderate tricuspid regurgitation with RVSP of 32 mmHg.  No evidence of ischemia.  c. Stress echocardiogram 8/2021: EF 69%, GLS -25.4%, severe mitral valve prolapse of the posterior mitral leaflet, moderate mitral regurgitation is present with an anteriorly directed jet.  No significant mitral stenosis.  Trace TR with RVSP of 33.2 mmHg, no evidence of ischemia  2. Thoracic ascending aortic aneurysm/dilated aortic root  a. MRI of the chest 2015: Mildly dilated ascending aorta measuring 4.1 cm.  b. MRI of the chest 10/2018: Mildly dilated aortic arch measuring 4.1 cm.  c. MRI of the chest 8/2021: Dilatation of the ascending aorta measuring 4.5 cm of the aortic root, grossly unchanged since at least 7/2017.  3. Hypertension  4. Hyperlipidemia  5. Colon cancer status post resection    The patient presents today for follow-up.  His primary cardiologist is   "Vaughn.    Since the patient was last seen he reports that he has been doing well from a cardiac standpoint.  He denies chest pain, shortness of breath, palpitations, lightheadedness, or syncope.  Does occasionally have mild, dependent lower extremity edema.  Does note if he checks his pulse after exercise he occasionally feels \"skipped beats\".  Blood pressure has been well controlled at home.  He does note he underwent a screening colonoscopy and was noted to have colon cancer is now status post resection.    Review of Systems:  Positive for lower extremity edema, palpitations  Negative for exertional chest pain, dyspnea with exertion, syncope.     PFSH:  Patient Active Problem List   Diagnosis   • Arthritis   • Hyperlipidemia   • Hypertension, essential   • MOFFETT (dyspnea on exertion)   • Non-rheumatic mitral regurgitation   • MVP (mitral valve prolapse)   • Dilated aortic root (HCC)   • Increased prostate specific antigen (PSA) velocity   • Other ill-defined heart diseases    • History of colon polyps   • Thoracic aortic aneurysm without rupture (HCC)   • Malignant neoplasm of sigmoid colon (HCC)   • Cancer of sigmoid colon (HCC)         Current Outpatient Medications:   •  Cetirizine HCl 10 MG capsule, Take 10 mg by mouth As Needed., Disp: , Rfl:   •  glucosamine-chondroitin 500-400 MG capsule capsule, Take 1 capsule by mouth Daily. 2 po daily HOLD PER MD INSTR, Disp: , Rfl:   •  losartan (COZAAR) 25 MG tablet, TAKE ONE TABLET BY MOUTH DAILY, Disp: 90 tablet, Rfl: 0  •  simvastatin (ZOCOR) 20 MG tablet, TAKE ONE TABLET BY MOUTH ONCE NIGHTLY, Disp: 90 tablet, Rfl: 0  •  tadalafil (CIALIS) 5 MG tablet, TAKE ONE TABLET BY MOUTH DAILY, Disp: 60 tablet, Rfl: 3  •  Turmeric 500 MG capsule, Take  by mouth. HOLD PER MD INSTR, Disp: , Rfl:     Allergies   Allergen Reactions   • Sulfa Antibiotics Unknown - Low Severity        reports that he has never smoked. He has never used smokeless tobacco.      Objective:   Physical " "exam:  /64   Pulse 61   Ht 172.7 cm (68\")   Wt 73 kg (161 lb)   BMI 24.48 kg/m²   CONSTITUTIONAL: No acute distress  RESPIRATORY: Normal effort. Clear to auscultation bilaterally without wheezing or rales  CARDIOVASCULAR: Carotids with normal upstrokes without bruits.  Regular rate and rhythm with normal S1 and S2. Without gallop or rub.  Grade 3/6 crescendo-decrescendo, mid to late systolic murmur at the apex.  Normal radial pulse. There is no lower extremity edema bilaterally.    Labs:    BUN   Date Value Ref Range Status   10/13/2021 11 8 - 23 mg/dL Final     Creatinine   Date Value Ref Range Status   10/13/2021 0.88 0.76 - 1.27 mg/dL Final   08/11/2021 1.00 0.60 - 1.30 mg/dL Final     Comment:     Serial Number: [id number]:  [id number]     Potassium   Date Value Ref Range Status   10/13/2021 4.2 3.5 - 5.2 mmol/L Final     ALT (SGPT)   Date Value Ref Range Status   10/06/2021 17 1 - 41 U/L Final     AST (SGOT)   Date Value Ref Range Status   10/06/2021 21 1 - 40 U/L Final       Lab Results   Component Value Date    CHOL 174 05/01/2017     Lab Results   Component Value Date    TRIG 78 05/25/2021     Lab Results   Component Value Date    HDL 48 05/25/2021     Lab Results   Component Value Date    LDL 97 05/25/2021     No components found for: LDLDIRECTC    Diagnostic Data:      ECG 12 Lead    Date/Time: 3/4/2022 9:41 AM  Performed by: Niharika Putnam APRN  Authorized by: Niharika Putnam APRN   Comparison: compared with previous ECG from 7/14/2021  Similar to previous ECG  Rhythm: sinus rhythm  Rate: normal  BPM: 61  Comments: QRS 96 ms,  ms            Results for orders placed during the hospital encounter of 08/03/21    Adult Stress Echo W/ Cont or Stress Agent if Necessary Per Protocol    Interpretation Summary  · Calculated left ventricular EF = 69% Estimated left ventricular EF = 69% Left ventricular systolic function is normal. Normal global longitudinal LV strain (GLS) = -25.4%. Left " ventricle strain data was reviewed by the physician and found to be accurate. Normal left ventricular wall thickness noted. The left ventricular cavity is mild to moderately dilated. All left ventricular wall segments contract normally. Left ventricular diastolic function was indeterminate.  · There is severe mitral valve prolapse of the posterior mitral leaflet. Moderate mitral valve regurgitation is present with an anteriorly-directed jet noted. This is a very eccentric jet that is likely underestimated No significant mitral valve stenosis is present.  · Trace tricuspid valve regurgitation is present. Calculated right ventricular systolic pressure from tricuspid regurgitation is 33.2 mmHg.  · Negative clinical evidence of myocardial ischemia. Findings consistent with a normal ECG stress test.  · Normal stress echo with no significant echocardiographic evidence for myocardial ischemia.  · PVCs are noted at rest that improved with exercise and recur again in recovery      Assessment and Plan:   Diagnoses and all orders for this visit:    MVP (mitral valve prolapse) (Primary)  Non-rheumatic mitral regurgitation  -Echo completed today prior to appointment and is currently pending.  -Clinically stable, will await echo results.    Dilated aortic root (HCC)  -Stable on most recent MRI of the chest 8/2021.  -We will continue to monitor clinically for now.    Hypertension, essential  -At goal, continue losartan.    - Return for Next scheduled follow up with Dr. Vaughn as previously scheduled..    Electronically signed by SILVIA Mcdermott, 03/04/22, 9:43 AM EST.

## 2022-03-07 ENCOUNTER — TELEPHONE (OUTPATIENT)
Dept: CARDIOLOGY | Facility: CLINIC | Age: 77
End: 2022-03-07

## 2022-03-07 NOTE — TELEPHONE ENCOUNTER
Can you let the patient know that his echo showed that his heart is pumping good and strong and relaxing well.  His mitral valve prolapse was noted to be in the moderate category which is improved from his last echo.  He was also noted to have mild to moderate regurgitation which is similar to prior.  His aortic root is mildly dilated, but appears to be similar to prior echocardiogram measurements.  He should keep his follow-up with Dr. Vaughn in July as planned.

## 2022-03-07 NOTE — TELEPHONE ENCOUNTER
Reviewed results and recommendations with Mirza Zimmer.  Patient verbalized understanding.    Thank you,  Sepideh Hunt RN  Triage Nurse [facility]

## 2022-03-14 RX ORDER — LOSARTAN POTASSIUM 25 MG/1
TABLET ORAL
Qty: 90 TABLET | Refills: 0 | Status: SHIPPED | OUTPATIENT
Start: 2022-03-14 | End: 2022-04-07

## 2022-04-07 RX ORDER — LOSARTAN POTASSIUM 25 MG/1
TABLET ORAL
Qty: 90 TABLET | Refills: 0 | Status: SHIPPED | OUTPATIENT
Start: 2022-04-07 | End: 2022-06-03 | Stop reason: SDUPTHER

## 2022-04-07 RX ORDER — SIMVASTATIN 20 MG
TABLET ORAL
Qty: 90 TABLET | Refills: 0 | Status: SHIPPED | OUTPATIENT
Start: 2022-04-07 | End: 2022-06-03 | Stop reason: SDUPTHER

## 2022-05-18 DIAGNOSIS — Z12.5 SCREENING FOR PROSTATE CANCER: ICD-10-CM

## 2022-05-18 DIAGNOSIS — I10 HYPERTENSION, ESSENTIAL: ICD-10-CM

## 2022-05-18 DIAGNOSIS — E78.5 HYPERLIPIDEMIA, UNSPECIFIED HYPERLIPIDEMIA TYPE: Primary | ICD-10-CM

## 2022-05-28 LAB
ALBUMIN SERPL-MCNC: 4.2 G/DL (ref 3.7–4.7)
ALBUMIN/GLOB SERPL: 2 {RATIO} (ref 1.2–2.2)
ALP SERPL-CCNC: 81 IU/L (ref 44–121)
ALT SERPL-CCNC: 18 IU/L (ref 0–44)
APPEARANCE UR: CLEAR
AST SERPL-CCNC: 21 IU/L (ref 0–40)
BACTERIA #/AREA URNS HPF: NORMAL /[HPF]
BASOPHILS # BLD AUTO: 0 X10E3/UL (ref 0–0.2)
BASOPHILS NFR BLD AUTO: 1 %
BILIRUB SERPL-MCNC: 0.5 MG/DL (ref 0–1.2)
BILIRUB UR QL STRIP: NEGATIVE
BUN SERPL-MCNC: 16 MG/DL (ref 8–27)
BUN/CREAT SERPL: 17 (ref 10–24)
CALCIUM SERPL-MCNC: 9.1 MG/DL (ref 8.6–10.2)
CASTS URNS QL MICRO: NORMAL /LPF
CHLORIDE SERPL-SCNC: 102 MMOL/L (ref 96–106)
CHOLEST SERPL-MCNC: 155 MG/DL (ref 100–199)
CO2 SERPL-SCNC: 25 MMOL/L (ref 20–29)
COLOR UR: YELLOW
CREAT SERPL-MCNC: 0.93 MG/DL (ref 0.76–1.27)
EGFRCR SERPLBLD CKD-EPI 2021: 85 ML/MIN/1.73
EOSINOPHIL # BLD AUTO: 0.1 X10E3/UL (ref 0–0.4)
EOSINOPHIL NFR BLD AUTO: 3 %
EPI CELLS #/AREA URNS HPF: NORMAL /HPF (ref 0–10)
ERYTHROCYTE [DISTWIDTH] IN BLOOD BY AUTOMATED COUNT: 12.2 % (ref 11.6–15.4)
GLOBULIN SER CALC-MCNC: 2.1 G/DL (ref 1.5–4.5)
GLUCOSE SERPL-MCNC: 94 MG/DL (ref 65–99)
GLUCOSE UR QL STRIP: NEGATIVE
HCT VFR BLD AUTO: 43.3 % (ref 37.5–51)
HDLC SERPL-MCNC: 51 MG/DL
HGB BLD-MCNC: 14.5 G/DL (ref 13–17.7)
HGB UR QL STRIP: NEGATIVE
IMM GRANULOCYTES # BLD AUTO: 0 X10E3/UL (ref 0–0.1)
IMM GRANULOCYTES NFR BLD AUTO: 0 %
KETONES UR QL STRIP: NEGATIVE
LDLC SERPL CALC-MCNC: 90 MG/DL (ref 0–99)
LEUKOCYTE ESTERASE UR QL STRIP: NEGATIVE
LYMPHOCYTES # BLD AUTO: 1 X10E3/UL (ref 0.7–3.1)
LYMPHOCYTES NFR BLD AUTO: 26 %
MCH RBC QN AUTO: 31.3 PG (ref 26.6–33)
MCHC RBC AUTO-ENTMCNC: 33.5 G/DL (ref 31.5–35.7)
MCV RBC AUTO: 93 FL (ref 79–97)
MICRO URNS: NORMAL
MICRO URNS: NORMAL
MONOCYTES # BLD AUTO: 0.5 X10E3/UL (ref 0.1–0.9)
MONOCYTES NFR BLD AUTO: 12 %
NEUTROPHILS # BLD AUTO: 2.2 X10E3/UL (ref 1.4–7)
NEUTROPHILS NFR BLD AUTO: 58 %
NITRITE UR QL STRIP: NEGATIVE
PH UR STRIP: 6 [PH] (ref 5–7.5)
PLATELET # BLD AUTO: 163 X10E3/UL (ref 150–450)
POTASSIUM SERPL-SCNC: 4.4 MMOL/L (ref 3.5–5.2)
PROT SERPL-MCNC: 6.3 G/DL (ref 6–8.5)
PROT UR QL STRIP: NEGATIVE
PSA SERPL-MCNC: 3.6 NG/ML (ref 0–4)
RBC # BLD AUTO: 4.64 X10E6/UL (ref 4.14–5.8)
RBC #/AREA URNS HPF: NORMAL /HPF (ref 0–2)
SODIUM SERPL-SCNC: 139 MMOL/L (ref 134–144)
SP GR UR STRIP: 1.01 (ref 1–1.03)
TRIGL SERPL-MCNC: 73 MG/DL (ref 0–149)
TSH SERPL DL<=0.005 MIU/L-ACNC: 2.45 UIU/ML (ref 0.45–4.5)
URINALYSIS REFLEX: NORMAL
UROBILINOGEN UR STRIP-MCNC: 0.2 MG/DL (ref 0.2–1)
VLDLC SERPL CALC-MCNC: 14 MG/DL (ref 5–40)
WBC # BLD AUTO: 3.8 X10E3/UL (ref 3.4–10.8)
WBC #/AREA URNS HPF: NORMAL /HPF (ref 0–5)

## 2022-06-03 ENCOUNTER — OFFICE VISIT (OUTPATIENT)
Dept: INTERNAL MEDICINE | Facility: CLINIC | Age: 77
End: 2022-06-03

## 2022-06-03 VITALS
WEIGHT: 160 LBS | HEART RATE: 55 BPM | BODY MASS INDEX: 24.25 KG/M2 | SYSTOLIC BLOOD PRESSURE: 132 MMHG | HEIGHT: 68 IN | DIASTOLIC BLOOD PRESSURE: 68 MMHG

## 2022-06-03 DIAGNOSIS — Z00.00 HEALTHCARE MAINTENANCE: Primary | ICD-10-CM

## 2022-06-03 DIAGNOSIS — I34.1 MVP (MITRAL VALVE PROLAPSE): ICD-10-CM

## 2022-06-03 DIAGNOSIS — E78.5 HYPERLIPIDEMIA, UNSPECIFIED HYPERLIPIDEMIA TYPE: ICD-10-CM

## 2022-06-03 DIAGNOSIS — I10 HYPERTENSION, ESSENTIAL: ICD-10-CM

## 2022-06-03 PROCEDURE — 1170F FXNL STATUS ASSESSED: CPT | Performed by: INTERNAL MEDICINE

## 2022-06-03 PROCEDURE — 99213 OFFICE O/P EST LOW 20 MIN: CPT | Performed by: INTERNAL MEDICINE

## 2022-06-03 PROCEDURE — G0439 PPPS, SUBSEQ VISIT: HCPCS | Performed by: INTERNAL MEDICINE

## 2022-06-03 PROCEDURE — 1159F MED LIST DOCD IN RCRD: CPT | Performed by: INTERNAL MEDICINE

## 2022-06-03 RX ORDER — SIMVASTATIN 20 MG
20 TABLET ORAL NIGHTLY
Qty: 90 TABLET | Refills: 3 | Status: SHIPPED | OUTPATIENT
Start: 2022-06-03

## 2022-06-03 RX ORDER — LOSARTAN POTASSIUM 25 MG/1
25 TABLET ORAL DAILY
Qty: 90 TABLET | Refills: 3 | Status: SHIPPED | OUTPATIENT
Start: 2022-06-03

## 2022-06-03 RX ORDER — TADALAFIL 5 MG/1
5 TABLET ORAL DAILY
Qty: 90 TABLET | Refills: 3 | Status: SHIPPED | OUTPATIENT
Start: 2022-06-03

## 2022-06-03 NOTE — PROGRESS NOTES
The ABCs of the Annual Wellness Visit  Subsequent Medicare Wellness Visit    Chief Complaint   Patient presents with   • Annual Exam   • Hyperlipidemia   • Hypertension   • colon cancer      Subjective    History of Present Illness:  Mirza Zimmer is a 77 y.o. male who presents for a Subsequent Medicare Wellness Visit. He is now s/p partial colectomy for an adenocarcinoma. He does report slightly looser bm after this procedure. No dumping noted. He is to have an annual cscope related to this. Patient has hypertension and hyperlipidemia. He notes that bp has been normotensive. Lipids are at a goal level. He has moderate-severe mitral valve prolapse. In general is asymptomatic from this but can note dizziness if bending forward such as gardening.         The following portions of the patient's history were reviewed and   updated as appropriate: allergies, current medications, past family history, past medical history, past social history, past surgical history and problem list.    Compared to one year ago, the patient feels his physical   health is the same.    Compared to one year ago, the patient feels his mental   health is the same.    Recent Hospitalizations:  This patient has had a [facility] admission record on file within the last 365 days.    Current Medical Providers:  Patient Care Team:  Radha Rollins MD as PCP - General (Internal Medicine)  Ritika Vaughn MD (Cardiology)  Radha Rollins MD as Referring Physician (Internal Medicine)    Outpatient Medications Prior to Visit   Medication Sig Dispense Refill   • Cetirizine HCl 10 MG capsule Take 10 mg by mouth As Needed.     • glucosamine-chondroitin 500-400 MG capsule capsule Take 1 capsule by mouth Daily. 2 po daily  HOLD PER MD INSTR     • Turmeric 500 MG capsule Take  by mouth. HOLD PER MD INSTR     • losartan (COZAAR) 25 MG tablet TAKE ONE TABLET BY MOUTH DAILY 90 tablet 0   • simvastatin (ZOCOR) 20 MG tablet TAKE ONE TABLET BY MOUTH ONCE  "NIGHTLY 90 tablet 0   • tadalafil (CIALIS) 5 MG tablet TAKE ONE TABLET BY MOUTH DAILY 60 tablet 3     No facility-administered medications prior to visit.       No opioid medication identified on active medication list. I have reviewed chart for other potential  high risk medication/s and harmful drug interactions in the elderly.          Aspirin is not on active medication list.  Aspirin use is not indicated based on review of current medical condition/s. Risk of harm outweighs potential benefits.  .    Patient Active Problem List   Diagnosis   • Arthritis   • Hyperlipidemia   • Hypertension, essential   • MOFFETT (dyspnea on exertion)   • Non-rheumatic mitral regurgitation   • MVP (mitral valve prolapse)   • Dilated aortic root (HCC)   • Increased prostate specific antigen (PSA) velocity   • Other ill-defined heart diseases    • History of colon polyps   • Thoracic aortic aneurysm without rupture (HCC)   • Malignant neoplasm of sigmoid colon (HCC)   • Cancer of sigmoid colon (HCC)     Advance Care Planning  Advance Directive is not on file.  ACP discussion was held with the patient during this visit. Patient has an advance directive (not in EMR), copy requested.          Objective    Vitals:    06/03/22 1059   BP: 132/68   Pulse: 55   Weight: 72.6 kg (160 lb)   Height: 172.7 cm (68\")     Body mass index is 24.33 kg/m².  BMI is within normal parameters. No other follow-up for BMI required.    Does the patient have evidence of cognitive impairment? No    Physical Exam  Vitals and nursing note reviewed.   Constitutional:       Appearance: Normal appearance. He is well-developed.   HENT:      Head: Normocephalic and atraumatic.      Right Ear: Tympanic membrane and external ear normal.      Left Ear: Tympanic membrane and external ear normal.      Nose: Nose normal.      Mouth/Throat:      Mouth: Mucous membranes are moist.   Eyes:      Extraocular Movements: Extraocular movements intact.      Pupils: Pupils are equal, " round, and reactive to light.   Cardiovascular:      Rate and Rhythm: Normal rate and regular rhythm.      Pulses: Normal pulses.      Heart sounds: Normal heart sounds.   Pulmonary:      Effort: Pulmonary effort is normal. No respiratory distress.      Breath sounds: Normal breath sounds.   Abdominal:      General: Abdomen is flat.      Palpations: Abdomen is soft.   Musculoskeletal:         General: Normal range of motion.      Cervical back: Normal range of motion and neck supple.   Skin:     General: Skin is warm and dry.   Neurological:      General: No focal deficit present.      Mental Status: He is alert and oriented to person, place, and time.   Psychiatric:         Mood and Affect: Mood normal.         Behavior: Behavior normal.         Thought Content: Thought content normal.         Judgment: Judgment normal.       Lab Results   Component Value Date    CHLPL 155 05/27/2022    TRIG 73 05/27/2022    HDL 51 05/27/2022    LDL 90 05/27/2022    VLDL 14 05/27/2022            HEALTH RISK ASSESSMENT    Smoking Status:  Social History     Tobacco Use   Smoking Status Never Smoker   Smokeless Tobacco Never Used   Tobacco Comment    Caffeine use     Alcohol Consumption:  Social History     Substance and Sexual Activity   Alcohol Use Yes   • Alcohol/week: 1.0 standard drink   • Types: 1 Glasses of wine per week    Comment: social occasional use     Fall Risk Screen:    STEADI Fall Risk Assessment was completed, and patient is at LOW risk for falls.Assessment completed on:6/3/2022    Depression Screening:  PHQ-2/PHQ-9 Depression Screening 6/3/2022   Retired PHQ-9 Total Score -   Retired Total Score -   Little Interest or Pleasure in Doing Things 0-->not at all   Feeling Down, Depressed or Hopeless 0-->not at all   PHQ-9: Brief Depression Severity Measure Score 0       Health Habits and Functional and Cognitive Screening:  Functional & Cognitive Status 6/3/2022   Do you have difficulty preparing food and eating? No    Do you have difficulty bathing yourself, getting dressed or grooming yourself? No   Do you have difficulty using the toilet? No   Do you have difficulty moving around from place to place? No   Do you have trouble with steps or getting out of a bed or a chair? No   Current Diet Well Balanced Diet   Dental Exam Up to date   Eye Exam Up to date   Exercise (times per week) -   Current Exercise Activities Include -   Do you need help using the phone?  No   Are you deaf or do you have serious difficulty hearing?  No   Do you need help with transportation? No   Do you need help shopping? No   Do you need help preparing meals?  No   Do you need help with housework?  No   Do you need help with laundry? No   Do you need help taking your medications? No   Do you need help managing money? No   Do you ever drive or ride in a car without wearing a seat belt? No   Have you felt unusual stress, anger or loneliness in the last month? No   Who do you live with? Spouse   If you need help, do you have trouble finding someone available to you? No   Have you been bothered in the last four weeks by sexual problems? No   Do you have difficulty concentrating, remembering or making decisions? No       Age-appropriate Screening Schedule:  Refer to the list below for future screening recommendations based on patient's age, sex and/or medical conditions. Orders for these recommended tests are listed in the plan section. The patient has been provided with a written plan.    Health Maintenance   Topic Date Due   • INFLUENZA VACCINE  08/01/2022   • COLONOSCOPY  09/29/2022   • PROSTATE CANCER SCREENING  05/27/2023   • LIPID PANEL  05/27/2023   • TDAP/TD VACCINES (3 - Td or Tdap) 08/08/2027   • ZOSTER VACCINE  Completed              Assessment & Plan   CMS Preventative Services Quick Reference  Risk Factors Identified During Encounter  None Identified  The above risks/problems have been discussed with the patient.  Follow up actions/plans if  indicated are seen below in the Assessment/Plan Section.  Pertinent information has been shared with the patient in the After Visit Summary.    Diagnoses and all orders for this visit:    1. Healthcare maintenance (Primary)    2. Hypertension, essential    3. Hyperlipidemia, unspecified hyperlipidemia type    4. MVP (mitral valve prolapse)    Other orders  -     simvastatin (ZOCOR) 20 MG tablet; Take 1 tablet by mouth Every Night.  Dispense: 90 tablet; Refill: 3  -     losartan (COZAAR) 25 MG tablet; Take 1 tablet by mouth Daily.  Dispense: 90 tablet; Refill: 3  -     tadalafil (CIALIS) 5 MG tablet; Take 1 tablet by mouth Daily.  Dispense: 90 tablet; Refill: 3        Follow Up:   No follow-ups on file.     An After Visit Summary and PPPS were made available to the patient.          I spent 40 minutes caring for Mirza on this date of service. This time includes time spent by me in the following activities:preparing for the visit, reviewing tests, obtaining and/or reviewing a separately obtained history, performing a medically appropriate examination and/or evaluation , counseling and educating the patient/family/caregiver, ordering medications, tests, or procedures, referring and communicating with other health care professionals , documenting information in the medical record and independently interpreting results and communicating that information with the patient/family/caregiver

## 2022-06-07 ENCOUNTER — DOCUMENTATION (OUTPATIENT)
Dept: SURGERY | Facility: CLINIC | Age: 77
End: 2022-06-07

## 2022-07-26 ENCOUNTER — PREP FOR SURGERY (OUTPATIENT)
Dept: OTHER | Facility: HOSPITAL | Age: 77
End: 2022-07-26

## 2022-07-26 DIAGNOSIS — Z85.038 PERSONAL HISTORY OF COLON CANCER: Primary | ICD-10-CM

## 2022-08-02 PROBLEM — Z85.038 PERSONAL HISTORY OF COLON CANCER: Status: ACTIVE | Noted: 2022-08-02

## 2022-08-12 ENCOUNTER — OFFICE VISIT (OUTPATIENT)
Dept: CARDIOLOGY | Facility: CLINIC | Age: 77
End: 2022-08-12

## 2022-08-12 VITALS
HEIGHT: 68 IN | WEIGHT: 161 LBS | SYSTOLIC BLOOD PRESSURE: 128 MMHG | BODY MASS INDEX: 24.4 KG/M2 | DIASTOLIC BLOOD PRESSURE: 72 MMHG | HEART RATE: 66 BPM

## 2022-08-12 DIAGNOSIS — I34.0 NON-RHEUMATIC MITRAL REGURGITATION: ICD-10-CM

## 2022-08-12 DIAGNOSIS — I77.810 DILATED AORTIC ROOT: ICD-10-CM

## 2022-08-12 DIAGNOSIS — E78.5 HYPERLIPIDEMIA, UNSPECIFIED HYPERLIPIDEMIA TYPE: ICD-10-CM

## 2022-08-12 DIAGNOSIS — I10 HYPERTENSION, ESSENTIAL: ICD-10-CM

## 2022-08-12 DIAGNOSIS — I34.1 MVP (MITRAL VALVE PROLAPSE): Primary | ICD-10-CM

## 2022-08-12 DIAGNOSIS — R06.09 DOE (DYSPNEA ON EXERTION): ICD-10-CM

## 2022-08-12 PROCEDURE — 99214 OFFICE O/P EST MOD 30 MIN: CPT | Performed by: INTERNAL MEDICINE

## 2022-08-12 PROCEDURE — 93000 ELECTROCARDIOGRAM COMPLETE: CPT | Performed by: INTERNAL MEDICINE

## 2022-08-12 RX ORDER — AMOXICILLIN 500 MG/1
2000 CAPSULE ORAL AS NEEDED
COMMUNITY
Start: 2022-08-11

## 2022-08-12 NOTE — PROGRESS NOTES
Date of Office Visit: 2022  Encounter Provider: Adriana Vaughn MD  Place of Service: [facility] CARDIOLOGY  Patient Name: Mirza Zimmer  :1945    Chief complaint  Mitral valve disease, dilated aortic root, hypertension    History of Present Illness  Patient is a pleasant 77-year-old gentleman history of hypertension, hyperlipidemia with a mitral valve prolapse mild regurgitation and aortic root dilatation.  In  an echocardiogram showed an ejection fraction of 57% with moderate severe prolapse the posterior leaflet with severe anterior directed mitral regurgitation.  Aortic root measured 4.2 cm.  MR angiogram of the chest also showed a mildly dilated asending aorta measuring 4.1 cm.  Last MR angiogram of the chest dated 2018 showed a mildly dilated aortic arch measuring 4.1 cm clinically he is doing well and we have opted to follow him with serial echocardiographic studies including stress imaging.  Last stress test in 2019 showed an ejection fraction 58% with moderately dilated left ventricle with grade 2 diastolic dysfunction, moderate left atrial margin, mild right atrial enlargement, severe prolapse of the posterior leaflet with severe anterior directed mitral regurgitation at rest.  There is moderate tricuspid regurgitation with an RV systolic pressure 32 mmHg.  Following exercise (12 minutes on the Shahab protocol achieving 13 METS) blood pressure response was appropriate and there was worse regurgitation diastolic function.  Unable to assess RV systolic pressure post exercise.  Ejection fraction did increased to 66%.  On 2021 stress echocardiogram demonstrated ejection fraction of 69% with normal GLS diastolic function was indeterminate.  Severe mitral prolapse with moderate anteriorly directed mitral valve regurgitation was noted it was felt to be underestimated.  There was no ischemia at 12 METS.  There was augmentation of LV function.  A  follow-up echocardiogram 3/2022 showed an ejection fraction 67% with GLS -19.3% (unchanged) with normal diastolic function with mild to moderate mitral valve regurgitation which is eccentric and an RV systolic pressure elevated to 42 mmHg the aortic root was mildly dilated.  Patient had CT angiogram of the chest abdomen pelvis on 10/2021 that showed no pulmonary emboli nodules the aorta was not mentioned.  However a 2.5 sigmoid mass was noted.  Prostate was heterogenous and enlarged.  He subsequently had colectomy and appendectomy.    Patient has had no palpitations, chest pain dizziness is minimal edema in the evening.  He has had mild dyspnea on exertion when climbing up hills.  He is wearing his bicycle for an hour 2-3 times a week.    Blood work 5/2020 includes normal CMP TSH, LDL 90, HDL 51, triglycerides 73, hemoglobin normal.    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 EDGARD LEE   • Arthritis    • BPH (benign prostatic hyperplasia)     FOLLOWED BY DR. MARK BALL   • Cardiomegaly     FOLLOWED BY DR. SAMUEL RIVAS   • Cataract     BILATERAL   • Chronic prostatitis     FOLLOWED BY DR. MARK BALL   • Colon cancer (HCC) 09/29/2021    Sigmoid colon superficial fragments of eroded tubulovillous adenoma with at intramucosal adenocarcinoma   • Colon polyps     FOLLOWED BY DR. KEVIN LUDWIG   • Dilated aortic root (HCC)    • Dyspnea on exertion    • ED (erectile dysfunction)    • Elevated PSA    • Heart murmur    • Hemorrhoids    • History of colon resection    • Hyperlipidemia    • Hypermetropia, bilateral    • Hypertension    • Iliac artery aneurysm (HCC) 03/2018    FOLLOWED BY DR. EDGARD LEE   • Increased prostate specific antigen (PSA) velocity 5/8/2017   • Laceration of right index finger 08/01/2019   • Lichen simplex chronicus    • Mechanical ptosis, left    • Mitral regurgitation    • Mitral valve insufficiency    • Muscular  aches    • MVP (mitral valve prolapse)    • Nocturia 06/2021   • Perichondritis of right external ear 07/2021   • Presbyopia    • Regular astigmatism of both eyes    • Seborrheic keratosis    • Thoracic aortic aneurysm without rupture (HCC)    • Thoracic aortic ectasia (HCC) 07/2017   • Trochanteric bursitis of left hip      Past Surgical History:   Procedure Laterality Date   • APPENDECTOMY  10/12/2021    Done as a part of a colon resection   • COLON RESECTION N/A 10/12/2021    Procedure: LAPAROSCOPIC LEFT COLECTOMY, APPENDECTOMY;  Surgeon: Cesar Rinaldi MD;  Location: [facility] MAIN OR;  Service: General;  Laterality: N/A;   • COLONOSCOPY N/A 8/28/2019    5 MM TUBULAR ADENOMA POLYP IN HEPATIC FLEXURE, 6 MM SERRATED ADENOMA POLYP IN TRANSVERSE, MULTIPLE DIVERTICULA IN SIGMOID, RESCOPE IN 2 YRS, DR. KEVIN LUDWIG AT [facility]   • COLONOSCOPY N/A 9/29/2021    3 TUBULAR ADENOMA POLYPS IN ASCENDING, AN INFILTRATIVE MASS IN SIGMOID, PATH: TUBULOVILLOUS ADENOMA WITH AT LEAST INTRAMUCOSAL ADENOCARCINOMA, AREA TATTOOED, RESCOPE IN1 YR, DR. KEVIN LUDWIG AT [facility]   • COLONOSCOPY W/ POLYPECTOMY N/A 09/03/2004    5 MM ADENOMATOUS POLYP IN SIGMOID, OTHERWISE WNL, RESCOPE IN 3 YRS, DR. ANGI CONLEY AT [facility]   • COLONOSCOPY W/ POLYPECTOMY N/A 05/28/2013    3 MM SESSILE HYPERPLASTIC POLYP 50CM FROM ANUS, A FEW SMALL DIVERTICULA IN SIGMOID, NON BLEEDING INTERNAL HEMORRHOIDS GRADE II, DR. ANGI CONLEY AT [facility]   • COLONOSCOPY W/ POLYPECTOMY N/A 02/29/2008    5 MM HYPERPLASTIC POLYP AT 60 CM FROM ANUS, RESCOPE IN 5 YRS, DR. ANGI CONLEY AT [facility]   • EYE SURGERY  2003    ATYPICAL MOLE EXCSION, DR. SANCHEZ AT [facility] EYE [facility]   • PROSTATE SURGERY N/A 08/15/2018    UROLIFT, PROSTATE VOLUME 48CC, NO ABNORMALITIES, DR. COLLIN GARVIN   • TONSILLECTOMY Bilateral 1958    DONE IN [place], [place]     Outpatient Medications Prior to Visit   Medication Sig Dispense Refill   • amoxicillin (AMOXIL) 500 MG capsule Take 2,000 mg by mouth As  Needed. Dental Cleaning     • Cetirizine HCl 10 MG capsule Take 10 mg by mouth As Needed.     • glucosamine-chondroitin 500-400 MG capsule capsule Take 1 capsule by mouth Daily. HOLD PER MD INSTR     • losartan (COZAAR) 25 MG tablet Take 1 tablet by mouth Daily. 90 tablet 3   • simvastatin (ZOCOR) 20 MG tablet Take 1 tablet by mouth Every Night. 90 tablet 3   • tadalafil (CIALIS) 5 MG tablet Take 1 tablet by mouth Daily. 90 tablet 3   • Turmeric 500 MG capsule Take  by mouth. HOLD PER MD INSTR       No facility-administered medications prior to visit.       Allergies as of 08/12/2022 - Reviewed 08/12/2022   Allergen Reaction Noted   • Sulfa antibiotics Unknown - Low Severity 02/18/2016     Social History     Socioeconomic History   • Marital status:    Tobacco Use   • Smoking status: Never Smoker   • Smokeless tobacco: Never Used   • Tobacco comment: Caffeine use   Vaping Use   • Vaping Use: Never used   Substance and Sexual Activity   • Alcohol use: Yes     Alcohol/week: 1.0 standard drink     Types: 1 Glasses of wine per week     Comment: social occasional use   • Drug use: No   • Sexual activity: Yes     Partners: Female     Birth control/protection: None     Family History   Problem Relation Age of Onset   • Dementia Mother    • Arthritis Mother    • Diabetes Father    • Sudden death Father    • Heart attack Father    • Heart disease Father    • Breast cancer Sister    • Cancer Sister    • Mental illness Maternal Grandmother         Manic depressive   • Gallbladder disease Son    • Malig Hyperthermia Neg Hx      Review of Systems   Constitutional: Negative for chills, fever, weight gain and weight loss.   Cardiovascular: Negative for leg swelling.   Respiratory: Negative for cough, snoring and wheezing.    Hematologic/Lymphatic: Negative for bleeding problem. Does not bruise/bleed easily.   Skin: Negative for color change.   Musculoskeletal: Positive for joint pain. Negative for falls and myalgias.  "  Gastrointestinal: Negative for melena.   Genitourinary: Negative for hematuria.   Neurological: Negative for excessive daytime sleepiness.   Psychiatric/Behavioral: Negative for depression. The patient is not nervous/anxious.         Objective:     Vitals:    08/12/22 0932   BP: 128/72   Pulse: 66   Weight: 73 kg (161 lb)   Height: 172.7 cm (68\")     Body mass index is 24.48 kg/m².    Vitals reviewed.   Constitutional:       Appearance: Well-developed.   Eyes:      General: No scleral icterus.        Right eye: No discharge.      Conjunctiva/sclera: Conjunctivae normal.      Pupils: Pupils are equal, round, and reactive to light.   HENT:      Head: Normocephalic.      Nose: Nose normal.   Neck:      Thyroid: No thyromegaly.      Vascular: No JVD.   Pulmonary:      Effort: Pulmonary effort is normal. No respiratory distress.      Breath sounds: Normal breath sounds. No wheezing. No rales.   Cardiovascular:      Normal rate. Regular rhythm. Normal S1. Normal S2.      Murmurs: There is a grade 2/6 crescendo-decrescendo, mid to late systolic murmur at the LLSB and apex.      No gallop.   Abdominal:      General: Bowel sounds are normal. There is no distension.      Palpations: Abdomen is soft.      Tenderness: There is no abdominal tenderness. There is no rebound.   Musculoskeletal: Normal range of motion.         General: No tenderness.      Cervical back: Normal range of motion and neck supple. Skin:     General: Skin is warm and dry.      Findings: No erythema or rash.   Neurological:      Mental Status: Alert and oriented to person, place, and time.   Psychiatric:         Behavior: Behavior normal.         Thought Content: Thought content normal.         Judgment: Judgment normal.       Lab Review:     ECG 12 Lead    Date/Time: 8/12/2022 9:43 AM  Performed by: Adriana Vaughn MD  Authorized by: Adriana Vaughn MD   Comparison: compared with previous ECG   Similar to previous ECG  Rhythm: sinus rhythm    Clinical " impression: normal ECG          Assessment:       Diagnosis Plan   1. MVP (mitral valve prolapse)  ECG 12 Lead    Adult Stress Echo W/ Cont or Stress Agent if Necessary Per Protocol   2. Non-rheumatic mitral regurgitation  ECG 12 Lead    Adult Stress Echo W/ Cont or Stress Agent if Necessary Per Protocol   3. Dilated aortic root (HCC)  ECG 12 Lead   4. MOFFETT (dyspnea on exertion)  Adult Stress Echo W/ Cont or Stress Agent if Necessary Per Protocol   5. Hypertension, essential     6. Hyperlipidemia, unspecified hyperlipidemia type       Plan:       1.  Mitral prolapse with moderate to severe severe mitral regurgitation.  Clinically stable with recent echo showing adequate contractile reserve eccentric mitral vegetation is likely underestimated.  Normal LV size strain and diastolic function.  Observe for now.  RV systolic pressure is elevated in March but likely due to allergies.  Clinically doing well.  Plan on follow-up in 6 months with repeat stress echo.  2.  Dilated aortic root, stable by MR imaging 8/2021 with aortic root measuring 4.5 cm with ascending aorta measuring 3.9 cm.  This is unchanged and plan on repeat MR imaging in 6 months to 1 year.  3.  Dyslipidemia.  Controlled  4.  Left iliac artery aneurysm followed by Dr. Yost and f/p pending  5.  Hypertension.  Controlled  6.  Pulmonary hypertension.  Had seasonal allergies earlier this year suspect this contributed as LV size and strain with normal I do not believe this is due to the large regurgitation.  We will reassess as above.    Time Spent: I spent 35 minutes caring for Mirza on this date of service. This time includes time spent by me in the following activities: preparing for the visit, reviewing tests, obtaining and/or reviewing a separately obtained history, performing a medically appropriate examination and/or evaluation, counseling and educating the patient/family/caregiver, ordering medications, tests, or procedures, documenting information  in the medical record and independently interpreting results and communicating that information with the patient/family/caregiver.   I spent 1 minutes on the separately reported service of ECG. This time is not included in the time used to support the E/M service also reported today.        Your medication list          Accurate as of August 12, 2022 11:59 PM. If you have any questions, ask your nurse or doctor.            CONTINUE taking these medications      Instructions Last Dose Given Next Dose Due   amoxicillin 500 MG capsule  Commonly known as: AMOXIL      Take 2,000 mg by mouth As Needed. Dental Cleaning       Cetirizine HCl 10 MG capsule      Take 10 mg by mouth As Needed.       glucosamine-chondroitin 500-400 MG capsule capsule      Take 1 capsule by mouth Daily. HOLD PER MD YE       losartan 25 MG tablet  Commonly known as: COZAAR      Take 1 tablet by mouth Daily.       simvastatin 20 MG tablet  Commonly known as: ZOCOR      Take 1 tablet by mouth Every Night.       tadalafil 5 MG tablet  Commonly known as: CIALIS      Take 1 tablet by mouth Daily.       Turmeric 500 MG capsule      Take  by mouth. HOLD PER MD YE              Patient is no longer taking -.  I corrected the med list to reflect this.  I did not stop these medications.      Dictated utilizing Dragon dictation

## 2022-10-19 ENCOUNTER — ANESTHESIA (OUTPATIENT)
Dept: GASTROENTEROLOGY | Facility: HOSPITAL | Age: 77
End: 2022-10-19

## 2022-10-19 ENCOUNTER — ANESTHESIA EVENT (OUTPATIENT)
Dept: GASTROENTEROLOGY | Facility: HOSPITAL | Age: 77
End: 2022-10-19

## 2022-10-19 ENCOUNTER — HOSPITAL ENCOUNTER (OUTPATIENT)
Facility: HOSPITAL | Age: 77
Setting detail: HOSPITAL OUTPATIENT SURGERY
Discharge: HOME | End: 2022-10-19
Attending: SURGERY | Admitting: SURGERY

## 2022-10-19 VITALS
DIASTOLIC BLOOD PRESSURE: 71 MMHG | TEMPERATURE: 97.9 F | HEART RATE: 51 BPM | HEIGHT: 68 IN | WEIGHT: 158 LBS | SYSTOLIC BLOOD PRESSURE: 124 MMHG | RESPIRATION RATE: 16 BRPM | BODY MASS INDEX: 23.95 KG/M2 | OXYGEN SATURATION: 95 %

## 2022-10-19 DIAGNOSIS — Z85.038 PERSONAL HISTORY OF COLON CANCER: ICD-10-CM

## 2022-10-19 PROCEDURE — 45380 COLONOSCOPY AND BIOPSY: CPT | Performed by: SURGERY

## 2022-10-19 PROCEDURE — 88305 TISSUE EXAM BY PATHOLOGIST: CPT | Performed by: SURGERY

## 2022-10-19 PROCEDURE — S0260 H&P FOR SURGERY: HCPCS | Performed by: SURGERY

## 2022-10-19 PROCEDURE — 25010000002 PROPOFOL 10 MG/ML EMULSION: Performed by: ANESTHESIOLOGY

## 2022-10-19 RX ORDER — SODIUM CHLORIDE, SODIUM LACTATE, POTASSIUM CHLORIDE, CALCIUM CHLORIDE 600; 310; 30; 20 MG/100ML; MG/100ML; MG/100ML; MG/100ML
30 INJECTION, SOLUTION INTRAVENOUS CONTINUOUS PRN
Status: DISCONTINUED | OUTPATIENT
Start: 2022-10-19 | End: 2022-10-19 | Stop reason: HOSPADM

## 2022-10-19 RX ORDER — PROPOFOL 10 MG/ML
VIAL (ML) INTRAVENOUS AS NEEDED
Status: DISCONTINUED | OUTPATIENT
Start: 2022-10-19 | End: 2022-10-19 | Stop reason: SURG

## 2022-10-19 RX ORDER — PROPOFOL 10 MG/ML
VIAL (ML) INTRAVENOUS CONTINUOUS PRN
Status: DISCONTINUED | OUTPATIENT
Start: 2022-10-19 | End: 2022-10-19 | Stop reason: SURG

## 2022-10-19 RX ORDER — LIDOCAINE HYDROCHLORIDE 20 MG/ML
INJECTION, SOLUTION INFILTRATION; PERINEURAL AS NEEDED
Status: DISCONTINUED | OUTPATIENT
Start: 2022-10-19 | End: 2022-10-19 | Stop reason: SURG

## 2022-10-19 RX ADMIN — Medication 100 MCG/KG/MIN: at 07:43

## 2022-10-19 RX ADMIN — PROPOFOL 120 MG: 10 INJECTION, EMULSION INTRAVENOUS at 07:43

## 2022-10-19 RX ADMIN — LIDOCAINE HYDROCHLORIDE 50 MG: 20 INJECTION, SOLUTION INFILTRATION; PERINEURAL at 07:40

## 2022-10-19 RX ADMIN — SODIUM CHLORIDE, POTASSIUM CHLORIDE, SODIUM LACTATE AND CALCIUM CHLORIDE 30 ML/HR: 600; 310; 30; 20 INJECTION, SOLUTION INTRAVENOUS at 06:46

## 2022-10-19 NOTE — DISCHARGE INSTRUCTIONS
For the next 24 hours patient needs to be with a responsible adult.    For 24 hours DO NOT drive, operate machinery, appliances, drink alcohol, make important decisions or sign legal documents.    Start with a light or bland diet if you are feeling sick to your stomach otherwise advance to regular diet as tolerated.    Follow recommendations on procedure report if provided by your doctor.    Call Dr Rinaldi for problems [phone]    Problems may include but not limited to: large amounts of bleeding, trouble breathing, repeated vomiting, severe unrelieved pain, fever or chills.

## 2022-10-19 NOTE — ANESTHESIA PREPROCEDURE EVALUATION
Anesthesia Evaluation     Patient summary reviewed and Nursing notes reviewed   NPO Solid Status: > 8 hours  NPO Liquid Status: > 4 hours           Airway   Mallampati: II  Neck ROM: full  No difficulty expected  Dental      Comment: Partial lower    Pulmonary     breath sounds clear to auscultation  (+) shortness of breath,   Cardiovascular     Rhythm: regular    (+) hypertension, valvular problems/murmurs MR and MVP, MOFFETT, PVD, hyperlipidemia,       Neuro/Psych  GI/Hepatic/Renal/Endo      Musculoskeletal     Abdominal    Substance History      OB/GYN          Other   arthritis,                    Anesthesia Plan    ASA 3     MAC     intravenous induction     Anesthetic plan, risks, benefits, and alternatives have been provided, discussed and informed consent has been obtained with: patient.        CODE STATUS:

## 2022-10-19 NOTE — H&P
CC: Surveillance    HPI: 77-year-old gentleman with personal history of colon cancer status post left colectomy 10/12/2021 for T2N0 tumor.  No symptoms or problems.    PMH, PSH, MEDS AND ALLERGIES reviewed and reconciled in  EPIC    PHYSICAL EXAM:  • Constitutional:  awake, alert, no acute distress  • VS: afebrile, VSS  • Respiratory:  normal inspiratory effort  • Cardiovascular: regular rate  • Gastrointestinal: Soft    ROS:  relevant systems negative other than any presenting complaints    ASSESSMENT/PLAN:    77-year-old gentleman presents for surveillance colonoscopy    Cesar Rinaldi M.D.

## 2022-10-19 NOTE — OP NOTE
PREOPERATIVE DIAGNOSIS:  • High risk screening secondary to personal history of left colon cancer status post laparoscopic left colectomy 10/12/2021 for T2N0 tumor    POSTOPERATIVE DIAGNOSIS AND FINDINGS:  • Minuscule transverse colon polyp  • Diverticulosis    PROCEDURE:  Colonoscopy to cecum with cold biopsy removal of polyp    SURGEON:  Cesar Rinaldi MD    ANESTHESIA:  MAC    SPECIMEN(S):  • Polyp    DESCRIPTION:  In decubitus position digital rectal exam was normal. Colonoscope inserted under direct visualization of lumen to cecum confirmed by visualization of ileocecal valve and base of cecum, status post appendectomy.  Scope was slowly withdrawn circumferentially examining all mucosal surfaces.  Bowel preparation was excellent.  Distal transverse colon polyp identified and removed with cold biopsy forceps, good hemostasis at the site.  No other mucosal abnormalities noted.  Minimal diverticulosis present.  Tolerated well.    RECOMMENDATION FOR FUTURE SURVEILLANCE:  To be determined based on polyp pathology and issued as separate report    Cesar Rinaldi M.D.

## 2022-10-19 NOTE — ANESTHESIA POSTPROCEDURE EVALUATION
"Patient: Mirza Zimmer    Procedure Summary     Date: 10/19/22 Room / Location: [facility] ENDOSCOPY 1 /  [facility]    Anesthesia Start: 0735 Anesthesia Stop: 0758    Procedure: COLONOSCOPY to CECUM WITH COLD BX POLYPECTOMY Diagnosis:       Personal history of colon cancer      (Personal history of colon cancer [Z85.038])    Surgeons: Cesar Rinaldi MD Provider: Emeterio Fuentes MD    Anesthesia Type: MAC ASA Status: 3          Anesthesia Type: MAC    Vitals  Vitals Value Taken Time   /71 10/19/22 0814   Temp 36.6 °C (97.9 °F) 10/19/22 0814   Pulse 51 10/19/22 0814   Resp 16 10/19/22 0814   SpO2 95 % 10/19/22 0814           Post Anesthesia Care and Evaluation    Patient location during evaluation: bedside  Patient participation: complete - patient participated  Level of consciousness: sleepy but conscious  Pain score: 0  Pain management: adequate    Airway patency: patent  Anesthetic complications: No anesthetic complications    Cardiovascular status: acceptable  Respiratory status: acceptable  Hydration status: acceptable    Comments: /71   Pulse 51   Temp 36.6 °C (97.9 °F)   Resp 16   Ht 172.7 cm (68\")   Wt 71.7 kg (158 lb)   SpO2 95%   BMI 24.02 kg/m²         "

## 2022-10-20 ENCOUNTER — DOCUMENTATION (OUTPATIENT)
Dept: SURGERY | Facility: CLINIC | Age: 77
End: 2022-10-20

## 2022-10-20 ENCOUNTER — TELEPHONE (OUTPATIENT)
Dept: SURGERY | Facility: CLINIC | Age: 77
End: 2022-10-20

## 2022-10-20 LAB
LAB AP CASE REPORT: NORMAL
PATH REPORT.FINAL DX SPEC: NORMAL
PATH REPORT.GROSS SPEC: NORMAL

## 2022-10-20 NOTE — PROGRESS NOTES
Please let him know that he had 1 minuscule benign polyp, 3-year surveillance colonoscopy recommended-put in computer for reminder

## 2022-10-20 NOTE — PROGRESS NOTES
ENDOSCOPY FOLLOW UP NOTE    Colonoscopy 10/19/2022    Indication:  • Personal history of left colon cancer status post laparoscopic left colectomy 10/12/2021 for T2N0 tumor    Findings:  • Minuscule transverse colon polyp: Pathology benign mucosa  • Diverticulosis    Recommendations:  • 3-year surveillance    Cesar Rinaldi M.D.

## 2022-10-20 NOTE — TELEPHONE ENCOUNTER
----- Message from Cesar Rinaldi MD sent at 10/20/2022  1:59 PM EDT -----  Please let him know that he had 1 minuscule benign polyp, 3-year surveillance colonoscopy recommended-put in computer for reminder

## 2023-02-03 ENCOUNTER — TELEPHONE (OUTPATIENT)
Dept: INTERNAL MEDICINE | Facility: CLINIC | Age: 78
End: 2023-02-03
Payer: MEDICARE

## 2023-02-03 NOTE — TELEPHONE ENCOUNTER
"Caller: Mirza Zimmer    Relationship: Self    Best call back number: [phone]    What medication are you requesting: PAXLOVID    What are your current symptoms: PREVENTATIVE    If a prescription is needed, what is your preferred pharmacy and phone number: [facility] [id number] - [facility] [address] AT [facility] [address] - [phone]  - [phone]     Additional notes: PATIENT STATES THAT HE IS LEAVING FOR A TRIP TO THE [facility], AND WAS INFORMED THERE IS NO PAXLOVID AVAILABLE THERE. REQUESTS PRESCRIPTION \"JUST IN CASE.\" PLEASE CALL AND ADVISE        "

## 2023-02-14 ENCOUNTER — HOSPITAL ENCOUNTER (OUTPATIENT)
Dept: CARDIOLOGY | Facility: HOSPITAL | Age: 78
Discharge: HOME | End: 2023-02-14
Admitting: INTERNAL MEDICINE
Payer: MEDICARE

## 2023-02-14 ENCOUNTER — OFFICE VISIT (OUTPATIENT)
Dept: CARDIOLOGY | Facility: CLINIC | Age: 78
End: 2023-02-14
Payer: MEDICARE

## 2023-02-14 VITALS
HEART RATE: 56 BPM | WEIGHT: 166.01 LBS | BODY MASS INDEX: 25.16 KG/M2 | HEIGHT: 68 IN | SYSTOLIC BLOOD PRESSURE: 100 MMHG | DIASTOLIC BLOOD PRESSURE: 60 MMHG

## 2023-02-14 VITALS
HEART RATE: 56 BPM | SYSTOLIC BLOOD PRESSURE: 100 MMHG | HEIGHT: 68 IN | WEIGHT: 166 LBS | BODY MASS INDEX: 25.16 KG/M2 | DIASTOLIC BLOOD PRESSURE: 60 MMHG

## 2023-02-14 DIAGNOSIS — R06.09 DOE (DYSPNEA ON EXERTION): ICD-10-CM

## 2023-02-14 DIAGNOSIS — I34.1 MVP (MITRAL VALVE PROLAPSE): Primary | ICD-10-CM

## 2023-02-14 DIAGNOSIS — I77.810 DILATED AORTIC ROOT: ICD-10-CM

## 2023-02-14 DIAGNOSIS — C18.7 CANCER OF SIGMOID COLON: ICD-10-CM

## 2023-02-14 DIAGNOSIS — I34.1 MVP (MITRAL VALVE PROLAPSE): ICD-10-CM

## 2023-02-14 DIAGNOSIS — I34.0 NON-RHEUMATIC MITRAL REGURGITATION: ICD-10-CM

## 2023-02-14 DIAGNOSIS — I10 HYPERTENSION, ESSENTIAL: ICD-10-CM

## 2023-02-14 LAB
AORTIC ARCH: 3.2 CM
ASCENDING AORTA: 3.6 CM
BH CV ECHO LEFT VENTRICLE GLOBAL LONGITUDINAL STRAIN: -24.6 %
BH CV ECHO MEAS - ACS: 2.27 CM
BH CV ECHO MEAS - AO MAX PG: 7.3 MMHG
BH CV ECHO MEAS - AO MEAN PG: 4.3 MMHG
BH CV ECHO MEAS - AO ROOT DIAM: 4.3 CM
BH CV ECHO MEAS - AO V2 MAX: 134.7 CM/SEC
BH CV ECHO MEAS - AO V2 VTI: 27.4 CM
BH CV ECHO MEAS - AVA(I,D): 2.48 CM2
BH CV ECHO MEAS - EDV(CUBED): 163.1 ML
BH CV ECHO MEAS - EDV(MOD-SP2): 172 ML
BH CV ECHO MEAS - EDV(MOD-SP4): 169 ML
BH CV ECHO MEAS - EF(MOD-BP): 59.1 %
BH CV ECHO MEAS - EF(MOD-SP2): 55.8 %
BH CV ECHO MEAS - EF(MOD-SP4): 60.4 %
BH CV ECHO MEAS - ESV(CUBED): 62.3 ML
BH CV ECHO MEAS - ESV(MOD-SP2): 76 ML
BH CV ECHO MEAS - ESV(MOD-SP4): 67 ML
BH CV ECHO MEAS - FS: 27.4 %
BH CV ECHO MEAS - IVS/LVPW: 1.05 CM
BH CV ECHO MEAS - IVSD: 1.23 CM
BH CV ECHO MEAS - LAT PEAK E' VEL: 9.7 CM/SEC
BH CV ECHO MEAS - LV DIASTOLIC VOL/BSA (35-75): 89.4 CM2
BH CV ECHO MEAS - LV MASS(C)D: 269.7 GRAMS
BH CV ECHO MEAS - LV MAX PG: 4.3 MMHG
BH CV ECHO MEAS - LV MEAN PG: 2.6 MMHG
BH CV ECHO MEAS - LV SYSTOLIC VOL/BSA (12-30): 35.4 CM2
BH CV ECHO MEAS - LV V1 MAX: 103.4 CM/SEC
BH CV ECHO MEAS - LV V1 VTI: 19 CM
BH CV ECHO MEAS - LVIDD: 5.5 CM
BH CV ECHO MEAS - LVIDS: 4 CM
BH CV ECHO MEAS - LVOT AREA: 3.6 CM2
BH CV ECHO MEAS - LVOT DIAM: 2.13 CM
BH CV ECHO MEAS - LVPWD: 1.17 CM
BH CV ECHO MEAS - MED PEAK E' VEL: 7.1 CM/SEC
BH CV ECHO MEAS - MV A DUR: 0.16 SEC
BH CV ECHO MEAS - MV A MAX VEL: 71 CM/SEC
BH CV ECHO MEAS - MV DEC SLOPE: 561.5 CM/SEC2
BH CV ECHO MEAS - MV DEC TIME: 0.21 MSEC
BH CV ECHO MEAS - MV E MAX VEL: 141 CM/SEC
BH CV ECHO MEAS - MV E/A: 1.99
BH CV ECHO MEAS - MV MAX PG: 7.2 MMHG
BH CV ECHO MEAS - MV MEAN PG: 2.15 MMHG
BH CV ECHO MEAS - MV P1/2T: 72.2 MSEC
BH CV ECHO MEAS - MV V2 VTI: 44.9 CM
BH CV ECHO MEAS - MVA(P1/2T): 3 CM2
BH CV ECHO MEAS - MVA(VTI): 1.51 CM2
BH CV ECHO MEAS - PA ACC TIME: 0.11 SEC
BH CV ECHO MEAS - PA PR(ACCEL): 28.3 MMHG
BH CV ECHO MEAS - PA V2 MAX: 83.4 CM/SEC
BH CV ECHO MEAS - PULM A REVS DUR: 0.21 SEC
BH CV ECHO MEAS - PULM A REVS VEL: 24.8 CM/SEC
BH CV ECHO MEAS - PULM DIAS VEL: 68.6 CM/SEC
BH CV ECHO MEAS - PULM S/D: 1.01
BH CV ECHO MEAS - PULM SYS VEL: 69 CM/SEC
BH CV ECHO MEAS - QP/QS: 0.97
BH CV ECHO MEAS - RAP SYSTOLE: 8 MMHG
BH CV ECHO MEAS - RV MAX PG: 0.97 MMHG
BH CV ECHO MEAS - RV V1 MAX: 49.3 CM/SEC
BH CV ECHO MEAS - RV V1 VTI: 12.4 CM
BH CV ECHO MEAS - RVOT DIAM: 2.6 CM
BH CV ECHO MEAS - RVSP: 24.1 MMHG
BH CV ECHO MEAS - SI(MOD-SP2): 50.8 ML/M2
BH CV ECHO MEAS - SI(MOD-SP4): 54 ML/M2
BH CV ECHO MEAS - SUP REN AO DIAM: 2.6 CM
BH CV ECHO MEAS - SV(LVOT): 67.7 ML
BH CV ECHO MEAS - SV(MOD-SP2): 96 ML
BH CV ECHO MEAS - SV(MOD-SP4): 102 ML
BH CV ECHO MEAS - SV(RVOT): 65.9 ML
BH CV ECHO MEAS - TAPSE (>1.6): 3 CM
BH CV ECHO MEAS - TR MAX PG: 16.1 MMHG
BH CV ECHO MEAS - TR MAX VEL: 200.9 CM/SEC
BH CV ECHO MEASUREMENTS AVERAGE E/E' RATIO: 16.79
BH CV STRESS BP STAGE 1: NORMAL
BH CV STRESS BP STAGE 2: NORMAL
BH CV STRESS BP STAGE 3: NORMAL
BH CV STRESS BP STAGE 4: NORMAL
BH CV STRESS DURATION MIN STAGE 1: 3
BH CV STRESS DURATION MIN STAGE 2: 3
BH CV STRESS DURATION MIN STAGE 3: 3
BH CV STRESS DURATION MIN STAGE 4: 3
BH CV STRESS DURATION MIN STAGE 5: 0
BH CV STRESS DURATION SEC STAGE 1: 0
BH CV STRESS DURATION SEC STAGE 2: 0
BH CV STRESS DURATION SEC STAGE 3: 0
BH CV STRESS DURATION SEC STAGE 4: 0
BH CV STRESS DURATION SEC STAGE 5: 30
BH CV STRESS ECHO POST STRESS EJECTION FRACTION EF: 80 %
BH CV STRESS GRADE STAGE 1: 10
BH CV STRESS GRADE STAGE 2: 12
BH CV STRESS GRADE STAGE 3: 14
BH CV STRESS GRADE STAGE 4: 16
BH CV STRESS GRADE STAGE 5: 18
BH CV STRESS HR STAGE 1: 78
BH CV STRESS HR STAGE 2: 88
BH CV STRESS HR STAGE 3: 101
BH CV STRESS HR STAGE 4: 125
BH CV STRESS HR STAGE 5: 135
BH CV STRESS METS STAGE 1: 5
BH CV STRESS METS STAGE 2: 7.5
BH CV STRESS METS STAGE 3: 10
BH CV STRESS METS STAGE 4: 13.5
BH CV STRESS METS STAGE 5: 14
BH CV STRESS PROTOCOL 1: NORMAL
BH CV STRESS SPEED STAGE 1: 1.7
BH CV STRESS SPEED STAGE 2: 2.5
BH CV STRESS SPEED STAGE 3: 3.4
BH CV STRESS SPEED STAGE 4: 4.2
BH CV STRESS SPEED STAGE 5: 5
BH CV STRESS STAGE 1: 1
BH CV STRESS STAGE 2: 2
BH CV STRESS STAGE 3: 3
BH CV STRESS STAGE 4: 4
BH CV STRESS STAGE 5: 5
BH CV XLRA - TDI S': 11.9 CM/SEC
LEFT ATRIUM VOLUME INDEX: 54 ML/M2
MAXIMAL PREDICTED HEART RATE: 143 BPM
PERCENT MAX PREDICTED HR: 94.41 %
SINUS: 3.8 CM
STJ: 3.2 CM
STRESS BASELINE BP: NORMAL MMHG
STRESS BASELINE HR: 55 BPM
STRESS PERCENT HR: 111 %
STRESS POST ESTIMATED WORKLOAD: 14 METS
STRESS POST EXERCISE DUR MIN: 12 MIN
STRESS POST EXERCISE DUR SEC: 30 SEC
STRESS POST PEAK BP: NORMAL MMHG
STRESS POST PEAK HR: 135 BPM
STRESS TARGET HR: 122 BPM

## 2023-02-14 PROCEDURE — 93018 CV STRESS TEST I&R ONLY: CPT | Performed by: INTERNAL MEDICINE

## 2023-02-14 PROCEDURE — 93325 DOPPLER ECHO COLOR FLOW MAPG: CPT

## 2023-02-14 PROCEDURE — 93325 DOPPLER ECHO COLOR FLOW MAPG: CPT | Performed by: INTERNAL MEDICINE

## 2023-02-14 PROCEDURE — 25010000002 PERFLUTREN (DEFINITY) 8.476 MG IN SODIUM CHLORIDE (PF) 0.9 % 10 ML INJECTION: Performed by: INTERNAL MEDICINE

## 2023-02-14 PROCEDURE — 93000 ELECTROCARDIOGRAM COMPLETE: CPT | Performed by: INTERNAL MEDICINE

## 2023-02-14 PROCEDURE — 93356 MYOCRD STRAIN IMG SPCKL TRCK: CPT | Performed by: INTERNAL MEDICINE

## 2023-02-14 PROCEDURE — 93350 STRESS TTE ONLY: CPT | Performed by: INTERNAL MEDICINE

## 2023-02-14 PROCEDURE — 93017 CV STRESS TEST TRACING ONLY: CPT

## 2023-02-14 PROCEDURE — 93320 DOPPLER ECHO COMPLETE: CPT | Performed by: INTERNAL MEDICINE

## 2023-02-14 PROCEDURE — 93350 STRESS TTE ONLY: CPT

## 2023-02-14 PROCEDURE — 93356 MYOCRD STRAIN IMG SPCKL TRCK: CPT

## 2023-02-14 PROCEDURE — 93016 CV STRESS TEST SUPVJ ONLY: CPT | Performed by: INTERNAL MEDICINE

## 2023-02-14 PROCEDURE — 93320 DOPPLER ECHO COMPLETE: CPT

## 2023-02-14 PROCEDURE — 99214 OFFICE O/P EST MOD 30 MIN: CPT | Performed by: INTERNAL MEDICINE

## 2023-02-14 PROCEDURE — 93352 ADMIN ECG CONTRAST AGENT: CPT | Performed by: INTERNAL MEDICINE

## 2023-02-14 RX ADMIN — PERFLUTREN 2 ML: 6.52 INJECTION, SUSPENSION INTRAVENOUS at 10:50

## 2023-02-14 NOTE — PROGRESS NOTES
Date of Office Visit: 2023  Encounter Provider: Adriana Vaughn MD  Place of Service: [facility] CARDIOLOGY  Patient Name: Mirza Zimmer  :1945    Chief complaint  Dilated aortic root mitral valve disease, hypertension    History of Present Illness   Patient is a pleasant 77-year-old gentleman history of hypertension, hyperlipidemia with a mitral valve prolapse mild regurgitation and aortic root dilatation.  In  an echocardiogram showed an ejection fraction of 57% with moderate severe prolapse the posterior leaflet with severe anterior directed mitral regurgitation.  Aortic root measured 4.2 cm.  MR angiogram of the chest also showed a mildly dilated asending aorta measuring 4.1 cm.  Last MR angiogram of the chest dated 2018 showed a mildly dilated aortic arch measuring 4.1 cm clinically he is doing well and we have opted to follow him with serial echocardiographic studies including stress imaging.  Last stress test in 2019 showed an ejection fraction 58% with moderately dilated left ventricle with grade 2 diastolic dysfunction, moderate left atrial margin, mild right atrial enlargement, severe prolapse of the posterior leaflet with severe anterior directed mitral regurgitation at rest.  There is moderate tricuspid regurgitation with an RV systolic pressure 32 mmHg.  Following exercise (12 minutes on the Shahab protocol achieving 13 METS) blood pressure response was appropriate and there was worse regurgitation diastolic function.  Unable to assess RV systolic pressure post exercise.  Ejection fraction did increased to 66%.  On 2021 stress echocardiogram demonstrated ejection fraction of 69% with normal GLS diastolic function was indeterminate.  Severe mitral prolapse with moderate anteriorly directed mitral valve regurgitation was noted it was felt to be underestimated.  There was no ischemia at 12 METS.  There was augmentation of LV function.  A  follow-up echocardiogram 3/2022 showed an ejection fraction 67% with GLS -19.3% (unchanged) with normal diastolic function with mild to moderate mitral valve regurgitation which is eccentric and an RV systolic pressure elevated to 42 mmHg the aortic root was mildly dilated.  Patient had CT angiogram of the chest abdomen pelvis on 10/2021 that showed no pulmonary emboli nodules the aorta was not mentioned.  However a 2.5 sigmoid mass was noted.  Prostate was heterogenous and enlarged.  He subsequently had colectomy and appendectomy.       Since last visit he is exercising for 30 minutes daily either on the bike or walking and lifting weights.  He has had no shortness of breath edema or chest pain.  Has had mild dizziness with palpitations these appear to be worse and occur more with exertion at this point.  Blood pressure remains somewhat low.    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 EDGARD LEE   • Arthritis    • BPH (benign prostatic hyperplasia)     FOLLOWED BY DR. MARK BALL   • Cardiomegaly     FOLLOWED BY DR. SAMUEL RIVAS   • Cataract     BILATERAL   • Chronic prostatitis     FOLLOWED BY DR. MARK BALL   • Colon cancer (HCC) 09/29/2021    Sigmoid colon superficial fragments of eroded tubulovillous adenoma with at intramucosal adenocarcinoma   • Colon polyps     FOLLOWED BY DR. KEVIN LUDWIG   • Dilated aortic root (HCC)    • Dyspnea on exertion    • ED (erectile dysfunction)    • Elevated PSA    • Heart murmur    • Hemorrhoids    • History of colon resection    • Hyperlipidemia    • Hypermetropia, bilateral    • Hypertension    • Iliac artery aneurysm (HCC) 03/2018    FOLLOWED BY DR. EDGARD LEE   • Increased prostate specific antigen (PSA) velocity 5/8/2017   • Laceration of right index finger 08/01/2019   • Lichen simplex chronicus    • Mechanical ptosis, left    •  Mitral regurgitation    • Mitral valve insufficiency    • Muscular aches    • MVP (mitral valve prolapse)    • Nocturia 06/2021   • Perichondritis of right external ear 07/2021   • Presbyopia    • Regular astigmatism of both eyes    • Seborrheic keratosis    • Thoracic aortic aneurysm without rupture    • Thoracic aortic ectasia (HCC) 07/2017   • Trochanteric bursitis of left hip      Past Surgical History:   Procedure Laterality Date   • APPENDECTOMY  10/12/2021    Done as a part of a colon resection   • COLON RESECTION N/A 10/12/2021    Procedure: LAPAROSCOPIC LEFT COLECTOMY, APPENDECTOMY;  Surgeon: Cesar Rinaldi MD;  Location: [facility] MAIN OR;  Service: General;  Laterality: N/A;   • COLONOSCOPY N/A 8/28/2019    5 MM TUBULAR ADENOMA POLYP IN HEPATIC FLEXURE, 6 MM SERRATED ADENOMA POLYP IN TRANSVERSE, MULTIPLE DIVERTICULA IN SIGMOID, RESCOPE IN 2 YRS, DR. KEVIN LUDWIG AT [facility]   • COLONOSCOPY N/A 9/29/2021    3 TUBULAR ADENOMA POLYPS IN ASCENDING, AN INFILTRATIVE MASS IN SIGMOID, PATH: TUBULOVILLOUS ADENOMA WITH AT LEAST INTRAMUCOSAL ADENOCARCINOMA, AREA TATTOOED, RESCOPE IN1 YR, DR. KEVIN LUDWIG AT [facility]   • COLONOSCOPY N/A 10/19/2022    Procedure: COLONOSCOPY to CECUM WITH COLD BX POLYPECTOMY;  Surgeon: Cesar Rinaldi MD;  Location: [facility] ENDOSCOPY;  Service: General;  Laterality: N/A;  HX COLON CA  --POLYP, DIVERTICULOSIS       • COLONOSCOPY W/ POLYPECTOMY N/A 09/03/2004    5 MM ADENOMATOUS POLYP IN SIGMOID, OTHERWISE WNL, RESCOPE IN 3 YRS, DR. ANGI CONLEY AT [facility]   • COLONOSCOPY W/ POLYPECTOMY N/A 05/28/2013    3 MM SESSILE HYPERPLASTIC POLYP 50CM FROM ANUS, A FEW SMALL DIVERTICULA IN SIGMOID, NON BLEEDING INTERNAL HEMORRHOIDS GRADE II, DR. ANGI CONLEY AT [facility]   • COLONOSCOPY W/ POLYPECTOMY N/A 02/29/2008    5 MM HYPERPLASTIC POLYP AT 60 CM FROM ANUS, RESCOPE IN 5 YRS, DR. ANGI CONLEY AT [facility]   • EYE SURGERY  2003    ATYPICAL MOLE EXCSION, DR. SANCHEZ AT [facility]   • PROSTATE  SURGERY N/A 08/15/2018    UROLIFT, PROSTATE VOLUME 48CC, NO ABNORMALITIES, DR. COLLIN GARVIN   • TONSILLECTOMY Bilateral 1958    DONE IN [place], [place]     Outpatient Medications Prior to Visit   Medication Sig Dispense Refill   • amoxicillin (AMOXIL) 500 MG capsule Take 2,000 mg by mouth As Needed. Dental Cleaning     • Cetirizine HCl 10 MG capsule Take 10 mg by mouth As Needed.     • glucosamine-chondroitin 500-400 MG capsule capsule Take 1 capsule by mouth Daily. HOLD PER MD INSTR     • losartan (COZAAR) 25 MG tablet Take 1 tablet by mouth Daily. (Patient taking differently: Take 25 mg by mouth Every Night.) 90 tablet 3   • simvastatin (ZOCOR) 20 MG tablet Take 1 tablet by mouth Every Night. 90 tablet 3   • tadalafil (CIALIS) 5 MG tablet Take 1 tablet by mouth Daily. 90 tablet 3   • Turmeric 500 MG capsule Take  by mouth Daily. HOLD PER MD INSTR       No facility-administered medications prior to visit.       Allergies as of 02/14/2023 - Reviewed 02/14/2023   Allergen Reaction Noted   • Sulfa antibiotics Unknown - Low Severity 02/18/2016     Social History     Socioeconomic History   • Marital status:    Tobacco Use   • Smoking status: Never   • Smokeless tobacco: Never   • Tobacco comments:     Caffeine use   Vaping Use   • Vaping Use: Never used   Substance and Sexual Activity   • Alcohol use: Yes     Alcohol/week: 1.0 standard drink     Types: 1 Glasses of wine per week     Comment: social occasional use   • Drug use: No   • Sexual activity: Yes     Partners: Female     Birth control/protection: None     Family History   Problem Relation Age of Onset   • Dementia Mother    • Arthritis Mother    • Diabetes Father    • Sudden death Father    • Heart attack Father    • Heart disease Father    • Breast cancer Sister    • Cancer Sister    • Mental illness Maternal Grandmother         Manic depressive   • Gallbladder disease Son    • Malig Hyperthermia Neg Hx      Review of Systems   Constitutional: Negative  "for chills, fever, weight gain and weight loss.   Cardiovascular: Negative for leg swelling.   Respiratory: Negative for cough, snoring and wheezing.    Hematologic/Lymphatic: Negative for bleeding problem. Does not bruise/bleed easily.   Skin: Negative for color change.   Musculoskeletal: Negative for falls, joint pain and myalgias.   Gastrointestinal: Negative for melena.   Genitourinary: Negative for hematuria.   Neurological: Negative for excessive daytime sleepiness.   Psychiatric/Behavioral: Negative for depression. The patient is not nervous/anxious.         Objective:     Vitals:    02/14/23 0745   BP: 100/60   Pulse: 56   Weight: 75.3 kg (166 lb)   Height: 172.7 cm (68\")     Body mass index is 25.24 kg/m².    Vitals reviewed.   Constitutional:       Appearance: Well-developed.   Eyes:      General: No scleral icterus.        Right eye: No discharge.      Conjunctiva/sclera: Conjunctivae normal.      Pupils: Pupils are equal, round, and reactive to light.   HENT:      Head: Normocephalic.      Nose: Nose normal.   Neck:      Thyroid: No thyromegaly.      Vascular: No JVD.   Pulmonary:      Effort: Pulmonary effort is normal. No respiratory distress.      Breath sounds: Normal breath sounds. No wheezing. No rales.   Cardiovascular:      Normal rate. Regular rhythm. Normal S1. Normal S2.      Murmurs: There is a high frequency blowing holosystolic murmur at the apex.      No gallop.   Pulses:     Intact distal pulses.   Edema:     Peripheral edema absent.   Abdominal:      General: Bowel sounds are normal. There is no distension.      Palpations: Abdomen is soft.      Tenderness: There is no abdominal tenderness. There is no rebound.   Musculoskeletal: Normal range of motion.         General: No tenderness.      Cervical back: Normal range of motion and neck supple. Skin:     General: Skin is warm and dry.      Findings: No erythema or rash.   Neurological:      Mental Status: Alert and oriented to person, " place, and time.   Psychiatric:         Behavior: Behavior normal.         Thought Content: Thought content normal.         Judgment: Judgment normal.       Lab Review:     ECG 12 Lead    Date/Time: 2/14/2023 8:24 AM  Performed by: Adriana Vaughn MD  Authorized by: Adriana Vaughn MD   Comparison: compared with previous ECG   Similar to previous ECG  Rhythm: sinus rhythm  Conduction: 1st degree AV block and non-specific intraventricular conduction delay    Clinical impression: abnormal EKG          Assessment:       Diagnosis Plan   1. MVP (mitral valve prolapse)  ECG 12 Lead      2. Non-rheumatic mitral regurgitation  ECG 12 Lead      3. Dilated aortic root (HCC)        4. Hypertension, essential        5. Cancer of sigmoid colon (HCC)          Plan:       1.  Mitral prolapse with moderate to severe severe mitral regurgitation.  He has he has previously demonstrated good contractile reserve remains relatively asymptomatic and we have opted for careful observation.  At this point with worsening exertional palpitations will check stress echocardiogram to assess for ischemia as well as pulmonary hypertension.  2.  Dilated aortic root, stable by MR imaging 8/2021 with aortic root measuring 4.5 cm with ascending aorta measuring 3.9 cm.  This was unchanged and plan on repeat MR imaging in 6 months to 1 year.  Also assess at the time of stress echo as above  3.  Dyslipidemia.  Controlled  4.  Left iliac artery aneurysm followed by Dr. Yost and f/p pending  5.  Hypertension.  Controlled and at times low.  He will observe more closely with the upcoming summer months remain hydrated.  6.  Pulmonary hypertension.  As above will assess by echo today    Time Spent: I spent 35 minutes caring for Mirza on this date of service. This time includes time spent by me in the following activities: preparing for the visit, reviewing tests, obtaining and/or reviewing a separately obtained history, performing a medically appropriate  examination and/or evaluation, counseling and educating the patient/family/caregiver, ordering medications, tests, or procedures, documenting information in the medical record and independently interpreting results and communicating that information with the patient/family/caregiver.   I spent 1 minutes on the separately reported service of ECG. This time is not included in the time used to support the E/M service also reported today.        Your medication list          Accurate as of February 14, 2023 11:59 PM. If you have any questions, ask your nurse or doctor.            CHANGE how you take these medications      Instructions Last Dose Given Next Dose Due   losartan 25 MG tablet  Commonly known as: COZAAR  What changed: when to take this      Take 1 tablet by mouth Daily.          CONTINUE taking these medications      Instructions Last Dose Given Next Dose Due   amoxicillin 500 MG capsule  Commonly known as: AMOXIL      Take 2,000 mg by mouth As Needed. Dental Cleaning       Cetirizine HCl 10 MG capsule      Take 10 mg by mouth As Needed.       glucosamine-chondroitin 500-400 MG capsule capsule      Take 1 capsule by mouth Daily. HOLD PER MD INSTR       simvastatin 20 MG tablet  Commonly known as: ZOCOR      Take 1 tablet by mouth Every Night.       tadalafil 5 MG tablet  Commonly known as: CIALIS      Take 1 tablet by mouth Daily.       Turmeric 500 MG capsule      Take  by mouth Daily. HOLD PER MD INSTR              Patient is no longer taking -.  I corrected the med list to reflect this.  I did not stop these medications.      Dictated utilizing Dragon dictation

## 2023-02-17 ENCOUNTER — TELEPHONE (OUTPATIENT)
Dept: CARDIOLOGY | Facility: CLINIC | Age: 78
End: 2023-02-17
Payer: MEDICARE

## 2023-02-17 NOTE — TELEPHONE ENCOUNTER
Please let him know that stress echo looks good and is very similar to the one done in 2021. His heart is still strong and the leaky valve that we have been watching is stable. No changes for now.

## 2023-02-17 NOTE — TELEPHONE ENCOUNTER
I spoke with Mirza Zimmer and his wife and updated them on results/recommendations from provider.  They verbalized understanding and has no further questions at this time.    Per patient, when he was in the office, his BP was low.  He wanted you to be aware that he's been monitoring his BP at home and it has been consistently 120s/70s.    Thank you,    Stefany Thurston RN  [place] [facility] [place]  02/17/23 13:30 EST

## 2023-05-22 DIAGNOSIS — I10 HYPERTENSION, ESSENTIAL: ICD-10-CM

## 2023-05-22 DIAGNOSIS — Z12.5 SCREENING FOR PROSTATE CANCER: Primary | ICD-10-CM

## 2023-05-22 DIAGNOSIS — E78.5 HYPERLIPIDEMIA, UNSPECIFIED HYPERLIPIDEMIA TYPE: ICD-10-CM

## 2023-06-02 LAB
ALBUMIN SERPL-MCNC: 4.4 G/DL (ref 3.5–5.2)
ALBUMIN/GLOB SERPL: 2.2 G/DL
ALP SERPL-CCNC: 71 U/L (ref 39–117)
ALT SERPL-CCNC: 18 U/L (ref 1–41)
APPEARANCE UR: CLEAR
AST SERPL-CCNC: 20 U/L (ref 1–40)
BACTERIA #/AREA URNS HPF: NORMAL /HPF
BASOPHILS # BLD AUTO: 0.03 10*3/MM3 (ref 0–0.2)
BASOPHILS NFR BLD AUTO: 0.8 % (ref 0–1.5)
BILIRUB SERPL-MCNC: 0.6 MG/DL (ref 0–1.2)
BILIRUB UR QL STRIP: NEGATIVE
BUN SERPL-MCNC: 18 MG/DL (ref 8–23)
BUN/CREAT SERPL: 20 (ref 7–25)
CALCIUM SERPL-MCNC: 9.6 MG/DL (ref 8.6–10.5)
CASTS URNS QL MICRO: NORMAL /LPF
CHLORIDE SERPL-SCNC: 106 MMOL/L (ref 98–107)
CHOLEST SERPL-MCNC: 164 MG/DL (ref 0–200)
CO2 SERPL-SCNC: 28.8 MMOL/L (ref 22–29)
COLOR UR: YELLOW
CREAT SERPL-MCNC: 0.9 MG/DL (ref 0.76–1.27)
EGFRCR SERPLBLD CKD-EPI 2021: 87.4 ML/MIN/1.73
EOSINOPHIL # BLD AUTO: 0.09 10*3/MM3 (ref 0–0.4)
EOSINOPHIL NFR BLD AUTO: 2.4 % (ref 0.3–6.2)
EPI CELLS #/AREA URNS HPF: NORMAL /HPF (ref 0–10)
ERYTHROCYTE [DISTWIDTH] IN BLOOD BY AUTOMATED COUNT: 12.3 % (ref 12.3–15.4)
GLOBULIN SER CALC-MCNC: 2 GM/DL
GLUCOSE SERPL-MCNC: 102 MG/DL (ref 65–99)
GLUCOSE UR QL STRIP: NEGATIVE
HCT VFR BLD AUTO: 43.6 % (ref 37.5–51)
HDLC SERPL-MCNC: 55 MG/DL (ref 40–60)
HGB BLD-MCNC: 14.7 G/DL (ref 13–17.7)
HGB UR QL STRIP: NEGATIVE
IMM GRANULOCYTES # BLD AUTO: 0.01 10*3/MM3 (ref 0–0.05)
IMM GRANULOCYTES NFR BLD AUTO: 0.3 % (ref 0–0.5)
KETONES UR QL STRIP: ABNORMAL
LDLC SERPL CALC-MCNC: 98 MG/DL (ref 0–100)
LEUKOCYTE ESTERASE UR QL STRIP: NEGATIVE
LYMPHOCYTES # BLD AUTO: 0.92 10*3/MM3 (ref 0.7–3.1)
LYMPHOCYTES NFR BLD AUTO: 24.7 % (ref 19.6–45.3)
MCH RBC QN AUTO: 31.6 PG (ref 26.6–33)
MCHC RBC AUTO-ENTMCNC: 33.7 G/DL (ref 31.5–35.7)
MCV RBC AUTO: 93.8 FL (ref 79–97)
MICRO URNS: ABNORMAL
MICRO URNS: ABNORMAL
MONOCYTES # BLD AUTO: 0.61 10*3/MM3 (ref 0.1–0.9)
MONOCYTES NFR BLD AUTO: 16.4 % (ref 5–12)
NEUTROPHILS # BLD AUTO: 2.07 10*3/MM3 (ref 1.7–7)
NEUTROPHILS NFR BLD AUTO: 55.4 % (ref 42.7–76)
NITRITE UR QL STRIP: NEGATIVE
NRBC BLD AUTO-RTO: 0 /100 WBC (ref 0–0.2)
PH UR STRIP: 5.5 [PH] (ref 5–7.5)
PLATELET # BLD AUTO: 153 10*3/MM3 (ref 140–450)
POTASSIUM SERPL-SCNC: 4.4 MMOL/L (ref 3.5–5.2)
PROT SERPL-MCNC: 6.4 G/DL (ref 6–8.5)
PROT UR QL STRIP: NEGATIVE
PSA SERPL-MCNC: 4.53 NG/ML (ref 0–4)
RBC # BLD AUTO: 4.65 10*6/MM3 (ref 4.14–5.8)
RBC #/AREA URNS HPF: NORMAL /HPF (ref 0–2)
SODIUM SERPL-SCNC: 143 MMOL/L (ref 136–145)
SP GR UR STRIP: 1.03 (ref 1–1.03)
TRIGL SERPL-MCNC: 53 MG/DL (ref 0–150)
TSH SERPL DL<=0.005 MIU/L-ACNC: 1.97 UIU/ML (ref 0.27–4.2)
URINALYSIS REFLEX: ABNORMAL
UROBILINOGEN UR STRIP-MCNC: 0.2 MG/DL (ref 0.2–1)
VLDLC SERPL CALC-MCNC: 11 MG/DL (ref 5–40)
WBC # BLD AUTO: 3.73 10*3/MM3 (ref 3.4–10.8)
WBC #/AREA URNS HPF: NORMAL /HPF (ref 0–5)

## 2023-06-06 ENCOUNTER — OFFICE VISIT (OUTPATIENT)
Dept: INTERNAL MEDICINE | Facility: CLINIC | Age: 78
End: 2023-06-06
Payer: MEDICARE

## 2023-06-06 VITALS
BODY MASS INDEX: 24.25 KG/M2 | HEART RATE: 61 BPM | OXYGEN SATURATION: 95 % | SYSTOLIC BLOOD PRESSURE: 110 MMHG | DIASTOLIC BLOOD PRESSURE: 68 MMHG | WEIGHT: 160 LBS | HEIGHT: 68 IN

## 2023-06-06 DIAGNOSIS — I10 HYPERTENSION, ESSENTIAL: ICD-10-CM

## 2023-06-06 DIAGNOSIS — Z00.00 HEALTHCARE MAINTENANCE: Primary | ICD-10-CM

## 2023-06-06 DIAGNOSIS — M79.89 NODULE OF SOFT TISSUE: ICD-10-CM

## 2023-06-06 DIAGNOSIS — M25.541 ARTHRALGIA OF BOTH HANDS: ICD-10-CM

## 2023-06-06 DIAGNOSIS — E78.5 HYPERLIPIDEMIA, UNSPECIFIED HYPERLIPIDEMIA TYPE: ICD-10-CM

## 2023-06-06 DIAGNOSIS — I34.1 MVP (MITRAL VALVE PROLAPSE): ICD-10-CM

## 2023-06-06 DIAGNOSIS — M25.542 ARTHRALGIA OF BOTH HANDS: ICD-10-CM

## 2023-06-06 DIAGNOSIS — R97.20 INCREASED PROSTATE SPECIFIC ANTIGEN (PSA) VELOCITY: ICD-10-CM

## 2023-06-06 NOTE — PROGRESS NOTES
The ABCs of the Annual Wellness Visit  Subsequent Medicare Wellness Visit    Subjective    Mirza Zimmer is a 78 y.o. male who presents for a Subsequent Medicare Wellness Visit.    The following portions of the patient's history were reviewed and   updated as appropriate: allergies, current medications, past family history, past medical history, past social history, past surgical history, and problem list.    Compared to one year ago, the patient feels his physical   health is the same.    Compared to one year ago, the patient feels his mental   health is better.    Recent Hospitalizations:  He was not admitted to the hospital during the last year.       Current Medical Providers:  Patient Care Team:  Radha Rollins MD as PCP - General (Internal Medicine)  Ritika Vaughn MD (Cardiology)  Radha Rollins MD as Referring Physician (Internal Medicine)  Yazan Peterson Jr., MD as Consulting Physician (Urology)    Outpatient Medications Prior to Visit   Medication Sig Dispense Refill    amoxicillin (AMOXIL) 500 MG capsule Take 4 capsules by mouth As Needed. Dental Cleaning      Cetirizine HCl 10 MG capsule Take 10 mg by mouth As Needed.      glucosamine-chondroitin 500-400 MG capsule capsule Take 1 capsule by mouth Daily. HOLD PER MD INSTR      losartan (COZAAR) 25 MG tablet Take 1 tablet by mouth Daily. (Patient taking differently: Take 1 tablet by mouth Every Night.) 90 tablet 3    simvastatin (ZOCOR) 20 MG tablet Take 1 tablet by mouth Every Night. 90 tablet 3    tadalafil (CIALIS) 5 MG tablet Take 1 tablet by mouth Daily. 90 tablet 3    Turmeric 500 MG capsule Take  by mouth Daily. HOLD PER MD INSTR       No facility-administered medications prior to visit.       No opioid medication identified on active medication list. I have reviewed chart for other potential  high risk medication/s and harmful drug interactions in the elderly.        Aspirin is not on active medication list.  Aspirin use is not indicated based  "on review of current medical condition/s. Risk of harm outweighs potential benefits.  .    Patient Active Problem List   Diagnosis    Arthritis    Hyperlipidemia    Hypertension, essential    MOFFETT (dyspnea on exertion)    Non-rheumatic mitral regurgitation    MVP (mitral valve prolapse)    Dilated aortic root    Increased prostate specific antigen (PSA) velocity    Other ill-defined heart diseases     History of colon polyps    Thoracic aortic aneurysm without rupture    Malignant neoplasm of sigmoid colon (HCC)    Cancer of sigmoid colon    Personal history of colon cancer     Advance Care Planning   Advance Care Planning     Advance Directive is not on file.  ACP discussion was held with the patient during this visit. Patient has an advance directive (not in EMR), copy requested.     Objective    Vitals:    23 0912   BP: 110/68   Pulse: 61   SpO2: 95%   Weight: 72.6 kg (160 lb)   Height: 172.7 cm (68\")     Estimated body mass index is 24.33 kg/m² as calculated from the following:    Height as of this encounter: 172.7 cm (68\").    Weight as of this encounter: 72.6 kg (160 lb).    BMI is within normal parameters. No other follow-up for BMI required.      Does the patient have evidence of cognitive impairment? No    Lab Results   Component Value Date    CHLPL 164 2023    TRIG 53 2023    HDL 55 2023    LDL 98 2023    VLDL 11 2023        HEALTH RISK ASSESSMENT    Smoking Status:  Social History     Tobacco Use   Smoking Status Never   Smokeless Tobacco Never   Tobacco Comments    Caffeine use     Alcohol Consumption:  Social History     Substance and Sexual Activity   Alcohol Use Yes    Alcohol/week: 1.0 standard drink    Types: 1 Glasses of wine per week    Comment: social occasional use     Fall Risk Screen:    STEADI Fall Risk Assessment was completed, and patient is at LOW risk for falls.Assessment completed on:2023    Depression Screenin/6/2023     9:14 AM "   PHQ-2/PHQ-9 Depression Screening   Little Interest or Pleasure in Doing Things 0-->not at all   Feeling Down, Depressed or Hopeless 0-->not at all   PHQ-9: Brief Depression Severity Measure Score 0       Health Habits and Functional and Cognitive Screenin/6/2023     9:14 AM   Functional & Cognitive Status   Do you have difficulty preparing food and eating? No   Do you have difficulty bathing yourself, getting dressed or grooming yourself? No   Do you have difficulty using the toilet? No   Do you have difficulty moving around from place to place? No   Do you have trouble with steps or getting out of a bed or a chair? No   Dental Exam Up to date   Eye Exam Up to date   Do you need help using the phone?  No   Are you deaf or do you have serious difficulty hearing?  No   Do you need help with transportation? No   Do you need help shopping? No   Do you need help preparing meals?  No   Do you need help with housework?  No   Do you need help with laundry? No   Do you need help taking your medications? No   Do you need help managing money? No   Do you ever drive or ride in a car without wearing a seat belt? No   Have you felt unusual stress, anger or loneliness in the last month? No   Who do you live with? Spouse   If you need help, do you have trouble finding someone available to you? No   Have you been bothered in the last four weeks by sexual problems? No   Do you have difficulty concentrating, remembering or making decisions? No       Age-appropriate Screening Schedule:  Refer to the list below for future screening recommendations based on patient's age, sex and/or medical conditions. Orders for these recommended tests are listed in the plan section. The patient has been provided with a written plan.    Health Maintenance   Topic Date Due    INFLUENZA VACCINE  2023    PROSTATE CANCER SCREENING  2024    LIPID PANEL  2024    ANNUAL WELLNESS VISIT  2024    COLONOSCOPY  10/19/2025     "TDAP/TD VACCINES (3 - Td or Tdap) 08/08/2027    HEPATITIS C SCREENING  Completed    COVID-19 Vaccine  Completed    Pneumococcal Vaccine 65+  Completed    ZOSTER VACCINE  Completed                  CMS Preventative Services Quick Reference  Risk Factors Identified During Encounter  None Identified  The above risks/problems have been discussed with the patient.  Pertinent information has been shared with the patient in the After Visit Summary.  An After Visit Summary and PPPS were made available to the patient.    Follow Up:   Next Medicare Wellness visit to be scheduled in 1 year.       Additional E&M Note during same encounter follows:  Patient has multiple medical problems which are significant and separately identifiable that require additional work above and beyond the Medicare Wellness Visit.      Chief Complaint  Annual Exam  Hyperlipidemia  Hypertension  Bph-elevated PSA  MVP    Subjective        HPI  Mirza Zimmer is also being seen today for hyperlipidemia, hypertension, bph. Patient has been cycling routinely and exercise tolerance is very good. He has bp. Increased psa on most recent testing. He is scheduled to see his urologist next week. He does take cialis in the evenings with benefit of less nocturia and improved erections.   Patient has htn. Bp is very well controlled w/ low dose losartan. He does have mild orthostatic symptoms gardening.   Mild ifg. He notes that his father had dm2.   Patient has mitral valve prolapse w/ mod-severe regurg. He is following w/ cardiology on this.            Objective   Vital Signs:  /68   Pulse 61   Ht 172.7 cm (68\")   Wt 72.6 kg (160 lb)   SpO2 95%   BMI 24.33 kg/m²     Physical Exam  Vitals and nursing note reviewed.   Constitutional:       Appearance: Normal appearance. He is well-developed.   HENT:      Head: Normocephalic and atraumatic.      Right Ear: Tympanic membrane and external ear normal.      Left Ear: Tympanic membrane and external ear " normal.      Nose: Nose normal.      Mouth/Throat:      Mouth: Mucous membranes are moist.   Eyes:      Extraocular Movements: Extraocular movements intact.      Pupils: Pupils are equal, round, and reactive to light.   Cardiovascular:      Rate and Rhythm: Normal rate and regular rhythm.      Pulses: Normal pulses.      Heart sounds: Normal heart sounds.   Pulmonary:      Effort: Pulmonary effort is normal. No respiratory distress.      Breath sounds: Normal breath sounds.   Abdominal:      General: Abdomen is flat.      Palpations: Abdomen is soft.   Musculoskeletal:         General: Normal range of motion.      Cervical back: Neck supple.   Skin:     General: Skin is warm and dry.   Neurological:      General: No focal deficit present.      Mental Status: He is alert and oriented to person, place, and time.   Psychiatric:         Mood and Affect: Mood normal.         Behavior: Behavior normal.         Thought Content: Thought content normal.         Judgment: Judgment normal.        The following data was reviewed by: Radha Rollins MD on 06/06/2023:  CMP          6/1/2023    09:05   CMP   Glucose 102    BUN 18    Creatinine 0.90    Sodium 143    Potassium 4.4    Chloride 106    Calcium 9.6    Total Protein 6.4    Albumin 4.4    Globulin 2.0    Total Bilirubin 0.6    Alkaline Phosphatase 71    AST (SGOT) 20    ALT (SGPT) 18    BUN/Creatinine Ratio 20.0      CBC w/diff          6/1/2023    09:05   CBC w/Diff   WBC 3.73    RBC 4.65    Hemoglobin 14.7    Hematocrit 43.6    MCV 93.8    MCH 31.6    MCHC 33.7    RDW 12.3    Platelets 153    Neutrophil Rel % 55.4    Lymphocyte Rel % 24.7    Monocyte Rel % 16.4    Eosinophil Rel % 2.4    Basophil Rel % 0.8      Lipid Panel          6/1/2023    09:05   Lipid Panel   Total Cholesterol 164    Triglycerides 53    HDL Cholesterol 55    VLDL Cholesterol 11    LDL Cholesterol  98      TSH          6/1/2023    09:05   TSH   TSH 1.970      UA          6/1/2023    09:05    Urinalysis   Blood, UA Negative    Leukocytes, UA Negative    Nitrite, UA Negative    RBC, UA 0-2    Bacteria, UA None seen      PSA          6/1/2023    09:05   PSA   PSA 4.530      Data reviewed : Cardiology studies stress echo           Assessment and Plan   Diagnoses and all orders for this visit:    1. Healthcare maintenance (Primary)    2. Hypertension, essential    3. MVP (mitral valve prolapse)    4. Hyperlipidemia, unspecified hyperlipidemia type    5. Increased prostate specific antigen (PSA) velocity      Patient will monitor bp. If continued orthostatic symptoms may reduce losartan to 1/2 tablet. He will f/u w/ cardiology routinely regarding MVP. Will continue simvastatin for dyslipidemia. Will f/u w/ urology for elevated PSA w/ h/o BPH/ ED. Will f/u here in 1 year or prn.        I spent 50 minutes caring for Mirza on this date of service. This time includes time spent by me in the following activities:preparing for the visit, reviewing tests, obtaining and/or reviewing a separately obtained history, performing a medically appropriate examination and/or evaluation , counseling and educating the patient/family/caregiver, ordering medications, tests, or procedures, referring and communicating with other health care professionals , documenting information in the medical record, and independently interpreting results and communicating that information with the patient/family/caregiver  Follow Up   No follow-ups on file.  Patient was given instructions and counseling regarding his condition or for health maintenance advice. Please see specific information pulled into the AVS if appropriate.

## 2023-06-13 RX ORDER — LOSARTAN POTASSIUM 25 MG/1
TABLET ORAL
Qty: 90 TABLET | Refills: 3 | Status: SHIPPED | OUTPATIENT
Start: 2023-06-13

## 2023-06-13 RX ORDER — SIMVASTATIN 20 MG
TABLET ORAL
Qty: 90 TABLET | Refills: 3 | Status: SHIPPED | OUTPATIENT
Start: 2023-06-13

## 2023-06-13 RX ORDER — TADALAFIL 5 MG/1
TABLET ORAL
Qty: 90 TABLET | Refills: 3 | Status: SHIPPED | OUTPATIENT
Start: 2023-06-13

## 2023-06-16 DIAGNOSIS — R97.20 ELEVATED PSA: ICD-10-CM

## 2023-06-16 DIAGNOSIS — Z12.5 SCREENING PSA (PROSTATE SPECIFIC ANTIGEN): Primary | ICD-10-CM

## 2023-08-17 ENCOUNTER — OFFICE VISIT (OUTPATIENT)
Dept: CARDIOLOGY | Facility: CLINIC | Age: 78
End: 2023-08-17
Payer: MEDICARE

## 2023-08-17 VITALS
OXYGEN SATURATION: 98 % | HEIGHT: 68 IN | HEART RATE: 60 BPM | BODY MASS INDEX: 24.1 KG/M2 | DIASTOLIC BLOOD PRESSURE: 72 MMHG | SYSTOLIC BLOOD PRESSURE: 124 MMHG | WEIGHT: 159 LBS

## 2023-08-17 DIAGNOSIS — I10 HYPERTENSION, ESSENTIAL: Primary | ICD-10-CM

## 2023-08-17 DIAGNOSIS — I34.0 NON-RHEUMATIC MITRAL REGURGITATION: ICD-10-CM

## 2023-08-17 DIAGNOSIS — I71.20 THORACIC AORTIC ANEURYSM WITHOUT RUPTURE, UNSPECIFIED PART: ICD-10-CM

## 2023-08-17 DIAGNOSIS — E78.2 MIXED HYPERLIPIDEMIA: ICD-10-CM

## 2023-08-17 DIAGNOSIS — I77.810 DILATED AORTIC ROOT: ICD-10-CM

## 2023-08-17 PROCEDURE — 3074F SYST BP LT 130 MM HG: CPT | Performed by: NURSE PRACTITIONER

## 2023-08-17 PROCEDURE — 1159F MED LIST DOCD IN RCRD: CPT | Performed by: NURSE PRACTITIONER

## 2023-08-17 PROCEDURE — 1160F RVW MEDS BY RX/DR IN RCRD: CPT | Performed by: NURSE PRACTITIONER

## 2023-08-17 PROCEDURE — 93000 ELECTROCARDIOGRAM COMPLETE: CPT | Performed by: NURSE PRACTITIONER

## 2023-08-17 PROCEDURE — 99214 OFFICE O/P EST MOD 30 MIN: CPT | Performed by: NURSE PRACTITIONER

## 2023-08-17 PROCEDURE — 3078F DIAST BP <80 MM HG: CPT | Performed by: NURSE PRACTITIONER

## 2023-08-17 NOTE — PROGRESS NOTES
Date of Office Visit: 2023  Encounter Provider: SILVIA Tirado  Place of Service: [facility]  Patient Name: Mirza Zimmer  :1945    Chief Complaint   Patient presents with    Follow-up    Atrial Fibrillation    Coronary Artery Disease   :     HPI: Mirza Zimmer is a 78 y.o. male who is a patient of Dr. Rivas and is new to me today.  He has a history of hypertension, hyperlipidemia and mitral valve prolapse with mitral regurg.  He has some mild aortic root dilatation at 4.2 cm.  His EF has been normal in the past.  He had a stress test in 2019 that showed a dilated LV with grade 2 diastolic dysfunction prolapse he was able to walk for 12 minutes on the treadmill reaching 13 METS.  He had a CT of the chest abdomen and pelvis in  no pulmonary emboli he did have a 2.5 cm sigmoid mass and his prostate was enlarged.  He underwent a colectomy and appendectomy.  He was last in the office in February he was doing well except for some dizziness that occurred with palpitations.  We decided to do a stress echo he had moderate to severe MR but no ischemic changes on wall motion.  We continued with medical therapy.    Overall he is feeling well his cholesterol is at goal.  He denies any chest pain, pressure or tightness.  He has no shortness of breath and less he significantly exerts himself physically with exercise.  He walks every day and lifts weights.  He is a retired  from [employer] for 40 years.  Previous testing and notes have been reviewed by me.   Past Medical History:   Diagnosis Date    Actinic keratosis     Aneurysm of iliac artery 2019    FOLLOWED BY DR. EDGARD LEE    Arthritis     BPH (benign prostatic hyperplasia)     FOLLOWED BY DR. MARK BALL    Cardiomegaly     FOLLOWED BY DR. SAMUEL RIVAS    Cataract     BILATERAL    Chronic prostatitis     FOLLOWED BY DR. MARK BALL    Colon cancer 2021    Sigmoid colon superficial  fragments of eroded tubulovillous adenoma with at intramucosal adenocarcinoma    Colon polyps     FOLLOWED BY DR. 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 EDGARD LEE    Increased prostate specific antigen (PSA) velocity 5/8/2017    Laceration of right index finger 08/01/2019    Lichen simplex chronicus     Mechanical ptosis, left     Mitral regurgitation     Mitral valve insufficiency     Muscular aches     MVP (mitral valve prolapse)     Nocturia 06/2021    Perichondritis of right external ear 07/2021    Presbyopia     Regular astigmatism of both eyes     Seborrheic keratosis     Thoracic aortic aneurysm without rupture     Thoracic aortic ectasia 07/2017    Trochanteric bursitis of left hip        Past Surgical History:   Procedure Laterality Date    APPENDECTOMY  10/12/2021    Done as a part of a colon resection    COLON RESECTION N/A 10/12/2021    Procedure: LAPAROSCOPIC LEFT COLECTOMY, APPENDECTOMY;  Surgeon: Cesar Rinaldi MD;  Location: [facility] OR;  Service: General;  Laterality: N/A;    COLONOSCOPY N/A 8/28/2019    5 MM TUBULAR ADENOMA POLYP IN HEPATIC FLEXURE, 6 MM SERRATED ADENOMA POLYP IN TRANSVERSE, MULTIPLE DIVERTICULA IN SIGMOID, RESCOPE IN 2 YRS, DR. KEVIN LDUWIG AT [facility]    COLONOSCOPY N/A 9/29/2021    3 TUBULAR ADENOMA POLYPS IN ASCENDING, AN INFILTRATIVE MASS IN SIGMOID, PATH: TUBULOVILLOUS ADENOMA WITH AT LEAST INTRAMUCOSAL ADENOCARCINOMA, AREA TATTOOED, RESCOPE IN1 YR, DR. KEVIN LUDWIG AT [facility]    COLONOSCOPY N/A 10/19/2022    Procedure: COLONOSCOPY to CECUM WITH COLD BX POLYPECTOMY;  Surgeon: Cesar Rinaldi MD;  Location: [facility] ENDOSCOPY;  Service: General;  Laterality: N/A;  HX COLON CA  --POLYP, DIVERTICULOSIS        COLONOSCOPY W/  POLYPECTOMY N/A 09/03/2004    5 MM ADENOMATOUS POLYP IN SIGMOID, OTHERWISE WNL, RESCOPE IN 3 YRS, DR. ANGI CONLEY AT [facility]    COLONOSCOPY W/ POLYPECTOMY N/A 05/28/2013    3 MM SESSILE HYPERPLASTIC POLYP 50CM FROM ANUS, A FEW SMALL DIVERTICULA IN SIGMOID, NON BLEEDING INTERNAL HEMORRHOIDS GRADE II, DR. ANGI CONLEY AT [facility]    COLONOSCOPY W/ POLYPECTOMY N/A 02/29/2008    5 MM HYPERPLASTIC POLYP AT 60 CM FROM ANUS, RESCOPE IN 5 YRS, DR. ANGI CONLEY AT [facility]    EYE SURGERY  2003    ATYPICAL MOLE EXCSION, DR. SANCHEZ AT [facility] [place] [facility]    PROSTATE SURGERY N/A 08/15/2018    UROLIFT, PROSTATE VOLUME 48CC, NO ABNORMALITIES, DR. COLLIN GARVIN    TONSILLECTOMY Bilateral 1958    DONE IN [place], [place]       Social History     Socioeconomic History    Marital status:    Tobacco Use    Smoking status: Never    Smokeless tobacco: Never    Tobacco comments:     Caffeine use   Vaping Use    Vaping Use: Never used   Substance and Sexual Activity    Alcohol use: Yes     Alcohol/week: 1.0 standard drink     Types: 1 Glasses of wine per week     Comment: social occasional use    Drug use: Never    Sexual activity: Yes     Partners: Female     Birth control/protection: None       Family History   Problem Relation Age of Onset    Dementia Mother     Arthritis Mother     Diabetes Father     Sudden death Father     Heart attack Father     Heart disease Father     Breast cancer Sister     Cancer Sister         Breast cancer    Mental illness Maternal Grandmother         Manic depressive    Gallbladder disease Son     Malig Hyperthermia Neg Hx        Review of Systems   Constitutional: Negative for diaphoresis and malaise/fatigue.   Cardiovascular:  Negative for chest pain, claudication, dyspnea on exertion, irregular heartbeat, leg swelling, near-syncope, orthopnea, palpitations, paroxysmal nocturnal dyspnea and syncope.   Respiratory:  Negative for cough, shortness of breath and sleep disturbances due to breathing.   "  Musculoskeletal:  Negative for falls.   Neurological:  Negative for dizziness and weakness.   Psychiatric/Behavioral:  Negative for altered mental status and substance abuse.      Allergies   Allergen Reactions    Sulfa Antibiotics Unknown - Low Severity         Current Outpatient Medications:     amoxicillin (AMOXIL) 500 MG capsule, Take 4 capsules by mouth As Needed. Dental Cleaning, Disp: , Rfl:     Cetirizine HCl 10 MG capsule, Take 10 mg by mouth As Needed., Disp: , Rfl:     glucosamine-chondroitin 500-400 MG capsule capsule, Take 1 capsule by mouth Daily. HOLD PER MD INSTR, Disp: , Rfl:     losartan (COZAAR) 25 MG tablet, TAKE ONE TABLET BY MOUTH DAILY, Disp: 90 tablet, Rfl: 3    simvastatin (ZOCOR) 20 MG tablet, TAKE ONE TABLET BY MOUTH ONCE NIGHTLY, Disp: 90 tablet, Rfl: 3    tadalafil (CIALIS) 5 MG tablet, TAKE ONE TABLET BY MOUTH DAILY, Disp: 90 tablet, Rfl: 3    Turmeric 500 MG capsule, Take  by mouth Daily. HOLD PER MD INSTR, Disp: , Rfl:       Objective:     Vitals:    08/17/23 0944   BP: 124/72   Pulse: 60   SpO2: 98%   Weight: 72.1 kg (159 lb)   Height: 172.7 cm (68\")     Body mass index is 24.18 kg/mý.    PHYSICAL EXAM:    Constitutional:       General: Not in acute distress.     Appearance: Normal appearance. Well-developed.   Eyes:      Pupils: Pupils are equal, round, and reactive to light.   HENT:      Head: Normocephalic.   Neck:      Vascular: No carotid bruit or JVD.   Pulmonary:      Effort: Pulmonary effort is normal. No tachypnea.      Breath sounds: Normal breath sounds. No wheezing. No rales.   Cardiovascular:      Normal rate. Regular rhythm.      Murmurs: There is a grade 2/4 diastolic murmur.      No gallop.    Pulses:     Intact distal pulses.   Edema:     Peripheral edema absent.   Abdominal:      General: Bowel sounds are normal.      Palpations: Abdomen is soft.      Tenderness: There is no abdominal tenderness.   Musculoskeletal: Normal range of motion.      Cervical back: " Normal range of motion and neck supple. No edema. Skin:     General: Skin is warm and dry.   Neurological:      Mental Status: Alert and oriented to person, place, and time.         ECG 12 Lead    Date/Time: 8/17/2023 10:00 AM  Performed by: Roxanne Moreno APRN  Authorized by: Roxanne Moreno APRN   Comparison: compared with previous ECG from 2/14/2023  Similar to previous ECG  Rhythm: sinus rhythm  Rate: normal  QRS axis: normal    Clinical impression: normal ECG          Assessment:       Diagnosis Plan   1. Hypertension, essential     Blood pressure well controlled on losartan 25 mg   2. Mixed hyperlipidemia     Cholesterol at goal on current statin   3. Thoracic aortic aneurysm without rupture, unspecified part     Stable on last CT of the chest   4. Non-rheumatic mitral regurgitation     Echo earlier this year showed moderate to severe MR.  Asymptomatic LV function normal repeat echo in 6 months.     No orders of the defined types were placed in this encounter.         Plan:       Follow-up in 6 months         Your medication list            Accurate as of August 17, 2023  9:57 AM. If you have any questions, ask your nurse or doctor.                CONTINUE taking these medications        Instructions Last Dose Given Next Dose Due   amoxicillin 500 MG capsule  Commonly known as: AMOXIL      Take 4 capsules by mouth As Needed. Dental Cleaning       Cetirizine HCl 10 MG capsule      Take 10 mg by mouth As Needed.       glucosamine-chondroitin 500-400 MG capsule capsule      Take 1 capsule by mouth Daily. HOLD PER MD INSTR       losartan 25 MG tablet  Commonly known as: COZAAR      TAKE ONE TABLET BY MOUTH DAILY       simvastatin 20 MG tablet  Commonly known as: ZOCOR      TAKE ONE TABLET BY MOUTH ONCE NIGHTLY       tadalafil 5 MG tablet  Commonly known as: CIALIS      TAKE ONE TABLET BY MOUTH DAILY       Turmeric 500 MG capsule      Take  by mouth Daily. HOLD PER MD INSTR                  As always, it has  been a pleasure to participate in your patient's care.      Sincerely,     Roxanne VEGA

## 2024-03-15 ENCOUNTER — OFFICE VISIT (OUTPATIENT)
Dept: CARDIOLOGY | Facility: CLINIC | Age: 79
End: 2024-03-15
Payer: MEDICARE

## 2024-03-15 ENCOUNTER — HOSPITAL ENCOUNTER (OUTPATIENT)
Dept: CARDIOLOGY | Facility: HOSPITAL | Age: 79
Discharge: HOME | End: 2024-03-15
Payer: MEDICARE

## 2024-03-15 VITALS
HEIGHT: 68 IN | SYSTOLIC BLOOD PRESSURE: 118 MMHG | BODY MASS INDEX: 24.09 KG/M2 | HEART RATE: 56 BPM | OXYGEN SATURATION: 97 % | DIASTOLIC BLOOD PRESSURE: 54 MMHG | WEIGHT: 158.95 LBS

## 2024-03-15 VITALS
WEIGHT: 165 LBS | HEART RATE: 52 BPM | HEIGHT: 68 IN | BODY MASS INDEX: 25.01 KG/M2 | DIASTOLIC BLOOD PRESSURE: 62 MMHG | SYSTOLIC BLOOD PRESSURE: 122 MMHG

## 2024-03-15 DIAGNOSIS — I34.0 NON-RHEUMATIC MITRAL REGURGITATION: Primary | ICD-10-CM

## 2024-03-15 DIAGNOSIS — I34.0 NON-RHEUMATIC MITRAL REGURGITATION: ICD-10-CM

## 2024-03-15 DIAGNOSIS — I34.1 MVP (MITRAL VALVE PROLAPSE): ICD-10-CM

## 2024-03-15 DIAGNOSIS — I71.21 ANEURYSM OF ASCENDING AORTA WITHOUT RUPTURE: ICD-10-CM

## 2024-03-15 DIAGNOSIS — I77.810 DILATED AORTIC ROOT: ICD-10-CM

## 2024-03-15 LAB
AORTIC ARCH: 2.6 CM
AORTIC DIMENSIONLESS INDEX: 0.7 (DI)
ASCENDING AORTA: 3.6 CM
BH CV ECHO LEFT VENTRICLE GLOBAL LONGITUDINAL STRAIN: -27.2 %
BH CV ECHO MEAS - ACS: 2.07 CM
BH CV ECHO MEAS - AO MAX PG: 10.8 MMHG
BH CV ECHO MEAS - AO MEAN PG: 5.4 MMHG
BH CV ECHO MEAS - AO ROOT DIAM: 3.7 CM
BH CV ECHO MEAS - AO V2 MAX: 164.2 CM/SEC
BH CV ECHO MEAS - AO V2 VTI: 33.2 CM
BH CV ECHO MEAS - AVA(I,D): 2.9 CM2
BH CV ECHO MEAS - EDV(CUBED): 146.6 ML
BH CV ECHO MEAS - EDV(MOD-SP2): 130 ML
BH CV ECHO MEAS - EDV(MOD-SP4): 124 ML
BH CV ECHO MEAS - EF(MOD-BP): 62.4 %
BH CV ECHO MEAS - EF(MOD-SP2): 63.1 %
BH CV ECHO MEAS - EF(MOD-SP4): 61.3 %
BH CV ECHO MEAS - ESV(CUBED): 35.1 ML
BH CV ECHO MEAS - ESV(MOD-SP2): 48 ML
BH CV ECHO MEAS - ESV(MOD-SP4): 48 ML
BH CV ECHO MEAS - FS: 37.9 %
BH CV ECHO MEAS - IVS/LVPW: 0.98 CM
BH CV ECHO MEAS - IVSD: 0.97 CM
BH CV ECHO MEAS - LAT PEAK E' VEL: 6.6 CM/SEC
BH CV ECHO MEAS - LV DIASTOLIC VOL/BSA (35-75): 66.8 CM2
BH CV ECHO MEAS - LV MASS(C)D: 193.8 GRAMS
BH CV ECHO MEAS - LV MAX PG: 5.2 MMHG
BH CV ECHO MEAS - LV MEAN PG: 3.1 MMHG
BH CV ECHO MEAS - LV SYSTOLIC VOL/BSA (12-30): 25.9 CM2
BH CV ECHO MEAS - LV V1 MAX: 113.8 CM/SEC
BH CV ECHO MEAS - LV V1 VTI: 22 CM
BH CV ECHO MEAS - LVIDD: 5.3 CM
BH CV ECHO MEAS - LVIDS: 3.3 CM
BH CV ECHO MEAS - LVOT AREA: 4.4 CM2
BH CV ECHO MEAS - LVOT DIAM: 2.38 CM
BH CV ECHO MEAS - LVPWD: 0.99 CM
BH CV ECHO MEAS - MED PEAK E' VEL: 5.9 CM/SEC
BH CV ECHO MEAS - MV A DUR: 0.15 SEC
BH CV ECHO MEAS - MV A MAX VEL: 86.6 CM/SEC
BH CV ECHO MEAS - MV DEC SLOPE: 540.9 CM/SEC2
BH CV ECHO MEAS - MV DEC TIME: 0.15 SEC
BH CV ECHO MEAS - MV E MAX VEL: 125 CM/SEC
BH CV ECHO MEAS - MV E/A: 1.44
BH CV ECHO MEAS - MV MAX PG: 6.8 MMHG
BH CV ECHO MEAS - MV MEAN PG: 2.2 MMHG
BH CV ECHO MEAS - MV P1/2T: 74.1 MSEC
BH CV ECHO MEAS - MV V2 VTI: 43.5 CM
BH CV ECHO MEAS - MVA(P1/2T): 3 CM2
BH CV ECHO MEAS - MVA(VTI): 2.24 CM2
BH CV ECHO MEAS - PA ACC TIME: 0.13 SEC
BH CV ECHO MEAS - PA V2 MAX: 123.8 CM/SEC
BH CV ECHO MEAS - QP/QS: 0.53
BH CV ECHO MEAS - RAP SYSTOLE: 3 MMHG
BH CV ECHO MEAS - RV MAX PG: 0.99 MMHG
BH CV ECHO MEAS - RV V1 MAX: 49.7 CM/SEC
BH CV ECHO MEAS - RV V1 VTI: 11.6 CM
BH CV ECHO MEAS - RVOT DIAM: 2.38 CM
BH CV ECHO MEAS - RVSP: 27.8 MMHG
BH CV ECHO MEAS - SI(MOD-SP2): 44.2 ML/M2
BH CV ECHO MEAS - SI(MOD-SP4): 40.9 ML/M2
BH CV ECHO MEAS - SUP REN AO DIAM: 2.4 CM
BH CV ECHO MEAS - SV(LVOT): 97.6 ML
BH CV ECHO MEAS - SV(MOD-SP2): 82 ML
BH CV ECHO MEAS - SV(MOD-SP4): 76 ML
BH CV ECHO MEAS - SV(RVOT): 51.6 ML
BH CV ECHO MEAS - TAPSE (>1.6): 2.26 CM
BH CV ECHO MEAS - TR MAX PG: 24.8 MMHG
BH CV ECHO MEAS - TR MAX VEL: 248.9 CM/SEC
BH CV ECHO MEASUREMENTS AVERAGE E/E' RATIO: 20
BH CV XLRA - RV BASE: 3.5 CM
BH CV XLRA - RV MID: 3.4 CM
BH CV XLRA - TDI S': 17.2 CM/SEC
LEFT ATRIUM VOLUME INDEX: 47.5 ML/M2
SINUS: 4.2 CM
STJ: 3.4 CM

## 2024-03-15 PROCEDURE — 99215 OFFICE O/P EST HI 40 MIN: CPT | Performed by: INTERNAL MEDICINE

## 2024-03-15 PROCEDURE — 3074F SYST BP LT 130 MM HG: CPT | Performed by: INTERNAL MEDICINE

## 2024-03-15 PROCEDURE — 3078F DIAST BP <80 MM HG: CPT | Performed by: INTERNAL MEDICINE

## 2024-03-15 PROCEDURE — 93306 TTE W/DOPPLER COMPLETE: CPT

## 2024-03-15 PROCEDURE — 93000 ELECTROCARDIOGRAM COMPLETE: CPT | Performed by: INTERNAL MEDICINE

## 2024-03-15 PROCEDURE — 93356 MYOCRD STRAIN IMG SPCKL TRCK: CPT

## 2024-03-15 NOTE — PROGRESS NOTES
Date of Office Visit: 03/15/2024  Encounter Provider: Adriana Vaughn MD  Place of Service: [facility] C[facility]  Patient Name: Mirza Zimmer  :1945    Chief complaint  Dilated aortic root and ascending aorta, hypertension, valvular heart disease, pulmonary hypertension    History of Present Illness   Patient is a pleasant 78 year-old gentleman history of hypertension, hyperlipidemia with a mitral valve prolapse mild regurgitation and aortic root dilatation.  In  an echocardiogram showed an ejection fraction of 57% with moderate severe prolapse the posterior leaflet with severe anterior directed mitral regurgitation.  Aortic root measured 4.2 cm.  MR angiogram of the chest also showed a mildly dilated asending aorta measuring 4.1 cm.  Last MR angiogram of the chest dated 2018 showed a mildly dilated aortic arch measuring 4.1 cm clinically he is doing well and we have opted to follow him with serial echocardiographic studies including stress imaging.  Last stress test in 2019 showed an ejection fraction 58% with moderately dilated left ventricle with grade 2 diastolic dysfunction, moderate left atrial margin, mild right atrial enlargement, severe prolapse of the posterior leaflet with severe anterior directed mitral regurgitation at rest.  There is moderate tricuspid regurgitation with an RV systolic pressure 32 mmHg.  Following exercise (12 minutes on the Shahab protocol achieving 13 METS) blood pressure response was appropriate and there was worse regurgitation diastolic function.  Unable to assess RV systolic pressure post exercise.  Ejection fraction did increased to 66%.  On 2021 stress echocardiogram demonstrated ejection fraction of 69% with normal GLS diastolic function was indeterminate.  Severe mitral prolapse with moderate anteriorly directed mitral valve regurgitation was noted it was felt to be underestimated.  There was no ischemia at 12 METS.   There was augmentation of LV function.  A follow-up echocardiogram 3/2022 showed an ejection fraction 67% with GLS -19.3% (unchanged) with normal diastolic function with mild to moderate mitral valve regurgitation which is eccentric and an RV systolic pressure elevated to 42 mmHg the aortic root was mildly dilated.  Patient had CT angiogram of the chest abdomen pelvis on 10/2021 that showed no pulmonary emboli nodules the aorta was not mentioned.  However a 2.5 sigmoid mass was noted.  Prostate was heterogenous and enlarged.  He subsequently had colectomy and appendectomy.  Patient had a stress echocardiogram 2/2023 that showed normal left ventricular size systolic function ejection fraction 59%, GLS -24.6%, normal left ventricular wall thickness and grade 2 diastolic dysfunction there was severe biatrial enlargement with mitral prolapse and moderate to severe mitral regurgitation, normal right-sided pressures.  There was no ischemia at a good workload with adequate contractile reserve with an ejection fraction 75% post exercise at 14 METS.  Echocardiogram 3/15/2024 showed an ejection fraction 62% normal left ventricular size, normal GLS -27.2%, grade 2 diastolic function, severe left atrial enlargement mitral prolapse with severe anterior directed mitral valve regurgitation, mild tricuspid regurgitation with normal right-sided pressures the aortic root remains dilated.    Since last visit he has remained very active riding his bicycle walking weightlifting.  He has had no chest pain palpitations syncope near syncope during these activities.  He had 1 episode of chest discomfort that occurred after he show months no.  This was an unusual activity and lasted for 30 minutes and resolved.  He describes as a tightness.    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 EDGARD LEE    Arthritis     BPH (benign prostatic hyperplasia)     FOLLOWED BY   MARK BALL    Cardiomegaly     FOLLOWED BY DR. SAMUEL RIVAS    Cataract     BILATERAL    Chronic prostatitis     FOLLOWED BY DR. MARK BALL    Colon cancer 09/29/2021    Sigmoid colon superficial fragments of eroded tubulovillous adenoma with at intramucosal adenocarcinoma    Colon polyps     FOLLOWED BY DR. 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 EDGARD LEE    Increased prostate specific antigen (PSA) velocity 5/8/2017    Laceration of right index finger 08/01/2019    Lichen simplex chronicus     Mechanical ptosis, left     Mitral regurgitation     Mitral valve insufficiency     Muscular aches     MVP (mitral valve prolapse)     Nocturia 06/2021    Perichondritis of right external ear 07/2021    Presbyopia     Regular astigmatism of both eyes     Seborrheic keratosis     Thoracic aortic aneurysm without rupture     Thoracic aortic ectasia 07/2017    Trochanteric bursitis of left hip      Past Surgical History:   Procedure Laterality Date    APPENDECTOMY  10/12/2021    Done as a part of a colon resection    COLON RESECTION N/A 10/12/2021    Procedure: LAPAROSCOPIC LEFT COLECTOMY, APPENDECTOMY;  Surgeon: Cesar Rinaldi MD;  Location: [facility];  Service: General;  Laterality: N/A;    COLONOSCOPY N/A 8/28/2019    5 MM TUBULAR ADENOMA POLYP IN HEPATIC FLEXURE, 6 MM SERRATED ADENOMA POLYP IN TRANSVERSE, MULTIPLE DIVERTICULA IN SIGMOID, RESCOPE IN 2 YRS, DR. KEVIN LUDWIG AT [facility]    COLONOSCOPY N/A 9/29/2021    3 TUBULAR ADENOMA POLYPS IN ASCENDING, AN INFILTRATIVE MASS IN SIGMOID, PATH: TUBULOVILLOUS ADENOMA WITH AT LEAST INTRAMUCOSAL ADENOCARCINOMA, AREA TATTOOED, RESCOPE IN1 YR, DR. KEVIN LUDWIG AT [facility]    COLONOSCOPY N/A 10/19/2022    Procedure: COLONOSCOPY to CECUM  WITH COLD BX POLYPECTOMY;  Surgeon: Cesar Rinaldi MD;  Location: [facility] ENDOSCOPY;  Service: General;  Laterality: N/A;  HX COLON CA  --POLYP, DIVERTICULOSIS        COLONOSCOPY W/ POLYPECTOMY N/A 09/03/2004    5 MM ADENOMATOUS POLYP IN SIGMOID, OTHERWISE WNL, RESCOPE IN 3 YRS, DR. ANGI CONLEY AT [facility]    COLONOSCOPY W/ POLYPECTOMY N/A 05/28/2013    3 MM SESSILE HYPERPLASTIC POLYP 50CM FROM ANUS, A FEW SMALL DIVERTICULA IN SIGMOID, NON BLEEDING INTERNAL HEMORRHOIDS GRADE II, DR. ANGI CONLEY AT [facility]    COLONOSCOPY W/ POLYPECTOMY N/A 02/29/2008    5 MM HYPERPLASTIC POLYP AT 60 CM FROM ANUS, RESCOPE IN 5 YRS, DR. ANGI CONLEY AT [facility]    EYE SURGERY  2003    ATYPICAL MOLE EXCSION, DR. SANCHEZ AT [facility] IN [facility]    PROSTATE SURGERY N/A 08/15/2018    UROLIFT, PROSTATE VOLUME 48CC, NO ABNORMALITIES, DR. COLLIN GARVIN    TONSILLECTOMY Bilateral 1958    DONE IN [place], [place]     Outpatient Medications Prior to Visit   Medication Sig Dispense Refill    amoxicillin (AMOXIL) 500 MG capsule Take 4 capsules by mouth As Needed. Dental Cleaning      Cetirizine HCl 10 MG capsule Take 10 mg by mouth As Needed.      glucosamine-chondroitin 500-400 MG capsule capsule Take 1 capsule by mouth Daily. HOLD PER MD YE      losartan (COZAAR) 25 MG tablet TAKE ONE TABLET BY MOUTH DAILY 90 tablet 3    simvastatin (ZOCOR) 20 MG tablet TAKE ONE TABLET BY MOUTH ONCE NIGHTLY 90 tablet 3    tadalafil (CIALIS) 5 MG tablet TAKE ONE TABLET BY MOUTH DAILY 90 tablet 3    Turmeric 500 MG capsule Take  by mouth Daily. HOLD PER MD YE (Patient not taking: Reported on 3/15/2024)       No facility-administered medications prior to visit.       Allergies as of 03/15/2024 - Reviewed 03/15/2024   Allergen Reaction Noted    Sulfa antibiotics Unknown - Low Severity 02/18/2016     Social History     Socioeconomic History    Marital status:    Tobacco Use    Smoking status: Never    Smokeless tobacco: Never    Tobacco comments:      "Caffeine use   Vaping Use    Vaping status: Never Used   Substance and Sexual Activity    Alcohol use: Yes     Alcohol/week: 1.0 standard drink of alcohol     Types: 1 Glasses of wine per week     Comment: social occasional use    Drug use: Never    Sexual activity: Yes     Partners: Female     Birth control/protection: None     Family History   Problem Relation Age of Onset    Dementia Mother     Arthritis Mother     Diabetes Father     Sudden death Father     Heart attack Father         Probable cause of his death.    Heart disease Father     Breast cancer Sister     Cancer Sister         Breast cancer    Mental illness Maternal Grandmother         Manic depressive    Gallbladder disease Son     Malig Hyperthermia Neg Hx      Review of Systems   Constitutional: Negative for chills, fever, weight gain and weight loss.   Cardiovascular:  Positive for chest pain. Negative for leg swelling.   Respiratory:  Negative for cough, snoring and wheezing.    Hematologic/Lymphatic: Negative for bleeding problem. Does not bruise/bleed easily.   Skin:  Negative for color change.   Musculoskeletal:  Negative for falls, joint pain and myalgias.   Gastrointestinal:  Negative for melena.   Genitourinary:  Negative for hematuria.   Neurological:  Negative for excessive daytime sleepiness.   Psychiatric/Behavioral:  Negative for depression. The patient is not nervous/anxious.         Objective:     Vitals:    03/15/24 0959   BP: 122/62   BP Location: Right arm   Patient Position: Sitting   Cuff Size: Small Adult   Pulse: 52   Weight: 74.8 kg (165 lb)   Height: 172.7 cm (68\")     Body mass index is 25.09 kg/m².    Vitals reviewed.   Constitutional:       Appearance: Well-developed.   Eyes:      General: No scleral icterus.        Right eye: No discharge.      Conjunctiva/sclera: Conjunctivae normal.      Pupils: Pupils are equal, round, and reactive to light.   HENT:      Head: Normocephalic.      Nose: Nose normal.   Neck:      " Thyroid: No thyromegaly.      Vascular: No JVD.   Pulmonary:      Effort: Pulmonary effort is normal. No respiratory distress.      Breath sounds: Normal breath sounds. No wheezing. No rales.   Cardiovascular:      Normal rate. Regular rhythm. Normal S1. Normal S2.       Murmurs: There is a grade 2to 3/6 high frequency blowing holosystolic murmur at the LLSB and apex.      No gallop.    Pulses:     Intact distal pulses.      Carotid: 2+ bilaterally.     Radial: 2+ bilaterally.     Femoral: 2+ bilaterally.     Popliteal: 2+ bilaterally.     Dorsalis pedis: 2+ bilaterally.     Posterior tibial: 2+ bilaterally.  Edema:     Peripheral edema absent.   Abdominal:      General: Bowel sounds are normal. There is no distension.      Palpations: Abdomen is soft.      Tenderness: There is no abdominal tenderness. There is no rebound.   Musculoskeletal: Normal range of motion.         General: No tenderness.      Cervical back: Normal range of motion and neck supple. Skin:     General: Skin is warm and dry.      Findings: No erythema or rash.   Neurological:      Mental Status: Alert and oriented to person, place, and time.   Psychiatric:         Behavior: Behavior normal.         Thought Content: Thought content normal.         Judgment: Judgment normal.       Lab Review:   Lab Results - Last 18 Months   Lab Units 06/01/23  0905   WBC 10*3/mm3 3.73   RBC 10*6/mm3 4.65   HEMOGLOBIN g/dL 14.7   HEMATOCRIT % 43.6   MCV fL 93.8   MCH pg 31.6   MCHC g/dL 33.7   RDW % 12.3   PLATELETS 10*3/mm3 153   NEUTROPHIL % % 55.4   LYMPHOCYTE % % 24.7   MONOCYTES % % 16.4*   EOSINOPHIL % % 2.4   BASOPHIL % % 0.8   NEUTROS ABS 10*3/mm3 2.07   LYMPHS ABS 10*3/mm3 0.92   MONOS ABS 10*3/mm3 0.61   EOS ABS 10*3/mm3 0.09   BASOS ABS 10*3/mm3 0.03   IMM GRAN % % 0.3   IMMATURE GRANS (ABS) 10*3/mm3 0.01       Lab Results - Last 18 Months   Lab Units 06/01/23  0905   GLUCOSE mg/dL 102*   BUN mg/dL 18   CREATININE mg/dL 0.90   SODIUM mmol/L 143    POTASSIUM mmol/L 4.4   CHLORIDE mmol/L 106   CO2 mmol/L 28.8   CALCIUM mg/dL 9.6   ALBUMIN g/dL 4.4   ALT (SGPT) U/L 18   AST (SGOT) U/L 20   ALK PHOS U/L 71   BILIRUBIN mg/dL 0.6   BUN / CREAT RATIO  20.0     Lab Results - Last 18 Months   Lab Units 06/01/23  0905   TRIGLYCERIDES mg/dL 53   HDL CHOL mg/dL 55   LDL CHOL mg/dL 98   VLDL CHOLESTEROL GRICELDA mg/dL 11       Lab Results - Last 18 Months   Lab Units 06/01/23  0905   TSH uIU/mL 1.970         ECG 12 Lead    Date/Time: 3/15/2024 11:28 PM  Performed by: Adriana Vaughn MD    Authorized by: Adriana Vaughn MD  Comparison: compared with previous ECG   Similar to previous ECG  Rhythm: sinus bradycardia  Conduction: 1st degree AV block    Clinical impression: abnormal EKG        Assessment:       Diagnosis Plan   1. Non-rheumatic mitral regurgitation  Ambulatory Referral to Cardiac Surgery    ECG 12 Lead      2. MVP (mitral valve prolapse)  Ambulatory Referral to Cardiac Surgery    ECG 12 Lead      3. Dilated aortic root  Ambulatory Referral to Cardiac Surgery    CT Angiogram Chest    ECG 12 Lead      4. Aneurysm of ascending aorta without rupture  CT Angiogram Chest    ECG 12 Lead        Plan:       1.  Mitral prolapse with severe severe mitral regurgitation.  Stress echocardiogram 2/2023 with normal contractile reserve no significant pulmonary hypertension and grade 2 diastolic dysfunction.  Diastolic dysfunction still persists.  He remains relatively asymptomatic however does have diastolic dysfunction noted last echo and this echo.  No other precipitating reasons other than severity of mitral regurgitation.  I think at this point best to get a opinion from CV surgery surgical risk will increase as he gets older.  2.  Dilated aortic root, stable by MR imaging 8/2021 with aortic root measuring 4.5 cm with ascending aorta measuring 3.9 cm.  Repeat imaging not pursued.  Had been stable by echo but most recently slightly larger.  Will check CT angiogram of the chest.  3.   Dyslipidemia.  Controlled  4.  Left iliac artery aneurysm followed by Dr. Yost and f/p pending  5.  Hypertension.  Appears controlled.  6.  Pulmonary hypertension.  Resolved by echo 2/2023.  Not present on echo.      Time Spent: I spent 50 minutes caring for Mirza on this date of service. This time includes time spent by me in the following activities: preparing for the visit, reviewing tests, obtaining and/or reviewing a separately obtained history, performing a medically appropriate examination and/or evaluation, counseling and educating the patient/family/caregiver, ordering medications, tests, or procedures, referring and communicating with other health care professionals, documenting information in the medical record, and independently interpreting results and communicating that information with the patient/family/caregiver.   I spent 1 minutes on the separately reported service of ECG. This time is not included in the time used to support the E/M service also reported today.        Your medication list            Accurate as of March 15, 2024 11:59 PM. If you have any questions, ask your nurse or doctor.                CONTINUE taking these medications        Instructions Last Dose Given Next Dose Due   amoxicillin 500 MG capsule  Commonly known as: AMOXIL      Take 4 capsules by mouth As Needed. Dental Cleaning       Cetirizine HCl 10 MG capsule      Take 10 mg by mouth As Needed.       glucosamine-chondroitin 500-400 MG capsule capsule      Take 1 capsule by mouth Daily. HOLD PER MD YE       losartan 25 MG tablet  Commonly known as: COZAAR      TAKE ONE TABLET BY MOUTH DAILY       simvastatin 20 MG tablet  Commonly known as: ZOCOR      TAKE ONE TABLET BY MOUTH ONCE NIGHTLY       tadalafil 5 MG tablet  Commonly known as: CIALIS      TAKE ONE TABLET BY MOUTH DAILY                Patient is no longer taking -.  I corrected the med list to reflect this.  I did not stop these medications.      Dictated  utilizing Dragon dictation

## 2024-03-15 NOTE — H&P (VIEW-ONLY)
Date of Office Visit: 03/15/2024  Encounter Provider: Adriana Vaughn MD  Place of Service: [facility] CARDIOLOGY  Patient Name: Mirza Zimmer  :1945    Chief complaint  Dilated aortic root and ascending aorta, hypertension, valvular heart disease, pulmonary hypertension    History of Present Illness   Patient is a pleasant 78 year-old gentleman history of hypertension, hyperlipidemia with a mitral valve prolapse mild regurgitation and aortic root dilatation.  In  an echocardiogram showed an ejection fraction of 57% with moderate severe prolapse the posterior leaflet with severe anterior directed mitral regurgitation.  Aortic root measured 4.2 cm.  MR angiogram of the chest also showed a mildly dilated asending aorta measuring 4.1 cm.  Last MR angiogram of the chest dated 2018 showed a mildly dilated aortic arch measuring 4.1 cm clinically he is doing well and we have opted to follow him with serial echocardiographic studies including stress imaging.  Last stress test in 2019 showed an ejection fraction 58% with moderately dilated left ventricle with grade 2 diastolic dysfunction, moderate left atrial margin, mild right atrial enlargement, severe prolapse of the posterior leaflet with severe anterior directed mitral regurgitation at rest.  There is moderate tricuspid regurgitation with an RV systolic pressure 32 mmHg.  Following exercise (12 minutes on the Shahab protocol achieving 13 METS) blood pressure response was appropriate and there was worse regurgitation diastolic function.  Unable to assess RV systolic pressure post exercise.  Ejection fraction did increased to 66%.  On 2021 stress echocardiogram demonstrated ejection fraction of 69% with normal GLS diastolic function was indeterminate.  Severe mitral prolapse with moderate anteriorly directed mitral valve regurgitation was noted it was felt to be underestimated.  There was no ischemia at 12 METS.   There was augmentation of LV function.  A follow-up echocardiogram 3/2022 showed an ejection fraction 67% with GLS -19.3% (unchanged) with normal diastolic function with mild to moderate mitral valve regurgitation which is eccentric and an RV systolic pressure elevated to 42 mmHg the aortic root was mildly dilated.  Patient had CT angiogram of the chest abdomen pelvis on 10/2021 that showed no pulmonary emboli nodules the aorta was not mentioned.  However a 2.5 sigmoid mass was noted.  Prostate was heterogenous and enlarged.  He subsequently had colectomy and appendectomy.  Patient had a stress echocardiogram 2/2023 that showed normal left ventricular size systolic function ejection fraction 59%, GLS -24.6%, normal left ventricular wall thickness and grade 2 diastolic dysfunction there was severe biatrial enlargement with mitral prolapse and moderate to severe mitral regurgitation, normal right-sided pressures.  There was no ischemia at a good workload with adequate contractile reserve with an ejection fraction 75% post exercise at 14 METS.  Echocardiogram 3/15/2024 showed an ejection fraction 62% normal left ventricular size, normal GLS -27.2%, grade 2 diastolic function, severe left atrial enlargement mitral prolapse with severe anterior directed mitral valve regurgitation, mild tricuspid regurgitation with normal right-sided pressures the aortic root remains dilated.    Since last visit he has remained very active riding his bicycle walking weightlifting.  He has had no chest pain palpitations syncope near syncope during these activities.  He had 1 episode of chest discomfort that occurred after he show months no.  This was an unusual activity and lasted for 30 minutes and resolved.  He describes as a tightness.    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 EDGARD LEE    Arthritis     BPH (benign prostatic hyperplasia)     FOLLOWED BY   MARK BALL    Cardiomegaly     FOLLOWED BY DR. SAMUEL RIVAS    Cataract     BILATERAL    Chronic prostatitis     FOLLOWED BY DR. MARK BALL    Colon cancer 09/29/2021    Sigmoid colon superficial fragments of eroded tubulovillous adenoma with at intramucosal adenocarcinoma    Colon polyps     FOLLOWED BY DR. 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 EDGARD LEE    Increased prostate specific antigen (PSA) velocity 5/8/2017    Laceration of right index finger 08/01/2019    Lichen simplex chronicus     Mechanical ptosis, left     Mitral regurgitation     Mitral valve insufficiency     Muscular aches     MVP (mitral valve prolapse)     Nocturia 06/2021    Perichondritis of right external ear 07/2021    Presbyopia     Regular astigmatism of both eyes     Seborrheic keratosis     Thoracic aortic aneurysm without rupture     Thoracic aortic ectasia 07/2017    Trochanteric bursitis of left hip      Past Surgical History:   Procedure Laterality Date    APPENDECTOMY  10/12/2021    Done as a part of a colon resection    COLON RESECTION N/A 10/12/2021    Procedure: LAPAROSCOPIC LEFT COLECTOMY, APPENDECTOMY;  Surgeon: Cesar Rinaldi MD;  Location: [facility];  Service: General;  Laterality: N/A;    COLONOSCOPY N/A 8/28/2019    5 MM TUBULAR ADENOMA POLYP IN HEPATIC FLEXURE, 6 MM SERRATED ADENOMA POLYP IN TRANSVERSE, MULTIPLE DIVERTICULA IN SIGMOID, RESCOPE IN 2 YRS, DR. KEVIN LUDWIG AT [facility]    COLONOSCOPY N/A 9/29/2021    3 TUBULAR ADENOMA POLYPS IN ASCENDING, AN INFILTRATIVE MASS IN SIGMOID, PATH: TUBULOVILLOUS ADENOMA WITH AT LEAST INTRAMUCOSAL ADENOCARCINOMA, AREA TATTOOED, RESCOPE IN1 YR, DR. KEVIN LUDWIG AT [facility]    COLONOSCOPY N/A 10/19/2022    Procedure: COLONOSCOPY to CECUM  WITH COLD BX POLYPECTOMY;  Surgeon: Cesar Rinaldi MD;  Location: [facility] ENDOSCOPY;  Service: General;  Laterality: N/A;  HX COLON CA  --POLYP, DIVERTICULOSIS        COLONOSCOPY W/ POLYPECTOMY N/A 09/03/2004    5 MM ADENOMATOUS POLYP IN SIGMOID, OTHERWISE WNL, RESCOPE IN 3 YRS, DR. ANGI CONLEY AT [facility]    COLONOSCOPY W/ POLYPECTOMY N/A 05/28/2013    3 MM SESSILE HYPERPLASTIC POLYP 50CM FROM ANUS, A FEW SMALL DIVERTICULA IN SIGMOID, NON BLEEDING INTERNAL HEMORRHOIDS GRADE II, DR. ANGI CONLEY AT [facility]    COLONOSCOPY W/ POLYPECTOMY N/A 02/29/2008    5 MM HYPERPLASTIC POLYP AT 60 CM FROM ANUS, RESCOPE IN 5 YRS, DR. ANGI CONLEY AT [facility]    EYE SURGERY  2003    ATYPICAL MOLE EXCSION, DR. SANCHEZ AT [facility]    PROSTATE SURGERY N/A 08/15/2018    UROLIFT, PROSTATE VOLUME 48CC, NO ABNORMALITIES, DR. COLLIN GARVIN    TONSILLECTOMY Bilateral 1958    DONE IN [place], [place]     Outpatient Medications Prior to Visit   Medication Sig Dispense Refill    amoxicillin (AMOXIL) 500 MG capsule Take 4 capsules by mouth As Needed. Dental Cleaning      Cetirizine HCl 10 MG capsule Take 10 mg by mouth As Needed.      glucosamine-chondroitin 500-400 MG capsule capsule Take 1 capsule by mouth Daily. HOLD PER MD YE      losartan (COZAAR) 25 MG tablet TAKE ONE TABLET BY MOUTH DAILY 90 tablet 3    simvastatin (ZOCOR) 20 MG tablet TAKE ONE TABLET BY MOUTH ONCE NIGHTLY 90 tablet 3    tadalafil (CIALIS) 5 MG tablet TAKE ONE TABLET BY MOUTH DAILY 90 tablet 3    Turmeric 500 MG capsule Take  by mouth Daily. HOLD PER MD YE (Patient not taking: Reported on 3/15/2024)       No facility-administered medications prior to visit.       Allergies as of 03/15/2024 - Reviewed 03/15/2024   Allergen Reaction Noted    Sulfa antibiotics Unknown - Low Severity 02/18/2016     Social History     Socioeconomic History    Marital status:    Tobacco Use    Smoking status: Never    Smokeless tobacco: Never    Tobacco comments:      "Caffeine use   Vaping Use    Vaping status: Never Used   Substance and Sexual Activity    Alcohol use: Yes     Alcohol/week: 1.0 standard drink of alcohol     Types: 1 Glasses of wine per week     Comment: social occasional use    Drug use: Never    Sexual activity: Yes     Partners: Female     Birth control/protection: None     Family History   Problem Relation Age of Onset    Dementia Mother     Arthritis Mother     Diabetes Father     Sudden death Father     Heart attack Father         Probable cause of his death.    Heart disease Father     Breast cancer Sister     Cancer Sister         Breast cancer    Mental illness Maternal Grandmother         Manic depressive    Gallbladder disease Son     Malig Hyperthermia Neg Hx      Review of Systems   Constitutional: Negative for chills, fever, weight gain and weight loss.   Cardiovascular:  Positive for chest pain. Negative for leg swelling.   Respiratory:  Negative for cough, snoring and wheezing.    Hematologic/Lymphatic: Negative for bleeding problem. Does not bruise/bleed easily.   Skin:  Negative for color change.   Musculoskeletal:  Negative for falls, joint pain and myalgias.   Gastrointestinal:  Negative for melena.   Genitourinary:  Negative for hematuria.   Neurological:  Negative for excessive daytime sleepiness.   Psychiatric/Behavioral:  Negative for depression. The patient is not nervous/anxious.         Objective:     Vitals:    03/15/24 0959   BP: 122/62   BP Location: Right arm   Patient Position: Sitting   Cuff Size: Small Adult   Pulse: 52   Weight: 74.8 kg (165 lb)   Height: 172.7 cm (68\")     Body mass index is 25.09 kg/m².    Vitals reviewed.   Constitutional:       Appearance: Well-developed.   Eyes:      General: No scleral icterus.        Right eye: No discharge.      Conjunctiva/sclera: Conjunctivae normal.      Pupils: Pupils are equal, round, and reactive to light.   HENT:      Head: Normocephalic.      Nose: Nose normal.   Neck:      " Thyroid: No thyromegaly.      Vascular: No JVD.   Pulmonary:      Effort: Pulmonary effort is normal. No respiratory distress.      Breath sounds: Normal breath sounds. No wheezing. No rales.   Cardiovascular:      Normal rate. Regular rhythm. Normal S1. Normal S2.       Murmurs: There is a grade 2to 3/6 high frequency blowing holosystolic murmur at the LLSB and apex.      No gallop.    Pulses:     Intact distal pulses.      Carotid: 2+ bilaterally.     Radial: 2+ bilaterally.     Femoral: 2+ bilaterally.     Popliteal: 2+ bilaterally.     Dorsalis pedis: 2+ bilaterally.     Posterior tibial: 2+ bilaterally.  Edema:     Peripheral edema absent.   Abdominal:      General: Bowel sounds are normal. There is no distension.      Palpations: Abdomen is soft.      Tenderness: There is no abdominal tenderness. There is no rebound.   Musculoskeletal: Normal range of motion.         General: No tenderness.      Cervical back: Normal range of motion and neck supple. Skin:     General: Skin is warm and dry.      Findings: No erythema or rash.   Neurological:      Mental Status: Alert and oriented to person, place, and time.   Psychiatric:         Behavior: Behavior normal.         Thought Content: Thought content normal.         Judgment: Judgment normal.       Lab Review:   Lab Results - Last 18 Months   Lab Units 06/01/23  0905   WBC 10*3/mm3 3.73   RBC 10*6/mm3 4.65   HEMOGLOBIN g/dL 14.7   HEMATOCRIT % 43.6   MCV fL 93.8   MCH pg 31.6   MCHC g/dL 33.7   RDW % 12.3   PLATELETS 10*3/mm3 153   NEUTROPHIL % % 55.4   LYMPHOCYTE % % 24.7   MONOCYTES % % 16.4*   EOSINOPHIL % % 2.4   BASOPHIL % % 0.8   NEUTROS ABS 10*3/mm3 2.07   LYMPHS ABS 10*3/mm3 0.92   MONOS ABS 10*3/mm3 0.61   EOS ABS 10*3/mm3 0.09   BASOS ABS 10*3/mm3 0.03   IMM GRAN % % 0.3   IMMATURE GRANS (ABS) 10*3/mm3 0.01       Lab Results - Last 18 Months   Lab Units 06/01/23  0905   GLUCOSE mg/dL 102*   BUN mg/dL 18   CREATININE mg/dL 0.90   SODIUM mmol/L 143    POTASSIUM mmol/L 4.4   CHLORIDE mmol/L 106   CO2 mmol/L 28.8   CALCIUM mg/dL 9.6   ALBUMIN g/dL 4.4   ALT (SGPT) U/L 18   AST (SGOT) U/L 20   ALK PHOS U/L 71   BILIRUBIN mg/dL 0.6   BUN / CREAT RATIO  20.0     Lab Results - Last 18 Months   Lab Units 06/01/23  0905   TRIGLYCERIDES mg/dL 53   HDL CHOL mg/dL 55   LDL CHOL mg/dL 98   VLDL CHOLESTEROL GRICELDA mg/dL 11       Lab Results - Last 18 Months   Lab Units 06/01/23  0905   TSH uIU/mL 1.970         ECG 12 Lead    Date/Time: 3/15/2024 11:28 PM  Performed by: Adriana Vaughn MD    Authorized by: Adriana Vaughn MD  Comparison: compared with previous ECG   Similar to previous ECG  Rhythm: sinus bradycardia  Conduction: 1st degree AV block    Clinical impression: abnormal EKG        Assessment:       Diagnosis Plan   1. Non-rheumatic mitral regurgitation  Ambulatory Referral to Cardiac Surgery    ECG 12 Lead      2. MVP (mitral valve prolapse)  Ambulatory Referral to Cardiac Surgery    ECG 12 Lead      3. Dilated aortic root  Ambulatory Referral to Cardiac Surgery    CT Angiogram Chest    ECG 12 Lead      4. Aneurysm of ascending aorta without rupture  CT Angiogram Chest    ECG 12 Lead        Plan:       1.  Mitral prolapse with severe severe mitral regurgitation.  Stress echocardiogram 2/2023 with normal contractile reserve no significant pulmonary hypertension and grade 2 diastolic dysfunction.  Diastolic dysfunction still persists.  He remains relatively asymptomatic however does have diastolic dysfunction noted last echo and this echo.  No other precipitating reasons other than severity of mitral regurgitation.  I think at this point best to get a opinion from CV surgery surgical risk will increase as he gets older.  2.  Dilated aortic root, stable by MR imaging 8/2021 with aortic root measuring 4.5 cm with ascending aorta measuring 3.9 cm.  Repeat imaging not pursued.  Had been stable by echo but most recently slightly larger.  Will check CT angiogram of the chest.  3.   Dyslipidemia.  Controlled  4.  Left iliac artery aneurysm followed by Dr. Yost and f/p pending  5.  Hypertension.  Appears controlled.  6.  Pulmonary hypertension.  Resolved by echo 2/2023.  Not present on echo.      Time Spent: I spent 50 minutes caring for Mirza on this date of service. This time includes time spent by me in the following activities: preparing for the visit, reviewing tests, obtaining and/or reviewing a separately obtained history, performing a medically appropriate examination and/or evaluation, counseling and educating the patient/family/caregiver, ordering medications, tests, or procedures, referring and communicating with other health care professionals, documenting information in the medical record, and independently interpreting results and communicating that information with the patient/family/caregiver.   I spent 1 minutes on the separately reported service of ECG. This time is not included in the time used to support the E/M service also reported today.        Your medication list            Accurate as of March 15, 2024 11:59 PM. If you have any questions, ask your nurse or doctor.                CONTINUE taking these medications        Instructions Last Dose Given Next Dose Due   amoxicillin 500 MG capsule  Commonly known as: AMOXIL      Take 4 capsules by mouth As Needed. Dental Cleaning       Cetirizine HCl 10 MG capsule      Take 10 mg by mouth As Needed.       glucosamine-chondroitin 500-400 MG capsule capsule      Take 1 capsule by mouth Daily. HOLD PER MD YE       losartan 25 MG tablet  Commonly known as: COZAAR      TAKE ONE TABLET BY MOUTH DAILY       simvastatin 20 MG tablet  Commonly known as: ZOCOR      TAKE ONE TABLET BY MOUTH ONCE NIGHTLY       tadalafil 5 MG tablet  Commonly known as: CIALIS      TAKE ONE TABLET BY MOUTH DAILY                Patient is no longer taking -.  I corrected the med list to reflect this.  I did not stop these medications.      Dictated  utilizing Dragon dictation

## 2024-03-19 ENCOUNTER — TELEPHONE (OUTPATIENT)
Dept: CARDIOLOGY | Facility: CLINIC | Age: 79
End: 2024-03-19
Payer: MEDICARE

## 2024-03-19 NOTE — TELEPHONE ENCOUNTER
Dr. Vaughn/Viviane,    Pt called this morning and said that he was able to have his vascular study scheduled for October and his appt with Katerina Santiago is that day, as well. He said he thought this was too far out and wanted to check with you to make sure those appt times are acceptable.    Thank you,    Marcie Guido, RN  [place] [facility] [place]  03/19/24 09:46 EDT

## 2024-03-19 NOTE — TELEPHONE ENCOUNTER
That would be fine as his vascular study in 2021 showed mild dilation and he was not having any abdominal pain when he saw Dr. Vaughn last week.

## 2024-03-19 NOTE — TELEPHONE ENCOUNTER
Notified pt of recommendations from Viviane. Pt verbalized understanding.    Thank you,    Marcie Guido, RN  [place] [facility] [place]  03/19/24 10:07 EDT

## 2024-03-22 ENCOUNTER — OFFICE VISIT (OUTPATIENT)
Dept: CARDIAC SURGERY | Facility: CLINIC | Age: 79
End: 2024-03-22
Payer: MEDICARE

## 2024-03-22 VITALS
DIASTOLIC BLOOD PRESSURE: 74 MMHG | WEIGHT: 160 LBS | HEART RATE: 64 BPM | TEMPERATURE: 98.4 F | OXYGEN SATURATION: 97 % | BODY MASS INDEX: 24.25 KG/M2 | HEIGHT: 68 IN | SYSTOLIC BLOOD PRESSURE: 139 MMHG | RESPIRATION RATE: 18 BRPM

## 2024-03-22 DIAGNOSIS — I34.0 NONRHEUMATIC MITRAL VALVE REGURGITATION: Primary | ICD-10-CM

## 2024-03-22 DIAGNOSIS — I34.1 MVP (MITRAL VALVE PROLAPSE): ICD-10-CM

## 2024-03-22 DIAGNOSIS — I34.0 NON-RHEUMATIC MITRAL REGURGITATION: ICD-10-CM

## 2024-03-22 DIAGNOSIS — I77.810 DILATED AORTIC ROOT: Primary | ICD-10-CM

## 2024-03-22 DIAGNOSIS — I71.20 THORACIC AORTIC ANEURYSM WITHOUT RUPTURE, UNSPECIFIED PART: ICD-10-CM

## 2024-03-22 NOTE — PROGRESS NOTES
Krzysztfo has had mitral valve prolapse with incompetence first diagnosed by Dante years ago.  He is now having class II-III dyspnea with walking up steps.  I think he does have repairable valve with posterior leaflet prolapse and may be a torn cord.  There is some question about root enlargement at 4.2 cm but I do not really see that.  There is some calcium in the aorta and trace aortic incompetence.  I see calcium in the LAD and circumflex system on CT scan.  I do think he needs to have an intervention on the mitral valve and I do think it is repairable.  The for step is a cardiac cath and UNIQUE.  Then I would refer him to Dr. Olivier Ramsey for mitral valve repair at least.  It was certainly nice to see him today.

## 2024-03-22 NOTE — LETTER
March 22, 2024       No Recipients    Patient: Mirza Zimmer   YOB: 1945   Date of Visit: 3/22/2024     Dear Radha Rollins MD:       Thank you for referring Mirza Zimmer to me for evaluation. Below are the relevant portions of my assessment and plan of care.    If you have questions, please do not hesitate to call me. I look forward to following Mirza along with you.         Sincerely,        Barry Lux MD        CC:   No Recipients    Jr Barry Lux MD  03/22/24 1340  Sign when Signing Visit  Krzysztof has had mitral valve prolapse with incompetence first diagnosed by Dante years ago.  He is now having class II-III dyspnea with walking up steps.  I think he does have repairable valve with posterior leaflet prolapse and may be a torn cord.  There is some question about root enlargement at 4.2 cm but I do not really see that.  There is some calcium in the aorta and trace aortic incompetence.  I see calcium in the LAD and circumflex system on CT scan.  I do think he needs to have an intervention on the mitral valve and I do think it is repairable.  The for step is a cardiac cath and UNIQUE.  Then I would refer him to Dr. Olivier Ramsey for mitral valve repair at least.  It was certainly nice to see him today.

## 2024-03-25 ENCOUNTER — TELEPHONE (OUTPATIENT)
Age: 79
End: 2024-03-25
Payer: MEDICARE

## 2024-03-26 PROBLEM — I34.0 NONRHEUMATIC MITRAL VALVE REGURGITATION: Status: ACTIVE | Noted: 2024-03-22

## 2024-03-26 NOTE — TELEPHONE ENCOUNTER
"  Caller: Mirza Zimmer \"Krzysztof\"    Relationship: Self    Best call back number: [phone]     What is the best time to reach you: ANYTIME    Who are you requesting to speak with (clinical staff, provider,  specific staff member): CLINICAL    Do you know the name of the person who called: NA    What was the call regarding: PT REACHING OUT AS HE RECEIVED A MESSAGE ABOUT A UNIQUE AND WHEN SPEAKING WITH HIS PCP HE WAS TOLD ABOUT A CARDIAC CATH AND PT FOLLOWING UP AND REQUESTING TO SPEAK WITH SOMEONE ABOUT SCHEDULING IF NECESSARY - PT ASKING ABOUT TIMEFRAME FOR SCHEDULING AS HE WILL BE OUT OF TOWN UNTIL SATURDAY THIS WEEK - 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

## 2024-03-28 NOTE — TELEPHONE ENCOUNTER
I talked to patient regarding plans for UNIQUE and cardiac cath including risks and benefits of both.  He does not have any trouble swallowing has not had any endoscopic GI procedures or interventions.  I reviewed cardiac cath rational as well as risks and benefits.  Including risk of myocardial infarction death renal failure, stroke and renal artery moving head.  He understands and wishes to proceed.  He asked me about Dr Ramsey's experience during these procedures and my confidence.  I told him I was very confident with Dr. Lux's recommendations for patient to see Dr Ramsey and that Dr Ramsey has been doing this for very long time and doing quite well.  I did recommend he not wait too long to get this done as worsening mild regurgitation can cause permanent LV damage.  He will consider this.  He is also considering possible second opinion at [facility] but wants to proceed with UNIQUE and cardiac cath.  Okay to proceed with both

## 2024-04-01 ENCOUNTER — TRANSCRIBE ORDERS (OUTPATIENT)
Dept: CARDIOLOGY | Facility: CLINIC | Age: 79
End: 2024-04-01
Payer: MEDICARE

## 2024-04-01 ENCOUNTER — LAB (OUTPATIENT)
Dept: LAB | Facility: HOSPITAL | Age: 79
End: 2024-04-01
Payer: MEDICARE

## 2024-04-01 DIAGNOSIS — Z13.6 SCREENING FOR ISCHEMIC HEART DISEASE: ICD-10-CM

## 2024-04-01 DIAGNOSIS — Z01.810 PRE-OPERATIVE CARDIOVASCULAR EXAMINATION: ICD-10-CM

## 2024-04-01 DIAGNOSIS — Z01.810 PRE-OPERATIVE CARDIOVASCULAR EXAMINATION: Primary | ICD-10-CM

## 2024-04-01 LAB
ANION GAP SERPL CALCULATED.3IONS-SCNC: 9.4 MMOL/L (ref 5–15)
BASOPHILS # BLD AUTO: 0.03 10*3/MM3 (ref 0–0.2)
BASOPHILS NFR BLD AUTO: 0.8 % (ref 0–1.5)
BUN SERPL-MCNC: 15 MG/DL (ref 8–23)
BUN/CREAT SERPL: 14.7 (ref 7–25)
CALCIUM SPEC-SCNC: 9.4 MG/DL (ref 8.6–10.5)
CHLORIDE SERPL-SCNC: 108 MMOL/L (ref 98–107)
CO2 SERPL-SCNC: 26.6 MMOL/L (ref 22–29)
CREAT SERPL-MCNC: 1.02 MG/DL (ref 0.76–1.27)
DEPRECATED RDW RBC AUTO: 42.4 FL (ref 37–54)
EGFRCR SERPLBLD CKD-EPI 2021: 75.2 ML/MIN/1.73
EOSINOPHIL # BLD AUTO: 0.05 10*3/MM3 (ref 0–0.4)
EOSINOPHIL NFR BLD AUTO: 1.3 % (ref 0.3–6.2)
ERYTHROCYTE [DISTWIDTH] IN BLOOD BY AUTOMATED COUNT: 12.2 % (ref 12.3–15.4)
GLUCOSE SERPL-MCNC: 102 MG/DL (ref 65–99)
HCT VFR BLD AUTO: 40.8 % (ref 37.5–51)
HGB BLD-MCNC: 14 G/DL (ref 13–17.7)
IMM GRANULOCYTES # BLD AUTO: 0.01 10*3/MM3 (ref 0–0.05)
IMM GRANULOCYTES NFR BLD AUTO: 0.3 % (ref 0–0.5)
LYMPHOCYTES # BLD AUTO: 0.85 10*3/MM3 (ref 0.7–3.1)
LYMPHOCYTES NFR BLD AUTO: 22.3 % (ref 19.6–45.3)
MCH RBC QN AUTO: 32.1 PG (ref 26.6–33)
MCHC RBC AUTO-ENTMCNC: 34.3 G/DL (ref 31.5–35.7)
MCV RBC AUTO: 93.6 FL (ref 79–97)
MONOCYTES # BLD AUTO: 0.41 10*3/MM3 (ref 0.1–0.9)
MONOCYTES NFR BLD AUTO: 10.7 % (ref 5–12)
NEUTROPHILS NFR BLD AUTO: 2.47 10*3/MM3 (ref 1.7–7)
NEUTROPHILS NFR BLD AUTO: 64.6 % (ref 42.7–76)
NRBC BLD AUTO-RTO: 0 /100 WBC (ref 0–0.2)
PLATELET # BLD AUTO: 140 10*3/MM3 (ref 140–450)
PMV BLD AUTO: 9.6 FL (ref 6–12)
POTASSIUM SERPL-SCNC: 4.1 MMOL/L (ref 3.5–5.2)
RBC # BLD AUTO: 4.36 10*6/MM3 (ref 4.14–5.8)
SODIUM SERPL-SCNC: 144 MMOL/L (ref 136–145)
WBC NRBC COR # BLD AUTO: 3.82 10*3/MM3 (ref 3.4–10.8)

## 2024-04-01 PROCEDURE — 83880 ASSAY OF NATRIURETIC PEPTIDE: CPT | Performed by: NURSE PRACTITIONER

## 2024-04-01 PROCEDURE — 85025 COMPLETE CBC W/AUTO DIFF WBC: CPT

## 2024-04-01 PROCEDURE — 80048 BASIC METABOLIC PNL TOTAL CA: CPT

## 2024-04-01 PROCEDURE — 36415 COLL VENOUS BLD VENIPUNCTURE: CPT

## 2024-04-04 ENCOUNTER — ANESTHESIA (OUTPATIENT)
Dept: CARDIOLOGY | Facility: HOSPITAL | Age: 79
End: 2024-04-04
Payer: MEDICARE

## 2024-04-04 ENCOUNTER — ANESTHESIA EVENT (OUTPATIENT)
Dept: CARDIOLOGY | Facility: HOSPITAL | Age: 79
End: 2024-04-04
Payer: MEDICARE

## 2024-04-04 ENCOUNTER — TELEPHONE (OUTPATIENT)
Dept: CARDIOLOGY | Facility: CLINIC | Age: 79
End: 2024-04-04

## 2024-04-04 ENCOUNTER — HOSPITAL ENCOUNTER (OUTPATIENT)
Facility: HOSPITAL | Age: 79
Setting detail: HOSPITAL OUTPATIENT SURGERY
Discharge: HOME | End: 2024-04-04
Attending: INTERNAL MEDICINE | Admitting: INTERNAL MEDICINE
Payer: MEDICARE

## 2024-04-04 ENCOUNTER — APPOINTMENT (OUTPATIENT)
Dept: CARDIOLOGY | Facility: HOSPITAL | Age: 79
End: 2024-04-04
Payer: MEDICARE

## 2024-04-04 ENCOUNTER — HOSPITAL ENCOUNTER (OUTPATIENT)
Dept: CARDIOLOGY | Facility: HOSPITAL | Age: 79
Discharge: HOME | End: 2024-04-04
Payer: MEDICARE

## 2024-04-04 VITALS
HEART RATE: 62 BPM | OXYGEN SATURATION: 93 % | TEMPERATURE: 97.1 F | DIASTOLIC BLOOD PRESSURE: 64 MMHG | SYSTOLIC BLOOD PRESSURE: 107 MMHG | HEIGHT: 68 IN | BODY MASS INDEX: 24.72 KG/M2 | RESPIRATION RATE: 18 BRPM | WEIGHT: 163.1 LBS

## 2024-04-04 VITALS
DIASTOLIC BLOOD PRESSURE: 64 MMHG | TEMPERATURE: 96.8 F | SYSTOLIC BLOOD PRESSURE: 110 MMHG | HEART RATE: 62 BPM | OXYGEN SATURATION: 93 % | RESPIRATION RATE: 16 BRPM

## 2024-04-04 DIAGNOSIS — I34.0 NONRHEUMATIC MITRAL VALVE REGURGITATION: ICD-10-CM

## 2024-04-04 LAB — NT-PROBNP SERPL-MCNC: 118 PG/ML (ref 0–1800)

## 2024-04-04 PROCEDURE — C1769 GUIDE WIRE: HCPCS | Performed by: INTERNAL MEDICINE

## 2024-04-04 PROCEDURE — 93320 DOPPLER ECHO COMPLETE: CPT

## 2024-04-04 PROCEDURE — C1894 INTRO/SHEATH, NON-LASER: HCPCS | Performed by: INTERNAL MEDICINE

## 2024-04-04 PROCEDURE — 82810 BLOOD GASES O2 SAT ONLY: CPT

## 2024-04-04 PROCEDURE — 25010000002 PROPOFOL 10 MG/ML EMULSION: Performed by: STUDENT IN AN ORGANIZED HEALTH CARE EDUCATION/TRAINING PROGRAM

## 2024-04-04 PROCEDURE — 93312 ECHO TRANSESOPHAGEAL: CPT

## 2024-04-04 PROCEDURE — 85014 HEMATOCRIT: CPT

## 2024-04-04 PROCEDURE — 25010000002 MIDAZOLAM PER 1 MG: Performed by: INTERNAL MEDICINE

## 2024-04-04 PROCEDURE — 25810000003 LACTATED RINGERS PER 1000 ML: Performed by: STUDENT IN AN ORGANIZED HEALTH CARE EDUCATION/TRAINING PROGRAM

## 2024-04-04 PROCEDURE — 93325 DOPPLER ECHO COLOR FLOW MAPG: CPT

## 2024-04-04 PROCEDURE — 25010000002 FENTANYL CITRATE (PF) 50 MCG/ML SOLUTION: Performed by: INTERNAL MEDICINE

## 2024-04-04 PROCEDURE — 25510000001 IOPAMIDOL PER 1 ML: Performed by: INTERNAL MEDICINE

## 2024-04-04 PROCEDURE — 93460 R&L HRT ART/VENTRICLE ANGIO: CPT | Performed by: INTERNAL MEDICINE

## 2024-04-04 PROCEDURE — 25010000002 HEPARIN (PORCINE) PER 1000 UNITS: Performed by: INTERNAL MEDICINE

## 2024-04-04 PROCEDURE — 85018 HEMOGLOBIN: CPT

## 2024-04-04 PROCEDURE — 25810000003 SODIUM CHLORIDE 0.9 % SOLUTION: Performed by: INTERNAL MEDICINE

## 2024-04-04 RX ORDER — FENTANYL CITRATE 50 UG/ML
INJECTION, SOLUTION INTRAMUSCULAR; INTRAVENOUS
Status: COMPLETED | OUTPATIENT
Start: 2024-04-04 | End: 2024-04-04

## 2024-04-04 RX ORDER — HEPARIN SODIUM 1000 [USP'U]/ML
INJECTION, SOLUTION INTRAVENOUS; SUBCUTANEOUS
Status: DISCONTINUED | OUTPATIENT
Start: 2024-04-04 | End: 2024-04-04 | Stop reason: HOSPADM

## 2024-04-04 RX ORDER — SODIUM CHLORIDE 0.9 % (FLUSH) 0.9 %
10 SYRINGE (ML) INJECTION AS NEEDED
Status: CANCELLED | OUTPATIENT
Start: 2024-04-04

## 2024-04-04 RX ORDER — LIDOCAINE HYDROCHLORIDE 20 MG/ML
INJECTION, SOLUTION INFILTRATION; PERINEURAL AS NEEDED
Status: DISCONTINUED | OUTPATIENT
Start: 2024-04-04 | End: 2024-04-04 | Stop reason: SURG

## 2024-04-04 RX ORDER — VERAPAMIL HYDROCHLORIDE 2.5 MG/ML
INJECTION, SOLUTION INTRAVENOUS
Status: DISCONTINUED | OUTPATIENT
Start: 2024-04-04 | End: 2024-04-04 | Stop reason: HOSPADM

## 2024-04-04 RX ORDER — SODIUM CHLORIDE 0.9 % (FLUSH) 0.9 %
10 SYRINGE (ML) INJECTION EVERY 12 HOURS SCHEDULED
Status: DISCONTINUED | OUTPATIENT
Start: 2024-04-04 | End: 2024-04-04 | Stop reason: HOSPADM

## 2024-04-04 RX ORDER — PROPOFOL 10 MG/ML
VIAL (ML) INTRAVENOUS AS NEEDED
Status: DISCONTINUED | OUTPATIENT
Start: 2024-04-04 | End: 2024-04-04 | Stop reason: SURG

## 2024-04-04 RX ORDER — SODIUM CHLORIDE 0.9 % (FLUSH) 0.9 %
10 SYRINGE (ML) INJECTION EVERY 12 HOURS SCHEDULED
Status: CANCELLED | OUTPATIENT
Start: 2024-04-04

## 2024-04-04 RX ORDER — SODIUM CHLORIDE 0.9 % (FLUSH) 0.9 %
10 SYRINGE (ML) INJECTION AS NEEDED
Status: DISCONTINUED | OUTPATIENT
Start: 2024-04-04 | End: 2024-04-04 | Stop reason: HOSPADM

## 2024-04-04 RX ORDER — SODIUM CHLORIDE 9 MG/ML
40 INJECTION, SOLUTION INTRAVENOUS AS NEEDED
Status: CANCELLED | OUTPATIENT
Start: 2024-04-04

## 2024-04-04 RX ORDER — SODIUM CHLORIDE 9 MG/ML
75 INJECTION, SOLUTION INTRAVENOUS CONTINUOUS
Status: DISCONTINUED | OUTPATIENT
Start: 2024-04-04 | End: 2024-04-04 | Stop reason: HOSPADM

## 2024-04-04 RX ORDER — ACETAMINOPHEN 325 MG/1
650 TABLET ORAL EVERY 4 HOURS PRN
Status: DISCONTINUED | OUTPATIENT
Start: 2024-04-04 | End: 2024-04-04 | Stop reason: HOSPADM

## 2024-04-04 RX ORDER — MIDAZOLAM HYDROCHLORIDE 1 MG/ML
INJECTION INTRAMUSCULAR; INTRAVENOUS
Status: COMPLETED | OUTPATIENT
Start: 2024-04-04 | End: 2024-04-04

## 2024-04-04 RX ORDER — SODIUM CHLORIDE, SODIUM LACTATE, POTASSIUM CHLORIDE, CALCIUM CHLORIDE 600; 310; 30; 20 MG/100ML; MG/100ML; MG/100ML; MG/100ML
INJECTION, SOLUTION INTRAVENOUS CONTINUOUS PRN
Status: DISCONTINUED | OUTPATIENT
Start: 2024-04-04 | End: 2024-04-04 | Stop reason: SURG

## 2024-04-04 RX ORDER — LIDOCAINE HYDROCHLORIDE 20 MG/ML
INJECTION, SOLUTION INFILTRATION; PERINEURAL
Status: DISCONTINUED | OUTPATIENT
Start: 2024-04-04 | End: 2024-04-04 | Stop reason: HOSPADM

## 2024-04-04 RX ORDER — ACETAMINOPHEN 325 MG/1
650 TABLET ORAL EVERY 4 HOURS PRN
Status: CANCELLED | OUTPATIENT
Start: 2024-04-04

## 2024-04-04 RX ORDER — SODIUM CHLORIDE 9 MG/ML
40 INJECTION, SOLUTION INTRAVENOUS AS NEEDED
Status: DISCONTINUED | OUTPATIENT
Start: 2024-04-04 | End: 2024-04-04 | Stop reason: HOSPADM

## 2024-04-04 RX ORDER — FENTANYL CITRATE 50 UG/ML
INJECTION, SOLUTION INTRAMUSCULAR; INTRAVENOUS
Status: DISCONTINUED | OUTPATIENT
Start: 2024-04-04 | End: 2024-04-04 | Stop reason: HOSPADM

## 2024-04-04 RX ADMIN — MIDAZOLAM 2 MG: 1 INJECTION INTRAMUSCULAR; INTRAVENOUS at 12:25

## 2024-04-04 RX ADMIN — MIDAZOLAM 1 MG: 1 INJECTION INTRAMUSCULAR; INTRAVENOUS at 12:41

## 2024-04-04 RX ADMIN — MIDAZOLAM 1 MG: 1 INJECTION INTRAMUSCULAR; INTRAVENOUS at 12:34

## 2024-04-04 RX ADMIN — FENTANYL CITRATE 25 MCG: 50 INJECTION, SOLUTION INTRAMUSCULAR; INTRAVENOUS at 12:26

## 2024-04-04 RX ADMIN — TOPICAL ANESTHETIC 1 SPRAY: 200 SPRAY DENTAL; PERIODONTAL at 12:24

## 2024-04-04 RX ADMIN — MIDAZOLAM 1 MG: 1 INJECTION INTRAMUSCULAR; INTRAVENOUS at 12:26

## 2024-04-04 RX ADMIN — FENTANYL CITRATE 25 MCG: 50 INJECTION, SOLUTION INTRAMUSCULAR; INTRAVENOUS at 12:38

## 2024-04-04 RX ADMIN — SODIUM CHLORIDE 75 ML/HR: 9 INJECTION, SOLUTION INTRAVENOUS at 10:41

## 2024-04-04 RX ADMIN — PROPOFOL 60 MG: 10 INJECTION, EMULSION INTRAVENOUS at 13:08

## 2024-04-04 RX ADMIN — MIDAZOLAM 1 MG: 1 INJECTION INTRAMUSCULAR; INTRAVENOUS at 12:38

## 2024-04-04 RX ADMIN — FENTANYL CITRATE 25 MCG: 50 INJECTION, SOLUTION INTRAMUSCULAR; INTRAVENOUS at 12:25

## 2024-04-04 RX ADMIN — FENTANYL CITRATE 25 MCG: 50 INJECTION, SOLUTION INTRAMUSCULAR; INTRAVENOUS at 12:41

## 2024-04-04 RX ADMIN — PROPOFOL 150 MCG/KG/MIN: 10 INJECTION, EMULSION INTRAVENOUS at 13:09

## 2024-04-04 RX ADMIN — LIDOCAINE HYDROCHLORIDE 60 MG: 20 INJECTION, SOLUTION INFILTRATION; PERINEURAL at 13:08

## 2024-04-04 RX ADMIN — SODIUM CHLORIDE, SODIUM LACTATE, POTASSIUM CHLORIDE, AND CALCIUM CHLORIDE: 600; 310; 30; 20 INJECTION, SOLUTION INTRAVENOUS at 13:08

## 2024-04-04 RX ADMIN — FENTANYL CITRATE 25 MCG: 50 INJECTION, SOLUTION INTRAMUSCULAR; INTRAVENOUS at 12:33

## 2024-04-04 NOTE — DISCHARGE INSTRUCTIONS
[facility]  [address] [place] [address]    Coronary Angiogram (Radial/Ulnar Approach) After Care    Refer to this sheet in the next few weeks. These instructions provide you with information on caring for yourself after your procedure. Your caregiver may also give you more specific instructions. Your treatment has been planned according to current medical practices, but problems sometimes occur. Call your caregiver if you have any problems or questions after your procedure.    Home Care Instructions:  You may shower the day after the procedure. Remove the bandage (dressing) and gently wash the site with plain soap and water. Gently pat the site dry. You may apply a band aid daily for 2 days if desired.    Do not apply powder or lotion to the site.  Do not submerge the affected site in water for 3 to 5 days or until the site is completely healed.   Do not lift, push or pull anything over 5 pounds for 5 days after your procedure or as directed by your physician.  As a reference, a gallon of milk weighs 8 pounds.   Inspect the site at least twice daily. You may notice some bruising at the site and it may be tender for 1 to 2 weeks.     Increase your fluid intake for the next 2 days.    Keep arm elevated for 24 hours. For the remainder of the day, keep your arm in “Pledge of Allegiance” position when up and about.     You may drive 24 hours after the procedure unless otherwise instructed by your caregiver.  Do not operate machinery or power tools for 24 hours.  A responsible adult should be with you for the first 24 hours after you arrive home. Do not make any important legal decisions or sign legal papers for 24 hours.  Do not drink alcohol for 24 hours.    Metformin or any medications containing Metformin should not be taken for 48 hours after your procedure.      Call Your Doctor if:   You have unusual pain at the radial/ulnar (wrist) site.  You have redness, warmth, swelling, or pain at the  radial/ulnar (wrist) site.  You have drainage (other than a small amount of blood on the dressing).  `You have chills or a fever > 101.  Your arm becomes pale or dark, cool, tingly, or numb.  You develop chest pain, shortness of breath, feel faint or pass out.    You have heavy bleeding from the site, hold pressure on the site for 20 minutes.  If the bleeding stops, apply a fresh bandage and call your cardiologist.  However, if you        continue to have bleeding, call 911 and continue to apply pressure to the site.   You have any symptoms of a stroke.  Remember BE FAST  B-balance. Sudden trouble walking or loss of balance.  E-eyes.  Sudden changes in how you see or a sudden onset of a very bad headache.   F-face. Sudden weakness or loss of feeling of the face or facial droop on one side.   A-arms Sudden weakness or numbness in one arm.  One arm drifts down if they are both held out in front of you. This happens suddenly and usually on one side of the body.   S-speech.  Sudden trouble speaking, slurred speech or trouble understanding what are saying.   T-time  Time to call emergency services.  Write down the symptoms and the time they started.     [facility]  [address] [place] [address]    Coronary Angiogram (Radial/Ulnar Approach) After Care    Refer to this sheet in the next few weeks. These instructions provide you with information on caring for yourself after your procedure. Your caregiver may also give you more specific instructions. Your treatment has been planned according to current medical practices, but problems sometimes occur. Call your caregiver if you have any problems or questions after your procedure.    Home Care Instructions:  You may shower the day after the procedure. Remove the bandage (dressing) and gently wash the site with plain soap and water. Gently pat the site dry. You may apply a band aid daily for 2 days if desired.    Do not apply powder or lotion to the  site.  Do not submerge the affected site in water for 3 to 5 days or until the site is completely healed.   Do not lift, push or pull anything over 5 pounds for 5 days after your procedure or as directed by your physician.  As a reference, a gallon of milk weighs 8 pounds.   Inspect the site at least twice daily. You may notice some bruising at the site and it may be tender for 1 to 2 weeks.     Increase your fluid intake for the next 2 days.    Keep arm elevated for 24 hours. For the remainder of the day, keep your arm in “Pledge of Allegiance” position when up and about.     You may drive 24 hours after the procedure unless otherwise instructed by your caregiver.  Do not operate machinery or power tools for 24 hours.  A responsible adult should be with you for the first 24 hours after you arrive home. Do not make any important legal decisions or sign legal papers for 24 hours.  Do not drink alcohol for 24 hours.    Metformin or any medications containing Metformin should not be taken for 48 hours after your procedure.      Call Your Doctor if:   You have unusual pain at the radial/ulnar (wrist) site.  You have redness, warmth, swelling, or pain at the radial/ulnar (wrist) site.  You have drainage (other than a small amount of blood on the dressing).  `You have chills or a fever > 101.  Your arm becomes pale or dark, cool, tingly, or numb.  You develop chest pain, shortness of breath, feel faint or pass out.    You have heavy bleeding from the site, hold pressure on the site for 20 minutes.  If the bleeding stops, apply a fresh bandage and call your cardiologist.  However, if you        continue to have bleeding, call 911 and continue to apply pressure to the site.   You have any symptoms of a stroke.  Remember BE FAST  B-balance. Sudden trouble walking or loss of balance.  E-eyes.  Sudden changes in how you see or a sudden onset of a very bad headache.   F-face. Sudden weakness or loss of feeling of the face or  facial droop on one side.   A-arms Sudden weakness or numbness in one arm.  One arm drifts down if they are both held out in front of you. This happens suddenly and usually on one side of the body.   S-speech.  Sudden trouble speaking, slurred speech or trouble understanding what are saying.   T-time  Time to call emergency services.  Write down the symptoms and the time they started.

## 2024-04-04 NOTE — ANESTHESIA PREPROCEDURE EVALUATION
Anesthesia Evaluation     Patient summary reviewed and Nursing notes reviewed   no history of anesthetic complications:   NPO Solid Status: > 8 hours  NPO Liquid Status: > 2 hours           Airway   Mallampati: II  TM distance: >3 FB  Neck ROM: full  Dental      Pulmonary    Cardiovascular     ECG reviewed    (+) hypertension, valvular problems/murmurs MR, MOFFETT, hyperlipidemia    ROS comment: Echo: Severe Mitral regurgitation    Neuro/Psych  GI/Hepatic/Renal/Endo      Musculoskeletal     Abdominal    Substance History      OB/GYN          Other   arthritis,                       Anesthesia Plan    ASA 3     MAC     intravenous induction     Anesthetic plan, risks, benefits, and alternatives have been provided, discussed and informed consent has been obtained with: patient.        CODE STATUS:

## 2024-04-04 NOTE — PROGRESS NOTES
I talked to patient's wife regarding UNIQUE.  Despite use of fentanyl and Versed he remained not well sedated.  Anesthesia assistance was obtained to complete the study.  Of note is that patient was slightly hypoxic with initiation of anesthesia and sedation.  This was well-managed on oxygen therapy.    Findings of flail leaflet and severe posterior with moderate to severe anterior mitral prolapse was discussed with patient's wife.  There was also probable flail P2 scallop.  Mitral regurgitation is severe.  I therefore recommended he proceed with surgical intervention is over the past several years is also demonstrated worsening diastolic function and intermittent pulmonary hypertension.  Also has left atrial enlargement which eventually would lead to atrial fibrillation in the setting which will further complicate his care.  Follow-up all these reasons and the fact that I think is operative mortality will increase the older he gets I think he should proceed on with surgical intervention despite lack of perceived symptoms.  She asked me about travel and delay of the surgery for several weeks to months.  Will check a proBNP to assess hemodynamic status.  However I recommended against travel with increased stresses of travel which I think may precipitate A-fib in the setting especially proBNP is increased.

## 2024-04-04 NOTE — TELEPHONE ENCOUNTER
I had asked a proBNP to be added to labs done and they thought it could be added to the precath labs.  Can you please see if the results are available.  I am not able to find them in epic

## 2024-04-04 NOTE — ANESTHESIA POSTPROCEDURE EVALUATION
Patient: Mirza Zimmer    Procedure Summary       Date: 04/04/24 Room / Location: [facility] CARDIOVASCULAR OUTPATIENT    Anesthesia Start: 1300 Anesthesia Stop: 1338    Procedure: ADULT TRANSESOPHAGEAL ECHO (UNIQUE) W/ CONT IF NECESSARY PER PROTOCOL Diagnosis: (Valvular Disease)    Scheduled Providers: Adriana Vaughn MD Provider: Flavio Holder MD    Anesthesia Type: MAC ASA Status: 3            Anesthesia Type: MAC    Vitals  Vitals Value Taken Time   /64 04/04/24 1400   Temp 36 °C (96.8 °F) 04/04/24 1340   Pulse 62 04/04/24 1410   Resp 10 04/04/24 1418   SpO2 93 % 04/04/24 1410           Post Anesthesia Care and Evaluation    Patient location during evaluation: bedside  Patient participation: complete - patient participated  Level of consciousness: awake and alert  Pain management: adequate    Airway patency: patent  Anesthetic complications: No anesthetic complications  PONV Status: controlled  Cardiovascular status: blood pressure returned to baseline and acceptable  Respiratory status: acceptable  Hydration status: acceptable

## 2024-04-04 NOTE — Clinical Note
Hemostasis started on the right brachial vein. Manual pressure applied to vessel. Manual pressure was held by LH RT(R). Manual pressure was held for 5 min. Hemostasis achieved successfully. Closure device additional comment: tegaderm

## 2024-04-05 LAB
BH CV ECHO MEAS - MV DEC SLOPE: 381.1 CM/SEC2
BH CV ECHO MEAS - MV MAX PG: 4.7 MMHG
BH CV ECHO MEAS - MV MEAN PG: 1.59 MMHG
BH CV ECHO MEAS - MV P1/2T: 78.9 MSEC
BH CV ECHO MEAS - MV V2 VTI: 36.9 CM
BH CV ECHO MEAS - MVA(P1/2T): 2.8 CM2
HCT VFR BLDA CALC: 37 % (ref 38–51)
HCT VFR BLDA CALC: 38 % (ref 38–51)
HGB BLDA-MCNC: 12.6 G/DL (ref 12–17)
HGB BLDA-MCNC: 12.9 G/DL (ref 12–17)
LV EF 2D ECHO EST: 60 %
SAO2 % BLDA: 66 % (ref 95–98)
SAO2 % BLDA: 91 % (ref 95–98)
SINUS: 4.1 CM
STJ: 3.3 CM

## 2024-04-06 ENCOUNTER — TELEPHONE (OUTPATIENT)
Dept: CARDIOLOGY | Facility: CLINIC | Age: 79
End: 2024-04-06
Payer: MEDICARE

## 2024-04-06 NOTE — TELEPHONE ENCOUNTER
I talked to patient wife regarding findings of UNIQUE and cardiac cath including negative proBNP and myocardial bridge.  My preference would be that he have this done prior to travel however will discuss this further with Dr Ramsey as well as need for any further intervention on the bridge.

## 2024-04-09 ENCOUNTER — OFFICE VISIT (OUTPATIENT)
Dept: CARDIAC SURGERY | Facility: CLINIC | Age: 79
End: 2024-04-09
Payer: MEDICARE

## 2024-04-09 VITALS
SYSTOLIC BLOOD PRESSURE: 124 MMHG | BODY MASS INDEX: 23.64 KG/M2 | HEART RATE: 61 BPM | TEMPERATURE: 97.7 F | WEIGHT: 156 LBS | RESPIRATION RATE: 20 BRPM | DIASTOLIC BLOOD PRESSURE: 72 MMHG | OXYGEN SATURATION: 96 % | HEIGHT: 68 IN

## 2024-04-09 DIAGNOSIS — C18.7 MALIGNANT NEOPLASM OF SIGMOID COLON: ICD-10-CM

## 2024-04-09 DIAGNOSIS — I34.1 MVP (MITRAL VALVE PROLAPSE): ICD-10-CM

## 2024-04-09 DIAGNOSIS — I34.0 NON-RHEUMATIC MITRAL REGURGITATION: ICD-10-CM

## 2024-04-09 DIAGNOSIS — I77.810 DILATED AORTIC ROOT: ICD-10-CM

## 2024-04-09 DIAGNOSIS — I34.0 NONRHEUMATIC MITRAL VALVE REGURGITATION: ICD-10-CM

## 2024-04-09 DIAGNOSIS — I10 HYPERTENSION, ESSENTIAL: Primary | ICD-10-CM

## 2024-04-09 DIAGNOSIS — I71.20 THORACIC AORTIC ANEURYSM WITHOUT RUPTURE, UNSPECIFIED PART: ICD-10-CM

## 2024-04-09 NOTE — LETTER
April 14, 2024     Radha Rollins MD  [address] [id number]    Patient: Mirza Zimmer   YOB: 1945   Date of Visit: 4/9/2024     Dear Radha Rollins MD:       Thank you for referring Mirza Zimmer to me for evaluation. Below are the relevant portions of my assessment and plan of care.    If you have questions, please do not hesitate to call me. I look forward to following Mirza along with you.         Sincerely,        Olivier Ramsey MD        CC: MD Roxy Bland Sebastian, MD  04/14/24 1734  Sign when Signing Visit  4/14/2024    Seen on 4/9/2024    Chief Complaint     Evaluation of mitral regurgitation    History of Present Illness:       Dear Mini and Colleagues,  It was nice to see Mirza Zimmer in consultation at your request. He is a 79 y.o. male with hypertension, hyperlipidemia, arthritis, colon cancer who has developed mild decline and may be dyspnea on significant exertion.  The patient is a runner and he states that he cannot run as much but overall he is very fit and he exercise frequently. He denies syncope, TIA, orthopnea, PND or lower extremity edema.  He had initial echocardiogram 2017 that showed significant mitral regurgitation and prolapse of the posterior leaflet with anterior directed jet.  Following a CT scan showed an ascending aorta of 4.5 cm.  The patient at that time remained asymptomatic and he was treated medically.  He has no family history of aneurysms, dissections or connective tissue disorders.      Patient Active Problem List   Diagnosis   • Arthritis   • Hyperlipidemia   • Hypertension, essential   • MOFFETT (dyspnea on exertion)   • Non-rheumatic mitral regurgitation   • MVP (mitral valve prolapse)   • Dilated aortic root   • Increased prostate specific antigen (PSA) velocity   • Other ill-defined heart diseases    • History of colon polyps   • Thoracic aortic aneurysm without rupture   • Malignant neoplasm of sigmoid colon    • Cancer of sigmoid colon   • Personal history of colon cancer   • Nonrheumatic mitral valve regurgitation       Past Medical History:   Diagnosis Date   • Actinic keratosis    • 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 EDGARD LEE   • Arthritis    • BPH (benign prostatic hyperplasia)     FOLLOWED BY DR. MARK BALL   • Cardiomegaly     FOLLOWED BY DR. SAMUEL RIVAS   • Cataract     BILATERAL   • Chronic prostatitis     FOLLOWED BY DR. MARK BALL   • Colon cancer 09/29/2021    Sigmoid colon superficial fragments of eroded tubulovillous adenoma with at intramucosal adenocarcinoma   • Colon polyps     FOLLOWED BY DR. 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 EDGARD LEE   • Increased prostate specific antigen (PSA) velocity 5/8/2017   • Laceration of right index finger 08/01/2019   • Lichen simplex chronicus    • Mechanical ptosis, left    • Mitral regurgitation    • Mitral valve insufficiency    • Muscular aches    • MVP (mitral valve prolapse)    • Nocturia 06/2021   • Perichondritis of right external ear 07/2021   • Presbyopia    • Regular astigmatism of both eyes    • Seborrheic keratosis    • Thoracic aortic aneurysm without rupture    • Thoracic aortic ectasia 07/2017   • Trochanteric bursitis of left hip        Past Surgical History:   Procedure Laterality Date   • APPENDECTOMY  10/12/2021    Done as a part of a colon resection   • CARDIAC CATHETERIZATION N/A 4/4/2024    Procedure: Right and Left Heart Cath;  Surgeon: Williams Marques MD;  Location: [facility] INVASIVE LOCATION;  Service: Cardiovascular;  Laterality: N/A;   • COLON RESECTION N/A 10/12/2021    Procedure: LAPAROSCOPIC LEFT COLECTOMY, APPENDECTOMY;  Surgeon: Cesar Rinaldi MD;   Location:  [facility];  Service: General;  Laterality: N/A;   • COLONOSCOPY N/A 8/28/2019    5 MM TUBULAR ADENOMA POLYP IN HEPATIC FLEXURE, 6 MM SERRATED ADENOMA POLYP IN TRANSVERSE, MULTIPLE DIVERTICULA IN SIGMOID, RESCOPE IN 2 YRS, DR. KEVIN LUDWIG AT [facility]   • COLONOSCOPY N/A 9/29/2021    3 TUBULAR ADENOMA POLYPS IN ASCENDING, AN INFILTRATIVE MASS IN SIGMOID, PATH: TUBULOVILLOUS ADENOMA WITH AT LEAST INTRAMUCOSAL ADENOCARCINOMA, AREA TATTOOED, RESCOPE IN1 YR, DR. KEVIN LUDWIG AT [facility]   • COLONOSCOPY N/A 10/19/2022    Procedure: COLONOSCOPY to CECUM WITH COLD BX POLYPECTOMY;  Surgeon: Cesar Rinaldi MD;  Location:  [facility];  Service: General;  Laterality: N/A;  HX COLON CA  --POLYP, DIVERTICULOSIS       • COLONOSCOPY W/ POLYPECTOMY N/A 09/03/2004    5 MM ADENOMATOUS POLYP IN SIGMOID, OTHERWISE WNL, RESCOPE IN 3 YRS, DR. ANGI CONLEY AT [facility]   • COLONOSCOPY W/ POLYPECTOMY N/A 05/28/2013    3 MM SESSILE HYPERPLASTIC POLYP 50CM FROM ANUS, A FEW SMALL DIVERTICULA IN SIGMOID, NON BLEEDING INTERNAL HEMORRHOIDS GRADE II, DR. ANGI CONLEY AT [facility]   • COLONOSCOPY W/ POLYPECTOMY N/A 02/29/2008    5 MM HYPERPLASTIC POLYP AT 60 CM FROM ANUS, RESCOPE IN 5 YRS, DR. ANGI CONLEY AT [facility]   • EYE SURGERY  2003    ATYPICAL MOLE EXCSION, DR. SANCHEZ AT [facility]   • PROSTATE SURGERY N/A 08/15/2018    UROLIFT, PROSTATE VOLUME 48CC, NO ABNORMALITIES, DR. COLLIN GARVIN   • TONSILLECTOMY Bilateral 1958    DONE IN [place], [place]       Allergies   Allergen Reactions   • Sulfa Antibiotics Unknown - Low Severity         Current Outpatient Medications:   •  Cetirizine HCl 10 MG capsule, Take 10 mg by mouth As Needed., Disp: , Rfl:   •  glucosamine-chondroitin 500-400 MG capsule capsule, Take 1 capsule by mouth Daily. HOLD PER MD INSTR, Disp: , Rfl:   •  losartan (COZAAR) 25 MG tablet, TAKE ONE TABLET BY MOUTH DAILY, Disp: 90 tablet, Rfl: 3  •  simvastatin (ZOCOR) 20 MG tablet, TAKE ONE TABLET BY MOUTH ONCE  NIGHTLY, Disp: 90 tablet, Rfl: 3  •  tadalafil (CIALIS) 5 MG tablet, TAKE ONE TABLET BY MOUTH DAILY, Disp: 90 tablet, Rfl: 3  •  vitamin D3 125 MCG (5000 UT) capsule capsule, Take 1 capsule by mouth Daily., Disp: , Rfl:   •  amoxicillin (AMOXIL) 500 MG capsule, Take 4 capsules by mouth As Needed. Dental Cleaning (Patient not taking: Reported on 4/9/2024), Disp: , Rfl:     Social History     Socioeconomic History   • Marital status:    Tobacco Use   • Smoking status: Never   • Smokeless tobacco: Never   • Tobacco comments:     Caffeine use   Vaping Use   • Vaping status: Never Used   Substance and Sexual Activity   • Alcohol use: Yes     Alcohol/week: 1.0 standard drink of alcohol     Types: 1 Glasses of wine per week     Comment: social occasional use   • Drug use: Never   • Sexual activity: Yes     Partners: Female     Birth control/protection: None       Family History   Problem Relation Age of Onset   • Dementia Mother    • Arthritis Mother    • Diabetes Father    • Sudden death Father    • Heart attack Father         Probable cause of his death.   • Heart disease Father    • Breast cancer Sister    • Cancer Sister         Breast cancer   • Mental illness Maternal Grandmother         Manic depressive   • Gallbladder disease Son    • Malig Hyperthermia Neg Hx      Review of Systems:  As HPI, otherwise noncontributory    Physical Exam:    Vital Signs:  Weight: 70.8 kg (156 lb)   Body mass index is 23.72 kg/m².  Temp: 97.7 °F (36.5 °C)   Heart Rate: 61   BP: 124/72     Constitutional:       Appearance: Well-developed.   Eyes:      Conjunctiva/sclera: Conjunctivae normal.      Pupils: Pupils are equal, round, and reactive to light.   HENT:      Head: Normocephalic and atraumatic.      Nose: Nose normal.   Neck:      Thyroid: No thyromegaly.      Vascular: No JVD.      Lymphadenopathy: No cervical adenopathy.   Pulmonary:      Effort: Pulmonary effort is normal.      Breath sounds: Normal breath sounds. No  rales.   Cardiovascular:      Normal rate. Regular rhythm.      Murmurs: There is a grade 4/6 high frequency blowing holosystolic murmur at the apex.      No gallop.    Pulses:     Intact distal pulses. No decreased pulses.   Edema:     Peripheral edema absent.   Abdominal:      General: Bowel sounds are normal. There is no distension.      Palpations: Abdomen is soft. There is no abdominal mass.      Tenderness: There is no abdominal tenderness.   Musculoskeletal: Normal range of motion.         General: No tenderness or deformity.      Cervical back: Normal range of motion and neck supple. Skin:     General: Skin is warm and dry.      Findings: No erythema or rash.   Neurological:      Mental Status: Alert and oriented to person, place, and time.      Deep Tendon Reflexes: Reflexes are normal and symmetric.   Psychiatric:         Behavior: Behavior normal.          Assessment:     Problems Addressed this Visit          Cardiac and Vasculature    Hypertension, essential - Primary    Non-rheumatic mitral regurgitation    MVP (mitral valve prolapse)    Dilated aortic root    Thoracic aortic aneurysm without rupture    Nonrheumatic mitral valve regurgitation       Hematology and Neoplasia    Malignant neoplasm of sigmoid colon     Diagnoses         Codes Comments    Hypertension, essential    -  Primary ICD-10-CM: I10  ICD-9-CM: 401.9     Non-rheumatic mitral regurgitation     ICD-10-CM: I34.0  ICD-9-CM: 424.0     MVP (mitral valve prolapse)     ICD-10-CM: I34.1  ICD-9-CM: 424.0     Dilated aortic root     ICD-10-CM: I77.810  ICD-9-CM: 447.71     Thoracic aortic aneurysm without rupture, unspecified part     ICD-10-CM: I71.20  ICD-9-CM: 441.2     Nonrheumatic mitral valve regurgitation     ICD-10-CM: I34.0  ICD-9-CM: 424.0     Malignant neoplasm of sigmoid colon     ICD-10-CM: C18.7  ICD-9-CM: 153.3                         Assessment/recommendation:     Severe symptomatic mitral regurgitation with  myxomatous-degenerative mitral valve etiology and early symptoms with progression.  The patient is very active and he has a level of compensation however he has had severe mitral regurgitation for many years.  I am concerned he is decompensated and he is at the risk of further rotation of the left ventricle or atrial fibrillation.  He has had a cardiac cath that shows no significant coronary artery disease and no significant elevation of the pulmonary artery pressures.  I discussed with the patient the natural history of mitral regurgitation and the guidelines for intervention.  I recommend mitral valve repair or replacement to prolong survival, symptomatic relief and to prevent adverse events.  I discussed with the patient the details of such an operation and the repairability rate of 95 to 98%.  If that is not possible then biologic mitral regurgitation should be performed at the same time.  I discussed the multiple approach to the mitral valve by my recommendation of anterior approach in his case.  4.5 cm ascending aortic dilatation.  I recommend a CT scan of the chest to further evaluate objectively the size of the aorta in case aortic replacement is needed.  Hypertension, will continue same regimen.  I advised the patient to avoid strenuous exercising and hopefully to have surgery soon.  He is interested in pursuing a trip to [facility] where he works as a .  I advised him to immediate consultation to cardiovascular center chest pain or progression of symptoms develop.  He wished to have the surgery after his return    Thank you for allowing me to participate in his care.    Regards,    Olivier Ramsey M.D.    I spent over 65 minutes before, during after the office visit and reviewing the records, examining the patient, reviewing interpreting myself the cardiac cath, the echocardiac, the UINQUE, spent time in discussing the findings and options to the patient and wife who is a physician, discussing the  natural history disease and the type of decompensation possible, spent time discussing the surgical approach the risk and benefits and the different alternatives, and spent time in documenting in the electronic record and discussing the case with the cardiology team

## 2024-04-11 ENCOUNTER — TELEPHONE (OUTPATIENT)
Dept: CARDIOLOGY | Facility: CLINIC | Age: 79
End: 2024-04-11
Payer: MEDICARE

## 2024-04-11 NOTE — TELEPHONE ENCOUNTER
"  Caller: Mirza Zimmer \"Krzysztof\"    Relationship: Self    Best call back number: [phone]    What is the best time to reach you: ANYTME    Who are you requesting to speak with (clinical staff, provider,  specific staff member): CLINICAL        What was the call regarding: PT WILL BE TRAVELING OUT OF THE COUNTRY AT THE END OF MONTH.  WOULD DR RIVAS BE WILLING TO FILL OUT MED ET PAPERS.  (IT IS LIKE EXTRA INSURANCE WHEN TRAVELING [place]) PLEASE CALL TO DISCUSS WITH PT. IF DR RIVAS IS WILLING TO FILL OUT PAPERWORK HE WILL GET THEM TO YOU.        "

## 2024-04-14 NOTE — PROGRESS NOTES
4/14/2024    Seen on 4/9/2024    Chief Complaint     Evaluation of mitral regurgitation    History of Present Illness:       Dear Mini and Colleagues,  It was nice to see Mirza Zimmer in consultation at your request. He is a 79 y.o. male with hypertension, hyperlipidemia, arthritis, colon cancer who has developed mild decline and may be dyspnea on significant exertion.  The patient is a runner and he states that he cannot run as much but overall he is very fit and he exercise frequently. He denies syncope, TIA, orthopnea, PND or lower extremity edema.  He had initial echocardiogram 2017 that showed significant mitral regurgitation and prolapse of the posterior leaflet with anterior directed jet.  Following a CT scan showed an ascending aorta of 4.5 cm.  The patient at that time remained asymptomatic and he was treated medically.  He has no family history of aneurysms, dissections or connective tissue disorders.      Patient Active Problem List   Diagnosis    Arthritis    Hyperlipidemia    Hypertension, essential    MOFFETT (dyspnea on exertion)    Non-rheumatic mitral regurgitation    MVP (mitral valve prolapse)    Dilated aortic root    Increased prostate specific antigen (PSA) velocity    Other ill-defined heart diseases     History of colon polyps    Thoracic aortic aneurysm without rupture    Malignant neoplasm of sigmoid colon    Cancer of sigmoid colon    Personal history of colon cancer    Nonrheumatic mitral valve regurgitation       Past Medical History:   Diagnosis Date    Actinic keratosis     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 EDGARD LEE    Arthritis     BPH (benign prostatic hyperplasia)     FOLLOWED BY DR. MARK BALL    Cardiomegaly     FOLLOWED BY DR. BAKER RIVAS    Cataract     BILATERAL    Chronic prostatitis     FOLLOWED BY DR. MARK BALL    Colon cancer 09/29/2021    Sigmoid colon superficial fragments of eroded tubulovillous adenoma with at intramucosal adenocarcinoma    Colon  polyps     FOLLOWED BY DR. 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 EDGARD LEE    Increased prostate specific antigen (PSA) velocity 5/8/2017    Laceration of right index finger 08/01/2019    Lichen simplex chronicus     Mechanical ptosis, left     Mitral regurgitation     Mitral valve insufficiency     Muscular aches     MVP (mitral valve prolapse)     Nocturia 06/2021    Perichondritis of right external ear 07/2021    Presbyopia     Regular astigmatism of both eyes     Seborrheic keratosis     Thoracic aortic aneurysm without rupture     Thoracic aortic ectasia 07/2017    Trochanteric bursitis of left hip        Past Surgical History:   Procedure Laterality Date    APPENDECTOMY  10/12/2021    Done as a part of a colon resection    CARDIAC CATHETERIZATION N/A 4/4/2024    Procedure: Right and Left Heart Cath;  Surgeon: Williams Marques MD;  Location: [facility] CATH INVASIVE LOCATION;  Service: Cardiovascular;  Laterality: N/A;    COLON RESECTION N/A 10/12/2021    Procedure: LAPAROSCOPIC LEFT COLECTOMY, APPENDECTOMY;  Surgeon: Cesar Rinaldi MD;  Location: [facility] OR;  Service: General;  Laterality: N/A;    COLONOSCOPY N/A 8/28/2019    5 MM TUBULAR ADENOMA POLYP IN HEPATIC FLEXURE, 6 MM SERRATED ADENOMA POLYP IN TRANSVERSE, MULTIPLE DIVERTICULA IN SIGMOID, RESCOPE IN 2 YRS, DR. KEVIN LUDWIG AT [facility]    COLONOSCOPY N/A 9/29/2021    3 TUBULAR ADENOMA POLYPS IN ASCENDING, AN INFILTRATIVE MASS IN SIGMOID, PATH: TUBULOVILLOUS ADENOMA WITH AT LEAST INTRAMUCOSAL ADENOCARCINOMA, AREA TATTOOED, RESCOPE IN1 YR, DR. KEVIN LUDWIG AT [facility]    COLONOSCOPY N/A 10/19/2022    Procedure: COLONOSCOPY to CECUM WITH COLD BX POLYPECTOMY;  Surgeon: Cesar Rinaldi MD;  Location: [facility]  ENDOSCOPY;  Service: General;  Laterality: N/A;  HX COLON CA  --POLYP, DIVERTICULOSIS        COLONOSCOPY W/ POLYPECTOMY N/A 09/03/2004    5 MM ADENOMATOUS POLYP IN SIGMOID, OTHERWISE WNL, RESCOPE IN 3 YRS, DR. ANGI CONLEY AT [facility]    COLONOSCOPY W/ POLYPECTOMY N/A 05/28/2013    3 MM SESSILE HYPERPLASTIC POLYP 50CM FROM ANUS, A FEW SMALL DIVERTICULA IN SIGMOID, NON BLEEDING INTERNAL HEMORRHOIDS GRADE II, DR. ANGI CONLEY AT [facility]    COLONOSCOPY W/ POLYPECTOMY N/A 02/29/2008    5 MM HYPERPLASTIC POLYP AT 60 CM FROM ANUS, RESCOPE IN 5 YRS, DR. ANGI CONLEY AT [facility]    EYE SURGERY  2003    ATYPICAL MOLE EXCSION, DR. SANCHEZ AT [facility]    PROSTATE SURGERY N/A 08/15/2018    UROLIFT, PROSTATE VOLUME 48CC, NO ABNORMALITIES, DR. COLLIN GARVIN    TONSILLECTOMY Bilateral 1958    DONE IN [place], [place]       Allergies   Allergen Reactions    Sulfa Antibiotics Unknown - Low Severity         Current Outpatient Medications:     Cetirizine HCl 10 MG capsule, Take 10 mg by mouth As Needed., Disp: , Rfl:     glucosamine-chondroitin 500-400 MG capsule capsule, Take 1 capsule by mouth Daily. HOLD PER MD INSTR, Disp: , Rfl:     losartan (COZAAR) 25 MG tablet, TAKE ONE TABLET BY MOUTH DAILY, Disp: 90 tablet, Rfl: 3    simvastatin (ZOCOR) 20 MG tablet, TAKE ONE TABLET BY MOUTH ONCE NIGHTLY, Disp: 90 tablet, Rfl: 3    tadalafil (CIALIS) 5 MG tablet, TAKE ONE TABLET BY MOUTH DAILY, Disp: 90 tablet, Rfl: 3    vitamin D3 125 MCG (5000 UT) capsule capsule, Take 1 capsule by mouth Daily., Disp: , Rfl:     amoxicillin (AMOXIL) 500 MG capsule, Take 4 capsules by mouth As Needed. Dental Cleaning (Patient not taking: Reported on 4/9/2024), Disp: , Rfl:     Social History     Socioeconomic History    Marital status:    Tobacco Use    Smoking status: Never    Smokeless tobacco: Never    Tobacco comments:     Caffeine use   Vaping Use    Vaping status: Never Used   Substance and Sexual Activity    Alcohol use: Yes      Alcohol/week: 1.0 standard drink of alcohol     Types: 1 Glasses of wine per week     Comment: social occasional use    Drug use: Never    Sexual activity: Yes     Partners: Female     Birth control/protection: None       Family History   Problem Relation Age of Onset    Dementia Mother     Arthritis Mother     Diabetes Father     Sudden death Father     Heart attack Father         Probable cause of his death.    Heart disease Father     Breast cancer Sister     Cancer Sister         Breast cancer    Mental illness Maternal Grandmother         Manic depressive    Gallbladder disease Son     Malig Hyperthermia Neg Hx      Review of Systems:  As HPI, otherwise noncontributory    Physical Exam:    Vital Signs:  Weight: 70.8 kg (156 lb)   Body mass index is 23.72 kg/m².  Temp: 97.7 °F (36.5 °C)   Heart Rate: 61   BP: 124/72     Constitutional:       Appearance: Well-developed.   Eyes:      Conjunctiva/sclera: Conjunctivae normal.      Pupils: Pupils are equal, round, and reactive to light.   HENT:      Head: Normocephalic and atraumatic.      Nose: Nose normal.   Neck:      Thyroid: No thyromegaly.      Vascular: No JVD.      Lymphadenopathy: No cervical adenopathy.   Pulmonary:      Effort: Pulmonary effort is normal.      Breath sounds: Normal breath sounds. No rales.   Cardiovascular:      Normal rate. Regular rhythm.      Murmurs: There is a grade 4/6 high frequency blowing holosystolic murmur at the apex.      No gallop.    Pulses:     Intact distal pulses. No decreased pulses.   Edema:     Peripheral edema absent.   Abdominal:      General: Bowel sounds are normal. There is no distension.      Palpations: Abdomen is soft. There is no abdominal mass.      Tenderness: There is no abdominal tenderness.   Musculoskeletal: Normal range of motion.         General: No tenderness or deformity.      Cervical back: Normal range of motion and neck supple. Skin:     General: Skin is warm and dry.      Findings: No erythema or  rash.   Neurological:      Mental Status: Alert and oriented to person, place, and time.      Deep Tendon Reflexes: Reflexes are normal and symmetric.   Psychiatric:         Behavior: Behavior normal.          Assessment:     Problems Addressed this Visit          Cardiac and Vasculature    Hypertension, essential - Primary    Non-rheumatic mitral regurgitation    MVP (mitral valve prolapse)    Dilated aortic root    Thoracic aortic aneurysm without rupture    Nonrheumatic mitral valve regurgitation       Hematology and Neoplasia    Malignant neoplasm of sigmoid colon     Diagnoses         Codes Comments    Hypertension, essential    -  Primary ICD-10-CM: I10  ICD-9-CM: 401.9     Non-rheumatic mitral regurgitation     ICD-10-CM: I34.0  ICD-9-CM: 424.0     MVP (mitral valve prolapse)     ICD-10-CM: I34.1  ICD-9-CM: 424.0     Dilated aortic root     ICD-10-CM: I77.810  ICD-9-CM: 447.71     Thoracic aortic aneurysm without rupture, unspecified part     ICD-10-CM: I71.20  ICD-9-CM: 441.2     Nonrheumatic mitral valve regurgitation     ICD-10-CM: I34.0  ICD-9-CM: 424.0     Malignant neoplasm of sigmoid colon     ICD-10-CM: C18.7  ICD-9-CM: 153.3                         Assessment/recommendation:     Severe symptomatic mitral regurgitation with myxomatous-degenerative mitral valve etiology and early symptoms with progression.  The patient is very active and he has a level of compensation however he has had severe mitral regurgitation for many years.  I am concerned he is decompensated and he is at the risk of further rotation of the left ventricle or atrial fibrillation.  He has had a cardiac cath that shows no significant coronary artery disease and no significant elevation of the pulmonary artery pressures.  I discussed with the patient the natural history of mitral regurgitation and the guidelines for intervention.  I recommend mitral valve repair or replacement to prolong survival, symptomatic relief and to prevent  adverse events.  I discussed with the patient the details of such an operation and the repairability rate of 95 to 98%.  If that is not possible then biologic mitral regurgitation should be performed at the same time.  I discussed the multiple approach to the mitral valve by my recommendation of anterior approach in his case.  4.5 cm ascending aortic dilatation.  I recommend a CT scan of the chest to further evaluate objectively the size of the aorta in case aortic replacement is needed.  Hypertension, will continue same regimen.  I advised the patient to avoid strenuous exercising and hopefully to have surgery soon.  He is interested in pursuing a trip to [facility] where he works as a .  I advised him to immediate consultation to cardiovascular center chest pain or progression of symptoms develop.  He wished to have the surgery after his return    Thank you for allowing me to participate in his care.    Regards,    Olivier Ramsey M.D.    I spent over 65 minutes before, during after the office visit and reviewing the records, examining the patient, reviewing interpreting myself the cardiac cath, the echocardiac, the UNIQUE, spent time in discussing the findings and options to the patient and wife who is a physician, discussing the natural history disease and the type of decompensation possible, spent time discussing the surgical approach the risk and benefits and the different alternatives, and spent time in documenting in the electronic record and discussing the case with the cardiology team

## 2024-04-15 NOTE — TELEPHONE ENCOUNTER
This has been discussed with patient and family already. Dr. Vaughn and Dr. Ramsey both recommend against travel. We cannot do this paperwork.

## 2024-04-16 ENCOUNTER — HOSPITAL ENCOUNTER (OUTPATIENT)
Facility: HOSPITAL | Age: 79
Discharge: HOME | End: 2024-04-16
Admitting: INTERNAL MEDICINE
Payer: MEDICARE

## 2024-04-16 PROCEDURE — 25510000001 IOPAMIDOL PER 1 ML: Performed by: INTERNAL MEDICINE

## 2024-04-16 PROCEDURE — 71275 CT ANGIOGRAPHY CHEST: CPT

## 2024-04-16 RX ADMIN — IOPAMIDOL 100 ML: 755 INJECTION, SOLUTION INTRAVENOUS at 17:11

## 2024-04-22 ENCOUNTER — TELEPHONE (OUTPATIENT)
Dept: CARDIOLOGY | Facility: CLINIC | Age: 79
End: 2024-04-22
Payer: MEDICARE

## 2024-04-22 NOTE — TELEPHONE ENCOUNTER
Please let him know that aorta measures about 4.2 cm at the greatest diameter, this was unable to be compared to prior CT due to motion artifact.  Please make sure that Dr. Ramsey also has a copy of this report.  Continue current medications and keep currently scheduled follow-up appointments.

## 2024-04-22 NOTE — TELEPHONE ENCOUNTER
Results and recommendations called to pt.  Instructed to call with any further questions or concerns.  Verbalized understanding.    Selena Cabral RN  Triage Nurse, [facility]  04/22/24 08:56 EDT

## 2024-04-29 ENCOUNTER — PREP FOR SURGERY (OUTPATIENT)
Dept: OTHER | Facility: HOSPITAL | Age: 79
End: 2024-04-29
Payer: MEDICARE

## 2024-04-29 DIAGNOSIS — R79.9 ABNORMAL FINDING OF BLOOD CHEMISTRY, UNSPECIFIED: ICD-10-CM

## 2024-04-29 DIAGNOSIS — R79.1 ABNORMAL COAGULATION PROFILE: ICD-10-CM

## 2024-04-29 DIAGNOSIS — I79.8 OTHER DISORDERS OF ARTERIES, ARTERIOLES AND CAPILLARIES IN DISEASES CLASSIFIED ELSEWHERE: ICD-10-CM

## 2024-04-29 DIAGNOSIS — I05.9 MITRAL VALVE DISORDER: Primary | ICD-10-CM

## 2024-04-29 DIAGNOSIS — I11.0 HYPERTENSIVE HEART DISEASE WITH HEART FAILURE: ICD-10-CM

## 2024-04-29 DIAGNOSIS — R93.3 ABNORMAL FINDINGS ON DIAGNOSTIC IMAGING OF OTHER PARTS OF DIGESTIVE TRACT: ICD-10-CM

## 2024-04-29 DIAGNOSIS — R93.1 ABNORMAL FINDINGS ON DIAGNOSTIC IMAGING OF HEART AND CORONARY CIRCULATION: ICD-10-CM

## 2024-04-29 RX ORDER — CHLORHEXIDINE GLUCONATE ORAL RINSE 1.2 MG/ML
15 SOLUTION DENTAL ONCE
OUTPATIENT
Start: 2024-04-29 | End: 2024-04-29

## 2024-04-29 RX ORDER — CHLORHEXIDINE GLUCONATE 500 MG/1
1 CLOTH TOPICAL EVERY 12 HOURS PRN
OUTPATIENT
Start: 2024-04-29

## 2024-04-29 RX ORDER — CHLORHEXIDINE GLUCONATE ORAL RINSE 1.2 MG/ML
15 SOLUTION DENTAL EVERY 12 HOURS
OUTPATIENT
Start: 2024-04-29 | End: 2024-04-30

## 2024-04-30 PROBLEM — I05.9 MITRAL VALVE DISORDER: Status: ACTIVE | Noted: 2024-04-29

## 2024-04-30 PROBLEM — R93.1 ABNORMAL FINDINGS ON DIAGNOSTIC IMAGING OF HEART AND CORONARY CIRCULATION: Status: ACTIVE | Noted: 2024-04-29

## 2024-05-14 ENCOUNTER — OFFICE VISIT (OUTPATIENT)
Dept: INTERNAL MEDICINE | Facility: CLINIC | Age: 79
End: 2024-05-14
Payer: MEDICARE

## 2024-05-14 ENCOUNTER — TELEPHONE (OUTPATIENT)
Dept: INTERNAL MEDICINE | Facility: CLINIC | Age: 79
End: 2024-05-14

## 2024-05-14 VITALS
DIASTOLIC BLOOD PRESSURE: 76 MMHG | WEIGHT: 153 LBS | SYSTOLIC BLOOD PRESSURE: 132 MMHG | HEART RATE: 64 BPM | HEIGHT: 68 IN | BODY MASS INDEX: 23.19 KG/M2 | OXYGEN SATURATION: 96 %

## 2024-05-14 DIAGNOSIS — E78.2 MIXED HYPERLIPIDEMIA: ICD-10-CM

## 2024-05-14 DIAGNOSIS — R97.20 INCREASED PROSTATE SPECIFIC ANTIGEN (PSA) VELOCITY: ICD-10-CM

## 2024-05-14 DIAGNOSIS — R19.7 DIARRHEA, UNSPECIFIED TYPE: Primary | ICD-10-CM

## 2024-05-14 DIAGNOSIS — I34.0 NONRHEUMATIC MITRAL VALVE REGURGITATION: ICD-10-CM

## 2024-05-14 DIAGNOSIS — Z00.00 HEALTHCARE MAINTENANCE: ICD-10-CM

## 2024-05-14 PROCEDURE — 1160F RVW MEDS BY RX/DR IN RCRD: CPT | Performed by: INTERNAL MEDICINE

## 2024-05-14 PROCEDURE — 3078F DIAST BP <80 MM HG: CPT | Performed by: INTERNAL MEDICINE

## 2024-05-14 PROCEDURE — 3075F SYST BP GE 130 - 139MM HG: CPT | Performed by: INTERNAL MEDICINE

## 2024-05-14 PROCEDURE — 1126F AMNT PAIN NOTED NONE PRSNT: CPT | Performed by: INTERNAL MEDICINE

## 2024-05-14 PROCEDURE — 99214 OFFICE O/P EST MOD 30 MIN: CPT | Performed by: INTERNAL MEDICINE

## 2024-05-14 PROCEDURE — 1159F MED LIST DOCD IN RCRD: CPT | Performed by: INTERNAL MEDICINE

## 2024-05-14 RX ORDER — AZITHROMYCIN 500 MG/1
500 TABLET, FILM COATED ORAL DAILY
Qty: 7 TABLET | Refills: 1 | Status: SHIPPED | OUTPATIENT
Start: 2024-05-14

## 2024-05-14 RX ORDER — SACCHAROMYCES BOULARDII 250 MG
250 CAPSULE ORAL 2 TIMES DAILY
Qty: 20 CAPSULE | Refills: 0 | Status: SHIPPED | OUTPATIENT
Start: 2024-05-14

## 2024-05-14 NOTE — PROGRESS NOTES
Chief Complaint   Patient presents with    Diarrhea     Out of country       Diarrhea   Pertinent negatives include no fever.      Mirza Zimmer is a 79 y.o. male presents for acute care. Recent travel to [facility]. Started 6 days ago w/ loose bm. 6-7/ day initially now 3-4. Has tried immodium and this has reduced loose stooling. He has been replenishing electrolytes with gatorade or electrolye powder. No bleeding or fevers. No currnt pain but did have some.       The following portions of the patient's history were reviewed and updated as appropriate: allergies, current medications, past family history, past medical history, past social history, past surgical history and problem list.  Current Outpatient Medications on File Prior to Visit   Medication Sig Dispense Refill    azithromycin (Zithromax) 500 MG tablet Take 1 tablet by mouth Daily. 7 tablet 1    Cetirizine HCl 10 MG capsule Take 10 mg by mouth As Needed.      glucosamine-chondroitin 500-400 MG capsule capsule Take 1 capsule by mouth Daily. HOLD PER MD INSTR      losartan (COZAAR) 25 MG tablet TAKE ONE TABLET BY MOUTH DAILY 90 tablet 3    simvastatin (ZOCOR) 20 MG tablet TAKE ONE TABLET BY MOUTH ONCE NIGHTLY 90 tablet 3    tadalafil (CIALIS) 5 MG tablet TAKE ONE TABLET BY MOUTH DAILY 90 tablet 3    vitamin D3 125 MCG (5000 UT) capsule capsule Take 1 capsule by mouth Daily.      amoxicillin (AMOXIL) 500 MG capsule Take 4 capsules by mouth As Needed. Dental Cleaning (Patient not taking: Reported on 4/9/2024)      saccharomyces boulardii (Florastor) 250 MG capsule Take 1 capsule by mouth 2 (Two) Times a Day. 20 capsule 0     No current facility-administered medications on file prior to visit.     Review of Systems   Constitutional:  Negative for fever.   HENT: Negative.     Eyes: Negative.    Respiratory: Negative.     Cardiovascular: Negative.    Gastrointestinal:  Positive for diarrhea.   Endocrine: Negative.    Genitourinary: Negative.   "  Musculoskeletal: Negative.    Allergic/Immunologic: Negative.    Neurological: Negative.    Hematological: Negative.    Psychiatric/Behavioral: Negative.         Objective   Physical Exam  Vitals and nursing note reviewed.   Constitutional:       Appearance: Normal appearance. He is well-developed.   HENT:      Head: Normocephalic and atraumatic.      Right Ear: Tympanic membrane and external ear normal.      Left Ear: Tympanic membrane and external ear normal.      Nose: Nose normal.      Mouth/Throat:      Mouth: Mucous membranes are moist.   Eyes:      Extraocular Movements: Extraocular movements intact.      Pupils: Pupils are equal, round, and reactive to light.   Cardiovascular:      Rate and Rhythm: Normal rate and regular rhythm.      Pulses: Normal pulses.      Heart sounds: Murmur heard.   Pulmonary:      Effort: Pulmonary effort is normal. No respiratory distress.      Breath sounds: Normal breath sounds.   Abdominal:      General: Abdomen is flat.      Palpations: Abdomen is soft.   Musculoskeletal:         General: Normal range of motion.      Cervical back: Normal range of motion and neck supple.   Skin:     General: Skin is warm and dry.   Neurological:      General: No focal deficit present.      Mental Status: He is alert and oriented to person, place, and time.   Psychiatric:         Mood and Affect: Mood normal.         Behavior: Behavior normal.         Thought Content: Thought content normal.         Judgment: Judgment normal.          /76   Pulse 64   Ht 172.7 cm (68\")   Wt 69.4 kg (153 lb)   SpO2 96%   BMI 23.26 kg/m²     Assessment & Plan   Diagnoses and all orders for this visit:    Diarrhea, unspecified type    Nonrheumatic mitral valve regurgitation      Pateint w acute diarrhea. Possibly infectious given recent travel. He will start azithromycin empyrically one tab daily for 5-7 days. He is to take a probiotic with this. Will get stool culture o/p to further evaluate. He will " hydrate well. Will test cbc and cmp today.   Patient has advanced mitral regurgitation. He is to have preop eval as scheduled and may proceed w usual precautions. He will follow up here prn/ routinely.

## 2024-05-14 NOTE — TELEPHONE ENCOUNTER
Caller: KATHLEEN VILLAR    Relationship: Emergency Contact    Best call back number: [phone]     What was the call regarding: PATIENT [place] [facility] YESTERDAY 5/13/2024 AND HAS HAD DIARRHEA FOR 6-7 DAYS WITH A SLIGHT LOW GRADE FEVER THAT HAS GONE AWAY.

## 2024-05-15 LAB
ALBUMIN SERPL-MCNC: 3.8 G/DL (ref 3.5–5.2)
ALBUMIN/GLOB SERPL: 1.6 G/DL
ALP SERPL-CCNC: 69 U/L (ref 39–117)
ALT SERPL-CCNC: 13 U/L (ref 1–41)
AST SERPL-CCNC: 22 U/L (ref 1–40)
BASOPHILS # BLD AUTO: 0.03 10*3/MM3 (ref 0–0.2)
BASOPHILS NFR BLD AUTO: 0.5 % (ref 0–1.5)
BILIRUB SERPL-MCNC: 0.4 MG/DL (ref 0–1.2)
BUN SERPL-MCNC: 14 MG/DL (ref 8–23)
BUN/CREAT SERPL: 16.3 (ref 7–25)
CALCIUM SERPL-MCNC: 8.6 MG/DL (ref 8.6–10.5)
CHLORIDE SERPL-SCNC: 97 MMOL/L (ref 98–107)
CHOLEST SERPL-MCNC: 143 MG/DL (ref 0–200)
CO2 SERPL-SCNC: 26 MMOL/L (ref 22–29)
CREAT SERPL-MCNC: 0.86 MG/DL (ref 0.76–1.27)
EGFRCR SERPLBLD CKD-EPI 2021: 88.1 ML/MIN/1.73
EOSINOPHIL # BLD AUTO: 0.13 10*3/MM3 (ref 0–0.4)
EOSINOPHIL NFR BLD AUTO: 2.3 % (ref 0.3–6.2)
ERYTHROCYTE [DISTWIDTH] IN BLOOD BY AUTOMATED COUNT: 12.1 % (ref 12.3–15.4)
GLOBULIN SER CALC-MCNC: 2.4 GM/DL
GLUCOSE SERPL-MCNC: 96 MG/DL (ref 65–99)
HCT VFR BLD AUTO: 39.5 % (ref 37.5–51)
HDLC SERPL-MCNC: 37 MG/DL (ref 40–60)
HGB BLD-MCNC: 13.6 G/DL (ref 13–17.7)
IMM GRANULOCYTES # BLD AUTO: 0.03 10*3/MM3 (ref 0–0.05)
IMM GRANULOCYTES NFR BLD AUTO: 0.5 % (ref 0–0.5)
LDLC SERPL CALC-MCNC: 90 MG/DL (ref 0–100)
LDLC/HDLC SERPL: 2.41 {RATIO}
LYMPHOCYTES # BLD AUTO: 1.15 10*3/MM3 (ref 0.7–3.1)
LYMPHOCYTES NFR BLD AUTO: 20.7 % (ref 19.6–45.3)
MCH RBC QN AUTO: 32 PG (ref 26.6–33)
MCHC RBC AUTO-ENTMCNC: 34.4 G/DL (ref 31.5–35.7)
MCV RBC AUTO: 92.9 FL (ref 79–97)
MONOCYTES # BLD AUTO: 1 10*3/MM3 (ref 0.1–0.9)
MONOCYTES NFR BLD AUTO: 18 % (ref 5–12)
NEUTROPHILS # BLD AUTO: 3.21 10*3/MM3 (ref 1.7–7)
NEUTROPHILS NFR BLD AUTO: 58 % (ref 42.7–76)
NRBC BLD AUTO-RTO: 0 /100 WBC (ref 0–0.2)
PLATELET # BLD AUTO: 200 10*3/MM3 (ref 140–450)
POTASSIUM SERPL-SCNC: 3.5 MMOL/L (ref 3.5–5.2)
PROT SERPL-MCNC: 6.2 G/DL (ref 6–8.5)
PSA FREE MFR SERPL: 17.7 %
PSA FREE SERPL-MCNC: 1.13 NG/ML
PSA SERPL-MCNC: 6.4 NG/ML (ref 0–4)
RBC # BLD AUTO: 4.25 10*6/MM3 (ref 4.14–5.8)
SODIUM SERPL-SCNC: 138 MMOL/L (ref 136–145)
TRIGL SERPL-MCNC: 85 MG/DL (ref 0–150)
TSH SERPL DL<=0.005 MIU/L-ACNC: 1.47 UIU/ML (ref 0.27–4.2)
VLDLC SERPL CALC-MCNC: 16 MG/DL (ref 5–40)
WBC # BLD AUTO: 5.55 10*3/MM3 (ref 3.4–10.8)

## 2024-05-17 ENCOUNTER — TELEPHONE (OUTPATIENT)
Dept: CARDIAC SURGERY | Facility: CLINIC | Age: 79
End: 2024-05-17
Payer: MEDICARE

## 2024-05-17 NOTE — TELEPHONE ENCOUNTER
Spoke to patient. PAT is scheduled for 6- at 0800 with any testing to follow. Surgery arrival time is 0500 with the case to start at 0730 He expressed a verbal understanding of these instructions. Was instructed to call back with any further questions.

## 2024-05-19 LAB
BACTERIA SPEC CULT: NORMAL
BACTERIA SPEC CULT: NORMAL
CAMPYLOBACTER STL CULT: NORMAL
CRYPTOSP AG STL QL IA: NEGATIVE
E COLI SXT STL QL IA: NEGATIVE
G LAMBLIA AG STL QL IA: NEGATIVE
SALM + SHIG STL CULT: NORMAL

## 2024-06-10 ENCOUNTER — HOSPITAL ENCOUNTER (OUTPATIENT)
Dept: CARDIOLOGY | Facility: HOSPITAL | Age: 79
Discharge: HOME | End: 2024-06-10
Payer: MEDICARE

## 2024-06-10 ENCOUNTER — ANESTHESIA EVENT (OUTPATIENT)
Dept: PERIOP | Facility: HOSPITAL | Age: 79
End: 2024-06-10
Payer: MEDICARE

## 2024-06-10 ENCOUNTER — PRE-ADMISSION TESTING (OUTPATIENT)
Dept: PREADMISSION TESTING | Facility: HOSPITAL | Age: 79
DRG: 221 | End: 2024-06-10
Payer: MEDICARE

## 2024-06-10 ENCOUNTER — HOSPITAL ENCOUNTER (OUTPATIENT)
Dept: GENERAL RADIOLOGY | Facility: HOSPITAL | Age: 79
Discharge: HOME | End: 2024-06-10
Payer: MEDICARE

## 2024-06-10 VITALS
BODY MASS INDEX: 22.95 KG/M2 | RESPIRATION RATE: 16 BRPM | TEMPERATURE: 97.6 F | SYSTOLIC BLOOD PRESSURE: 130 MMHG | WEIGHT: 160.3 LBS | HEIGHT: 70 IN | DIASTOLIC BLOOD PRESSURE: 72 MMHG | HEART RATE: 65 BPM | OXYGEN SATURATION: 97 %

## 2024-06-10 DIAGNOSIS — I05.9 MITRAL VALVE DISORDER: ICD-10-CM

## 2024-06-10 DIAGNOSIS — I11.0 HYPERTENSIVE HEART DISEASE WITH HEART FAILURE: ICD-10-CM

## 2024-06-10 DIAGNOSIS — R79.9 ABNORMAL FINDING OF BLOOD CHEMISTRY, UNSPECIFIED: ICD-10-CM

## 2024-06-10 DIAGNOSIS — I79.8 OTHER DISORDERS OF ARTERIES, ARTERIOLES AND CAPILLARIES IN DISEASES CLASSIFIED ELSEWHERE: ICD-10-CM

## 2024-06-10 DIAGNOSIS — R93.3 ABNORMAL FINDINGS ON DIAGNOSTIC IMAGING OF OTHER PARTS OF DIGESTIVE TRACT: ICD-10-CM

## 2024-06-10 DIAGNOSIS — R79.1 ABNORMAL COAGULATION PROFILE: ICD-10-CM

## 2024-06-10 LAB
ABO GROUP BLD: NORMAL
ALBUMIN SERPL-MCNC: 4 G/DL (ref 3.5–5.2)
ALBUMIN/GLOB SERPL: 1.7 G/DL
ALP SERPL-CCNC: 73 U/L (ref 39–117)
ALT SERPL W P-5'-P-CCNC: 19 U/L (ref 1–41)
ANION GAP SERPL CALCULATED.3IONS-SCNC: 9.6 MMOL/L (ref 5–15)
APTT PPP: 26.8 SECONDS (ref 22.7–35.4)
AST SERPL-CCNC: 24 U/L (ref 1–40)
BASOPHILS # BLD AUTO: 0.04 10*3/MM3 (ref 0–0.2)
BASOPHILS NFR BLD AUTO: 1.1 % (ref 0–1.5)
BH CV XLRA MEAS LEFT DIST CCA EDV: -19.6 CM/SEC
BH CV XLRA MEAS LEFT DIST CCA PSV: -103 CM/SEC
BH CV XLRA MEAS LEFT DIST ICA EDV: -26.4 CM/SEC
BH CV XLRA MEAS LEFT DIST ICA PSV: -80.3 CM/SEC
BH CV XLRA MEAS LEFT ICA/CCA RATIO: 0.83
BH CV XLRA MEAS LEFT MID ICA EDV: -26.2 CM/SEC
BH CV XLRA MEAS LEFT MID ICA PSV: -85.8 CM/SEC
BH CV XLRA MEAS LEFT PROX CCA EDV: -18.2 CM/SEC
BH CV XLRA MEAS LEFT PROX CCA PSV: -123.3 CM/SEC
BH CV XLRA MEAS LEFT PROX ECA EDV: -2.2 CM/SEC
BH CV XLRA MEAS LEFT PROX ECA PSV: -89.5 CM/SEC
BH CV XLRA MEAS LEFT PROX ICA EDV: -14.7 CM/SEC
BH CV XLRA MEAS LEFT PROX ICA PSV: -71.8 CM/SEC
BH CV XLRA MEAS LEFT PROX SCLA PSV: 131 CM/SEC
BH CV XLRA MEAS LEFT VERTEBRAL A EDV: 17.2 CM/SEC
BH CV XLRA MEAS LEFT VERTEBRAL A PSV: 57.6 CM/SEC
BH CV XLRA MEAS RIGHT DIST CCA EDV: -15.5 CM/SEC
BH CV XLRA MEAS RIGHT DIST CCA PSV: -101.3 CM/SEC
BH CV XLRA MEAS RIGHT DIST ICA EDV: -21.3 CM/SEC
BH CV XLRA MEAS RIGHT DIST ICA PSV: -68.7 CM/SEC
BH CV XLRA MEAS RIGHT ICA/CCA RATIO: 1.06
BH CV XLRA MEAS RIGHT MID ICA EDV: -18.4 CM/SEC
BH CV XLRA MEAS RIGHT MID ICA PSV: -64 CM/SEC
BH CV XLRA MEAS RIGHT PROX CCA EDV: -19.9 CM/SEC
BH CV XLRA MEAS RIGHT PROX CCA PSV: -108.1 CM/SEC
BH CV XLRA MEAS RIGHT PROX ECA EDV: -8.2 CM/SEC
BH CV XLRA MEAS RIGHT PROX ECA PSV: -92.2 CM/SEC
BH CV XLRA MEAS RIGHT PROX ICA EDV: -13.7 CM/SEC
BH CV XLRA MEAS RIGHT PROX ICA PSV: -107.3 CM/SEC
BH CV XLRA MEAS RIGHT PROX SCLA PSV: 126.8 CM/SEC
BH CV XLRA MEAS RIGHT VERTEBRAL A EDV: -16.2 CM/SEC
BH CV XLRA MEAS RIGHT VERTEBRAL A PSV: -63.2 CM/SEC
BILIRUB SERPL-MCNC: 0.4 MG/DL (ref 0–1.2)
BILIRUB UR QL STRIP: NEGATIVE
BLD GP AB SCN SERPL QL: NEGATIVE
BUN SERPL-MCNC: 15 MG/DL (ref 8–23)
BUN/CREAT SERPL: 16.1 (ref 7–25)
CALCIUM SPEC-SCNC: 9.1 MG/DL (ref 8.6–10.5)
CHLORIDE SERPL-SCNC: 103 MMOL/L (ref 98–107)
CHOLEST SERPL-MCNC: 144 MG/DL (ref 0–200)
CLARITY UR: CLEAR
CLOSE TME COLL+ADP + EPINEP PNL BLD: 96 % (ref 86–100)
CO2 SERPL-SCNC: 26.4 MMOL/L (ref 22–29)
COLOR UR: YELLOW
CREAT SERPL-MCNC: 0.93 MG/DL (ref 0.76–1.27)
DEPRECATED RDW RBC AUTO: 43.8 FL (ref 37–54)
EGFRCR SERPLBLD CKD-EPI 2021: 83.5 ML/MIN/1.73
EOSINOPHIL # BLD AUTO: 0.14 10*3/MM3 (ref 0–0.4)
EOSINOPHIL NFR BLD AUTO: 3.9 % (ref 0.3–6.2)
ERYTHROCYTE [DISTWIDTH] IN BLOOD BY AUTOMATED COUNT: 12.4 % (ref 12.3–15.4)
GLOBULIN UR ELPH-MCNC: 2.4 GM/DL
GLUCOSE SERPL-MCNC: 108 MG/DL (ref 65–99)
GLUCOSE UR STRIP-MCNC: NEGATIVE MG/DL
HBA1C MFR BLD: 5.6 % (ref 4.8–5.6)
HCT VFR BLD AUTO: 40.5 % (ref 37.5–51)
HDLC SERPL-MCNC: 54 MG/DL (ref 40–60)
HGB BLD-MCNC: 13.3 G/DL (ref 13–17.7)
HGB UR QL STRIP.AUTO: NEGATIVE
HOLD SPECIMEN: NORMAL
IMM GRANULOCYTES # BLD AUTO: 0.01 10*3/MM3 (ref 0–0.05)
IMM GRANULOCYTES NFR BLD AUTO: 0.3 % (ref 0–0.5)
INR PPP: 1.03 (ref 0.9–1.1)
KETONES UR QL STRIP: NEGATIVE
LDLC SERPL CALC-MCNC: 74 MG/DL (ref 0–100)
LDLC/HDLC SERPL: 1.35 {RATIO}
LEFT ARM BP: NORMAL MMHG
LEUKOCYTE ESTERASE UR QL STRIP.AUTO: NEGATIVE
LYMPHOCYTES # BLD AUTO: 0.88 10*3/MM3 (ref 0.7–3.1)
LYMPHOCYTES NFR BLD AUTO: 24.6 % (ref 19.6–45.3)
MAGNESIUM SERPL-MCNC: 1.9 MG/DL (ref 1.6–2.4)
MCH RBC QN AUTO: 31.7 PG (ref 26.6–33)
MCHC RBC AUTO-ENTMCNC: 32.8 G/DL (ref 31.5–35.7)
MCV RBC AUTO: 96.4 FL (ref 79–97)
MONOCYTES # BLD AUTO: 0.45 10*3/MM3 (ref 0.1–0.9)
MONOCYTES NFR BLD AUTO: 12.6 % (ref 5–12)
NEUTROPHILS NFR BLD AUTO: 2.05 10*3/MM3 (ref 1.7–7)
NEUTROPHILS NFR BLD AUTO: 57.5 % (ref 42.7–76)
NITRITE UR QL STRIP: NEGATIVE
NRBC BLD AUTO-RTO: 0 /100 WBC (ref 0–0.2)
NT-PROBNP SERPL-MCNC: 98.2 PG/ML (ref 0–1800)
PH UR STRIP.AUTO: 5.5 [PH] (ref 5–8)
PLATELET # BLD AUTO: 150 10*3/MM3 (ref 140–450)
PMV BLD AUTO: 10 FL (ref 6–12)
POTASSIUM SERPL-SCNC: 4.2 MMOL/L (ref 3.5–5.2)
PROT SERPL-MCNC: 6.4 G/DL (ref 6–8.5)
PROT UR QL STRIP: NEGATIVE
PROTHROMBIN TIME: 13.7 SECONDS (ref 11.7–14.2)
QT INTERVAL: 421 MS
QTC INTERVAL: 403 MS
RBC # BLD AUTO: 4.2 10*6/MM3 (ref 4.14–5.8)
RH BLD: POSITIVE
RIGHT ARM BP: NORMAL MMHG
SARS-COV-2 RNA RESP QL NAA+PROBE: NOT DETECTED
SODIUM SERPL-SCNC: 139 MMOL/L (ref 136–145)
SP GR UR STRIP: 1.01 (ref 1–1.03)
T&S EXPIRATION DATE: NORMAL
TRIGL SERPL-MCNC: 86 MG/DL (ref 0–150)
UROBILINOGEN UR QL STRIP: NORMAL
VLDLC SERPL-MCNC: 16 MG/DL (ref 5–40)
WBC NRBC COR # BLD AUTO: 3.57 10*3/MM3 (ref 3.4–10.8)

## 2024-06-10 PROCEDURE — 85610 PROTHROMBIN TIME: CPT

## 2024-06-10 PROCEDURE — 86920 COMPATIBILITY TEST SPIN: CPT

## 2024-06-10 PROCEDURE — 83735 ASSAY OF MAGNESIUM: CPT

## 2024-06-10 PROCEDURE — 85576 BLOOD PLATELET AGGREGATION: CPT

## 2024-06-10 PROCEDURE — 85025 COMPLETE CBC W/AUTO DIFF WBC: CPT

## 2024-06-10 PROCEDURE — 86900 BLOOD TYPING SEROLOGIC ABO: CPT

## 2024-06-10 PROCEDURE — 83036 HEMOGLOBIN GLYCOSYLATED A1C: CPT

## 2024-06-10 PROCEDURE — 87635 SARS-COV-2 COVID-19 AMP PRB: CPT

## 2024-06-10 PROCEDURE — 83880 ASSAY OF NATRIURETIC PEPTIDE: CPT

## 2024-06-10 PROCEDURE — 85730 THROMBOPLASTIN TIME PARTIAL: CPT

## 2024-06-10 PROCEDURE — 36415 COLL VENOUS BLD VENIPUNCTURE: CPT

## 2024-06-10 PROCEDURE — 71046 X-RAY EXAM CHEST 2 VIEWS: CPT

## 2024-06-10 PROCEDURE — 80061 LIPID PANEL: CPT

## 2024-06-10 PROCEDURE — 86901 BLOOD TYPING SEROLOGIC RH(D): CPT

## 2024-06-10 PROCEDURE — 93880 EXTRACRANIAL BILAT STUDY: CPT | Performed by: SURGERY

## 2024-06-10 PROCEDURE — 86850 RBC ANTIBODY SCREEN: CPT

## 2024-06-10 PROCEDURE — 93010 ELECTROCARDIOGRAM REPORT: CPT | Performed by: INTERNAL MEDICINE

## 2024-06-10 PROCEDURE — 80053 COMPREHEN METABOLIC PANEL: CPT

## 2024-06-10 PROCEDURE — 81003 URINALYSIS AUTO W/O SCOPE: CPT

## 2024-06-10 PROCEDURE — 93005 ELECTROCARDIOGRAM TRACING: CPT

## 2024-06-10 PROCEDURE — 93880 EXTRACRANIAL BILAT STUDY: CPT

## 2024-06-10 RX ORDER — CHLORHEXIDINE GLUCONATE 500 MG/1
1 CLOTH TOPICAL EVERY 12 HOURS PRN
Status: ACTIVE | OUTPATIENT
Start: 2024-06-10

## 2024-06-10 RX ORDER — TADALAFIL 5 MG/1
5 TABLET ORAL DAILY
Qty: 90 TABLET | Refills: 3 | Status: SHIPPED | OUTPATIENT
Start: 2024-06-10

## 2024-06-10 RX ORDER — DOXYCYCLINE HYCLATE 100 MG
TABLET ORAL
COMMUNITY
Start: 2024-06-03 | End: 2024-06-10

## 2024-06-10 RX ORDER — CHLORHEXIDINE GLUCONATE ORAL RINSE 1.2 MG/ML
15 SOLUTION DENTAL EVERY 12 HOURS
Status: DISPENSED | OUTPATIENT
Start: 2024-06-10 | End: 2024-06-11

## 2024-06-10 RX ORDER — LOSARTAN POTASSIUM 25 MG/1
25 TABLET ORAL DAILY
Qty: 90 TABLET | Refills: 3 | Status: SHIPPED | OUTPATIENT
Start: 2024-06-10 | End: 2024-06-15 | Stop reason: HOSPADM

## 2024-06-10 RX ORDER — SIMVASTATIN 20 MG
TABLET ORAL
Qty: 90 TABLET | Refills: 3 | Status: SHIPPED | OUTPATIENT
Start: 2024-06-10

## 2024-06-10 NOTE — DISCHARGE INSTRUCTIONS
Take the following medications the morning of surgery with a small sip of water: NONE    GO TO ENTRANCE C.      If you are on prescription narcotic pain medication to control your pain you may also take that medication the morning of surgery.    General Instructions:  Do not eat or drink anything after midnight the night before surgery.  Infants may have breast milk up to four hours before surgery.  Infants drinking formula may drink formula up to six hours before surgery.   Patients who avoid smoking, chewing tobacco and alcohol for 4 weeks prior to surgery have a reduced risk of post-operative complications.  Quit smoking as many days before surgery as you can.  Do not smoke, use chewing tobacco or drink alcohol the day of surgery.   If applicable bring your C-PAP/ BI-PAP machine in with you to preop day of surgery.  Bring any papers given to you in the doctor’s office.  Wear clean comfortable clothes.  Do not wear contact lenses, false eyelashes or make-up.  Bring a case for your glasses.   Bring crutches or walker if applicable.  Remove all piercings.  Leave jewelry and any other valuables at home.  Hair extensions with metal clips must be removed prior to surgery.  The Pre-Admission Testing nurse will instruct you to bring medications if unable to obtain an accurate list in Pre-Admission Testing.        If you were given a blood bank ID arm band remember to bring it with you the day of surgery.    Preventing a Surgical Site Infection:  For 2 to 3 days before surgery, avoid shaving with a razor because the razor can irritate skin and make it easier to develop an infection.    Any areas of open skin can increase the risk of a post-operative wound infection by allowing bacteria to enter and travel throughout the body.  Notify your surgeon if you have any skin wounds / rashes even if it is not near the expected surgical site.  The area will need assessed to determine if surgery should be delayed until it is  healed.  The night prior to surgery shower using a fresh bar of anti-bacterial soap (such as Dial) and clean washcloth.  Sleep in a clean bed with clean clothing.  Do not allow pets to sleep with you.  Shower on the morning of surgery using a fresh bar of anti-bacterial soap (such as Dial) and clean washcloth.  Dry with a clean towel and dress in clean clothing.    CHLORHEXIDINE CLOTH INSTRUCTIONS  The morning of surgery follow these instructions using the Chlorhexidine cloths you've been given.  These steps reduce bacteria on the body.  Do not use the cloths near your eyes, ears mouth, genitalia or on open wounds.  Throw the cloths away after use but do not try to flush them down a toilet.      Open and remove one cloth at a time from the package.    Leave the cloth unfolded and begin the bathing.  Massage the skin with the cloths using gentle pressure to remove bacteria.  Do not scrub harshly.   Follow the steps below with one 2% CHG cloth per area (6 total cloths).  One cloth for neck, shoulders and chest.  One cloth for both arms, hands, fingers and underarms (do underarms last).  One cloth for the abdomen followed by groin.  One cloth for right leg and foot including between the toes.  One cloth for left leg and foot including between the toes.  The last cloth is to be used for the back of the neck, back and buttocks.    Allow the CHG to air dry 3 minutes on the skin which will give it time to work and decrease the chance of irritation.  The skin may feel sticky until it is dry.  Do not rinse with water or any other liquid or you will lose the beneficial effects of the CHG.  If mild skin irritation occurs, do rinse the skin to remove the CHG.  Report this to the nurse at time of admission.  Do not apply lotions, creams, ointments, deodorants or perfumes after using the cloths. Dress in clean clothes before coming to the hospital.    BACTROBAN NASAL OINTMENT  There are many germs normally in your nose. Bactroban  is an ointment that will help reduce these germs. Please follow these instructions for Bactroban use:        ____The day before surgery in the evening              Date________    ____The day of surgery in the morning    Date________    **Squirt ½ package of Bactroban Ointment onto a cotton applicator and apply to inside of 1st nostril.  Squirt the remaining Bactroban and apply to the inside of the other nostril.    PERIDEX- ORAL:  Use only if your surgeon has ordered  Use the night before and morning of surgery - Swish, gargle, and spit - do not swallow.  Ask your surgeon if you will be receiving antibiotics prior to surgery.  Make sure you, your family, and all healthcare providers clean their hands with soap and water or an alcohol based hand  before caring for you or your wound.    Day of surgery:  Your arrival time is approximately two hours before your scheduled surgery time.  Upon arrival, a Pre-op nurse and Anesthesiologist will review your health history, obtain vital signs, and answer questions you may have.  The only belongings needed at this time will be your home medications and if applicable your C-PAP/BI-PAP machine.  A Pre-op nurse will start an IV and you may receive medication in preparation for surgery, including something to help you relax.      Please be aware that surgery does come with discomfort.  We want to make every effort to control your discomfort so please discuss any uncontrolled symptoms with your nurse.   Your doctor will most likely have prescribed pain medications.      If you are going home after surgery you will receive individualized written care instructions before being discharged.  A responsible adult must drive you to and from the hospital on the day of your surgery and ideally stay with you through the night.  Discharge prescriptions can be filled by the hospital pharmacy during regular pharmacy hours.  If you are having surgery late in the day/evening your  prescription may be e-prescribed to your pharmacy.  Please verify your pharmacy hours or chose a 24 hour pharmacy to avoid not having access to your prescription because your pharmacy has closed for the day.    If you are staying overnight following surgery, you will be transported to your hospital room following the recovery period.  [facility] has all private rooms.    If you have any questions please call Pre-Admission Testing at [phone].  Deductibles and co-payments are collected on the day of service. Please be prepared to pay the required co-pay, deductible or deposit on the day of service as defined by your plan.    Call your surgeon immediately if you experience any of the following symptoms:  Sore Throat  Shortness of Breath or difficulty breathing  Cough  Chills  Body soreness or muscle pain  Headache  Fever  New loss of taste or smell  Do not arrive for your surgery ill.  Your procedure will need to be rescheduled to another time.  You will need to call your physician before the day of surgery to avoid any unnecessary exposure to hospital staff as well as other patients.

## 2024-06-10 NOTE — ANESTHESIA PREPROCEDURE EVALUATION
Anesthesia Evaluation     no history of anesthetic complications:                Airway   Mallampati: II  Neck ROM: full  no difficulty expected  Dental - normal exam     Pulmonary - normal exam   (+) ,shortness of breath  (-) COPD, asthma, sleep apnea, not a smoker    PE comment: nonlabored  Cardiovascular - normal exam    ECG reviewed  Rhythm: regular  Rate: normal    (+) hypertension, valvular problems/murmurs MR, MVP and murmur, MOFFETT, PVD (Aneurysm of iliac artery), hyperlipidemia  (-) past MI, CAD, dysrhythmias, angina    ROS comment: Dilated aortic root  Thoracic aortic aneurysm without rupture      UNIQUE 4/5/24:     ·  Left ventricular systolic function is normal. Estimated left ventricular EF = 60% Normal left ventricular wall thickness noted. The left ventricular cavity is mildly dilated. All left ventricular wall segments contract normally. Left ventricular diastolic function was not assessed.  ·  No evidence of a left atrial thrombus present. There is no spontaneous echo contrast present. Left atrial appendage morphology best described as cauliflower. Doppler interrogation shows normal flow within the left atrial appendage. No evidence of a left atrial appendage thrombus was present.  ·  The right atrial cavity is borderline dilated.  ·  There are myxomatous changes of the mitral valve apparatus present. There is severe mitral valve prolapse of the anterior mitral leaflet. There is mitral valve prolapse of the posterior mitral leaflet. Both leaflets have myxomatous changes. There is severe prolapse of the posterior leaflet with flail P2 scallop. There is severe prolapse of the anterior mitral leaflet with slightly less degree and without perforation. Severe mitral valve regurgitation is present with a posteriorly-directed jet noted. No significant mitral valve stenosis is present.  ·  Trace tricuspid valve regurgitation is present. Insufficient TR velocity profile to estimate the right ventricular systolic  pressure.         Heart Cath 4/4/24:  CORONARY ANGIOGRAPHY:  Left main: Normal  Left anterior descending: 10% luminal irregularities in the midportion there is a long myocardial bridge in the midportion and normal distally  Ramus intermedius:Not present  Circumflex: Large eccentric calcified plaque in proximal circumflex with about a 30% lesion in it  RCA: Is a dominant vessel.  Normal throughout     SUMMARY: Severe mitral insufficiency with left atrial enlargement.  Normal LV size and function.  Normal LVEDP.  Normal right heart cath and interestingly normal wedge pressure with a normal V wave         Neuro/Psych- negative ROS  (-) seizures, TIA, CVA  GI/Hepatic/Renal/Endo - negative ROS   (-) GERD, liver disease, no renal disease, diabetes, no thyroid disorder    Musculoskeletal     (+) arthralgias  Abdominal    Substance History      OB/GYN          Other   arthritis,   history of cancer (sigmoid colon cancer)                  Anesthesia Plan    ASA 4     general, Sulema, CVL and PAC     intravenous induction     Anesthetic plan, risks, benefits, and alternatives have been provided, discussed and informed consent has been obtained with: patient and spouse/significant other.    CODE STATUS:

## 2024-06-10 NOTE — H&P
Patient Care Team:  Radha Rollins MD as PCP - General (Internal Medicine)  Radha Rollins MD as Referring Physician (Internal Medicine)  Mark Peterson Jr., MD as Consulting Physician (Urology)  Adriana Rivas MD as Consulting Physician (Cardiology)    Chief complaint: Mitral valve regurgitation    Subjective     History of Present Illness:    Patient is a 79 year old male with PMH of mitral regurgitation, dilatation of aorta, HTN, HLD, colon cancer who has been experiencing worsening shortness of breath with exertion. During cardiac workup, patient was found to have severe mitral valve regurgitation. Patient was referred to Dr. Ramsey who saw patient on 4/9/24. At that time, Dr. Ramsey recommended mitral valve repair/replacement and patient was agreeable and scheduled for surgery. Patient had recent CTA of the chest on 4/16 to evaluate aorta revealing an ascending aorta of 4.2cm. Patient denies hx of smoking, drinks one glass of wine per week, and does not take anticoagulants at home. Patient presents today for PAT in preparation for surgery tomorrow. He denies recent illness or changes to his medical history.     Review of Systems   Respiratory:  Positive for shortness of breath (with exertion).    All other systems reviewed and are negative.       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 EDGARD LEE    Arthritis     BPH (benign prostatic hyperplasia)     FOLLOWED BY DR. MARK PETERSON    Cardiomegaly     FOLLOWED BY DR. ADRIANA RIVAS    Cataract     BILATERAL    Chronic prostatitis     FOLLOWED BY DR. MARK PETERSON    Colon cancer 09/29/2021    Sigmoid colon superficial fragments of eroded tubulovillous adenoma with at intramucosal adenocarcinoma    Colon polyps     FOLLOWED BY DR. KEVIN LUDWIG    Dilated aortic root     Dyspnea on exertion     ED (erectile dysfunction)     Elevated PSA     Heart murmur     Hemorrhoids     History of colon  resection     Hyperlipidemia     Hypermetropia, bilateral     Hypertension     Iliac artery aneurysm 03/2018    FOLLOWED BY DR. EDGARD LEE    Increased prostate specific antigen (PSA) velocity 5/8/2017    Laceration of right index finger 08/01/2019    Lichen simplex chronicus     Mechanical ptosis, left     Mitral regurgitation     Mitral valve insufficiency     Muscular aches     MVP (mitral valve prolapse)     Nocturia 06/2021    Perichondritis of right external ear 07/2021    Presbyopia     Regular astigmatism of both eyes     Seborrheic keratosis     Thoracic aortic aneurysm without rupture     Thoracic aortic ectasia 07/2017    Trochanteric bursitis of left hip      Past Surgical History:   Procedure Laterality Date    APPENDECTOMY  10/12/2021    Done as a part of a colon resection    CARDIAC CATHETERIZATION N/A 4/4/2024    Procedure: Right and Left Heart Cath;  Surgeon: Williams Marques MD;  Location: [facility] CATH INVASIVE LOCATION;  Service: Cardiovascular;  Laterality: N/A;    COLON RESECTION N/A 10/12/2021    Procedure: LAPAROSCOPIC LEFT COLECTOMY, APPENDECTOMY;  Surgeon: Cesar Rinaldi MD;  Location: [facility] OR;  Service: General;  Laterality: N/A;    COLONOSCOPY N/A 8/28/2019    5 MM TUBULAR ADENOMA POLYP IN HEPATIC FLEXURE, 6 MM SERRATED ADENOMA POLYP IN TRANSVERSE, MULTIPLE DIVERTICULA IN SIGMOID, RESCOPE IN 2 YRS, DR. KEVIN LUDWIG AT [facility]    COLONOSCOPY N/A 9/29/2021    3 TUBULAR ADENOMA POLYPS IN ASCENDING, AN INFILTRATIVE MASS IN SIGMOID, PATH: TUBULOVILLOUS ADENOMA WITH AT LEAST INTRAMUCOSAL ADENOCARCINOMA, AREA TATTOOED, RESCOPE IN1 YR, DR. KEVIN LUDWIG AT [facility]    COLONOSCOPY N/A 10/19/2022    Procedure: COLONOSCOPY to CECUM WITH COLD BX POLYPECTOMY;  Surgeon: Cesar Rinaldi MD;  Location: [facility] ENDOSCOPY;  Service: General;  Laterality: N/A;  HX COLON CA  --POLYP, DIVERTICULOSIS        COLONOSCOPY W/ POLYPECTOMY N/A 09/03/2004    5 MM ADENOMATOUS POLYP IN SIGMOID, OTHERWISE WNL, RESCOPE  IN 3 YRS, DR. ANGI CONLEY AT [facility]    COLONOSCOPY W/ POLYPECTOMY N/A 05/28/2013    3 MM SESSILE HYPERPLASTIC POLYP 50CM FROM ANUS, A FEW SMALL DIVERTICULA IN SIGMOID, NON BLEEDING INTERNAL HEMORRHOIDS GRADE II, DR. ANGI CONLEY AT [facility]    COLONOSCOPY W/ POLYPECTOMY N/A 02/29/2008    5 MM HYPERPLASTIC POLYP AT 60 CM FROM ANUS, RESCOPE IN 5 YRS, DR. ANGI CONLEY AT [facility]    EYE SURGERY  2003    ATYPICAL MOLE EXCSION, DR. SANCHEZ [facility]    PROSTATE SURGERY N/A 08/15/2018    UROLIFT, PROSTATE VOLUME 48CC, NO ABNORMALITIES, DR. COLLIN GARVIN    TONSILLECTOMY Bilateral 1958    DONE IN [place], [place]     Family History   Problem Relation Age of Onset    Dementia Mother     Arthritis Mother     Diabetes Father     Sudden death Father     Heart attack Father         Probable cause of his death.    Heart disease Father     Breast cancer Sister     Cancer Sister         Breast cancer    Mental illness Maternal Grandmother         Manic depressive    Gallbladder disease Son     Malig Hyperthermia Neg Hx      Social History     Tobacco Use    Smoking status: Never    Smokeless tobacco: Never    Tobacco comments:     Caffeine use   Vaping Use    Vaping status: Never Used   Substance Use Topics    Alcohol use: Yes     Alcohol/week: 1.0 standard drink of alcohol     Types: 1 Glasses of wine per week     Comment: social occasional use    Drug use: Never     No medications prior to admission.       Allergies:  Sulfa antibiotics    Objective      Vital Signs  Temp:  [97.6 °F (36.4 °C)] 97.6 °F (36.4 °C)  Heart Rate:  [65] 65  Resp:  [16] 16  BP: (130-131)/(69-72) 130/72           Physical Exam  Vitals and nursing note reviewed.   Constitutional:       General: He is not in acute distress.     Appearance: Normal appearance. He is normal weight. He is not ill-appearing, toxic-appearing or diaphoretic.   HENT:      Head: Normocephalic and atraumatic.      Nose: Nose normal.      Mouth/Throat:      Mouth: Mucous  membranes are moist.      Pharynx: Oropharynx is clear.   Eyes:      Extraocular Movements: Extraocular movements intact.      Conjunctiva/sclera: Conjunctivae normal.      Pupils: Pupils are equal, round, and reactive to light.   Cardiovascular:      Rate and Rhythm: Normal rate and regular rhythm.      Heart sounds: Murmur (systolic murmur) heard.   Pulmonary:      Effort: Pulmonary effort is normal. No respiratory distress.      Breath sounds: Normal breath sounds. No wheezing or rales.   Abdominal:      General: Abdomen is flat. Bowel sounds are normal.      Palpations: Abdomen is soft.   Musculoskeletal:         General: Normal range of motion.      Cervical back: Normal range of motion and neck supple.      Right lower leg: No edema.      Left lower leg: No edema.   Skin:     General: Skin is warm and dry.   Neurological:      General: No focal deficit present.      Mental Status: He is alert and oriented to person, place, and time.   Psychiatric:         Mood and Affect: Mood normal.         Behavior: Behavior normal.         Results Review:   Lab Results (last 24 hours)       ** No results found for the last 24 hours. **                Assessment & Plan       Mitral valve disorder    Abnormal findings on diagnostic imaging of heart and coronary circulation      Assessment & Plan    Severe mitral regurgitation  Dilatation of aorta  HTN  HLD  Colon cancer    Pre-op studies:     COVID-19 -- neg   Urinalysis -- neg   EKG -- SR rate 55 (he is a runner)  Carotid duplex US -- less than 50% stenosis, bilaterally   Platelet aggregation -- 96% (plt ct 150)  PT/INR/PTT -- WNL   Cardiac cath -- normal coronaries  Hemoglobin A1c -- 5.6      Plan for mitral valve repair/replacement tomorrow morning with Dr. Ramsey. If valve requires replacement, patient would like a tissue valve. Patient has stopped his Losartan and was instructed not to take any medications the morning of surgery.        Harriett Corcoran,  PA  06/10/24  18:44 EDT

## 2024-06-11 ENCOUNTER — ANCILLARY PROCEDURE (OUTPATIENT)
Dept: PERIOP | Facility: HOSPITAL | Age: 79
DRG: 221 | End: 2024-06-11
Payer: MEDICARE

## 2024-06-11 ENCOUNTER — APPOINTMENT (OUTPATIENT)
Dept: GENERAL RADIOLOGY | Facility: HOSPITAL | Age: 79
DRG: 221 | End: 2024-06-11
Payer: MEDICARE

## 2024-06-11 ENCOUNTER — HOSPITAL ENCOUNTER (INPATIENT)
Facility: HOSPITAL | Age: 79
LOS: 4 days | Discharge: HOME HEALTH | DRG: 221 | End: 2024-06-15
Attending: THORACIC SURGERY (CARDIOTHORACIC VASCULAR SURGERY) | Admitting: THORACIC SURGERY (CARDIOTHORACIC VASCULAR SURGERY)
Payer: MEDICARE

## 2024-06-11 ENCOUNTER — ANESTHESIA (OUTPATIENT)
Dept: PERIOP | Facility: HOSPITAL | Age: 79
End: 2024-06-11
Payer: MEDICARE

## 2024-06-11 DIAGNOSIS — Z98.890 S/P MVR (MITRAL VALVE REPAIR): Primary | ICD-10-CM

## 2024-06-11 DIAGNOSIS — Z98.890 POSTOPERATIVE HYPOXIA: ICD-10-CM

## 2024-06-11 DIAGNOSIS — R93.1 ABNORMAL FINDINGS ON DIAGNOSTIC IMAGING OF HEART AND CORONARY CIRCULATION: ICD-10-CM

## 2024-06-11 DIAGNOSIS — I05.9 MITRAL VALVE DISORDER: ICD-10-CM

## 2024-06-11 DIAGNOSIS — R09.02 POSTOPERATIVE HYPOXIA: ICD-10-CM

## 2024-06-11 LAB
ACT BLD: 110 SECONDS (ref 82–152)
ACT BLD: 134 SECONDS (ref 82–152)
ACT BLD: 342 SECONDS (ref 82–152)
ACT BLD: 403 SECONDS (ref 82–152)
ACT BLD: 415 SECONDS (ref 82–152)
ACT BLD: 428 SECONDS (ref 82–152)
ALBUMIN SERPL-MCNC: 3.4 G/DL (ref 3.5–5.2)
ALBUMIN SERPL-MCNC: 4.2 G/DL (ref 3.5–5.2)
ANION GAP SERPL CALCULATED.3IONS-SCNC: 10.7 MMOL/L (ref 5–15)
ANION GAP SERPL CALCULATED.3IONS-SCNC: 9.6 MMOL/L (ref 5–15)
APTT PPP: 28 SECONDS (ref 22.7–35.4)
APTT PPP: 29.4 SECONDS (ref 22.7–35.4)
ARTERIAL PATENCY WRIST A: ABNORMAL
ARTERIAL PATENCY WRIST A: ABNORMAL
ATMOSPHERIC PRESS: 747.2 MMHG
ATMOSPHERIC PRESS: 748.7 MMHG
BASE EXCESS BLDA CALC-SCNC: -1 MMOL/L (ref -5–5)
BASE EXCESS BLDA CALC-SCNC: -2.8 MMOL/L (ref 0–2)
BASE EXCESS BLDA CALC-SCNC: 0 MMOL/L (ref -5–5)
BASE EXCESS BLDA CALC-SCNC: 0 MMOL/L (ref -5–5)
BASE EXCESS BLDA CALC-SCNC: 0.7 MMOL/L (ref 0–2)
BASE EXCESS BLDA CALC-SCNC: 1 MMOL/L (ref -5–5)
BASE EXCESS BLDA CALC-SCNC: 1 MMOL/L (ref -5–5)
BASE EXCESS BLDA CALC-SCNC: 2 MMOL/L (ref -5–5)
BASOPHILS # BLD AUTO: 0.01 10*3/MM3 (ref 0–0.2)
BASOPHILS NFR BLD AUTO: 0.1 % (ref 0–1.5)
BDY SITE: ABNORMAL
BDY SITE: ABNORMAL
BUN SERPL-MCNC: 18 MG/DL (ref 8–23)
BUN SERPL-MCNC: 18 MG/DL (ref 8–23)
BUN/CREAT SERPL: 18.9 (ref 7–25)
BUN/CREAT SERPL: 19.4 (ref 7–25)
CA-I BLD-MCNC: 4.8 MG/DL (ref 4.6–5.4)
CA-I SERPL ISE-MCNC: 1.2 MMOL/L (ref 1.15–1.35)
CALCIUM SPEC-SCNC: 8.6 MG/DL (ref 8.6–10.5)
CALCIUM SPEC-SCNC: 8.7 MG/DL (ref 8.6–10.5)
CHLORIDE SERPL-SCNC: 110 MMOL/L (ref 98–107)
CHLORIDE SERPL-SCNC: 111 MMOL/L (ref 98–107)
CO2 BLDA-SCNC: 23.1 MMOL/L (ref 23–27)
CO2 BLDA-SCNC: 25 MMOL/L (ref 24–29)
CO2 BLDA-SCNC: 26 MMOL/L (ref 24–29)
CO2 BLDA-SCNC: 27 MMOL/L (ref 23–27)
CO2 BLDA-SCNC: 27 MMOL/L (ref 24–29)
CO2 BLDA-SCNC: 27 MMOL/L (ref 24–29)
CO2 BLDA-SCNC: 28 MMOL/L (ref 24–29)
CO2 BLDA-SCNC: 28 MMOL/L (ref 24–29)
CO2 SERPL-SCNC: 21.3 MMOL/L (ref 22–29)
CO2 SERPL-SCNC: 24.4 MMOL/L (ref 22–29)
CREAT SERPL-MCNC: 0.93 MG/DL (ref 0.76–1.27)
CREAT SERPL-MCNC: 0.95 MG/DL (ref 0.76–1.27)
DEPRECATED RDW RBC AUTO: 41.7 FL (ref 37–54)
DEPRECATED RDW RBC AUTO: 41.8 FL (ref 37–54)
DEPRECATED RDW RBC AUTO: 42.1 FL (ref 37–54)
DEVICE COMMENT: ABNORMAL
DEVICE COMMENT: ABNORMAL
EGFRCR SERPLBLD CKD-EPI 2021: 81.4 ML/MIN/1.73
EGFRCR SERPLBLD CKD-EPI 2021: 83.5 ML/MIN/1.73
EOSINOPHIL # BLD AUTO: 0.04 10*3/MM3 (ref 0–0.4)
EOSINOPHIL NFR BLD AUTO: 0.4 % (ref 0.3–6.2)
ERYTHROCYTE [DISTWIDTH] IN BLOOD BY AUTOMATED COUNT: 12.2 % (ref 12.3–15.4)
ERYTHROCYTE [DISTWIDTH] IN BLOOD BY AUTOMATED COUNT: 12.3 % (ref 12.3–15.4)
ERYTHROCYTE [DISTWIDTH] IN BLOOD BY AUTOMATED COUNT: 12.3 % (ref 12.3–15.4)
FIBRINOGEN PPP-MCNC: 260 MG/DL (ref 219–464)
FIBRINOGEN PPP-MCNC: 283 MG/DL (ref 219–464)
GLUCOSE BLDC GLUCOMTR-MCNC: 121 MG/DL (ref 70–130)
GLUCOSE BLDC GLUCOMTR-MCNC: 133 MG/DL (ref 70–130)
GLUCOSE BLDC GLUCOMTR-MCNC: 135 MG/DL (ref 70–130)
GLUCOSE BLDC GLUCOMTR-MCNC: 145 MG/DL (ref 70–130)
GLUCOSE BLDC GLUCOMTR-MCNC: 146 MG/DL (ref 70–130)
GLUCOSE BLDC GLUCOMTR-MCNC: 146 MG/DL (ref 70–130)
GLUCOSE BLDC GLUCOMTR-MCNC: 148 MG/DL (ref 70–130)
GLUCOSE BLDC GLUCOMTR-MCNC: 149 MG/DL (ref 70–130)
GLUCOSE BLDC GLUCOMTR-MCNC: 150 MG/DL (ref 70–130)
GLUCOSE BLDC GLUCOMTR-MCNC: 152 MG/DL (ref 70–130)
GLUCOSE BLDC GLUCOMTR-MCNC: 152 MG/DL (ref 70–130)
GLUCOSE BLDC GLUCOMTR-MCNC: 153 MG/DL (ref 70–130)
GLUCOSE BLDC GLUCOMTR-MCNC: 162 MG/DL (ref 70–130)
GLUCOSE BLDC GLUCOMTR-MCNC: 163 MG/DL (ref 70–130)
GLUCOSE BLDC GLUCOMTR-MCNC: 99 MG/DL (ref 70–130)
GLUCOSE SERPL-MCNC: 135 MG/DL (ref 65–99)
GLUCOSE SERPL-MCNC: 148 MG/DL (ref 65–99)
HCO3 BLDA-SCNC: 21.9 MMOL/L (ref 22–28)
HCO3 BLDA-SCNC: 23.8 MMOL/L (ref 22–26)
HCO3 BLDA-SCNC: 25.1 MMOL/L (ref 22–26)
HCO3 BLDA-SCNC: 25.7 MMOL/L (ref 22–28)
HCO3 BLDA-SCNC: 25.8 MMOL/L (ref 22–26)
HCO3 BLDA-SCNC: 25.9 MMOL/L (ref 22–26)
HCO3 BLDA-SCNC: 26.6 MMOL/L (ref 22–26)
HCO3 BLDA-SCNC: 26.9 MMOL/L (ref 22–26)
HCT VFR BLD AUTO: 29.8 % (ref 37.5–51)
HCT VFR BLD AUTO: 31.8 % (ref 37.5–51)
HCT VFR BLD AUTO: 35.2 % (ref 37.5–51)
HCT VFR BLDA CALC: 27 % (ref 38–51)
HCT VFR BLDA CALC: 27 % (ref 38–51)
HCT VFR BLDA CALC: 29 % (ref 38–51)
HCT VFR BLDA CALC: 32 % (ref 38–51)
HCT VFR BLDA CALC: 33 % (ref 38–51)
HCT VFR BLDA CALC: 35 % (ref 38–51)
HEMODILUTION: NO
HEMODILUTION: NO
HGB BLD-MCNC: 10 G/DL (ref 13–17.7)
HGB BLD-MCNC: 10.8 G/DL (ref 13–17.7)
HGB BLD-MCNC: 11.7 G/DL (ref 13–17.7)
HGB BLDA-MCNC: 10.9 G/DL (ref 12–17)
HGB BLDA-MCNC: 11.2 G/DL (ref 12–17)
HGB BLDA-MCNC: 11.9 G/DL (ref 12–17)
HGB BLDA-MCNC: 9.2 G/DL (ref 12–17)
HGB BLDA-MCNC: 9.2 G/DL (ref 12–17)
HGB BLDA-MCNC: 9.9 G/DL (ref 12–17)
IMM GRANULOCYTES # BLD AUTO: 0.07 10*3/MM3 (ref 0–0.05)
IMM GRANULOCYTES NFR BLD AUTO: 0.6 % (ref 0–0.5)
INHALED O2 CONCENTRATION: 100 %
INHALED O2 CONCENTRATION: 40 %
INR PPP: 1.32 (ref 0.9–1.1)
INR PPP: 1.54 (ref 0.9–1.1)
INR PPP: 1.6 (ref 0.8–1.2)
LYMPHOCYTES # BLD AUTO: 0.77 10*3/MM3 (ref 0.7–3.1)
LYMPHOCYTES NFR BLD AUTO: 7.1 % (ref 19.6–45.3)
MAGNESIUM SERPL-MCNC: 1.8 MG/DL (ref 1.6–2.4)
MAGNESIUM SERPL-MCNC: 1.9 MG/DL (ref 1.6–2.4)
MCH RBC QN AUTO: 31.5 PG (ref 26.6–33)
MCH RBC QN AUTO: 31.6 PG (ref 26.6–33)
MCH RBC QN AUTO: 32 PG (ref 26.6–33)
MCHC RBC AUTO-ENTMCNC: 33.2 G/DL (ref 31.5–35.7)
MCHC RBC AUTO-ENTMCNC: 33.6 G/DL (ref 31.5–35.7)
MCHC RBC AUTO-ENTMCNC: 34 G/DL (ref 31.5–35.7)
MCV RBC AUTO: 94.3 FL (ref 79–97)
MCV RBC AUTO: 94.4 FL (ref 79–97)
MCV RBC AUTO: 94.6 FL (ref 79–97)
MODALITY: ABNORMAL
MODALITY: ABNORMAL
MONOCYTES # BLD AUTO: 0.9 10*3/MM3 (ref 0.1–0.9)
MONOCYTES NFR BLD AUTO: 8.3 % (ref 5–12)
NEUTROPHILS NFR BLD AUTO: 83.5 % (ref 42.7–76)
NEUTROPHILS NFR BLD AUTO: 9.06 10*3/MM3 (ref 1.7–7)
NRBC BLD AUTO-RTO: 0 /100 WBC (ref 0–0.2)
O2 A-A PPRESDIFF RESPIRATORY: 0.6 MMHG
O2 A-A PPRESDIFF RESPIRATORY: 0.8 MMHG
PCO2 BLDA: 37 MM HG (ref 35–45)
PCO2 BLDA: 37.8 MM HG (ref 35–45)
PCO2 BLDA: 41.3 MM HG (ref 35–45)
PCO2 BLDA: 41.8 MM HG (ref 35–45)
PCO2 BLDA: 43.7 MM HG (ref 35–45)
PCO2 BLDA: 47.7 MM HG (ref 35–45)
PCO2 BLDA: 50.3 MM HG (ref 35–45)
PCO2 BLDA: 51.4 MM HG (ref 35–45)
PEEP RESPIRATORY: 5 CM[H2O]
PEEP RESPIRATORY: 8 CM[H2O]
PH BLDA: 7.32 PH UNITS (ref 7.35–7.6)
PH BLDA: 7.37 PH UNITS (ref 7.35–7.6)
PH BLDA: 7.38 PH UNITS (ref 7.35–7.45)
PH BLDA: 7.39 PH UNITS (ref 7.35–7.6)
PH BLDA: 7.39 PH UNITS (ref 7.35–7.6)
PH BLDA: 7.4 PH UNITS (ref 7.35–7.45)
PH BLDA: 7.41 PH UNITS (ref 7.35–7.6)
PH BLDA: 7.41 PH UNITS (ref 7.35–7.6)
PHOSPHATE SERPL-MCNC: 2.4 MG/DL (ref 2.5–4.5)
PHOSPHATE SERPL-MCNC: 2.5 MG/DL (ref 2.5–4.5)
PLATELET # BLD AUTO: 104 10*3/MM3 (ref 140–450)
PLATELET # BLD AUTO: 136 10*3/MM3 (ref 140–450)
PLATELET # BLD AUTO: 84 10*3/MM3 (ref 140–450)
PMV BLD AUTO: 9.3 FL (ref 6–12)
PMV BLD AUTO: 9.4 FL (ref 6–12)
PMV BLD AUTO: 9.6 FL (ref 6–12)
PO2 BLD: 394 MM[HG] (ref 0–500)
PO2 BLD: 589 MM[HG] (ref 0–500)
PO2 BLDA: 104 MMHG (ref 80–105)
PO2 BLDA: 157.6 MM HG (ref 80–100)
PO2 BLDA: 336 MMHG (ref 80–105)
PO2 BLDA: 397 MMHG (ref 80–105)
PO2 BLDA: 42 MMHG (ref 80–105)
PO2 BLDA: 432 MMHG (ref 80–105)
PO2 BLDA: 457 MMHG (ref 80–105)
PO2 BLDA: 589 MM HG (ref 80–100)
POTASSIUM BLDA-SCNC: 3.9 MMOL/L (ref 3.5–4.9)
POTASSIUM BLDA-SCNC: 4.3 MMOL/L (ref 3.5–4.9)
POTASSIUM BLDA-SCNC: 4.4 MMOL/L (ref 3.5–4.9)
POTASSIUM BLDA-SCNC: 4.6 MMOL/L (ref 3.5–4.9)
POTASSIUM BLDA-SCNC: 4.9 MMOL/L (ref 3.5–4.9)
POTASSIUM BLDA-SCNC: 5.2 MMOL/L (ref 3.5–4.9)
POTASSIUM SERPL-SCNC: 4.3 MMOL/L (ref 3.5–5.2)
POTASSIUM SERPL-SCNC: 4.6 MMOL/L (ref 3.5–5.2)
PROTHROMBIN TIME: 16.6 SECONDS (ref 11.7–14.2)
PROTHROMBIN TIME: 18.5 SECONDS (ref 12.8–15.2)
PROTHROMBIN TIME: 18.7 SECONDS (ref 11.7–14.2)
PSV: 8 CMH2O
RBC # BLD AUTO: 3.16 10*6/MM3 (ref 4.14–5.8)
RBC # BLD AUTO: 3.37 10*6/MM3 (ref 4.14–5.8)
RBC # BLD AUTO: 3.72 10*6/MM3 (ref 4.14–5.8)
SAO2 % BLDA: 100 % (ref 95–98)
SAO2 % BLDA: 74 % (ref 95–98)
SAO2 % BLDA: 98 % (ref 95–98)
SAO2 % BLDCOA: 100 % (ref 92–98.5)
SAO2 % BLDCOA: 99.4 % (ref 92–98.5)
SET MECH RESP RATE: 16
SODIUM SERPL-SCNC: 143 MMOL/L (ref 136–145)
SODIUM SERPL-SCNC: 144 MMOL/L (ref 136–145)
TOTAL RATE: 16 BREATHS/MINUTE
TOTAL RATE: 21 BREATHS/MINUTE
VENTILATOR MODE: ABNORMAL
VENTILATOR MODE: AC
VT ON VENT VENT: 620 ML
WBC NRBC COR # BLD AUTO: 10.12 10*3/MM3 (ref 3.4–10.8)
WBC NRBC COR # BLD AUTO: 10.85 10*3/MM3 (ref 3.4–10.8)
WBC NRBC COR # BLD AUTO: 7.58 10*3/MM3 (ref 3.4–10.8)

## 2024-06-11 PROCEDURE — 25010000002 AMIODARONE PER 30 MG: Performed by: ANESTHESIOLOGY

## 2024-06-11 PROCEDURE — 85025 COMPLETE CBC W/AUTO DIFF WBC: CPT | Performed by: THORACIC SURGERY (CARDIOTHORACIC VASCULAR SURGERY)

## 2024-06-11 PROCEDURE — 02L70ZK OCCLUSION OF LEFT ATRIAL APPENDAGE, OPEN APPROACH: ICD-10-PCS | Performed by: THORACIC SURGERY (CARDIOTHORACIC VASCULAR SURGERY)

## 2024-06-11 PROCEDURE — 36430 TRANSFUSION BLD/BLD COMPNT: CPT

## 2024-06-11 PROCEDURE — P9041 ALBUMIN (HUMAN),5%, 50ML: HCPCS | Performed by: THORACIC SURGERY (CARDIOTHORACIC VASCULAR SURGERY)

## 2024-06-11 PROCEDURE — 85018 HEMOGLOBIN: CPT

## 2024-06-11 PROCEDURE — C1713 ANCHOR/SCREW BN/BN,TIS/BN: HCPCS | Performed by: THORACIC SURGERY (CARDIOTHORACIC VASCULAR SURGERY)

## 2024-06-11 PROCEDURE — 25010000002 CEFAZOLIN PER 500 MG: Performed by: NURSE PRACTITIONER

## 2024-06-11 PROCEDURE — 25010000002 HYDROCORTISONE SOD SUC (PF) 100 MG RECONSTITUTED SOLUTION: Performed by: ANESTHESIOLOGY

## 2024-06-11 PROCEDURE — 25810000003 SODIUM CHLORIDE 0.9 % SOLUTION 250 ML FLEX CONT: Performed by: PHYSICIAN ASSISTANT

## 2024-06-11 PROCEDURE — 25010000002 METHADONE PER 10 MG: Performed by: ANESTHESIOLOGY

## 2024-06-11 PROCEDURE — C1889 IMPLANT/INSERT DEVICE, NOC: HCPCS | Performed by: THORACIC SURGERY (CARDIOTHORACIC VASCULAR SURGERY)

## 2024-06-11 PROCEDURE — 94799 UNLISTED PULMONARY SVC/PX: CPT

## 2024-06-11 PROCEDURE — 93005 ELECTROCARDIOGRAM TRACING: CPT | Performed by: THORACIC SURGERY (CARDIOTHORACIC VASCULAR SURGERY)

## 2024-06-11 PROCEDURE — C1751 CATH, INF, PER/CENT/MIDLINE: HCPCS | Performed by: ANESTHESIOLOGY

## 2024-06-11 PROCEDURE — 25010000002 PROPOFOL 10 MG/ML EMULSION: Performed by: ANESTHESIOLOGY

## 2024-06-11 PROCEDURE — 80069 RENAL FUNCTION PANEL: CPT | Performed by: THORACIC SURGERY (CARDIOTHORACIC VASCULAR SURGERY)

## 2024-06-11 PROCEDURE — 25010000002 PROTAMINE SULFATE PER 10 MG: Performed by: ANESTHESIOLOGY

## 2024-06-11 PROCEDURE — 82803 BLOOD GASES ANY COMBINATION: CPT

## 2024-06-11 PROCEDURE — 25810000003 SODIUM CHLORIDE 0.9 % SOLUTION: Performed by: THORACIC SURGERY (CARDIOTHORACIC VASCULAR SURGERY)

## 2024-06-11 PROCEDURE — 25010000002 METOCLOPRAMIDE PER 10 MG: Performed by: THORACIC SURGERY (CARDIOTHORACIC VASCULAR SURGERY)

## 2024-06-11 PROCEDURE — 83735 ASSAY OF MAGNESIUM: CPT | Performed by: THORACIC SURGERY (CARDIOTHORACIC VASCULAR SURGERY)

## 2024-06-11 PROCEDURE — 85014 HEMATOCRIT: CPT

## 2024-06-11 PROCEDURE — 33427 REPAIR OF MITRAL VALVE: CPT | Performed by: THORACIC SURGERY (CARDIOTHORACIC VASCULAR SURGERY)

## 2024-06-11 PROCEDURE — 93318 ECHO TRANSESOPHAGEAL INTRAOP: CPT | Performed by: ANESTHESIOLOGY

## 2024-06-11 PROCEDURE — 25010000002 FENTANYL CITRATE (PF) 250 MCG/5ML SOLUTION: Performed by: ANESTHESIOLOGY

## 2024-06-11 PROCEDURE — 02UG0JZ SUPPLEMENT MITRAL VALVE WITH SYNTHETIC SUBSTITUTE, OPEN APPROACH: ICD-10-PCS | Performed by: THORACIC SURGERY (CARDIOTHORACIC VASCULAR SURGERY)

## 2024-06-11 PROCEDURE — C1729 CATH, DRAINAGE: HCPCS | Performed by: THORACIC SURGERY (CARDIOTHORACIC VASCULAR SURGERY)

## 2024-06-11 PROCEDURE — 82803 BLOOD GASES ANY COMBINATION: CPT | Performed by: THORACIC SURGERY (CARDIOTHORACIC VASCULAR SURGERY)

## 2024-06-11 PROCEDURE — 63710000001 INSULIN REGULAR HUMAN PER 5 UNITS: Performed by: ANESTHESIOLOGY

## 2024-06-11 PROCEDURE — 85384 FIBRINOGEN ACTIVITY: CPT | Performed by: THORACIC SURGERY (CARDIOTHORACIC VASCULAR SURGERY)

## 2024-06-11 PROCEDURE — 25010000002 ALBUMIN HUMAN 5% PER 50 ML: Performed by: THORACIC SURGERY (CARDIOTHORACIC VASCULAR SURGERY)

## 2024-06-11 PROCEDURE — 25010000002 MAGNESIUM SULFATE IN D5W 1G/100ML (PREMIX) 1-5 GM/100ML-% SOLUTION: Performed by: THORACIC SURGERY (CARDIOTHORACIC VASCULAR SURGERY)

## 2024-06-11 PROCEDURE — 85730 THROMBOPLASTIN TIME PARTIAL: CPT | Performed by: THORACIC SURGERY (CARDIOTHORACIC VASCULAR SURGERY)

## 2024-06-11 PROCEDURE — 33427 REPAIR OF MITRAL VALVE: CPT | Performed by: PHYSICIAN ASSISTANT

## 2024-06-11 PROCEDURE — 85347 COAGULATION TIME ACTIVATED: CPT

## 2024-06-11 PROCEDURE — 94002 VENT MGMT INPAT INIT DAY: CPT

## 2024-06-11 PROCEDURE — 25010000002 DIPHENHYDRAMINE PER 50 MG: Performed by: ANESTHESIOLOGY

## 2024-06-11 PROCEDURE — 82330 ASSAY OF CALCIUM: CPT | Performed by: THORACIC SURGERY (CARDIOTHORACIC VASCULAR SURGERY)

## 2024-06-11 PROCEDURE — 82947 ASSAY GLUCOSE BLOOD QUANT: CPT

## 2024-06-11 PROCEDURE — 25010000002 HEPARIN (PORCINE) PER 1000 UNITS: Performed by: ANESTHESIOLOGY

## 2024-06-11 PROCEDURE — 85610 PROTHROMBIN TIME: CPT | Performed by: THORACIC SURGERY (CARDIOTHORACIC VASCULAR SURGERY)

## 2024-06-11 PROCEDURE — 82948 REAGENT STRIP/BLOOD GLUCOSE: CPT

## 2024-06-11 PROCEDURE — 25010000002 ONDANSETRON PER 1 MG: Performed by: THORACIC SURGERY (CARDIOTHORACIC VASCULAR SURGERY)

## 2024-06-11 PROCEDURE — 25010000002 ACETAMINOPHEN 10 MG/ML SOLUTION: Performed by: THORACIC SURGERY (CARDIOTHORACIC VASCULAR SURGERY)

## 2024-06-11 PROCEDURE — B24BZZ4 ULTRASONOGRAPHY OF HEART WITH AORTA, TRANSESOPHAGEAL: ICD-10-PCS | Performed by: THORACIC SURGERY (CARDIOTHORACIC VASCULAR SURGERY)

## 2024-06-11 PROCEDURE — 71045 X-RAY EXAM CHEST 1 VIEW: CPT

## 2024-06-11 PROCEDURE — 5A1221Z PERFORMANCE OF CARDIAC OUTPUT, CONTINUOUS: ICD-10-PCS | Performed by: THORACIC SURGERY (CARDIOTHORACIC VASCULAR SURGERY)

## 2024-06-11 PROCEDURE — P9100 PATHOGEN TEST FOR PLATELETS: HCPCS

## 2024-06-11 PROCEDURE — P9035 PLATELET PHERES LEUKOREDUCED: HCPCS

## 2024-06-11 PROCEDURE — 85027 COMPLETE CBC AUTOMATED: CPT | Performed by: THORACIC SURGERY (CARDIOTHORACIC VASCULAR SURGERY)

## 2024-06-11 PROCEDURE — 25010000002 VANCOMYCIN 1 G RECONSTITUTED SOLUTION 1 EACH VIAL: Performed by: PHYSICIAN ASSISTANT

## 2024-06-11 PROCEDURE — 85610 PROTHROMBIN TIME: CPT

## 2024-06-11 DEVICE — ABSORBABLE HEMOSTAT (OXIDIZED REGENERATED CELLULOSE)
Type: IMPLANTABLE DEVICE | Site: HEART | Status: FUNCTIONAL
Brand: SURGICEL

## 2024-06-11 DEVICE — BND ANULPLSTY SIMUPLUS 36MM: Type: IMPLANTABLE DEVICE | Site: HEART | Status: FUNCTIONAL

## 2024-06-11 DEVICE — HEMOST ABS SURGIFOAM SZ100 8X12 10MM: Type: IMPLANTABLE DEVICE | Site: CHEST | Status: FUNCTIONAL

## 2024-06-11 DEVICE — COR-KNOT MINI® COMBO KITBASE PACKAGE TYPE - KITEACH STERILE PACKAGE KIT CONTAINS (2) SINGLE PATIENT USE COR-KNOT MINI® DEVICES AND (12) COR-KNOT® QUICK LOADS®.
Type: IMPLANTABLE DEVICE | Site: HEART | Status: FUNCTIONAL
Brand: COR-KNOT MINI®

## 2024-06-11 DEVICE — SS SUTURE, 3 PER SLEEVE
Type: IMPLANTABLE DEVICE | Site: STERNUM | Status: FUNCTIONAL
Brand: MYO/WIRE II

## 2024-06-11 DEVICE — SS SUTURE, 4 PER SLEEVE
Type: IMPLANTABLE DEVICE | Site: STERNUM | Status: FUNCTIONAL
Brand: MYO/WIRE II

## 2024-06-11 RX ORDER — ACETAMINOPHEN 325 MG/1
650 TABLET ORAL EVERY 4 HOURS PRN
Status: DISCONTINUED | OUTPATIENT
Start: 2024-06-12 | End: 2024-06-15 | Stop reason: HOSPADM

## 2024-06-11 RX ORDER — ATORVASTATIN CALCIUM 20 MG/1
40 TABLET, FILM COATED ORAL NIGHTLY
Status: DISCONTINUED | OUTPATIENT
Start: 2024-06-11 | End: 2024-06-14

## 2024-06-11 RX ORDER — PANTOPRAZOLE SODIUM 40 MG/10ML
40 INJECTION, POWDER, LYOPHILIZED, FOR SOLUTION INTRAVENOUS ONCE
Status: COMPLETED | OUTPATIENT
Start: 2024-06-11 | End: 2024-06-11

## 2024-06-11 RX ORDER — PHENYLEPHRINE HCL IN 0.9% NACL 0.5 MG/5ML
.2-3 SYRINGE (ML) INTRAVENOUS CONTINUOUS PRN
Status: DISCONTINUED | OUTPATIENT
Start: 2024-06-11 | End: 2024-06-12

## 2024-06-11 RX ORDER — ACETAMINOPHEN 650 MG/1
650 SUPPOSITORY RECTAL EVERY 4 HOURS PRN
Status: DISCONTINUED | OUTPATIENT
Start: 2024-06-12 | End: 2024-06-15 | Stop reason: HOSPADM

## 2024-06-11 RX ORDER — NITROGLYCERIN 20 MG/100ML
5-200 INJECTION INTRAVENOUS
Status: DISCONTINUED | OUTPATIENT
Start: 2024-06-11 | End: 2024-06-12

## 2024-06-11 RX ORDER — MORPHINE SULFATE 2 MG/ML
4 INJECTION, SOLUTION INTRAMUSCULAR; INTRAVENOUS
Status: DISCONTINUED | OUTPATIENT
Start: 2024-06-11 | End: 2024-06-12

## 2024-06-11 RX ORDER — NALOXONE HCL 0.4 MG/ML
0.4 VIAL (ML) INJECTION
Status: DISCONTINUED | OUTPATIENT
Start: 2024-06-11 | End: 2024-06-15 | Stop reason: HOSPADM

## 2024-06-11 RX ORDER — FENTANYL CITRATE 50 UG/ML
INJECTION, SOLUTION INTRAMUSCULAR; INTRAVENOUS AS NEEDED
Status: DISCONTINUED | OUTPATIENT
Start: 2024-06-11 | End: 2024-06-11 | Stop reason: SURG

## 2024-06-11 RX ORDER — FAMOTIDINE 10 MG/ML
INJECTION, SOLUTION INTRAVENOUS AS NEEDED
Status: DISCONTINUED | OUTPATIENT
Start: 2024-06-11 | End: 2024-06-11 | Stop reason: SURG

## 2024-06-11 RX ORDER — NICOTINE POLACRILEX 4 MG
15 LOZENGE BUCCAL
Status: DISCONTINUED | OUTPATIENT
Start: 2024-06-11 | End: 2024-06-12

## 2024-06-11 RX ORDER — CHLORHEXIDINE GLUCONATE 500 MG/1
1 CLOTH TOPICAL EVERY 12 HOURS PRN
Status: DISCONTINUED | OUTPATIENT
Start: 2024-06-11 | End: 2024-06-11 | Stop reason: HOSPADM

## 2024-06-11 RX ORDER — ACETAMINOPHEN 160 MG/5ML
650 SOLUTION ORAL EVERY 4 HOURS PRN
Status: DISCONTINUED | OUTPATIENT
Start: 2024-06-12 | End: 2024-06-15 | Stop reason: HOSPADM

## 2024-06-11 RX ORDER — AMINOCAPROIC ACID 250 MG/ML
INJECTION, SOLUTION INTRAVENOUS AS NEEDED
Status: DISCONTINUED | OUTPATIENT
Start: 2024-06-11 | End: 2024-06-11 | Stop reason: SURG

## 2024-06-11 RX ORDER — PANTOPRAZOLE SODIUM 40 MG/1
40 TABLET, DELAYED RELEASE ORAL EVERY MORNING
Status: COMPLETED | OUTPATIENT
Start: 2024-06-12 | End: 2024-06-14

## 2024-06-11 RX ORDER — NITROGLYCERIN 0.4 MG/1
0.4 TABLET SUBLINGUAL
Status: DISCONTINUED | OUTPATIENT
Start: 2024-06-11 | End: 2024-06-12

## 2024-06-11 RX ORDER — PHENYLEPHRINE HCL IN 0.9% NACL 1 MG/10 ML
SYRINGE (ML) INTRAVENOUS AS NEEDED
Status: DISCONTINUED | OUTPATIENT
Start: 2024-06-11 | End: 2024-06-11 | Stop reason: SURG

## 2024-06-11 RX ORDER — DEXMEDETOMIDINE HYDROCHLORIDE 4 UG/ML
.2-1.5 INJECTION, SOLUTION INTRAVENOUS
Status: DISCONTINUED | OUTPATIENT
Start: 2024-06-11 | End: 2024-06-12

## 2024-06-11 RX ORDER — PROPOFOL 10 MG/ML
VIAL (ML) INTRAVENOUS AS NEEDED
Status: DISCONTINUED | OUTPATIENT
Start: 2024-06-11 | End: 2024-06-11 | Stop reason: SURG

## 2024-06-11 RX ORDER — SODIUM CHLORIDE 9 MG/ML
INJECTION, SOLUTION INTRAVENOUS CONTINUOUS PRN
Status: DISCONTINUED | OUTPATIENT
Start: 2024-06-11 | End: 2024-06-11 | Stop reason: SURG

## 2024-06-11 RX ORDER — ONDANSETRON 2 MG/ML
4 INJECTION INTRAMUSCULAR; INTRAVENOUS EVERY 6 HOURS PRN
Status: DISCONTINUED | OUTPATIENT
Start: 2024-06-11 | End: 2024-06-15 | Stop reason: HOSPADM

## 2024-06-11 RX ORDER — LIDOCAINE HYDROCHLORIDE 20 MG/ML
INJECTION, SOLUTION INFILTRATION; PERINEURAL AS NEEDED
Status: DISCONTINUED | OUTPATIENT
Start: 2024-06-11 | End: 2024-06-11 | Stop reason: SURG

## 2024-06-11 RX ORDER — PROTAMINE SULFATE 10 MG/ML
INJECTION, SOLUTION INTRAVENOUS AS NEEDED
Status: DISCONTINUED | OUTPATIENT
Start: 2024-06-11 | End: 2024-06-11 | Stop reason: SURG

## 2024-06-11 RX ORDER — ALPRAZOLAM 0.25 MG/1
0.25 TABLET ORAL EVERY 8 HOURS PRN
Status: DISCONTINUED | OUTPATIENT
Start: 2024-06-11 | End: 2024-06-15 | Stop reason: HOSPADM

## 2024-06-11 RX ORDER — NOREPINEPHRINE BITARTRATE 1 MG/ML
INJECTION, SOLUTION INTRAVENOUS CONTINUOUS PRN
Status: DISCONTINUED | OUTPATIENT
Start: 2024-06-11 | End: 2024-06-11 | Stop reason: SURG

## 2024-06-11 RX ORDER — DOPAMINE HYDROCHLORIDE 160 MG/100ML
2-20 INJECTION, SOLUTION INTRAVENOUS CONTINUOUS PRN
Status: DISCONTINUED | OUTPATIENT
Start: 2024-06-11 | End: 2024-06-12

## 2024-06-11 RX ORDER — HEPARIN SODIUM 1000 [USP'U]/ML
INJECTION, SOLUTION INTRAVENOUS; SUBCUTANEOUS AS NEEDED
Status: DISCONTINUED | OUTPATIENT
Start: 2024-06-11 | End: 2024-06-11 | Stop reason: SURG

## 2024-06-11 RX ORDER — ROCURONIUM BROMIDE 10 MG/ML
INJECTION, SOLUTION INTRAVENOUS AS NEEDED
Status: DISCONTINUED | OUTPATIENT
Start: 2024-06-11 | End: 2024-06-11 | Stop reason: SURG

## 2024-06-11 RX ORDER — ACETAMINOPHEN 10 MG/ML
1000 INJECTION, SOLUTION INTRAVENOUS EVERY 8 HOURS
Status: COMPLETED | OUTPATIENT
Start: 2024-06-11 | End: 2024-06-12

## 2024-06-11 RX ORDER — SODIUM CHLORIDE 9 MG/ML
30 INJECTION, SOLUTION INTRAVENOUS CONTINUOUS
Status: DISCONTINUED | OUTPATIENT
Start: 2024-06-11 | End: 2024-06-15 | Stop reason: HOSPADM

## 2024-06-11 RX ORDER — MIDAZOLAM HYDROCHLORIDE 1 MG/ML
2 INJECTION INTRAMUSCULAR; INTRAVENOUS
Status: DISCONTINUED | OUTPATIENT
Start: 2024-06-11 | End: 2024-06-12

## 2024-06-11 RX ORDER — HYDROCODONE BITARTRATE AND ACETAMINOPHEN 5; 325 MG/1; MG/1
2 TABLET ORAL EVERY 4 HOURS PRN
Status: DISCONTINUED | OUTPATIENT
Start: 2024-06-11 | End: 2024-06-15 | Stop reason: HOSPADM

## 2024-06-11 RX ORDER — METOCLOPRAMIDE HYDROCHLORIDE 5 MG/ML
10 INJECTION INTRAMUSCULAR; INTRAVENOUS EVERY 6 HOURS
Status: DISPENSED | OUTPATIENT
Start: 2024-06-11 | End: 2024-06-12

## 2024-06-11 RX ORDER — POLYETHYLENE GLYCOL 3350 17 G/17G
17 POWDER, FOR SOLUTION ORAL DAILY PRN
Status: DISCONTINUED | OUTPATIENT
Start: 2024-06-11 | End: 2024-06-15 | Stop reason: HOSPADM

## 2024-06-11 RX ORDER — BISACODYL 5 MG/1
10 TABLET, DELAYED RELEASE ORAL DAILY PRN
Status: DISCONTINUED | OUTPATIENT
Start: 2024-06-11 | End: 2024-06-15 | Stop reason: HOSPADM

## 2024-06-11 RX ORDER — CYCLOBENZAPRINE HCL 10 MG
10 TABLET ORAL EVERY 8 HOURS PRN
Status: DISCONTINUED | OUTPATIENT
Start: 2024-06-12 | End: 2024-06-15 | Stop reason: HOSPADM

## 2024-06-11 RX ORDER — MEPERIDINE HYDROCHLORIDE 25 MG/ML
25 INJECTION INTRAMUSCULAR; INTRAVENOUS; SUBCUTANEOUS EVERY 4 HOURS PRN
Status: ACTIVE | OUTPATIENT
Start: 2024-06-11 | End: 2024-06-11

## 2024-06-11 RX ORDER — NOREPINEPHRINE BITARTRATE 0.03 MG/ML
.02-.3 INJECTION, SOLUTION INTRAVENOUS CONTINUOUS PRN
Status: DISCONTINUED | OUTPATIENT
Start: 2024-06-11 | End: 2024-06-12

## 2024-06-11 RX ORDER — CHLORHEXIDINE GLUCONATE ORAL RINSE 1.2 MG/ML
15 SOLUTION DENTAL EVERY 12 HOURS
Status: DISCONTINUED | OUTPATIENT
Start: 2024-06-11 | End: 2024-06-13

## 2024-06-11 RX ORDER — ASPIRIN 81 MG/1
81 TABLET ORAL DAILY
Status: DISCONTINUED | OUTPATIENT
Start: 2024-06-12 | End: 2024-06-15 | Stop reason: HOSPADM

## 2024-06-11 RX ORDER — BISACODYL 10 MG
10 SUPPOSITORY, RECTAL RECTAL DAILY PRN
Status: DISCONTINUED | OUTPATIENT
Start: 2024-06-12 | End: 2024-06-15 | Stop reason: HOSPADM

## 2024-06-11 RX ORDER — MAGNESIUM SULFATE 1 G/100ML
1 INJECTION INTRAVENOUS EVERY 8 HOURS
Status: DISPENSED | OUTPATIENT
Start: 2024-06-11 | End: 2024-06-12

## 2024-06-11 RX ORDER — ALBUMIN, HUMAN INJ 5% 5 %
1500 SOLUTION INTRAVENOUS AS NEEDED
Status: DISPENSED | OUTPATIENT
Start: 2024-06-11 | End: 2024-06-12

## 2024-06-11 RX ORDER — OXYCODONE HYDROCHLORIDE 5 MG/1
10 TABLET ORAL EVERY 4 HOURS PRN
Status: DISCONTINUED | OUTPATIENT
Start: 2024-06-11 | End: 2024-06-15 | Stop reason: HOSPADM

## 2024-06-11 RX ORDER — CHLORHEXIDINE GLUCONATE ORAL RINSE 1.2 MG/ML
15 SOLUTION DENTAL ONCE
Status: COMPLETED | OUTPATIENT
Start: 2024-06-11 | End: 2024-06-11

## 2024-06-11 RX ORDER — MORPHINE SULFATE 2 MG/ML
1 INJECTION, SOLUTION INTRAMUSCULAR; INTRAVENOUS EVERY 4 HOURS PRN
Status: ACTIVE | OUTPATIENT
Start: 2024-06-11 | End: 2024-06-12

## 2024-06-11 RX ORDER — IBUPROFEN 600 MG/1
1 TABLET ORAL
Status: DISCONTINUED | OUTPATIENT
Start: 2024-06-11 | End: 2024-06-12

## 2024-06-11 RX ORDER — AMIODARONE HYDROCHLORIDE 150 MG/3ML
INJECTION, SOLUTION INTRAVENOUS AS NEEDED
Status: DISCONTINUED | OUTPATIENT
Start: 2024-06-11 | End: 2024-06-11 | Stop reason: SURG

## 2024-06-11 RX ORDER — DEXTROSE MONOHYDRATE 25 G/50ML
10-50 INJECTION, SOLUTION INTRAVENOUS
Status: DISCONTINUED | OUTPATIENT
Start: 2024-06-11 | End: 2024-06-12

## 2024-06-11 RX ORDER — MILRINONE LACTATE 0.2 MG/ML
.25-.75 INJECTION, SOLUTION INTRAVENOUS CONTINUOUS PRN
Status: DISCONTINUED | OUTPATIENT
Start: 2024-06-11 | End: 2024-06-12

## 2024-06-11 RX ORDER — DIPHENHYDRAMINE HYDROCHLORIDE 50 MG/ML
INJECTION INTRAMUSCULAR; INTRAVENOUS AS NEEDED
Status: DISCONTINUED | OUTPATIENT
Start: 2024-06-11 | End: 2024-06-11 | Stop reason: SURG

## 2024-06-11 RX ORDER — ENOXAPARIN SODIUM 100 MG/ML
40 INJECTION SUBCUTANEOUS EVERY 24 HOURS
Status: DISCONTINUED | OUTPATIENT
Start: 2024-06-11 | End: 2024-06-15 | Stop reason: HOSPADM

## 2024-06-11 RX ORDER — AMOXICILLIN 250 MG
2 CAPSULE ORAL 2 TIMES DAILY
Status: DISCONTINUED | OUTPATIENT
Start: 2024-06-11 | End: 2024-06-15 | Stop reason: HOSPADM

## 2024-06-11 RX ORDER — METHADONE HYDROCHLORIDE 10 MG/ML
INJECTION, SOLUTION INTRAMUSCULAR; INTRAVENOUS; SUBCUTANEOUS AS NEEDED
Status: DISCONTINUED | OUTPATIENT
Start: 2024-06-11 | End: 2024-06-11 | Stop reason: SURG

## 2024-06-11 RX ADMIN — METHADONE HYDROCHLORIDE 10 MG: 10 INJECTION, SOLUTION INTRAMUSCULAR; INTRAVENOUS; SUBCUTANEOUS at 06:36

## 2024-06-11 RX ADMIN — ALBUMIN (HUMAN) 250 ML: 12.5 INJECTION, SOLUTION INTRAVENOUS at 12:29

## 2024-06-11 RX ADMIN — ACETAMINOPHEN 1000 MG: 10 INJECTION INTRAVENOUS at 12:06

## 2024-06-11 RX ADMIN — 0.12% CHLORHEXIDINE GLUCONATE 15 ML: 1.2 RINSE ORAL at 12:07

## 2024-06-11 RX ADMIN — FAMOTIDINE 20 MG: 10 INJECTION INTRAVENOUS at 11:25

## 2024-06-11 RX ADMIN — HEPARIN SODIUM 22000 UNITS: 1000 INJECTION, SOLUTION INTRAVENOUS; SUBCUTANEOUS at 07:54

## 2024-06-11 RX ADMIN — ACETAMINOPHEN 1000 MG: 10 INJECTION INTRAVENOUS at 18:54

## 2024-06-11 RX ADMIN — DEXMEDETOMIDINE HYDROCHLORIDE 0.5 MCG/KG/HR: 4 INJECTION, SOLUTION INTRAVENOUS at 12:07

## 2024-06-11 RX ADMIN — ALBUMIN (HUMAN) 500 ML: 12.5 INJECTION, SOLUTION INTRAVENOUS at 23:18

## 2024-06-11 RX ADMIN — ROCURONIUM BROMIDE 50 MG: 50 INJECTION INTRAVENOUS at 06:51

## 2024-06-11 RX ADMIN — PROPOFOL 150 MG: 10 INJECTION, EMULSION INTRAVENOUS at 06:50

## 2024-06-11 RX ADMIN — PANTOPRAZOLE SODIUM 40 MG: 40 INJECTION, POWDER, FOR SOLUTION INTRAVENOUS at 12:08

## 2024-06-11 RX ADMIN — VANCOMYCIN HYDROCHLORIDE 1000 MG: 1 INJECTION, POWDER, LYOPHILIZED, FOR SOLUTION INTRAVENOUS at 13:19

## 2024-06-11 RX ADMIN — LIDOCAINE HYDROCHLORIDE 60 MG: 20 INJECTION, SOLUTION INFILTRATION; PERINEURAL at 06:49

## 2024-06-11 RX ADMIN — SODIUM CHLORIDE 30 ML/HR: 9 INJECTION, SOLUTION INTRAVENOUS at 13:43

## 2024-06-11 RX ADMIN — MUPIROCIN 1 APPLICATION: 20 OINTMENT TOPICAL at 20:24

## 2024-06-11 RX ADMIN — 0.12% CHLORHEXIDINE GLUCONATE 15 ML: 1.2 RINSE ORAL at 06:05

## 2024-06-11 RX ADMIN — PROPOFOL 35 MCG/KG/MIN: 10 INJECTION, EMULSION INTRAVENOUS at 08:02

## 2024-06-11 RX ADMIN — SODIUM CHLORIDE 2000 MG: 900 INJECTION INTRAVENOUS at 07:24

## 2024-06-11 RX ADMIN — MUPIROCIN 1 APPLICATION: 20 OINTMENT TOPICAL at 12:07

## 2024-06-11 RX ADMIN — METOCLOPRAMIDE 10 MG: 5 INJECTION, SOLUTION INTRAMUSCULAR; INTRAVENOUS at 12:08

## 2024-06-11 RX ADMIN — DIPHENHYDRAMINE HYDROCHLORIDE 25 MG: 50 INJECTION, SOLUTION INTRAMUSCULAR; INTRAVENOUS at 11:17

## 2024-06-11 RX ADMIN — SODIUM CHLORIDE 1 MCG/KG/HR: 9 INJECTION, SOLUTION INTRAVENOUS at 07:16

## 2024-06-11 RX ADMIN — ATORVASTATIN CALCIUM 40 MG: 20 TABLET, FILM COATED ORAL at 20:24

## 2024-06-11 RX ADMIN — HYDROCORTISONE SODIUM SUCCINATE 100 MG: 100 INJECTION, POWDER, FOR SOLUTION INTRAMUSCULAR; INTRAVENOUS at 11:25

## 2024-06-11 RX ADMIN — SODIUM CHLORIDE 7 UNITS: 9 INJECTION, SOLUTION INTRAVENOUS at 08:56

## 2024-06-11 RX ADMIN — NOREPINEPHRINE BITARTRATE 0.05 MCG/KG/MIN: 1 SOLUTION INTRAVENOUS at 09:30

## 2024-06-11 RX ADMIN — METOCLOPRAMIDE 10 MG: 5 INJECTION, SOLUTION INTRAMUSCULAR; INTRAVENOUS at 16:35

## 2024-06-11 RX ADMIN — Medication 50 MCG: at 07:58

## 2024-06-11 RX ADMIN — Medication 100 MCG: at 10:44

## 2024-06-11 RX ADMIN — FENTANYL CITRATE 150 MCG: 50 INJECTION, SOLUTION INTRAMUSCULAR; INTRAVENOUS at 07:43

## 2024-06-11 RX ADMIN — ONDANSETRON 4 MG: 2 INJECTION INTRAMUSCULAR; INTRAVENOUS at 23:30

## 2024-06-11 RX ADMIN — SENNOSIDES AND DOCUSATE SODIUM 2 TABLET: 50; 8.6 TABLET ORAL at 20:24

## 2024-06-11 RX ADMIN — AMINOCAPROIC ACID 10 G: 250 INJECTION, SOLUTION INTRAVENOUS at 09:50

## 2024-06-11 RX ADMIN — SODIUM CHLORIDE: 9 INJECTION, SOLUTION INTRAVENOUS at 07:15

## 2024-06-11 RX ADMIN — SODIUM CHLORIDE 2000 MG: 900 INJECTION INTRAVENOUS at 10:38

## 2024-06-11 RX ADMIN — Medication 100 MCG: at 07:33

## 2024-06-11 RX ADMIN — SODIUM CHLORIDE: 9 INJECTION, SOLUTION INTRAVENOUS at 06:30

## 2024-06-11 RX ADMIN — PROTAMINE SULFATE 300 MG: 10 INJECTION, SOLUTION INTRAVENOUS at 09:41

## 2024-06-11 RX ADMIN — 0.12% CHLORHEXIDINE GLUCONATE 15 ML: 1.2 RINSE ORAL at 23:19

## 2024-06-11 RX ADMIN — OXYCODONE HYDROCHLORIDE 10 MG: 5 TABLET ORAL at 20:24

## 2024-06-11 RX ADMIN — AMIODARONE HYDROCHLORIDE 150 MG: 50 INJECTION, SOLUTION INTRAVENOUS at 09:26

## 2024-06-11 RX ADMIN — OXYCODONE HYDROCHLORIDE 10 MG: 5 TABLET ORAL at 16:35

## 2024-06-11 RX ADMIN — ROCURONIUM BROMIDE 20 MG: 50 INJECTION INTRAVENOUS at 08:54

## 2024-06-11 RX ADMIN — Medication 100 MCG: at 09:54

## 2024-06-11 RX ADMIN — Medication 100 MCG: at 08:00

## 2024-06-11 RX ADMIN — ALBUMIN (HUMAN) 250 ML: 12.5 INJECTION, SOLUTION INTRAVENOUS at 14:24

## 2024-06-11 RX ADMIN — FENTANYL CITRATE 100 MCG: 50 INJECTION, SOLUTION INTRAMUSCULAR; INTRAVENOUS at 07:42

## 2024-06-11 RX ADMIN — PROPOFOL 50 MG: 10 INJECTION, EMULSION INTRAVENOUS at 07:25

## 2024-06-11 RX ADMIN — SODIUM PHOSPHATE, MONOBASIC, MONOHYDRATE AND SODIUM PHOSPHATE, DIBASIC, ANHYDROUS 15 MMOL: 142; 276 INJECTION, SOLUTION INTRAVENOUS at 23:18

## 2024-06-11 RX ADMIN — ROCURONIUM BROMIDE 20 MG: 50 INJECTION INTRAVENOUS at 08:21

## 2024-06-11 RX ADMIN — MAGNESIUM SULFATE HEPTAHYDRATE 1 G: 1 INJECTION, SOLUTION INTRAVENOUS at 18:35

## 2024-06-11 NOTE — ANESTHESIA PROCEDURE NOTES
Arterial Line      Patient reassessed immediately prior to procedure    Patient location during procedure: OR  Start time: 6/11/2024 6:40 AM  Stop Time:6/11/2024 6:45 AM       Performed By   Anesthesiologist: Abdifatah Henley MD   Preanesthetic Checklist  Completed: patient identified, risks and benefits discussed, surgical consent, pre-op evaluation and timeout performed  Arterial Line Prep    Sterile Tech: cap, gloves and mask  Prep: ChloraPrep  Patient monitoring: blood pressure monitoring, continuous pulse oximetry and EKG  Arterial Line Procedure   Laterality:left  Location:  radial artery  Catheter size: 20 G   Guidance: ultrasound guided  PROCEDURE NOTE/ULTRASOUND INTERPRETATION.  Using ultrasound guidance the potential vascular sites for insertion of the catheter were visualized to determine the patency of the vessel to be used for vascular access.  After selecting the appropriate site for insertion, the needle was visualized under ultrasound being inserted into the radial artery, followed by ultrasound confirmation of wire and catheter placement. There were no abnormalities seen on ultrasound; an image was taken; and the patient tolerated the procedure with no complications.   Number of attempts: 1  Successful placement: yes Images: still images not obtained  Post Assessment   Dressing Type: occlusive dressing applied, secured with tape and wrist guard applied.   Complications no   Patient Tolerance: patient tolerated the procedure well with no apparent complications  Additional Notes  Ultrasound Interpretation:  Using ultrasound guidance the potential vascular sites for insertion of the catheter were visualized to determine the patency of the vessel to be used for vascular access.  After selecting the appropriate site for insertion, the needle was visualized under ultrasound being inserted into the vessel, followed by ultrasound confirmation of wire and catheter placement.  There were no abnormalities  seen on ultrasound; an image was taken/ and the patient tolerated the procedure with no complications.

## 2024-06-11 NOTE — ANESTHESIA PROCEDURE NOTES
Central Line      Patient reassessed immediately prior to procedure    Patient location during procedure: OR  Start time: 6/11/2024 7:00 AM  Stop Time:6/11/2024 7:08 AM  Indications: vascular access and central pressure monitoring  Staff  Anesthesiologist: Abdifatah Henley MD  Preanesthetic Checklist  Completed: patient identified and risks and benefits discussed  Central Line Prep  Sterile Tech:cap, gloves, gown, mask and sterile barriers  Prep: chloraprep  Patient monitoring: blood pressure monitoring, continuous pulse oximetry and EKG  Central Line Procedure  Laterality:right  Location:internal jugular  Catheter Type:double lumen and MAC  Catheter Size:9 Fr  Guidance:ultrasound guided  PROCEDURE NOTE/ULTRASOUND INTERPRETATION.  Using ultrasound guidance the potential vascular sites for insertion of the catheter were visualized to determine the patency of the vessel to be used for vascular access.  After selecting the appropriate site for insertion, the needle was visualized under ultrasound being inserted into the internal jugular vein, followed by ultrasound confirmation of wire and catheter placement. There were no abnormalities seen on ultrasound; an image was taken; and the patient tolerated the procedure with no complications. Images: still images not obtained  Assessment  Post procedure:biopatch applied, line sutured, occlusive dressing applied and secured with tape  Assessement:blood return through all ports  Complications:no  Patient Tolerance:patient tolerated the procedure well with no apparent complications  Additional Notes  Ultrasound Interpretation:  Using ultrasound guidance the potential vascular sites for insertion of the catheter were visualized to determine the patency of the vessel to be used for vascular access.  After selecting the appropriate site for insertion, the needle was visualized under ultrasound being inserted into the vessel, followed by ultrasound confirmation of wire and  catheter placement.  There were no abnormalities seen on ultrasound; an image was taken/ and the patient tolerated the procedure with no complications.

## 2024-06-11 NOTE — ANESTHESIA PROCEDURE NOTES
Airway  Date/Time: 6/11/2024 6:56 AM  Airway not difficult    General Information and Staff    Patient location during procedure: OR  Anesthesiologist: Abdifatah Henley MD    Indications and Patient Condition    Preoxygenated: yes  Mask difficulty assessment: 2 - vent by mask + OA or adjuvant +/- NMBA    Final Airway Details  Final airway type: endotracheal airway      Successful airway: ETT  Cuffed: yes   Successful intubation technique: direct laryngoscopy  Facilitating devices/methods: Bougie  Endotracheal tube insertion site: oral  Blade: Navarro  Blade size: 3  ETT size (mm): 8.0  Cormack-Lehane Classification: grade I - full view of glottis  Placement verified by: capnometry   Measured from: teeth  ETT/EBT  to teeth (cm): 23  Number of attempts at approach: 1  Assessment: lips, teeth, and gum same as pre-op and atraumatic intubation

## 2024-06-11 NOTE — ANESTHESIA PROCEDURE NOTES
Diagnostic intraoperative UNIQUE with 2D and 3D imaging and follow up color Doppler study    Procedure Performed: Diagnostic intraoperative UNIQUE with 2D and 3D imaging and follow up color Doppler study       Start Time:  6/11/2024 7:20 AM       End Time:   6/11/2024 8:40 AM      General Procedure Information  Diagnostic Indications for Echo:  assessment of surgical repair  Physician Requesting Echo: Olivier Ramsey MD  Location performed:  OR  Intubated  Bite block placed  Heart visualized  Probe Insertion:  Easy  Probe Type:  Multiplane  Modalities:  2D only, color flow mapping, continuous wave Doppler and pulse wave Doppler    Echocardiographic and Doppler Measurements    Ventricles    Right Ventricle:  Cavity size dilated.  Global function normal.    Left Ventricle:  Cavity size normal.  Global Function normal.  Ejection Fraction 55%.          Valves    Aortic Valve:  Annulus normal.  Stenosis not present.  Regurgitation mild.      Mitral Valve:  Annulus normal.  Stenosis not present.  Regurgitation severe.  Leaflet motions prolapsed and flail.  Specific leaflet segments with abnormal motions are described in the following comments:       P2 and P3    Tricuspid Valve:  Annulus normal.  Stenosis not present.  Regurgitation trace.    Pulmonic Valve:  Regurgitation trace.        Aorta    Ascending Aorta:  Size dilated.        Atria      Left Atrium:  Size normal.  Left atrial appendage normal.                  Anesthesia Information  Performed Personally      Echocardiogram Comments:       Diagnosis: non-rheumatic aortic insuffiency.  Severe mitral regurgitation.  Mechanism is mostly P2 flail but there is also P3 prolapse.    After complex mitral valve repair, there is trace mitral regurgitation.  Mean gradient 1 mm Hg through mitral valve.  Mitral annuloplasty band in place.  EF 60%.  No aortic dissection

## 2024-06-11 NOTE — ANESTHESIA POSTPROCEDURE EVALUATION
Patient: Mirza Zimmer    Procedure Summary       Date: 06/11/24 Room / Location: [place] [address] / [facility] CARDIOVASCULAR OPERATING ROOM    Anesthesia Start: 0630 Anesthesia Stop: 1137    Procedure: INTRAOPERATIVE UNIQUE; STERNOTOMY; MITRAL VALVE REPAIR AND PRP. (Chest) Diagnosis:       Mitral valve disorder      Abnormal findings on diagnostic imaging of heart and coronary circulation      (Mitral valve disorder [I05.9])      (Abnormal findings on diagnostic imaging of heart and coronary circulation [R93.1])    Surgeons: Olivier Ramsey MD Provider: Abdifatah Henley MD    Anesthesia Type: general, Sulema, CVL, PAC ASA Status: 4            Anesthesia Type: general, Sulema, CVL, PAC    Vitals  Vitals Value Taken Time   /75 06/11/24 1445   Temp 36.6 °C (97.88 °F) 06/11/24 1445   Pulse 90 06/11/24 1445   Resp 16 06/11/24 1300   SpO2 97 % 06/11/24 1445   Vitals shown include unfiled device data.        Post Anesthesia Care and Evaluation    Patient location during evaluation: bedside  Pain management: adequate    Airway patency: patent  Anesthetic complications: No anesthetic complications    Cardiovascular status: acceptable  Respiratory status: acceptable  Hydration status: acceptable

## 2024-06-11 NOTE — ANESTHESIA PROCEDURE NOTES
Central Line      Patient reassessed immediately prior to procedure    Patient location during procedure: OR  Start time: 6/11/2024 7:09 AM  Stop Time:6/11/2024 7:11 AM  Staff  Anesthesiologist: Abdifatah Henley MD  Preanesthetic Checklist  Completed: patient identified, risks and benefits discussed, surgical consent, pre-op evaluation and timeout performed  Central Line Prep  Sterile Tech:cap, gloves, gown, mask and sterile barriers  Prep: chloraprep  Patient monitoring: blood pressure monitoring, continuous pulse oximetry and EKG  Central Line Procedure  Laterality:right  Location:internal jugular  Catheter Type:[place]n  Assessment  Post procedure:biopatch applied, line sutured and occlusive dressing applied  Assessement:blood return through all ports and free fluid flow  Patient Tolerance:patient tolerated the procedure well with no apparent complications

## 2024-06-11 NOTE — OP NOTE
Operative Note    Date of Dictation: 06/11/24    Date of Procedure: Same    Referring Physician: Radha Rollins MD and Adriana Vaughn MD    Preoperative diagnosis:   1.  Severe mitral regurgitation  2.  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  Elective mitral valve repair with a 36 mm Medtronic flexible band posterior annuloplasty and chordal repair (4-0 [place] x 3) of P2 and P3 segments and primary scallop closure-left of P2-P3  2.  Endocardial closure of the left atrial appendage    Surgeon: Olivier Ramsey MD     Assistants: Assistant: Harriett Corcoran PA was responsible for performing the following activities: Retraction, Suction, Irrigation, Suturing, Closing, Placing Dressing, and all aspects of the complex cardiac case  and their skilled assistance was necessary for the success of this case.    Anesthesia: General endotracheal anesthesia and UNIQUE    Findings:  Severe prolapse of of the P2 segment of the posterior leaflet with a cleft between P2 and P3 with prolapse of the lateral aspect of P3.  The chordae to the midportion of P2    Estimated Blood Loss: Approximately 400 cc, most of it recovered with the Cell Saver device and cardiotomy suckers and was retransfused to the patient    STS Data:    The patient was explained the risks (STS risk score calculated), benefits and alternatives of surgery and agreed to proceed. The antibiotics and b blockers were given in the STS required window.        Description of the procedure:     The patient was placed supine on the operative table. Anesthesia was given and lines placed. The patient was prepped and draped using the usual sterile technique. A limited skin median sternotomy incision was performed with a scalpel and the layers carried down to the sternum using the electrocautery. The sternum was split in the midline using a vertical oscillating saw. Hemostasis was achieved. A  Octavio retractor was placed and the anterior mediastinum was exposed. The pericardium was opened and edges tacked to the wound.  300 units of IV heparin was given to maintain the ACT over 400.  4-0 Prolene cannulation sutures were placed in the ascending aorta and both superior and inferior vena cava. Small tip cannulas were placed and aorto right atrial cardiopulmonary bypass was started. Cardioplegia cannulas were placed and then the ascending aorta was clamped. One liter of cold blood cardioplegia was given in an antegrade fashion to achieve diastolic arrest and further doses every 15 minutes thereafter also using retrograde infusion.  The left atrium was entering the right interatrial groove and incision extended below each cava.  Self retracting blades were placed with good exposure of the valve.  I closed the left atrial appendage with a double layer of 3-0 Prolene continuous suture.  I explored the valve and identified a wide prolapse of P2 and part of P3 with excess of tissue and thickening.  In my opinion the valve was repairable.    MV REPAIR:  The mitral valve was systematically evaluated to identify the etiology using valve hooks. The problem was posterior leaflet prolapse therefore I placed 4-0 [place] pledgeted sutures in the posterior belly of both the anterior lateral and posterior medial papillary muscles and then anchored the limbs of each suture through the lateral aspect and medial aspect of P2 and then lateral aspect P3, I used a water test and the preliminary apposition of the leaflet was good.  I closed the cleft between P2 and P3 with interrupted 5-0 Prolene sutures.. Then, 2.0 Ethibond suture were placed in a plicating fashion from trigone to trigone posteriorly around the annulus. A ring was selected by measuring the inter-trigonal distance and the AP distance of the anterior leaflet. The sutures were passed through the 36 mm Medtronic flexible band and the knots secured. The valve was  tested by injecting saline and no residual leak was seen. Then the atrium was closed using a 3.0 prolene running sutures, the chamber de-aired and suture tied down.  Of note, I used the core knot device to secure all knots.  The patient was systemically rewarmed, I gave the 1 dose of cardioplegia and then completion I remove the aortic cross-clamp with steep suction on the aortic vent.  The left pleural space was suctioned and the lungs ventilated. The heart was paced till regular atrial rhythm resumed. I allowed the heart to eject  and I de-aired the left heart chambers under UNIQUE guidance and once hemodynamics were acceptable, then the CPB was discontinued and the venous and cardioplegia cannulas removed. The matching dose of protamine was given and the aortic cannula removed as well. AV temporary wires and pleural and mediastinal chest tubes were placed and the wound sprayed with platelet rich plasma.  The perioperative UNIQUE showed the mitral valve without prolapse and excellent competence with trace to non MR. The sternum was closed with single and double wires and soft tissue in layers of reabsorbable material. The wounds were covered with sterile dressings.       Specimen removed: None    CPB time: 97 minutes    Aortic clamp time: 72 minutes    Complications:  none           Disposition: Cardiovascular recovery room           Condition: Critical but stable.

## 2024-06-12 ENCOUNTER — APPOINTMENT (OUTPATIENT)
Dept: GENERAL RADIOLOGY | Facility: HOSPITAL | Age: 79
DRG: 221 | End: 2024-06-12
Payer: MEDICARE

## 2024-06-12 LAB
ALBUMIN SERPL-MCNC: 4.2 G/DL (ref 3.5–5.2)
ANION GAP SERPL CALCULATED.3IONS-SCNC: 8.8 MMOL/L (ref 5–15)
BASOPHILS # BLD AUTO: 0.01 10*3/MM3 (ref 0–0.2)
BASOPHILS NFR BLD AUTO: 0.1 % (ref 0–1.5)
BH BB BLOOD EXPIRATION DATE: NORMAL
BH BB BLOOD EXPIRATION DATE: NORMAL
BH BB BLOOD TYPE BARCODE: 6200
BH BB BLOOD TYPE BARCODE: 8400
BH BB DISPENSE STATUS: NORMAL
BH BB DISPENSE STATUS: NORMAL
BH BB PRODUCT CODE: NORMAL
BH BB PRODUCT CODE: NORMAL
BH BB UNIT NUMBER: NORMAL
BH BB UNIT NUMBER: NORMAL
BUN SERPL-MCNC: 19 MG/DL (ref 8–23)
BUN/CREAT SERPL: 21.6 (ref 7–25)
CA-I BLD-MCNC: 4.6 MG/DL (ref 4.6–5.4)
CA-I SERPL ISE-MCNC: 1.15 MMOL/L (ref 1.15–1.35)
CALCIUM SPEC-SCNC: 8.1 MG/DL (ref 8.6–10.5)
CHLORIDE SERPL-SCNC: 111 MMOL/L (ref 98–107)
CO2 SERPL-SCNC: 22.2 MMOL/L (ref 22–29)
CREAT SERPL-MCNC: 0.88 MG/DL (ref 0.76–1.27)
DEPRECATED RDW RBC AUTO: 43.4 FL (ref 37–54)
EGFRCR SERPLBLD CKD-EPI 2021: 87.5 ML/MIN/1.73
EOSINOPHIL # BLD AUTO: 0 10*3/MM3 (ref 0–0.4)
EOSINOPHIL NFR BLD AUTO: 0 % (ref 0.3–6.2)
ERYTHROCYTE [DISTWIDTH] IN BLOOD BY AUTOMATED COUNT: 12.7 % (ref 12.3–15.4)
GLUCOSE BLDC GLUCOMTR-MCNC: 127 MG/DL (ref 70–130)
GLUCOSE BLDC GLUCOMTR-MCNC: 128 MG/DL (ref 70–130)
GLUCOSE BLDC GLUCOMTR-MCNC: 130 MG/DL (ref 70–130)
GLUCOSE BLDC GLUCOMTR-MCNC: 131 MG/DL (ref 70–130)
GLUCOSE BLDC GLUCOMTR-MCNC: 131 MG/DL (ref 70–130)
GLUCOSE BLDC GLUCOMTR-MCNC: 136 MG/DL (ref 70–130)
GLUCOSE BLDC GLUCOMTR-MCNC: 141 MG/DL (ref 70–130)
GLUCOSE BLDC GLUCOMTR-MCNC: 142 MG/DL (ref 70–130)
GLUCOSE BLDC GLUCOMTR-MCNC: 145 MG/DL (ref 70–130)
GLUCOSE SERPL-MCNC: 120 MG/DL (ref 65–99)
HCT VFR BLD AUTO: 26.8 % (ref 37.5–51)
HGB BLD-MCNC: 8.9 G/DL (ref 13–17.7)
IMM GRANULOCYTES # BLD AUTO: 0.03 10*3/MM3 (ref 0–0.05)
IMM GRANULOCYTES NFR BLD AUTO: 0.4 % (ref 0–0.5)
INR PPP: 1.39 (ref 0.9–1.1)
IRON 24H UR-MRATE: 28 MCG/DL (ref 59–158)
IRON SATN MFR SERPL: 16 % (ref 20–50)
LYMPHOCYTES # BLD AUTO: 0.32 10*3/MM3 (ref 0.7–3.1)
LYMPHOCYTES NFR BLD AUTO: 4.4 % (ref 19.6–45.3)
MAGNESIUM SERPL-MCNC: 2.3 MG/DL (ref 1.6–2.4)
MCH RBC QN AUTO: 31.4 PG (ref 26.6–33)
MCHC RBC AUTO-ENTMCNC: 33.2 G/DL (ref 31.5–35.7)
MCV RBC AUTO: 94.7 FL (ref 79–97)
MONOCYTES # BLD AUTO: 0.61 10*3/MM3 (ref 0.1–0.9)
MONOCYTES NFR BLD AUTO: 8.4 % (ref 5–12)
NEUTROPHILS NFR BLD AUTO: 6.26 10*3/MM3 (ref 1.7–7)
NEUTROPHILS NFR BLD AUTO: 86.7 % (ref 42.7–76)
NRBC BLD AUTO-RTO: 0 /100 WBC (ref 0–0.2)
PHOSPHATE SERPL-MCNC: 3.7 MG/DL (ref 2.5–4.5)
PLATELET # BLD AUTO: 84 10*3/MM3 (ref 140–450)
PMV BLD AUTO: 9.3 FL (ref 6–12)
POTASSIUM SERPL-SCNC: 4.2 MMOL/L (ref 3.5–5.2)
PROTHROMBIN TIME: 17.3 SECONDS (ref 11.7–14.2)
QT INTERVAL: 407 MS
QT INTERVAL: 407 MS
QTC INTERVAL: 386 MS
QTC INTERVAL: 443 MS
RBC # BLD AUTO: 2.83 10*6/MM3 (ref 4.14–5.8)
SODIUM SERPL-SCNC: 142 MMOL/L (ref 136–145)
TIBC SERPL-MCNC: 179 MCG/DL (ref 298–536)
TRANSFERRIN SERPL-MCNC: 120 MG/DL (ref 200–360)
UNIT  ABO: NORMAL
UNIT  ABO: NORMAL
UNIT  RH: NORMAL
UNIT  RH: NORMAL
WBC NRBC COR # BLD AUTO: 7.23 10*3/MM3 (ref 3.4–10.8)

## 2024-06-12 PROCEDURE — 71045 X-RAY EXAM CHEST 1 VIEW: CPT

## 2024-06-12 PROCEDURE — 25010000002 ONDANSETRON PER 1 MG: Performed by: THORACIC SURGERY (CARDIOTHORACIC VASCULAR SURGERY)

## 2024-06-12 PROCEDURE — 99024 POSTOP FOLLOW-UP VISIT: CPT | Performed by: THORACIC SURGERY (CARDIOTHORACIC VASCULAR SURGERY)

## 2024-06-12 PROCEDURE — 99222 1ST HOSP IP/OBS MODERATE 55: CPT | Performed by: INTERNAL MEDICINE

## 2024-06-12 PROCEDURE — 82330 ASSAY OF CALCIUM: CPT | Performed by: THORACIC SURGERY (CARDIOTHORACIC VASCULAR SURGERY)

## 2024-06-12 PROCEDURE — 25010000002 METOCLOPRAMIDE PER 10 MG: Performed by: THORACIC SURGERY (CARDIOTHORACIC VASCULAR SURGERY)

## 2024-06-12 PROCEDURE — 83540 ASSAY OF IRON: CPT | Performed by: NURSE PRACTITIONER

## 2024-06-12 PROCEDURE — 97163 PT EVAL HIGH COMPLEX 45 MIN: CPT

## 2024-06-12 PROCEDURE — 97110 THERAPEUTIC EXERCISES: CPT

## 2024-06-12 PROCEDURE — 25010000002 MAGNESIUM SULFATE IN D5W 1G/100ML (PREMIX) 1-5 GM/100ML-% SOLUTION: Performed by: THORACIC SURGERY (CARDIOTHORACIC VASCULAR SURGERY)

## 2024-06-12 PROCEDURE — 93005 ELECTROCARDIOGRAM TRACING: CPT | Performed by: THORACIC SURGERY (CARDIOTHORACIC VASCULAR SURGERY)

## 2024-06-12 PROCEDURE — 25810000003 SODIUM CHLORIDE 0.9 % SOLUTION 250 ML FLEX CONT: Performed by: PHYSICIAN ASSISTANT

## 2024-06-12 PROCEDURE — 84466 ASSAY OF TRANSFERRIN: CPT | Performed by: NURSE PRACTITIONER

## 2024-06-12 PROCEDURE — 83735 ASSAY OF MAGNESIUM: CPT | Performed by: THORACIC SURGERY (CARDIOTHORACIC VASCULAR SURGERY)

## 2024-06-12 PROCEDURE — 25010000002 ACETAMINOPHEN 10 MG/ML SOLUTION: Performed by: THORACIC SURGERY (CARDIOTHORACIC VASCULAR SURGERY)

## 2024-06-12 PROCEDURE — 85025 COMPLETE CBC W/AUTO DIFF WBC: CPT | Performed by: THORACIC SURGERY (CARDIOTHORACIC VASCULAR SURGERY)

## 2024-06-12 PROCEDURE — 80069 RENAL FUNCTION PANEL: CPT | Performed by: THORACIC SURGERY (CARDIOTHORACIC VASCULAR SURGERY)

## 2024-06-12 PROCEDURE — 25010000002 VANCOMYCIN 1 G RECONSTITUTED SOLUTION 1 EACH VIAL: Performed by: PHYSICIAN ASSISTANT

## 2024-06-12 PROCEDURE — 25010000002 FUROSEMIDE PER 20 MG: Performed by: NURSE PRACTITIONER

## 2024-06-12 PROCEDURE — 82948 REAGENT STRIP/BLOOD GLUCOSE: CPT

## 2024-06-12 PROCEDURE — 85610 PROTHROMBIN TIME: CPT | Performed by: THORACIC SURGERY (CARDIOTHORACIC VASCULAR SURGERY)

## 2024-06-12 RX ORDER — COLCHICINE 0.6 MG/1
0.6 TABLET ORAL DAILY
Status: DISCONTINUED | OUTPATIENT
Start: 2024-06-12 | End: 2024-06-15 | Stop reason: HOSPADM

## 2024-06-12 RX ORDER — GUAIFENESIN 600 MG/1
1200 TABLET, EXTENDED RELEASE ORAL EVERY 12 HOURS SCHEDULED
Status: DISCONTINUED | OUTPATIENT
Start: 2024-06-12 | End: 2024-06-15 | Stop reason: HOSPADM

## 2024-06-12 RX ORDER — POTASSIUM CHLORIDE 750 MG/1
20 TABLET, FILM COATED, EXTENDED RELEASE ORAL ONCE
Status: COMPLETED | OUTPATIENT
Start: 2024-06-12 | End: 2024-06-12

## 2024-06-12 RX ORDER — NICOTINE POLACRILEX 4 MG
15 LOZENGE BUCCAL
Status: DISCONTINUED | OUTPATIENT
Start: 2024-06-12 | End: 2024-06-15 | Stop reason: HOSPADM

## 2024-06-12 RX ORDER — FERROUS SULFATE 325(65) MG
325 TABLET ORAL
Status: DISCONTINUED | OUTPATIENT
Start: 2024-06-12 | End: 2024-06-15 | Stop reason: HOSPADM

## 2024-06-12 RX ORDER — IBUPROFEN 600 MG/1
1 TABLET ORAL
Status: DISCONTINUED | OUTPATIENT
Start: 2024-06-12 | End: 2024-06-15 | Stop reason: HOSPADM

## 2024-06-12 RX ORDER — DEXTROSE MONOHYDRATE 25 G/50ML
25 INJECTION, SOLUTION INTRAVENOUS
Status: DISCONTINUED | OUTPATIENT
Start: 2024-06-12 | End: 2024-06-15 | Stop reason: HOSPADM

## 2024-06-12 RX ORDER — DIPHENOXYLATE HYDROCHLORIDE AND ATROPINE SULFATE 2.5; .025 MG/1; MG/1
1 TABLET ORAL DAILY
Status: DISCONTINUED | OUTPATIENT
Start: 2024-06-12 | End: 2024-06-15 | Stop reason: HOSPADM

## 2024-06-12 RX ORDER — INSULIN LISPRO 100 [IU]/ML
2-7 INJECTION, SOLUTION INTRAVENOUS; SUBCUTANEOUS
Status: DISCONTINUED | OUTPATIENT
Start: 2024-06-12 | End: 2024-06-14

## 2024-06-12 RX ORDER — FUROSEMIDE 10 MG/ML
40 INJECTION INTRAMUSCULAR; INTRAVENOUS ONCE
Status: COMPLETED | OUTPATIENT
Start: 2024-06-12 | End: 2024-06-12

## 2024-06-12 RX ADMIN — FERROUS SULFATE TAB 325 MG (65 MG ELEMENTAL FE) 325 MG: 325 (65 FE) TAB at 09:22

## 2024-06-12 RX ADMIN — HYDROCODONE BITARTRATE AND ACETAMINOPHEN 1 TABLET: 5; 325 TABLET ORAL at 15:30

## 2024-06-12 RX ADMIN — MAGNESIUM SULFATE HEPTAHYDRATE 1 G: 1 INJECTION, SOLUTION INTRAVENOUS at 04:14

## 2024-06-12 RX ADMIN — OXYCODONE HYDROCHLORIDE 10 MG: 5 TABLET ORAL at 00:16

## 2024-06-12 RX ADMIN — GUAIFENESIN 1200 MG: 600 TABLET, EXTENDED RELEASE ORAL at 09:21

## 2024-06-12 RX ADMIN — ASPIRIN 81 MG: 81 TABLET, COATED ORAL at 09:21

## 2024-06-12 RX ADMIN — VANCOMYCIN HYDROCHLORIDE 1000 MG: 1 INJECTION, POWDER, LYOPHILIZED, FOR SOLUTION INTRAVENOUS at 00:17

## 2024-06-12 RX ADMIN — 0.12% CHLORHEXIDINE GLUCONATE 15 ML: 1.2 RINSE ORAL at 21:11

## 2024-06-12 RX ADMIN — POTASSIUM CHLORIDE 20 MEQ: 750 TABLET, EXTENDED RELEASE ORAL at 09:21

## 2024-06-12 RX ADMIN — SENNOSIDES AND DOCUSATE SODIUM 2 TABLET: 50; 8.6 TABLET ORAL at 21:11

## 2024-06-12 RX ADMIN — HYDROCODONE BITARTRATE AND ACETAMINOPHEN 2 TABLET: 5; 325 TABLET ORAL at 09:34

## 2024-06-12 RX ADMIN — VANCOMYCIN HYDROCHLORIDE 1000 MG: 1 INJECTION, POWDER, LYOPHILIZED, FOR SOLUTION INTRAVENOUS at 12:27

## 2024-06-12 RX ADMIN — MUPIROCIN 1 APPLICATION: 20 OINTMENT TOPICAL at 09:21

## 2024-06-12 RX ADMIN — METOPROLOL TARTRATE 25 MG: 25 TABLET, FILM COATED ORAL at 21:14

## 2024-06-12 RX ADMIN — PANTOPRAZOLE SODIUM 40 MG: 40 TABLET, DELAYED RELEASE ORAL at 06:15

## 2024-06-12 RX ADMIN — HYDROCODONE BITARTRATE AND ACETAMINOPHEN 1 TABLET: 5; 325 TABLET ORAL at 21:11

## 2024-06-12 RX ADMIN — 0.12% CHLORHEXIDINE GLUCONATE 15 ML: 1.2 RINSE ORAL at 11:18

## 2024-06-12 RX ADMIN — METOPROLOL TARTRATE 25 MG: 25 TABLET, FILM COATED ORAL at 09:34

## 2024-06-12 RX ADMIN — Medication 1 TABLET: at 11:18

## 2024-06-12 RX ADMIN — COLCHICINE 0.6 MG: 0.6 TABLET, FILM COATED ORAL at 11:18

## 2024-06-12 RX ADMIN — MUPIROCIN: 20 OINTMENT TOPICAL at 21:17

## 2024-06-12 RX ADMIN — METOCLOPRAMIDE 10 MG: 5 INJECTION, SOLUTION INTRAMUSCULAR; INTRAVENOUS at 04:50

## 2024-06-12 RX ADMIN — SENNOSIDES AND DOCUSATE SODIUM 2 TABLET: 50; 8.6 TABLET ORAL at 09:21

## 2024-06-12 RX ADMIN — ATORVASTATIN CALCIUM 40 MG: 20 TABLET, FILM COATED ORAL at 21:11

## 2024-06-12 RX ADMIN — ONDANSETRON 4 MG: 2 INJECTION INTRAMUSCULAR; INTRAVENOUS at 07:04

## 2024-06-12 RX ADMIN — FUROSEMIDE 40 MG: 10 INJECTION, SOLUTION INTRAMUSCULAR; INTRAVENOUS at 09:21

## 2024-06-12 RX ADMIN — ACETAMINOPHEN 1000 MG: 10 INJECTION INTRAVENOUS at 04:14

## 2024-06-12 RX ADMIN — GUAIFENESIN 1200 MG: 600 TABLET, EXTENDED RELEASE ORAL at 21:11

## 2024-06-12 NOTE — CONSULTS
"Mirza Zimmer  1945 79 y.o.  [id number]      Patient Care Team:  Radha Rollins MD as PCP - General (Internal Medicine)  Radha Rollins MD as Referring Physician (Internal Medicine)  Yazan Peterson Jr., MD as Consulting Physician (Urology)  Adriana Vaughn MD as Consulting Physician (Cardiology)    CC: Severe mitral insufficiency status post mitral valve repair, preoperatively normal LV function and normal coronaries    Interval History: He looks remarkably well      Objective   Vital Signs  Temp:  [95.7 °F (35.4 °C)-99.5 °F (37.5 °C)] 99.3 °F (37.4 °C)  Heart Rate:  [66-91] 66  Resp:  [12-20] 12  BP: ()/(59-99) 104/61  Arterial Line BP: ()/(46-77) 126/57  FiO2 (%):  [39 %-100 %] 40 %    Intake/Output Summary (Last 24 hours) at 6/12/2024 0838  Last data filed at 6/12/2024 0701  Gross per 24 hour   Intake 5063 ml   Output 4650 ml   Net 413 ml     Flowsheet Rows      Flowsheet Row First Filed Value   Admission Height 172.7 cm (68\") Documented at 06/11/2024 0528   Admission Weight 72.7 kg (160 lb 4.4 oz) Documented at 06/11/2024 0528            Physical Exam:   General Appearance:    Alert,oriented, in no acute distress   Lungs:     Clear to auscultation,BS are equal    Heart:    Normal S1 and S2, RRR without murmur, gallop or rub   HEENT:    Sclerae are clear, no JVD or adenopathy   Abdomen:     Normal bowel sounds, soft nontender, nondistended, no HSM   Extremities:   Moves all extremities well, no edema, no cyanosis, no             Redness, no rash     Medication Review:      aspirin, 81 mg, Oral, Daily  atorvastatin, 40 mg, Oral, Nightly  chlorhexidine, 15 mL, Mouth/Throat, Q12H  enoxaparin, 40 mg, Subcutaneous, Q24H  furosemide, 40 mg, Intravenous, Once  guaiFENesin, 1,200 mg, Oral, Q12H  insulin lispro, 2-7 Units, Subcutaneous, 4x Daily AC & at Bedtime  magnesium sulfate, 1 g, Intravenous, Q8H  metoclopramide, 10 mg, Intravenous, Q6H  metoprolol tartrate, 25 mg, Oral, Q12H  mupirocin, , Each " Nare, BID  pantoprazole, 40 mg, Oral, QAM  potassium chloride, 20 mEq, Oral, Once  senna-docusate sodium, 2 tablet, Oral, BID  vancomycin, 15 mg/kg, Intravenous, Q12H      sodium chloride, 30 mL/hr, Last Rate: 30 mL/hr (06/11/24 1343)          I reviewed the patient's new clinical results.  I personally viewed and interpreted the patient's EKG/Telemetry data    Assessment/Plan  Active Hospital Problems    Diagnosis  POA    **Mitral valve disease [I05.9]  Yes    Mitral valve disorder [I05.9]  Unknown    Abnormal findings on diagnostic imaging of heart and coronary circulation [R93.1]  Unknown      Resolved Hospital Problems   No resolved problems to display.       He is postop day 1 after mitral valve repair looks fantastic early on his twelve-lead looks good they are going to get some of his lines out hemodynamically he is stable his hemoglobin is 8.9 I will start him on some iron and a multivitamin.  I would give him colchicine for 6 weeks to try and prevent atrial fibrillation.  They did clip his left atrial appendage    Williams Marques MD  06/12/24  08:38 EDT

## 2024-06-12 NOTE — THERAPY EVALUATION
Patient Name: Mirza Zimmer  : 1945    MRN: [id number]                              Today's Date: 2024       Admit Date: 2024    Visit Dx:     ICD-10-CM ICD-9-CM   1. S/P MVR (mitral valve repair)  Z98.890 V45.89   2. Mitral valve disorder  I05.9 394.9   3. Abnormal findings on diagnostic imaging of heart and coronary circulation  R93.1 794.39     Patient Active Problem List   Diagnosis    Arthritis    Hyperlipidemia    Hypertension, essential    MOFFETT (dyspnea on exertion)    Non-rheumatic mitral regurgitation    MVP (mitral valve prolapse)    Dilated aortic root    Increased prostate specific antigen (PSA) velocity    Other ill-defined heart diseases     History of colon polyps    Thoracic aortic aneurysm without rupture    Malignant neoplasm of sigmoid colon    Cancer of sigmoid colon    Personal history of colon cancer    Nonrheumatic mitral valve regurgitation    Mitral valve disorder    Abnormal findings on diagnostic imaging of heart and coronary circulation    Mitral valve disease     Past Medical History:   Diagnosis Date    Actinic keratosis     Aneurysm of iliac artery 2019    FOLLOWED BY DR. EDGARD LEE    Arthritis     BPH (benign prostatic hyperplasia)     FOLLOWED BY DR. MARK BALL    Cardiomegaly     FOLLOWED BY DR. SAMUEL RIVAS    Cataract     BILATERAL    Chronic prostatitis     FOLLOWED BY DR. MARK BALL    Colon cancer 2021    Sigmoid colon superficial fragments of eroded tubulovillous adenoma with at intramucosal adenocarcinoma    Colon polyps     FOLLOWED BY DR. KEVIN LUDWIG    Dilated aortic root     Dyspnea on exertion     ED (erectile dysfunction)     Elevated PSA     Heart murmur     Hemorrhoids     History of colon resection     Hyperlipidemia     Hypermetropia, bilateral     Hypertension     Iliac artery aneurysm 2018    FOLLOWED BY DR. 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    Presbyopia     Regular astigmatism of both eyes     Seborrheic keratosis     Thoracic aortic aneurysm without rupture     Thoracic aortic ectasia 07/2017    Trochanteric bursitis of left hip      Past Surgical History:   Procedure Laterality Date    APPENDECTOMY  10/12/2021    Done as a part of a colon resection    CARDIAC CATHETERIZATION N/A 4/4/2024    Procedure: Right and Left Heart Cath;  Surgeon: Williams Marques MD;  Location: [facility] CATH INVASIVE LOCATION;  Service: Cardiovascular;  Laterality: N/A;    COLON RESECTION N/A 10/12/2021    Procedure: LAPAROSCOPIC LEFT COLECTOMY, APPENDECTOMY;  Surgeon: Cesar Rinaldi MD;  Location: [facility] OR;  Service: General;  Laterality: N/A;    COLONOSCOPY N/A 8/28/2019    5 MM TUBULAR ADENOMA POLYP IN HEPATIC FLEXURE, 6 MM SERRATED ADENOMA POLYP IN TRANSVERSE, MULTIPLE DIVERTICULA IN SIGMOID, RESCOPE IN 2 YRS, DR. KEVIN LUDWIG AT [facility]    COLONOSCOPY N/A 9/29/2021    3 TUBULAR ADENOMA POLYPS IN ASCENDING, AN INFILTRATIVE MASS IN SIGMOID, PATH: TUBULOVILLOUS ADENOMA WITH AT LEAST INTRAMUCOSAL ADENOCARCINOMA, AREA TATTOOED, RESCOPE IN1 YR, DR. KEVIN LUDWIG AT [facility]    COLONOSCOPY N/A 10/19/2022    Procedure: COLONOSCOPY to CECUM WITH COLD BX POLYPECTOMY;  Surgeon: Cesar Rinaldi MD;  Location: [facility] ENDOSCOPY;  Service: General;  Laterality: N/A;  HX COLON CA  --POLYP, DIVERTICULOSIS        COLONOSCOPY W/ POLYPECTOMY N/A 09/03/2004    5 MM ADENOMATOUS POLYP IN SIGMOID, OTHERWISE WNL, RESCOPE IN 3 YRS, DR. ANGI CONLEY AT [facility]    COLONOSCOPY W/ POLYPECTOMY N/A 05/28/2013    3 MM SESSILE HYPERPLASTIC POLYP 50CM FROM ANUS, A FEW SMALL DIVERTICULA IN SIGMOID, NON BLEEDING INTERNAL HEMORRHOIDS GRADE II, DR. ANGI CONLEY AT [facility]    COLONOSCOPY W/  POLYPECTOMY N/A 02/29/2008    5 MM HYPERPLASTIC POLYP AT 60 CM FROM ANUS, RESCOPE IN 5 YRS, DR. ANGI CONLEY AT [facility]    EYE SURGERY  2003    ATYPICAL MOLE EXCSION, DR. SANCHEZ AT [facility] EYE [facility] [place] [facility]    MITRAL VALVE REPAIR/REPLACEMENT N/A 6/11/2024    Procedure: INTRAOPERATIVE UNIQUE; STERNOTOMY; MITRAL VALVE REPAIR AND PRP.;  Surgeon: Olivier Ramsey MD;  Location: [facility];  Service: Cardiothoracic;  Laterality: N/A;    PROSTATE SURGERY N/A 08/15/2018    UROLIFT, PROSTATE VOLUME 48CC, NO ABNORMALITIES, DR. COLLIN GARVIN    TONSILLECTOMY Bilateral 1958    DONE IN [place], [place]      General Information       Row Name 06/12/24 1007          Physical Therapy Time and Intention    Document Type evaluation  -AR     Mode of Treatment physical therapy  -AR       Row Name 06/12/24 1007          General Information    Patient Profile Reviewed yes  -AR     Prior Level of Function independent:  very active, golf, biking, yoga, etc.  was having a little SOB when climbing up hills during golf  -AR     Existing Precautions/Restrictions cardiac;fall  -AR     Barriers to Rehab none identified  -AR       Row Name 06/12/24 1007          Living Environment    People in Home spouse  -AR       Row Name 06/12/24 1007          Cognition    Orientation Status (Cognition) oriented x 4  -AR       Row Name 06/12/24 1007          Safety Issues, Functional Mobility    Impairments Affecting Function (Mobility) endurance/activity tolerance;strength;shortness of breath  -AR               User Key  (r) = Recorded By, (t) = Taken By, (c) = Cosigned By      Initials Name Provider Type    AR Luann Reyes PT Physical Therapist                   Mobility       Row Name 06/12/24 1008          Bed Mobility    Bed Mobility supine-sit;sit-supine  -AR     Supine-Sit [place] (Bed Mobility) not tested  -AR     Sit-Supine [place] (Bed Mobility) not tested  -AR       Row Name 06/12/24 1008          Sit-Stand Transfer    Sit-Stand  [place] (Transfers) standby assist  -AR       Row Name 06/12/24 1008          Gait/Stairs (Locomotion)    [place] Level (Gait) contact guard  -AR     Assistive Device (Gait) --  no UE support  -AR     Distance in Feet (Gait) 130  -AR     Deviations/Abnormal Patterns (Gait) gait speed decreased;antalgic  -AR     Comment, (Gait/Stairs) guarded gait but no loss ofbalance or safety concerns  -AR               User Key  (r) = Recorded By, (t) = Taken By, (c) = Cosigned By      Initials Name Provider Type    AR Luann Reyes, PT Physical Therapist                   Obj/Interventions       Row Name 06/12/24 1009          Range of Motion Comprehensive    Comment, General Range of Motion B LE WFL  -AR       Row Name 06/12/24 1009          Strength Comprehensive (MMT)    Comment, General Manual Muscle Testing (MMT) Assessment generalized post op weakness but WFL  -AR       Row Name 06/12/24 1009          Balance    Balance Assessment standing dynamic balance  -AR     Dynamic Standing Balance contact guard  -AR               User Key  (r) = Recorded By, (t) = Taken By, (c) = Cosigned By      Initials Name Provider Type    AR Luann Reyes, PT Physical Therapist                   Goals/Plan       Row Name 06/12/24 1015          Problem Specific Goal 1 (PT)    Problem Specific Goal 1 (PT) cardiac level III  -AR     Time Frame (Problem Specific Goal 1, PT) 1 week  -AR       Row Name 06/12/24 1015          Therapy Assessment/Plan (PT)    Planned Therapy Interventions (PT) balance training;bed mobility training;gait training;home exercise program;patient/family education;ROM (range of motion);stair training;strengthening;transfer training  -AR               User Key  (r) = Recorded By, (t) = Taken By, (c) = Cosigned By      Initials Name Provider Type    Luann Valdez, PT Physical Therapist                   Clinical Impression       Row Name 06/12/24 1009          Pain    Pretreatment Pain Rating 0/10 - no  pain  -AR     Posttreatment Pain Rating 0/10 - no pain  -AR     Pre/Posttreatment Pain Comment discussing pain med w/ RN  -AR     Pain Intervention(s) Repositioned  -AR       Row Name 06/12/24 1009          Plan of Care Review    Plan of Care Reviewed With patient  -AR     Outcome Evaluation POD 1 MVR.  Pt reports normally independent, no use of AD, very active with biking, walking, gardening, golf, yoga etc. Retired  from here.  Pt demonstrates generalized post op weakness but WFL, able to ambulate 130' w/ contact assist, no UE support.   Educated on activity recommendations,but education mostly focused on activities  to NOT do right after surgery like yoga inversions, supine abdominal exercises to avoid valsalva maneuver...etc.  Recommend DC to home.  -AR       Row Name 06/12/24 1009          Therapy Assessment/Plan (PT)    Rehab Potential (PT) good, to achieve stated therapy goals  -AR     Criteria for Skilled Interventions Met (PT) yes  -AR     Therapy Frequency (PT) 5 times/wk  -AR       Row Name 06/12/24 1009          Vital Signs    Pre SpO2 (%) 99  -AR     O2 Delivery Pre Treatment room air  -AR     Intra SpO2 (%) 94  -AR     O2 Delivery Intra Treatment room air  -AR     Post SpO2 (%) 89  RN present donning 02  -AR     O2 Delivery Post Treatment supplemental O2  -AR       Row Name 06/12/24 1015 06/12/24 1009       Positioning and Restraints    Pre-Treatment Position -- sitting in chair/recliner  -AR    Post Treatment Position -- chair  -AR    In Chair reclined;sitting;with nsg  -AR --              User Key  (r) = Recorded By, (t) = Taken By, (c) = Cosigned By      Initials Name Provider Type    AR Luann Reyes, PT Physical Therapist                   Outcome Measures       Row Name 06/12/24 1015          How much help from another person do you currently need...    Turning from your back to your side while in flat bed without using bedrails? 4  -AR     Moving from lying on back to sitting on the  side of a flat bed without bedrails? 4  -AR     Moving to and from a bed to a chair (including a wheelchair)? 4  -AR     Standing up from a chair using your arms (e.g., wheelchair, bedside chair)? 4  -AR     Climbing 3-5 steps with a railing? 3  -AR     To walk in hospital room? 3  -AR     AM-PAC 6 Clicks Score (PT) 22  -AR     Highest Level of Mobility Goal 7 --> Walk 25 feet or more  -AR       Row Name 06/12/24 1015          Functional Assessment    Outcome Measure Options AM-PAC 6 Clicks Basic Mobility (PT)  -AR               User Key  (r) = Recorded By, (t) = Taken By, (c) = Cosigned By      Initials Name Provider Type    AR Luann Reyes PT Physical Therapist                                 Physical Therapy Education       Title: PT OT SLP Therapies (In Progress)       Topic: Physical Therapy (In Progress)       Point: Mobility training (In Progress)       Learning Progress Summary             Patient Acceptance, E, NR by AR at 6/12/2024 1016                         Point: Home exercise program (In Progress)       Learning Progress Summary             Patient Acceptance, E, NR by AR at 6/12/2024 1016                         Point: Body mechanics (In Progress)       Learning Progress Summary             Patient Acceptance, E, NR by AR at 6/12/2024 1016                         Point: Precautions (In Progress)       Learning Progress Summary             Patient Acceptance, E, NR by AR at 6/12/2024 1016                                         User Key       Initials Effective Dates Name Provider Type Discipline    AR 06/16/21 -  Luann Reyes, PT Physical Therapist PT                  PT Recommendation and Plan  Planned Therapy Interventions (PT): balance training, bed mobility training, gait training, home exercise program, patient/family education, ROM (range of motion), stair training, strengthening, transfer training  Plan of Care Reviewed With: patient  Outcome Evaluation: POD 1 MVR.  Pt reports  normally independent, no use of AD, very active with biking, walking, gardening, golf, yoga etc. Retired  from here.  Pt demonstrates generalized post op weakness but WFL, able to ambulate 130' w/ contact assist, no UE support.   Educated on activity recommendations,but education mostly focused on activities  to NOT do right after surgery like yoga inversions, supine abdominal exercises to avoid valsalva maneuver...etc.  Recommend DC to home.     Time Calculation:         PT Charges       Row Name 06/12/24 1007             Time Calculation    Start Time 0903  -AR      Stop Time 0930  -AR      Time Calculation (min) 27 min  -AR      PT Received On 06/12/24  -AR      PT - Next Appointment 06/13/24  -AR      PT Goal Re-Cert Due Date 06/19/24  -AR                User Key  (r) = Recorded By, (t) = Taken By, (c) = Cosigned By      Initials Name Provider Type    AR Luann Reyes, PT Physical Therapist                  Therapy Charges for Today       Code Description Service Date Service Provider Modifiers Qty    34456223441 HC PT EVAL HIGH COMPLEXITY 3 6/12/2024 Luann Reyes, PT GP 1    14376989907 HC PT THER PROC EA 15 MIN 6/12/2024 Luann Reyes, PT GP 1            PT G-Codes  Outcome Measure Options: AM-PAC 6 Clicks Basic Mobility (PT)  AM-PAC 6 Clicks Score (PT): 22  PT Discharge Summary  Anticipated Discharge Disposition (PT): home with assist    Luann Reyes PT  6/12/2024

## 2024-06-12 NOTE — PROGRESS NOTES
" LOS: 1 day   Patient Care Team:  Radha Rollins MD as PCP - General (Internal Medicine)  Radha Rollins MD as Referring Physician (Internal Medicine)  Yazan Peterson Jr., MD as Consulting Physician (Urology)  Adriana Vaughn MD as Consulting Physician (Cardiology)    Chief Complaint: post op follow- up    Subjective:  Symptoms:  He reports weakness.  No shortness of breath or chest pressure.    Diet:  He reports  nausea.  No vomiting.    Activity level: Impaired due to weakness.          Vital Signs  Temp:  [95.7 °F (35.4 °C)-99.5 °F (37.5 °C)] 99.3 °F (37.4 °C)  Heart Rate:  [66-91] 69  Resp:  [12-20] 12  BP: ()/(59-99) 104/61  Arterial Line BP: ()/(46-77) 124/57  FiO2 (%):  [39 %-100 %] 40 %  Body mass index is 24.37 kg/m².    Intake/Output Summary (Last 24 hours) at 6/12/2024 0719  Last data filed at 6/12/2024 0654  Gross per 24 hour   Intake 3452 ml   Output 4750 ml   Net -1298 ml     No intake/output data recorded.    Chest tube drainage last 8 hours 250        06/11/24  0528   Weight: 72.7 kg (160 lb 4.4 oz)         Objective:  Vital signs: (most recent): Blood pressure 104/61, pulse 69, temperature 99.3 °F (37.4 °C), resp. rate 12, height 172.7 cm (68\"), weight 72.7 kg (160 lb 4.4 oz), SpO2 93%.                Results Review:        WBC WBC   Date Value Ref Range Status   06/12/2024 7.23 3.40 - 10.80 10*3/mm3 Final   06/11/2024 10.12 3.40 - 10.80 10*3/mm3 Final   06/11/2024 10.85 (H) 3.40 - 10.80 10*3/mm3 Final   06/11/2024 7.58 3.40 - 10.80 10*3/mm3 Final   06/10/2024 3.57 3.40 - 10.80 10*3/mm3 Final      HGB Hemoglobin   Date Value Ref Range Status   06/12/2024 8.9 (L) 13.0 - 17.7 g/dL Final   06/11/2024 10.8 (L) 13.0 - 17.7 g/dL Final   06/11/2024 11.7 (L) 13.0 - 17.7 g/dL Final   06/11/2024 9.9 (L) 12.0 - 17.0 g/dL Final   06/11/2024 10.0 (L) 13.0 - 17.7 g/dL Final   06/11/2024 10.9 (L) 12.0 - 17.0 g/dL Final   06/11/2024 11.2 (L) 12.0 - 17.0 g/dL Final   06/11/2024 9.2 (L) 12.0 - 17.0 g/dL " Final   06/11/2024 9.2 (L) 12.0 - 17.0 g/dL Final   06/11/2024 11.9 (L) 12.0 - 17.0 g/dL Final   06/10/2024 13.3 13.0 - 17.7 g/dL Final      HCT Hematocrit   Date Value Ref Range Status   06/12/2024 26.8 (L) 37.5 - 51.0 % Final   06/11/2024 31.8 (L) 37.5 - 51.0 % Final   06/11/2024 35.2 (L) 37.5 - 51.0 % Final   06/11/2024 29 (L) 38 - 51 % Final   06/11/2024 29.8 (L) 37.5 - 51.0 % Final   06/11/2024 32 (L) 38 - 51 % Final   06/11/2024 33 (L) 38 - 51 % Final   06/11/2024 27 (L) 38 - 51 % Final   06/11/2024 27 (L) 38 - 51 % Final   06/11/2024 35 (L) 38 - 51 % Final   06/10/2024 40.5 37.5 - 51.0 % Final      Platelets Platelets   Date Value Ref Range Status   06/12/2024 84 (L) 140 - 450 10*3/mm3 Final   06/11/2024 104 (L) 140 - 450 10*3/mm3 Final   06/11/2024 136 (L) 140 - 450 10*3/mm3 Final   06/11/2024 84 (L) 140 - 450 10*3/mm3 Final   06/10/2024 150 140 - 450 10*3/mm3 Final        PT/INR:    Protime   Date Value Ref Range Status   06/12/2024 17.3 (H) 11.7 - 14.2 Seconds Final   06/11/2024 16.6 (H) 11.7 - 14.2 Seconds Final   06/11/2024 18.5 (H) 12.8 - 15.2 seconds Final     Comment:     Serial Number: [id number]:  [id number]   06/11/2024 18.7 (H) 11.7 - 14.2 Seconds Final   06/10/2024 13.7 11.7 - 14.2 Seconds Final   /  INR   Date Value Ref Range Status   06/12/2024 1.39 (H) 0.90 - 1.10 Final   06/11/2024 1.32 (H) 0.90 - 1.10 Final   06/11/2024 1.6 (H) 0.8 - 1.2 Final   06/11/2024 1.54 (H) 0.90 - 1.10 Final   06/10/2024 1.03 0.90 - 1.10 Final       Sodium Sodium   Date Value Ref Range Status   06/12/2024 142 136 - 145 mmol/L Final   06/11/2024 143 136 - 145 mmol/L Final   06/11/2024 144 136 - 145 mmol/L Final   06/10/2024 139 136 - 145 mmol/L Final      Potassium Potassium   Date Value Ref Range Status   06/12/2024 4.2 3.5 - 5.2 mmol/L Final   06/11/2024 4.3 3.5 - 5.2 mmol/L Final   06/11/2024 4.6 3.5 - 5.2 mmol/L Final   06/10/2024 4.2 3.5 - 5.2 mmol/L Final      Chloride Chloride   Date Value Ref Range Status    06/12/2024 111 (H) 98 - 107 mmol/L Final   06/11/2024 111 (H) 98 - 107 mmol/L Final   06/11/2024 110 (H) 98 - 107 mmol/L Final   06/10/2024 103 98 - 107 mmol/L Final      Bicarbonate CO2   Date Value Ref Range Status   06/12/2024 22.2 22.0 - 29.0 mmol/L Final   06/11/2024 21.3 (L) 22.0 - 29.0 mmol/L Final   06/11/2024 24.4 22.0 - 29.0 mmol/L Final   06/10/2024 26.4 22.0 - 29.0 mmol/L Final      BUN BUN   Date Value Ref Range Status   06/12/2024 19 8 - 23 mg/dL Final   06/11/2024 18 8 - 23 mg/dL Final   06/11/2024 18 8 - 23 mg/dL Final   06/10/2024 15 8 - 23 mg/dL Final      Creatinine Creatinine   Date Value Ref Range Status   06/12/2024 0.88 0.76 - 1.27 mg/dL Final   06/11/2024 0.93 0.76 - 1.27 mg/dL Final   06/11/2024 0.95 0.76 - 1.27 mg/dL Final   06/10/2024 0.93 0.76 - 1.27 mg/dL Final      Calcium Calcium   Date Value Ref Range Status   06/12/2024 8.1 (L) 8.6 - 10.5 mg/dL Final   06/11/2024 8.6 8.6 - 10.5 mg/dL Final   06/11/2024 8.7 8.6 - 10.5 mg/dL Final   06/10/2024 9.1 8.6 - 10.5 mg/dL Final      Magnesium Magnesium   Date Value Ref Range Status   06/12/2024 2.3 1.6 - 2.4 mg/dL Final   06/11/2024 1.8 1.6 - 2.4 mg/dL Final   06/11/2024 1.9 1.6 - 2.4 mg/dL Final   06/10/2024 1.9 1.6 - 2.4 mg/dL Final          aspirin, 81 mg, Oral, Daily  atorvastatin, 40 mg, Oral, Nightly  chlorhexidine, 15 mL, Mouth/Throat, Q12H  enoxaparin, 40 mg, Subcutaneous, Q24H  magnesium sulfate, 1 g, Intravenous, Q8H  metoclopramide, 10 mg, Intravenous, Q6H  metoprolol tartrate, 12.5 mg, Oral, Q12H  mupirocin, , Each Nare, BID  pantoprazole, 40 mg, Oral, QAM  senna-docusate sodium, 2 tablet, Oral, BID  vancomycin, 15 mg/kg, Intravenous, Q12H      clevidipine, 2-32 mg/hr  dexmedetomidine, 0.2-1.5 mcg/kg/hr, Last Rate: Stopped (06/11/24 1430)  DOPamine, 2-20 mcg/kg/min  EPINEPHrine, 0.02-0.3 mcg/kg/min  insulin, 0-100 Units/hr, Last Rate: 1.7 Units/hr (06/12/24 0004)  milrinone, 0.25-0.75 mcg/kg/min  niCARdipine, 5-15  mg/hr  nitroglycerin, 5-200 mcg/min  norepinephrine, 0.02-0.3 mcg/kg/min, Last Rate: 0.02 mcg/kg/min (06/11/24 1438)  phenylephrine, 0.2-3 mcg/kg/min  propofol, 5-50 mcg/kg/min, Last Rate: Stopped (06/11/24 1340)  sodium chloride, 30 mL/hr, Last Rate: 30 mL/hr (06/11/24 1343)              Mitral valve disease    Mitral valve disorder    Abnormal findings on diagnostic imaging of heart and coronary circulation      Assessment & Plan    -Severe mitral regurgitation- s/p MV repair, TIKA-Pagni  -Dilatation of aorta  -HTN  -HLD  -Colon cancer  -post op anemia- expected acute blood loss  -TCP- plt 84 count     POD#1   Looks good this morning  Up in the chair  2L NC--- wean as able  Sinus rhythm rate in the 70s-- blood pressure will tolerate beta blocker.  CXR with increase in congestion-- will give a dose of IV lasix this morning  Encourage pulmonary toilet-- continue IS  Mobilize/increase activity  Discontinue swan and fabienne  Transfer to stepdown      Rosa Elena Barrera, SILVIA  06/12/24  07:19 EDT

## 2024-06-13 ENCOUNTER — APPOINTMENT (OUTPATIENT)
Dept: GENERAL RADIOLOGY | Facility: HOSPITAL | Age: 79
DRG: 221 | End: 2024-06-13
Payer: MEDICARE

## 2024-06-13 LAB
ANION GAP SERPL CALCULATED.3IONS-SCNC: 7.2 MMOL/L (ref 5–15)
BUN SERPL-MCNC: 23 MG/DL (ref 8–23)
BUN/CREAT SERPL: 28 (ref 7–25)
CALCIUM SPEC-SCNC: 8.3 MG/DL (ref 8.6–10.5)
CHLORIDE SERPL-SCNC: 108 MMOL/L (ref 98–107)
CO2 SERPL-SCNC: 26.8 MMOL/L (ref 22–29)
CREAT SERPL-MCNC: 0.82 MG/DL (ref 0.76–1.27)
DEPRECATED RDW RBC AUTO: 43.9 FL (ref 37–54)
EGFRCR SERPLBLD CKD-EPI 2021: 89.4 ML/MIN/1.73
ERYTHROCYTE [DISTWIDTH] IN BLOOD BY AUTOMATED COUNT: 12.5 % (ref 12.3–15.4)
GLUCOSE BLDC GLUCOMTR-MCNC: 128 MG/DL (ref 70–130)
GLUCOSE BLDC GLUCOMTR-MCNC: 132 MG/DL (ref 70–130)
GLUCOSE BLDC GLUCOMTR-MCNC: 134 MG/DL (ref 70–130)
GLUCOSE BLDC GLUCOMTR-MCNC: 141 MG/DL (ref 70–130)
GLUCOSE SERPL-MCNC: 131 MG/DL (ref 65–99)
HCT VFR BLD AUTO: 28.9 % (ref 37.5–51)
HGB BLD-MCNC: 9.5 G/DL (ref 13–17.7)
MCH RBC QN AUTO: 31.5 PG (ref 26.6–33)
MCHC RBC AUTO-ENTMCNC: 32.9 G/DL (ref 31.5–35.7)
MCV RBC AUTO: 95.7 FL (ref 79–97)
PLATELET # BLD AUTO: 85 10*3/MM3 (ref 140–450)
PMV BLD AUTO: 10.4 FL (ref 6–12)
POTASSIUM SERPL-SCNC: 4.5 MMOL/L (ref 3.5–5.2)
QT INTERVAL: 379 MS
QTC INTERVAL: 429 MS
RBC # BLD AUTO: 3.02 10*6/MM3 (ref 4.14–5.8)
SODIUM SERPL-SCNC: 142 MMOL/L (ref 136–145)
WBC NRBC COR # BLD AUTO: 9.37 10*3/MM3 (ref 3.4–10.8)

## 2024-06-13 PROCEDURE — 25010000002 METOCLOPRAMIDE PER 10 MG: Performed by: NURSE PRACTITIONER

## 2024-06-13 PROCEDURE — 25010000002 ONDANSETRON PER 1 MG: Performed by: THORACIC SURGERY (CARDIOTHORACIC VASCULAR SURGERY)

## 2024-06-13 PROCEDURE — 93005 ELECTROCARDIOGRAM TRACING: CPT | Performed by: THORACIC SURGERY (CARDIOTHORACIC VASCULAR SURGERY)

## 2024-06-13 PROCEDURE — 25010000002 FUROSEMIDE PER 20 MG: Performed by: NURSE PRACTITIONER

## 2024-06-13 PROCEDURE — 99232 SBSQ HOSP IP/OBS MODERATE 35: CPT | Performed by: NURSE PRACTITIONER

## 2024-06-13 PROCEDURE — 25010000002 MAGNESIUM SULFATE 2 GM/50ML SOLUTION: Performed by: NURSE PRACTITIONER

## 2024-06-13 PROCEDURE — 71045 X-RAY EXAM CHEST 1 VIEW: CPT

## 2024-06-13 PROCEDURE — 93010 ELECTROCARDIOGRAM REPORT: CPT | Performed by: INTERNAL MEDICINE

## 2024-06-13 PROCEDURE — 80048 BASIC METABOLIC PNL TOTAL CA: CPT | Performed by: THORACIC SURGERY (CARDIOTHORACIC VASCULAR SURGERY)

## 2024-06-13 PROCEDURE — 25010000002 CALCIUM GLUCONATE 2-0.675 GM/100ML-% SOLUTION: Performed by: NURSE PRACTITIONER

## 2024-06-13 PROCEDURE — 85027 COMPLETE CBC AUTOMATED: CPT | Performed by: THORACIC SURGERY (CARDIOTHORACIC VASCULAR SURGERY)

## 2024-06-13 PROCEDURE — 82948 REAGENT STRIP/BLOOD GLUCOSE: CPT

## 2024-06-13 PROCEDURE — 97110 THERAPEUTIC EXERCISES: CPT | Performed by: PHYSICAL THERAPIST

## 2024-06-13 RX ORDER — METOCLOPRAMIDE HYDROCHLORIDE 5 MG/ML
5 INJECTION INTRAMUSCULAR; INTRAVENOUS EVERY 6 HOURS
Status: COMPLETED | OUTPATIENT
Start: 2024-06-13 | End: 2024-06-13

## 2024-06-13 RX ORDER — CALCIUM GLUCONATE 20 MG/ML
2000 INJECTION, SOLUTION INTRAVENOUS ONCE
Status: COMPLETED | OUTPATIENT
Start: 2024-06-13 | End: 2024-06-13

## 2024-06-13 RX ORDER — MAGNESIUM SULFATE HEPTAHYDRATE 40 MG/ML
2 INJECTION, SOLUTION INTRAVENOUS ONCE
Status: COMPLETED | OUTPATIENT
Start: 2024-06-13 | End: 2024-06-13

## 2024-06-13 RX ORDER — FUROSEMIDE 10 MG/ML
40 INJECTION INTRAMUSCULAR; INTRAVENOUS ONCE
Status: COMPLETED | OUTPATIENT
Start: 2024-06-13 | End: 2024-06-13

## 2024-06-13 RX ORDER — SIMETHICONE 80 MG
80 TABLET,CHEWABLE ORAL 4 TIMES DAILY PRN
Status: DISCONTINUED | OUTPATIENT
Start: 2024-06-13 | End: 2024-06-15 | Stop reason: HOSPADM

## 2024-06-13 RX ORDER — POTASSIUM CHLORIDE 750 MG/1
20 TABLET, FILM COATED, EXTENDED RELEASE ORAL ONCE
Status: COMPLETED | OUTPATIENT
Start: 2024-06-13 | End: 2024-06-13

## 2024-06-13 RX ADMIN — POTASSIUM CHLORIDE 20 MEQ: 750 TABLET, EXTENDED RELEASE ORAL at 09:27

## 2024-06-13 RX ADMIN — METOPROLOL TARTRATE 25 MG: 25 TABLET, FILM COATED ORAL at 09:32

## 2024-06-13 RX ADMIN — PANTOPRAZOLE SODIUM 40 MG: 40 TABLET, DELAYED RELEASE ORAL at 06:11

## 2024-06-13 RX ADMIN — METOCLOPRAMIDE 5 MG: 5 INJECTION, SOLUTION INTRAMUSCULAR; INTRAVENOUS at 11:28

## 2024-06-13 RX ADMIN — GUAIFENESIN 1200 MG: 600 TABLET, EXTENDED RELEASE ORAL at 20:01

## 2024-06-13 RX ADMIN — GUAIFENESIN 1200 MG: 600 TABLET, EXTENDED RELEASE ORAL at 09:27

## 2024-06-13 RX ADMIN — FERROUS SULFATE TAB 325 MG (65 MG ELEMENTAL FE) 325 MG: 325 (65 FE) TAB at 09:27

## 2024-06-13 RX ADMIN — ACETAMINOPHEN 325MG 650 MG: 325 TABLET ORAL at 08:38

## 2024-06-13 RX ADMIN — ACETAMINOPHEN 325MG 650 MG: 325 TABLET ORAL at 03:31

## 2024-06-13 RX ADMIN — SENNOSIDES AND DOCUSATE SODIUM 2 TABLET: 50; 8.6 TABLET ORAL at 20:01

## 2024-06-13 RX ADMIN — COLCHICINE 0.6 MG: 0.6 TABLET, FILM COATED ORAL at 09:27

## 2024-06-13 RX ADMIN — SENNOSIDES AND DOCUSATE SODIUM 2 TABLET: 50; 8.6 TABLET ORAL at 09:27

## 2024-06-13 RX ADMIN — ONDANSETRON 4 MG: 2 INJECTION INTRAMUSCULAR; INTRAVENOUS at 08:21

## 2024-06-13 RX ADMIN — Medication 1 TABLET: at 09:27

## 2024-06-13 RX ADMIN — METOCLOPRAMIDE 5 MG: 5 INJECTION, SOLUTION INTRAMUSCULAR; INTRAVENOUS at 17:56

## 2024-06-13 RX ADMIN — ASPIRIN 81 MG: 81 TABLET, COATED ORAL at 09:27

## 2024-06-13 RX ADMIN — CALCIUM GLUCONATE 2000 MG: 20 INJECTION, SOLUTION INTRAVENOUS at 08:21

## 2024-06-13 RX ADMIN — METOPROLOL TARTRATE 25 MG: 25 TABLET, FILM COATED ORAL at 20:02

## 2024-06-13 RX ADMIN — ATORVASTATIN CALCIUM 40 MG: 20 TABLET, FILM COATED ORAL at 20:02

## 2024-06-13 RX ADMIN — METOCLOPRAMIDE 5 MG: 5 INJECTION, SOLUTION INTRAMUSCULAR; INTRAVENOUS at 23:35

## 2024-06-13 RX ADMIN — FUROSEMIDE 40 MG: 10 INJECTION, SOLUTION INTRAMUSCULAR; INTRAVENOUS at 08:21

## 2024-06-13 RX ADMIN — ACETAMINOPHEN 325MG 650 MG: 325 TABLET ORAL at 20:01

## 2024-06-13 RX ADMIN — MAGNESIUM SULFATE HEPTAHYDRATE 2 G: 2 INJECTION, SOLUTION INTRAVENOUS at 11:29

## 2024-06-13 RX ADMIN — MUPIROCIN 1 APPLICATION: 20 OINTMENT TOPICAL at 08:21

## 2024-06-13 NOTE — DISCHARGE PLACEMENT REQUEST
"Heriberto Zimmer \"Krzysztof\" (79 y.o. Male)       Date of Birth   1945    Social Security Number       Address   [address] [place] [address]    Home Phone   [phone]    MRN   [id number]       [facility]    Marital Status                               Admission Date   6/11/24    Admission Type   Elective    Admitting Provider   Olivier Ramsey MD    Attending Provider   Olivier Ramsey MD    Department, Room/Bed   [facility] CARDIOVASC UNIT, 2201/1       Discharge Date       Discharge Disposition       Discharge Destination                                 Attending Provider: Olivier Ramsey MD    Allergies: Kefzol [Cefazolin], Sulfa Antibiotics    Isolation: None   Infection: None   Code Status: CPR    Ht: 172.7 cm (68\")   Wt: 71.9 kg (158 lb 8 oz)    Admission Cmt: None   Principal Problem: Mitral valve disease [I05.9]                   Active Insurance as of 6/11/2024       Primary Coverage       Payor Plan Insurance Group Employer/Plan Group    MEDICARE MEDICARE A & B        Payor Plan Address Payor Plan Phone Number Payor Plan Fax Number Effective Dates    [address] [phone]  4/1/2010 - None Entered    [facility] [id number]         Subscriber Name Subscriber Birth Date Member ID       HERIBERTO ZIMMER 1945 [id number]               Secondary Coverage       Payor Plan Insurance Group Employer/Plan Group    [employer]AR HE[employer] C[employer] OPTIONS        Payor Plan Address Payor Plan Phone Number Payor Plan Fax Number Effective Dates    [facility] [phone]  6/1/2017 - None Entered    [address]       [facility] [address]         Subscriber Name Subscriber Birth Date Member ID       HERIBERTO ZIMMER 1945 [id number]                     Emergency Contacts        (Rel.) Home Phone Work Phone Mobile Phone    KATHLEEN VILLAR (Spouse) [phone] -- [phone]                "

## 2024-06-13 NOTE — CASE MANAGEMENT/SOCIAL WORK
Discharge Planning Assessment  [facility]     Patient Name: Mirza Zimmer  MRN: [id number]  Today's Date: 6/13/2024    Admit Date: 6/11/2024    Plan: Home w/ Terra GALVEZ   Discharge Needs Assessment       Row Name 06/13/24 1614       Living Environment    People in Home spouse    Name(s) of People in Home Selena Yusk/wife    Current Living Arrangements home    Potentially Unsafe Housing Conditions none    In the past 12 months has the electric, gas, oil, or water company threatened to shut off services in your home? No    Primary Care Provided by self    Provides Primary Care For no one    Family Caregiver if Needed spouse    Family Caregiver Names RENETTA    Quality of Family Relationships helpful;involved;supportive    Able to Return to Prior Arrangements yes       Resource/Environmental Concerns    Resource/Environmental Concerns none    Transportation Concerns none       Transportation Needs    In the past 12 months, has lack of transportation kept you from medical appointments or from getting medications? no    In the past 12 months, has lack of transportation kept you from meetings, work, or from getting things needed for daily living? No       Food Insecurity    Within the past 12 months, you worried that your food would run out before you got the money to buy more. Never true    Within the past 12 months, the food you bought just didn't last and you didn't have money to get more. Never true       Transition Planning    Patient/Family Anticipates Transition to home with family    Patient/Family Anticipated Services at Transition home health care    Transportation Anticipated family or friend will provide       Discharge Needs Assessment    Readmission Within the Last 30 Days no previous admission in last 30 days    Equipment Currently Used at Home bath bench;bp cuff;scales;pulse ox    Concerns to be Addressed discharge planning    Anticipated Changes Related to Illness none    Equipment Needed After  Discharge none    Discharge Facility/Level of Care Needs home with home health    Provided Post Acute Provider List? Yes    Provided Post Acute Provider Quality & Resource List? Yes    Post Acute Provider Quality and Resource List Home Health    Delivered To Patient    Method of Delivery In person    Patient's Choice of Community Agency(s) [facility]                   Discharge Plan       Row Name 06/13/24 4994       Plan    Plan Home w/ [facility]    Plan Comments CCP spoke with pleasant patient at bedside.  CCP role explained and discharge planning discussed.  Face sheet verified.  Patient stated he is IADL's, retired from [employer] and drives.  Patient lives with his wife/Selena Locke in a two-story home with three a basement.  Patients PCP confirmed as, Radha Rollins.  Patient's pharmacy confirmed as, [facility].  Patient has the following DME- bath bench, BP cuff monitor, scale and pulse oximeter.  Patient denies use of past home health or going to a sub-acute rehab.  Patient plans to return home at discharge and chose [facility] to follow.  [facility]O accepted.  CCP will continue to follow……Betty STACK /TE.                  Continued Care and Services - Admitted Since 6/11/2024       Home Medical Care Coordination complete.      Service Provider Request Status Selected Services Address Phone Fax Patient Preferred     [facility]  Selected Home Health Services [address] [address] [phone] --                  Expected Discharge Date and Time       Expected Discharge Date Expected Discharge Time    Jun 18, 2024            Demographic Summary       Row Name 06/13/24 1618       General Information    Admission Type inpatient    Arrived From home    Required Notices Provided Important Message from Medicare    Referral Source admission list    Reason for Consult discharge planning    Preferred Language English       Contact Information     Permission Granted to Share Info With family/designee                   Functional Status       Row Name 06/13/24 1613       Functional Status    Usual Activity Tolerance good    Current Activity Tolerance fair       Assessment of Health Literacy    How often do you have someone help you read hospital materials? Never    Health Literacy Good       Functional Status, IADL    Medications independent    Meal Preparation independent    Housekeeping independent    Laundry independent    Shopping independent       Mental Status    General Appearance WDL WDL       Mental Status Summary    Recent Changes in Mental Status/Cognitive Functioning no changes       Employment/    Employment Status retired    Employment/ Comments                    Psychosocial    No documentation.                  Abuse/Neglect    No documentation.                  Legal    No documentation.                  Substance Abuse    No documentation.                  Patient Forms    No documentation.                     Betty Corral RN

## 2024-06-13 NOTE — PROGRESS NOTES
" LOS: 2 days   Patient Care Team:  Radha Rollins MD as PCP - General (Internal Medicine)  Radha Rollins MD as Referring Physician (Internal Medicine)  Yazan Peterson Jr., MD as Consulting Physician (Urology)  Adriana Vaughn MD as Consulting Physician (Cardiology)    Chief Complaint: post op follow- up    Subjective:  Symptoms:  He reports weakness.  No shortness of breath or chest pressure.    Diet:  He reports  nausea.  No vomiting.    Activity level: Impaired due to weakness.          Vital Signs  Temp:  [97.7 °F (36.5 °C)-99.2 °F (37.3 °C)] 97.9 °F (36.6 °C)  Heart Rate:  [66-74] 70  Resp:  [16] 16  BP: ()/(67-76) 110/74  Arterial Line BP: (126)/(57) 126/57  Body mass index is 24.1 kg/m².    Intake/Output Summary (Last 24 hours) at 6/13/2024 0718  Last data filed at 6/13/2024 0619  Gross per 24 hour   Intake 779 ml   Output 2265 ml   Net -1486 ml     No intake/output data recorded.    Chest tube drainage last 8 hours 250        06/11/24  0528 06/13/24  0619   Weight: 72.7 kg (160 lb 4.4 oz) 71.9 kg (158 lb 8 oz)         Objective:  Vital signs: (most recent): Blood pressure 117/80, pulse 70, temperature 97.9 °F (36.6 °C), temperature source Oral, resp. rate 16, height 172.7 cm (68\"), weight 71.9 kg (158 lb 8 oz), SpO2 94%.                Results Review:        WBC WBC   Date Value Ref Range Status   06/13/2024 9.37 3.40 - 10.80 10*3/mm3 Final   06/12/2024 7.23 3.40 - 10.80 10*3/mm3 Final   06/11/2024 10.12 3.40 - 10.80 10*3/mm3 Final   06/11/2024 10.85 (H) 3.40 - 10.80 10*3/mm3 Final   06/11/2024 7.58 3.40 - 10.80 10*3/mm3 Final   06/10/2024 3.57 3.40 - 10.80 10*3/mm3 Final      HGB Hemoglobin   Date Value Ref Range Status   06/13/2024 9.5 (L) 13.0 - 17.7 g/dL Final   06/12/2024 8.9 (L) 13.0 - 17.7 g/dL Final   06/11/2024 10.8 (L) 13.0 - 17.7 g/dL Final   06/11/2024 11.7 (L) 13.0 - 17.7 g/dL Final   06/11/2024 9.9 (L) 12.0 - 17.0 g/dL Final   06/11/2024 10.0 (L) 13.0 - 17.7 g/dL Final   06/11/2024 10.9 " (L) 12.0 - 17.0 g/dL Final   06/11/2024 11.2 (L) 12.0 - 17.0 g/dL Final   06/11/2024 9.2 (L) 12.0 - 17.0 g/dL Final   06/11/2024 9.2 (L) 12.0 - 17.0 g/dL Final   06/11/2024 11.9 (L) 12.0 - 17.0 g/dL Final   06/10/2024 13.3 13.0 - 17.7 g/dL Final      HCT Hematocrit   Date Value Ref Range Status   06/13/2024 28.9 (L) 37.5 - 51.0 % Final   06/12/2024 26.8 (L) 37.5 - 51.0 % Final   06/11/2024 31.8 (L) 37.5 - 51.0 % Final   06/11/2024 35.2 (L) 37.5 - 51.0 % Final   06/11/2024 29 (L) 38 - 51 % Final   06/11/2024 29.8 (L) 37.5 - 51.0 % Final   06/11/2024 32 (L) 38 - 51 % Final   06/11/2024 33 (L) 38 - 51 % Final   06/11/2024 27 (L) 38 - 51 % Final   06/11/2024 27 (L) 38 - 51 % Final   06/11/2024 35 (L) 38 - 51 % Final   06/10/2024 40.5 37.5 - 51.0 % Final      Platelets Platelets   Date Value Ref Range Status   06/13/2024 85 (L) 140 - 450 10*3/mm3 Final   06/12/2024 84 (L) 140 - 450 10*3/mm3 Final   06/11/2024 104 (L) 140 - 450 10*3/mm3 Final   06/11/2024 136 (L) 140 - 450 10*3/mm3 Final   06/11/2024 84 (L) 140 - 450 10*3/mm3 Final   06/10/2024 150 140 - 450 10*3/mm3 Final        PT/INR:    Protime   Date Value Ref Range Status   06/12/2024 17.3 (H) 11.7 - 14.2 Seconds Final   06/11/2024 16.6 (H) 11.7 - 14.2 Seconds Final   06/11/2024 18.5 (H) 12.8 - 15.2 seconds Final     Comment:     Serial Number: [id number]:  [id number]   06/11/2024 18.7 (H) 11.7 - 14.2 Seconds Final   06/10/2024 13.7 11.7 - 14.2 Seconds Final   /  INR   Date Value Ref Range Status   06/12/2024 1.39 (H) 0.90 - 1.10 Final   06/11/2024 1.32 (H) 0.90 - 1.10 Final   06/11/2024 1.6 (H) 0.8 - 1.2 Final   06/11/2024 1.54 (H) 0.90 - 1.10 Final   06/10/2024 1.03 0.90 - 1.10 Final       Sodium Sodium   Date Value Ref Range Status   06/13/2024 142 136 - 145 mmol/L Final   06/12/2024 142 136 - 145 mmol/L Final   06/11/2024 143 136 - 145 mmol/L Final   06/11/2024 144 136 - 145 mmol/L Final   06/10/2024 139 136 - 145 mmol/L Final      Potassium Potassium   Date Value  Ref Range Status   06/13/2024 4.5 3.5 - 5.2 mmol/L Final   06/12/2024 4.2 3.5 - 5.2 mmol/L Final   06/11/2024 4.3 3.5 - 5.2 mmol/L Final   06/11/2024 4.6 3.5 - 5.2 mmol/L Final   06/10/2024 4.2 3.5 - 5.2 mmol/L Final      Chloride Chloride   Date Value Ref Range Status   06/13/2024 108 (H) 98 - 107 mmol/L Final   06/12/2024 111 (H) 98 - 107 mmol/L Final   06/11/2024 111 (H) 98 - 107 mmol/L Final   06/11/2024 110 (H) 98 - 107 mmol/L Final   06/10/2024 103 98 - 107 mmol/L Final      Bicarbonate CO2   Date Value Ref Range Status   06/13/2024 26.8 22.0 - 29.0 mmol/L Final   06/12/2024 22.2 22.0 - 29.0 mmol/L Final   06/11/2024 21.3 (L) 22.0 - 29.0 mmol/L Final   06/11/2024 24.4 22.0 - 29.0 mmol/L Final   06/10/2024 26.4 22.0 - 29.0 mmol/L Final      BUN BUN   Date Value Ref Range Status   06/13/2024 23 8 - 23 mg/dL Final   06/12/2024 19 8 - 23 mg/dL Final   06/11/2024 18 8 - 23 mg/dL Final   06/11/2024 18 8 - 23 mg/dL Final   06/10/2024 15 8 - 23 mg/dL Final      Creatinine Creatinine   Date Value Ref Range Status   06/13/2024 0.82 0.76 - 1.27 mg/dL Final   06/12/2024 0.88 0.76 - 1.27 mg/dL Final   06/11/2024 0.93 0.76 - 1.27 mg/dL Final   06/11/2024 0.95 0.76 - 1.27 mg/dL Final   06/10/2024 0.93 0.76 - 1.27 mg/dL Final      Calcium Calcium   Date Value Ref Range Status   06/13/2024 8.3 (L) 8.6 - 10.5 mg/dL Final   06/12/2024 8.1 (L) 8.6 - 10.5 mg/dL Final   06/11/2024 8.6 8.6 - 10.5 mg/dL Final   06/11/2024 8.7 8.6 - 10.5 mg/dL Final   06/10/2024 9.1 8.6 - 10.5 mg/dL Final      Magnesium Magnesium   Date Value Ref Range Status   06/12/2024 2.3 1.6 - 2.4 mg/dL Final   06/11/2024 1.8 1.6 - 2.4 mg/dL Final   06/11/2024 1.9 1.6 - 2.4 mg/dL Final   06/10/2024 1.9 1.6 - 2.4 mg/dL Final          aspirin, 81 mg, Oral, Daily  atorvastatin, 40 mg, Oral, Nightly  chlorhexidine, 15 mL, Mouth/Throat, Q12H  colchicine, 0.6 mg, Oral, Daily  enoxaparin, 40 mg, Subcutaneous, Q24H  ferrous sulfate, 325 mg, Oral, Daily With  Breakfast  guaiFENesin, 1,200 mg, Oral, Q12H  insulin lispro, 2-7 Units, Subcutaneous, 4x Daily AC & at Bedtime  metoprolol tartrate, 25 mg, Oral, Q12H  multivitamin, 1 tablet, Oral, Daily  mupirocin, , Each Nare, BID  pantoprazole, 40 mg, Oral, QAM  senna-docusate sodium, 2 tablet, Oral, BID      sodium chloride, 30 mL/hr, Last Rate: 30 mL/hr (06/11/24 1343)              Mitral valve disease    Mitral valve disorder    Abnormal findings on diagnostic imaging of heart and coronary circulation      Assessment & Plan    -Severe mitral regurgitation- s/p MV repair, TIKA-Pagni  -Dilatation of aorta  -HTN  -HLD  -Colon cancer  -post op anemia- expected acute blood loss  -TCP- plt 84 count     POD#2  Looks good this morning  Up in the chair  1L NC--- wean as able  Sinus rhythm rate in the 70s-- tolerated beta blocker- does have a rub, colchicine started yesterday, hopefully after the chest tubes come out it will improve.  Frequently belching this morning-- his bowel sounds are there but sleepy, will give a few more doses of reglan  CXR with in congestion-- will give another dose of lasix IV.  Hgb improved after diuresis 9.5.  plt count stable 85  Encourage pulmonary toilet-- continue IS  Mobilize/increase activity  Discontinue chest tubes, central line and esquivel  Continue routine care      SILVIA Sharma  06/13/24  07:18 EDT

## 2024-06-13 NOTE — PROGRESS NOTES
LOS: 2 days   Patient Care Team:  Radha Rollins MD as PCP - General (Internal Medicine)  Radha Rollins MD as Referring Physician (Internal Medicine)  Yazan Peterson Jr., MD as Consulting Physician (Urology)  Adriana Vaughn MD as Consulting Physician (Cardiology)      Chief Complaint: Follow-up mitral insufficiency, status post mitral valve repair       Interval History: Doing well overall. Feels a little weak. More discomfort than yesterday.       Objective   Vital Signs  Temp:  [97.7 °F (36.5 °C)-98.4 °F (36.9 °C)] 97.9 °F (36.6 °C)  Heart Rate:  [67-74] 70  Resp:  [16] 16  BP: ()/(67-80) 117/80    Intake/Output Summary (Last 24 hours) at 6/13/2024 0919  Last data filed at 6/13/2024 0831  Gross per 24 hour   Intake 779 ml   Output 2270 ml   Net -1491 ml         Physical Exam:  Constitutional: Well appearing, well developed, no acute distress   HENT: Oropharynx clear and membrane moist  Eyes: Normal conjunctiva, no sclera icterus.  Neck: Supple, no carotid bruit bilaterally.  Cardiovascular: Regular rate and rhythm, No Murmur, No bilateral lower extremity edema.  Pulmonary: Normal respiratory effort, normal lung sounds, no wheezing.  Abdominal: Soft, nontender, no hepatosplenomegaly, liver is non-pulsatile.  Neurological: Alert and orient x 3.   Skin: Warm, dry, no ecchymosis, no rash.  Psych: Appropriate mood and affect. Normal judgment and insight.      Results Review:      Results from last 7 days   Lab Units 06/13/24  0327 06/12/24  0356 06/11/24  1622   SODIUM mmol/L 142 142 143   POTASSIUM mmol/L 4.5 4.2 4.3   CHLORIDE mmol/L 108* 111* 111*   CO2 mmol/L 26.8 22.2 21.3*   BUN mg/dL 23 19 18   CREATININE mg/dL 0.82 0.88 0.93   GLUCOSE mg/dL 131* 120* 135*   CALCIUM mg/dL 8.3* 8.1* 8.6         Results from last 7 days   Lab Units 06/13/24  0327 06/12/24  0356 06/11/24  1622   WBC 10*3/mm3 9.37 7.23 10.12   HEMOGLOBIN g/dL 9.5* 8.9* 10.8*   HEMATOCRIT % 28.9* 26.8* 31.8*   PLATELETS 10*3/mm3 85* 84* 104*      Results from last 7 days   Lab Units 06/12/24  0356 06/11/24  1136 06/11/24  0956 06/11/24  0951 06/10/24  0822   INR  1.39* 1.32* 1.6* 1.54* 1.03   APTT seconds  --  28.0  --  29.4 26.8     Results from last 7 days   Lab Units 06/10/24  0822   CHOLESTEROL mg/dL 144     Results from last 7 days   Lab Units 06/12/24  0356   MAGNESIUM mg/dL 2.3     Results from last 7 days   Lab Units 06/10/24  0822   CHOLESTEROL mg/dL 144   TRIGLYCERIDES mg/dL 86   HDL CHOL mg/dL 54   LDL CHOL mg/dL 74       I reviewed the patient's new clinical results.  I personally viewed and interpreted the patient's EKG/Telemetry data        Medication Review:   aspirin, 81 mg, Oral, Daily  atorvastatin, 40 mg, Oral, Nightly  chlorhexidine, 15 mL, Mouth/Throat, Q12H  colchicine, 0.6 mg, Oral, Daily  enoxaparin, 40 mg, Subcutaneous, Q24H  ferrous sulfate, 325 mg, Oral, Daily With Breakfast  guaiFENesin, 1,200 mg, Oral, Q12H  insulin lispro, 2-7 Units, Subcutaneous, 4x Daily AC & at Bedtime  metoclopramide, 5 mg, Intravenous, Q6H  metoprolol tartrate, 25 mg, Oral, Q12H  multivitamin, 1 tablet, Oral, Daily  mupirocin, , Each Nare, BID  pantoprazole, 40 mg, Oral, QAM  potassium chloride, 20 mEq, Oral, Once  senna-docusate sodium, 2 tablet, Oral, BID        sodium chloride, 30 mL/hr, Last Rate: 30 mL/hr (06/11/24 1343)        Assessment & Plan       Mitral valve disease    Mitral valve disorder    Abnormal findings on diagnostic imaging of heart and coronary circulation      1.  Mitral prolapse with severe insufficiency.  Status post mitral valve repair and left atrial appendage ligation on 6/11.  2.  Hypertension.  Blood pressures have been on the lower side.  Continue low-dose metoprolol.  3.  Hyperlipidemia.  Continue statin.      Ana Kauffman, SILVIA  06/13/24  09:19 EDT

## 2024-06-13 NOTE — THERAPY TREATMENT NOTE
Patient Name: Mirza Zimmer  : 1945    MRN: [id number]                              Today's Date: 2024       Admit Date: 2024    Visit Dx:     ICD-10-CM ICD-9-CM   1. S/P MVR (mitral valve repair)  Z98.890 V45.89   2. Mitral valve disorder  I05.9 394.9   3. Abnormal findings on diagnostic imaging of heart and coronary circulation  R93.1 794.39     Patient Active Problem List   Diagnosis    Arthritis    Hyperlipidemia    Hypertension, essential    MOFFETT (dyspnea on exertion)    Non-rheumatic mitral regurgitation    MVP (mitral valve prolapse)    Dilated aortic root    Increased prostate specific antigen (PSA) velocity    Other ill-defined heart diseases     History of colon polyps    Thoracic aortic aneurysm without rupture    Malignant neoplasm of sigmoid colon    Cancer of sigmoid colon    Personal history of colon cancer    Nonrheumatic mitral valve regurgitation    Mitral valve disorder    Abnormal findings on diagnostic imaging of heart and coronary circulation    Mitral valve disease     Past Medical History:   Diagnosis Date    Actinic keratosis     Aneurysm of iliac artery 2019    FOLLOWED BY DR. EDGARD LEE    Arthritis     BPH (benign prostatic hyperplasia)     FOLLOWED BY DR. MARK BALL    Cardiomegaly     FOLLOWED BY DR. SAMUEL RIVAS    Cataract     BILATERAL    Chronic prostatitis     FOLLOWED BY DR. MARK BALL    Colon cancer 2021    Sigmoid colon superficial fragments of eroded tubulovillous adenoma with at intramucosal adenocarcinoma    Colon polyps     FOLLOWED BY DR. KEVIN LUDWIG    Dilated aortic root     Dyspnea on exertion     ED (erectile dysfunction)     Elevated PSA     Heart murmur     Hemorrhoids     History of colon resection     Hyperlipidemia     Hypermetropia, bilateral     Hypertension     Iliac artery aneurysm 2018    FOLLOWED BY DR. 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    Presbyopia     Regular astigmatism of both eyes     Seborrheic keratosis     Thoracic aortic aneurysm without rupture     Thoracic aortic ectasia 07/2017    Trochanteric bursitis of left hip      Past Surgical History:   Procedure Laterality Date    APPENDECTOMY  10/12/2021    Done as a part of a colon resection    CARDIAC CATHETERIZATION N/A 4/4/2024    Procedure: Right and Left Heart Cath;  Surgeon: Williams Marques MD;  Location: [facility] CATH INVASIVE LOCATION;  Service: Cardiovascular;  Laterality: N/A;    COLON RESECTION N/A 10/12/2021    Procedure: LAPAROSCOPIC LEFT COLECTOMY, APPENDECTOMY;  Surgeon: Cesar Rinaldi MD;  Location: [facility] OR;  Service: General;  Laterality: N/A;    COLONOSCOPY N/A 8/28/2019    5 MM TUBULAR ADENOMA POLYP IN HEPATIC FLEXURE, 6 MM SERRATED ADENOMA POLYP IN TRANSVERSE, MULTIPLE DIVERTICULA IN SIGMOID, RESCOPE IN 2 YRS, DR. KEVIN LUDWIG AT [facility]    COLONOSCOPY N/A 9/29/2021    3 TUBULAR ADENOMA POLYPS IN ASCENDING, AN INFILTRATIVE MASS IN SIGMOID, PATH: TUBULOVILLOUS ADENOMA WITH AT LEAST INTRAMUCOSAL ADENOCARCINOMA, AREA TATTOOED, RESCOPE IN1 YR, DR. KEVIN LUDWIG AT [facility]    COLONOSCOPY N/A 10/19/2022    Procedure: COLONOSCOPY to CECUM WITH COLD BX POLYPECTOMY;  Surgeon: Cesar Rinaldi MD;  Location: [facility] ENDOSCOPY;  Service: General;  Laterality: N/A;  HX COLON CA  --POLYP, DIVERTICULOSIS        COLONOSCOPY W/ POLYPECTOMY N/A 09/03/2004    5 MM ADENOMATOUS POLYP IN SIGMOID, OTHERWISE WNL, RESCOPE IN 3 YRS, DR. ANGI CONLEY AT [facility]    COLONOSCOPY W/ POLYPECTOMY N/A 05/28/2013    3 MM SESSILE HYPERPLASTIC POLYP 50CM FROM ANUS, A FEW SMALL DIVERTICULA IN SIGMOID, NON BLEEDING INTERNAL HEMORRHOIDS GRADE II, DR. ANGI CONLEY AT [facility]    COLONOSCOPY W/  POLYPECTOMY N/A 02/29/2008    5 MM HYPERPLASTIC POLYP AT 60 CM FROM ANUS, RESCOPE IN 5 YRS, DR. ANGI CONLEY AT [facility]    EYE SURGERY  2003    ATYPICAL MOLE EXCSION, [facility] SANC[facility]Z [facility] [place] [facility]    MITRAL VALVE REPAIR/REPLACEMENT N/A 6/11/2024    Procedure: INTRAOPERATIVE UNIQUE; STERNOTOMY; MITRAL VALVE REPAIR AND PRP.;  Surgeon: Olivier Ramsey MD;  Location: [facility];  Service: Cardiothoracic;  Laterality: N/A;    PROSTATE SURGERY N/A 08/15/2018    UROLIFT, PROSTATE VOLUME 48CC, NO ABNORMALITIES, DR. COLLIN GARVIN    TONSILLECTOMY Bilateral 1958    DONE IN [place], [place]      General Information       Row Name 06/13/24 1245          Physical Therapy Time and Intention    Document Type therapy note (daily note)  -SHELIA     Mode of Treatment physical therapy  -               User Key  (r) = Recorded By, (t) = Taken By, (c) = Cosigned By      Initials Name Provider Type    Rylee Agustin, PARMINDER Physical Therapist                   Mobility       Row Name 06/13/24 1245          Bed Mobility    Supine-Sit Stevens (Bed Mobility) standby assist  -     Sit-Supine Stevens (Bed Mobility) standby assist  -     Assistive Device (Bed Mobility) head of bed elevated  -       Row Name 06/13/24 1245          Sit-Stand Transfer    Sit-Stand Stevens (Transfers) standby assist  -       Row Name 06/13/24 1245          Gait/Stairs (Locomotion)    Stevens Level (Gait) standby assist  -     Distance in Feet (Gait) 300  with 1 seated rest break  -               User Key  (r) = Recorded By, (t) = Taken By, (c) = Cosigned By      Initials Name Provider Type    Rylee Agustin PT Physical Therapist                   Obj/Interventions       Row Name 06/13/24 1245          Motor Skills    Therapeutic Exercise --  cardiac protocol x 5 reps  -SHELIA               User Key  (r) = Recorded By, (t) = Taken By, (c) = Cosigned By      Initials Name Provider Type    SHELIA Rodriguez  Rylee Peterson, PT Physical Therapist                   Goals/Plan    No documentation.                  Clinical Impression       Row Name 06/13/24 1245          Pain    Pretreatment Pain Rating 0/10 - no pain  -     Posttreatment Pain Rating 0/10 - no pain  -       Row Name 06/13/24 1245          Plan of Care Review    Outcome Evaluation Pt was eager to walk. He ambulated well with SBA. Tolerated increased distance today. educated pt and spouse on cardac exercises. Encouraged pt to ambulate multiple times per day with nursing as well as PT.  -SHELIA       Row Name 06/13/24 1245          Positioning and Restraints    Pre-Treatment Position in bed  -     Post Treatment Position bathroom  -SHELIA     Bathroom sitting;call light within reach;with family/caregiver;notified nsg;encouraged to call for assist  -               User Key  (r) = Recorded By, (t) = Taken By, (c) = Cosigned By      Initials Name Provider Type    Rylee Agustin, PT Physical Therapist                   Outcome Measures       Row Name 06/13/24 1247 06/13/24 0838       How much help from another person do you currently need...    Turning from your back to your side while in flat bed without using bedrails? 4  -KH 4  -TD    Moving from lying on back to sitting on the side of a flat bed without bedrails? 4  -KH 4  -TD    Moving to and from a bed to a chair (including a wheelchair)? 4  -KH 3  -TD    Standing up from a chair using your arms (e.g., wheelchair, bedside chair)? 4  -KH 3  -TD    Climbing 3-5 steps with a railing? 3  -KH 3  -TD    To walk in hospital room? 4  -KH 3  -TD    AM-PAC 6 Clicks Score (PT) 23  -KH 20  -TD    Highest Level of Mobility Goal 7 --> Walk 25 feet or more  -KH 6 --> Walk 10 steps or more  -TD      Row Name 06/13/24 1247          Functional Assessment    Outcome Measure Options AM-PAC 6 Clicks Basic Mobility (PT)  -               User Key  (r) = Recorded By, (t) = Taken By, (c) = Cosigned By      Initials Name  Provider Type    Rylee Agustin, PT Physical Therapist    Francisca Sanchez, RN Registered Nurse                                 Physical Therapy Education       Title: PT OT SLP Therapies (In Progress)       Topic: Physical Therapy (In Progress)       Point: Mobility training (In Progress)       Learning Progress Summary             Patient Acceptance, E, NR by AR at 6/12/2024 1016                         Point: Home exercise program (In Progress)       Learning Progress Summary             Patient Acceptance, E, NR by AR at 6/12/2024 1016                         Point: Body mechanics (In Progress)       Learning Progress Summary             Patient Acceptance, E, NR by AR at 6/12/2024 1016                         Point: Precautions (In Progress)       Learning Progress Summary             Patient Acceptance, E, NR by AR at 6/12/2024 1016                                         User Key       Initials Effective Dates Name Provider Type [facility]    AR 06/16/21 -  Luann Reyes, PT Physical Therapist PT                  PT Recommendation and Plan     Outcome Evaluation: Pt was eager to walk. He ambulated well with SBA. Tolerated increased distance today. educated pt and spouse on cardac exercises. Encouraged pt to ambulate multiple times per day with nursing as well as PT.     Time Calculation:         PT Charges       Row Name 06/13/24 1248             Time Calculation    Start Time 1040  -      Stop Time 1104  -      Time Calculation (min) 24 min  -KH      PT Received On 06/13/24  -      PT - Next Appointment 06/14/24  -         Time Calculation- PT    Total Timed Code Minutes- PT 24 minute(s)  -         Timed Charges    38385 - PT Therapeutic Exercise Minutes 24  -KH         Total Minutes    Timed Charges Total Minutes 24  -KH       Total Minutes 24  -KH                User Key  (r) = Recorded By, (t) = Taken By, (c) = Cosigned By      Initials Name Provider Type    Rylee Agustin  Kristen, PT Physical Therapist                  Therapy Charges for Today       Code Description Service Date Service Provider Modifiers Qty    [id number] HC PT THER PROC EA 15 MIN 6/13/2024 Rylee Rodriguez, PT GP 2            PT G-Codes  Outcome Measure Options: AM-PAC 6 Clicks Basic Mobility (PT)  AM-PAC 6 Clicks Score (PT): 23       Rylee Rodriguez, PT  6/13/2024

## 2024-06-14 ENCOUNTER — APPOINTMENT (OUTPATIENT)
Dept: GENERAL RADIOLOGY | Facility: HOSPITAL | Age: 79
DRG: 221 | End: 2024-06-14
Payer: MEDICARE

## 2024-06-14 LAB
25(OH)D3 SERPL-MCNC: 32.8 NG/ML (ref 30–100)
ANION GAP SERPL CALCULATED.3IONS-SCNC: 9 MMOL/L (ref 5–15)
BUN SERPL-MCNC: 21 MG/DL (ref 8–23)
BUN/CREAT SERPL: 30.9 (ref 7–25)
CALCIUM SPEC-SCNC: 8.3 MG/DL (ref 8.6–10.5)
CHLORIDE SERPL-SCNC: 103 MMOL/L (ref 98–107)
CO2 SERPL-SCNC: 27 MMOL/L (ref 22–29)
CREAT SERPL-MCNC: 0.68 MG/DL (ref 0.76–1.27)
DEPRECATED RDW RBC AUTO: 43.8 FL (ref 37–54)
EGFRCR SERPLBLD CKD-EPI 2021: 94.6 ML/MIN/1.73
ERYTHROCYTE [DISTWIDTH] IN BLOOD BY AUTOMATED COUNT: 12.5 % (ref 12.3–15.4)
GLUCOSE BLDC GLUCOMTR-MCNC: 110 MG/DL (ref 70–130)
GLUCOSE SERPL-MCNC: 107 MG/DL (ref 65–99)
HCT VFR BLD AUTO: 27.5 % (ref 37.5–51)
HGB BLD-MCNC: 9.3 G/DL (ref 13–17.7)
MCH RBC QN AUTO: 32.2 PG (ref 26.6–33)
MCHC RBC AUTO-ENTMCNC: 33.8 G/DL (ref 31.5–35.7)
MCV RBC AUTO: 95.2 FL (ref 79–97)
PLATELET # BLD AUTO: 86 10*3/MM3 (ref 140–450)
PMV BLD AUTO: 10.4 FL (ref 6–12)
POTASSIUM SERPL-SCNC: 3.6 MMOL/L (ref 3.5–5.2)
POTASSIUM SERPL-SCNC: 3.8 MMOL/L (ref 3.5–5.2)
RBC # BLD AUTO: 2.89 10*6/MM3 (ref 4.14–5.8)
SODIUM SERPL-SCNC: 139 MMOL/L (ref 136–145)
WBC NRBC COR # BLD AUTO: 8.43 10*3/MM3 (ref 3.4–10.8)

## 2024-06-14 PROCEDURE — 99232 SBSQ HOSP IP/OBS MODERATE 35: CPT | Performed by: NURSE PRACTITIONER

## 2024-06-14 PROCEDURE — 94762 N-INVAS EAR/PLS OXIMTRY CONT: CPT

## 2024-06-14 PROCEDURE — 82948 REAGENT STRIP/BLOOD GLUCOSE: CPT

## 2024-06-14 PROCEDURE — 94799 UNLISTED PULMONARY SVC/PX: CPT

## 2024-06-14 PROCEDURE — 84132 ASSAY OF SERUM POTASSIUM: CPT | Performed by: THORACIC SURGERY (CARDIOTHORACIC VASCULAR SURGERY)

## 2024-06-14 PROCEDURE — 85027 COMPLETE CBC AUTOMATED: CPT | Performed by: THORACIC SURGERY (CARDIOTHORACIC VASCULAR SURGERY)

## 2024-06-14 PROCEDURE — 25010000002 MAGNESIUM SULFATE 2 GM/50ML SOLUTION: Performed by: NURSE PRACTITIONER

## 2024-06-14 PROCEDURE — 97110 THERAPEUTIC EXERCISES: CPT | Performed by: PHYSICAL THERAPIST

## 2024-06-14 PROCEDURE — 80048 BASIC METABOLIC PNL TOTAL CA: CPT | Performed by: THORACIC SURGERY (CARDIOTHORACIC VASCULAR SURGERY)

## 2024-06-14 PROCEDURE — 71046 X-RAY EXAM CHEST 2 VIEWS: CPT

## 2024-06-14 PROCEDURE — 82306 VITAMIN D 25 HYDROXY: CPT | Performed by: NURSE PRACTITIONER

## 2024-06-14 RX ORDER — POTASSIUM CHLORIDE 750 MG/1
40 TABLET, FILM COATED, EXTENDED RELEASE ORAL EVERY 4 HOURS
Status: DISPENSED | OUTPATIENT
Start: 2024-06-14 | End: 2024-06-14

## 2024-06-14 RX ORDER — DOCUSATE SODIUM 100 MG/1
100 CAPSULE, LIQUID FILLED ORAL 2 TIMES DAILY
Status: DISCONTINUED | OUTPATIENT
Start: 2024-06-14 | End: 2024-06-15 | Stop reason: HOSPADM

## 2024-06-14 RX ORDER — FUROSEMIDE 40 MG/1
40 TABLET ORAL DAILY
Status: DISCONTINUED | OUTPATIENT
Start: 2024-06-14 | End: 2024-06-15 | Stop reason: HOSPADM

## 2024-06-14 RX ORDER — MAGNESIUM SULFATE HEPTAHYDRATE 40 MG/ML
2 INJECTION, SOLUTION INTRAVENOUS ONCE
Status: COMPLETED | OUTPATIENT
Start: 2024-06-14 | End: 2024-06-14

## 2024-06-14 RX ORDER — POTASSIUM CHLORIDE 750 MG/1
20 TABLET, FILM COATED, EXTENDED RELEASE ORAL DAILY
Status: DISCONTINUED | OUTPATIENT
Start: 2024-06-14 | End: 2024-06-15 | Stop reason: HOSPADM

## 2024-06-14 RX ORDER — POLYETHYLENE GLYCOL 3350 17 G/17G
17 POWDER, FOR SOLUTION ORAL DAILY
Status: DISCONTINUED | OUTPATIENT
Start: 2024-06-14 | End: 2024-06-15 | Stop reason: HOSPADM

## 2024-06-14 RX ADMIN — COLCHICINE 0.6 MG: 0.6 TABLET, FILM COATED ORAL at 08:54

## 2024-06-14 RX ADMIN — PANTOPRAZOLE SODIUM 40 MG: 40 TABLET, DELAYED RELEASE ORAL at 06:27

## 2024-06-14 RX ADMIN — ACETAMINOPHEN 325MG 650 MG: 325 TABLET ORAL at 21:59

## 2024-06-14 RX ADMIN — POTASSIUM CHLORIDE 20 MEQ: 750 TABLET, EXTENDED RELEASE ORAL at 08:53

## 2024-06-14 RX ADMIN — Medication 1 TABLET: at 08:53

## 2024-06-14 RX ADMIN — GUAIFENESIN 1200 MG: 600 TABLET, EXTENDED RELEASE ORAL at 08:53

## 2024-06-14 RX ADMIN — METOPROLOL TARTRATE 25 MG: 25 TABLET, FILM COATED ORAL at 08:53

## 2024-06-14 RX ADMIN — POLYETHYLENE GLYCOL 3350 17 G: 17 POWDER, FOR SOLUTION ORAL at 08:54

## 2024-06-14 RX ADMIN — ACETAMINOPHEN 325MG 650 MG: 325 TABLET ORAL at 04:15

## 2024-06-14 RX ADMIN — METOPROLOL TARTRATE 25 MG: 25 TABLET, FILM COATED ORAL at 21:59

## 2024-06-14 RX ADMIN — DOCUSATE SODIUM 100 MG: 100 CAPSULE, LIQUID FILLED ORAL at 15:25

## 2024-06-14 RX ADMIN — MAGNESIUM SULFATE HEPTAHYDRATE 2 G: 2 INJECTION, SOLUTION INTRAVENOUS at 08:51

## 2024-06-14 RX ADMIN — FERROUS SULFATE TAB 325 MG (65 MG ELEMENTAL FE) 325 MG: 325 (65 FE) TAB at 08:54

## 2024-06-14 RX ADMIN — ASPIRIN 81 MG: 81 TABLET, COATED ORAL at 08:54

## 2024-06-14 RX ADMIN — SENNOSIDES AND DOCUSATE SODIUM 2 TABLET: 50; 8.6 TABLET ORAL at 21:59

## 2024-06-14 RX ADMIN — FUROSEMIDE 40 MG: 40 TABLET ORAL at 08:54

## 2024-06-14 RX ADMIN — SENNOSIDES AND DOCUSATE SODIUM 2 TABLET: 50; 8.6 TABLET ORAL at 08:53

## 2024-06-14 RX ADMIN — POTASSIUM CHLORIDE 40 MEQ: 750 TABLET, EXTENDED RELEASE ORAL at 08:51

## 2024-06-14 RX ADMIN — GUAIFENESIN 1200 MG: 600 TABLET, EXTENDED RELEASE ORAL at 21:59

## 2024-06-14 RX ADMIN — BISACODYL 10 MG: 10 SUPPOSITORY RECTAL at 15:25

## 2024-06-14 NOTE — CONSULTS
Met with patient to discuss the benefits of cardiac rehab. Provided phase II information along with the contact information for cardiac rehab here at [facility]. Explained if receiving home health would not be able to attend cardiac rehab until finished with home health. Pt will call when ready to schedule.

## 2024-06-14 NOTE — PROGRESS NOTES
LOS: 3 days   Patient Care Team:  Radha Rollnis MD as PCP - General (Internal Medicine)  Radha Rollins MD as Referring Physician (Internal Medicine)  Yazan Peterson Jr., MD as Consulting Physician (Urology)  Adriana Vaughn MD as Consulting Physician (Cardiology)      Chief Complaint: Follow-up mitral insufficiency, status post mitral valve repair       Interval History: Feels and looks great.    Objective   Vital Signs  Temp:  [98 °F (36.7 °C)-100.1 °F (37.8 °C)] 98.3 °F (36.8 °C)  Heart Rate:  [80-95] 88  Resp:  [16-18] 18  BP: (113-128)/(63-78) 128/72    Intake/Output Summary (Last 24 hours) at 6/14/2024 0930  Last data filed at 6/14/2024 0842  Gross per 24 hour   Intake 480 ml   Output 3145 ml   Net -2665 ml         Physical Exam:  Constitutional: Well appearing, well developed, no acute distress   HENT: Oropharynx clear and membrane moist  Eyes: Normal conjunctiva, no sclera icterus.  Neck: Supple, no carotid bruit bilaterally.  Cardiovascular: Regular rate and rhythm, No Murmur, No bilateral lower extremity edema.  Pulmonary: Normal respiratory effort, normal lung sounds, no wheezing.  Abdominal: Soft, nontender, no hepatosplenomegaly, liver is non-pulsatile.  Neurological: Alert and orient x 3.   Skin: Warm, dry, no ecchymosis, no rash.  Psych: Appropriate mood and affect. Normal judgment and insight.      Results Review:      Results from last 7 days   Lab Units 06/14/24  0559 06/13/24  0327 06/12/24  0356   SODIUM mmol/L 139 142 142   POTASSIUM mmol/L 3.6 4.5 4.2   CHLORIDE mmol/L 103 108* 111*   CO2 mmol/L 27.0 26.8 22.2   BUN mg/dL 21 23 19   CREATININE mg/dL 0.68* 0.82 0.88   GLUCOSE mg/dL 107* 131* 120*   CALCIUM mg/dL 8.3* 8.3* 8.1*         Results from last 7 days   Lab Units 06/14/24  0559 06/13/24  0327 06/12/24  0356   WBC 10*3/mm3 8.43 9.37 7.23   HEMOGLOBIN g/dL 9.3* 9.5* 8.9*   HEMATOCRIT % 27.5* 28.9* 26.8*   PLATELETS 10*3/mm3 86* 85* 84*     Results from last 7 days   Lab Units  06/12/24  0356 06/11/24  1136 06/11/24  0956 06/11/24  0951 06/10/24  0822   INR  1.39* 1.32* 1.6* 1.54* 1.03   APTT seconds  --  28.0  --  29.4 26.8     Results from last 7 days   Lab Units 06/10/24  0822   CHOLESTEROL mg/dL 144     Results from last 7 days   Lab Units 06/12/24  0356   MAGNESIUM mg/dL 2.3     Results from last 7 days   Lab Units 06/10/24  0822   CHOLESTEROL mg/dL 144   TRIGLYCERIDES mg/dL 86   HDL CHOL mg/dL 54   LDL CHOL mg/dL 74       I reviewed the patient's new clinical results.  I personally viewed and interpreted the patient's EKG/Telemetry data        Medication Review:   aspirin, 81 mg, Oral, Daily  colchicine, 0.6 mg, Oral, Daily  docusate sodium, 100 mg, Oral, BID  enoxaparin, 40 mg, Subcutaneous, Q24H  ferrous sulfate, 325 mg, Oral, Daily With Breakfast  furosemide, 40 mg, Oral, Daily  guaiFENesin, 1,200 mg, Oral, Q12H  metoprolol tartrate, 25 mg, Oral, Q12H  multivitamin, 1 tablet, Oral, Daily  polyethylene glycol, 17 g, Oral, Daily  potassium chloride, 20 mEq, Oral, Daily  potassium chloride ER, 40 mEq, Oral, Q4H  senna-docusate sodium, 2 tablet, Oral, BID        sodium chloride, 30 mL/hr, Last Rate: 30 mL/hr (06/11/24 1343)        Assessment & Plan       Mitral valve disease    Mitral valve disorder    Abnormal findings on diagnostic imaging of heart and coronary circulation      1.  Mitral prolapse with severe insufficiency.  Status post mitral valve repair and left atrial appendage ligation on 6/11.  2.  Hypertension.  Stable.   3.  Hyperlipidemia.  Continue statin.      Ana Kauffman, SILVIA  06/14/24  09:30 EDT

## 2024-06-14 NOTE — THERAPY DISCHARGE NOTE
Patient Name: Mirza Zimmer  : 1945    MRN: [id number]                              Today's Date: 2024       Admit Date: 2024    Visit Dx:     ICD-10-CM ICD-9-CM   1. S/P MVR (mitral valve repair)  Z98.890 V45.89   2. Mitral valve disorder  I05.9 394.9   3. Abnormal findings on diagnostic imaging of heart and coronary circulation  R93.1 794.39     Patient Active Problem List   Diagnosis    Arthritis    Hyperlipidemia    Hypertension, essential    MOFFETT (dyspnea on exertion)    Non-rheumatic mitral regurgitation    MVP (mitral valve prolapse)    Dilated aortic root    Increased prostate specific antigen (PSA) velocity    Other ill-defined heart diseases     History of colon polyps    Thoracic aortic aneurysm without rupture    Malignant neoplasm of sigmoid colon    Cancer of sigmoid colon    Personal history of colon cancer    Nonrheumatic mitral valve regurgitation    Mitral valve disorder    Abnormal findings on diagnostic imaging of heart and coronary circulation    Mitral valve disease     Past Medical History:   Diagnosis Date    Actinic keratosis     Aneurysm of iliac artery 2019    FOLLOWED BY DR. EDGARD LEE    Arthritis     BPH (benign prostatic hyperplasia)     FOLLOWED BY DR. MARK BALL    Cardiomegaly     FOLLOWED BY DR. SAMUEL RIVAS    Cataract     BILATERAL    Chronic prostatitis     FOLLOWED BY DR. MARK BALL    Colon cancer 2021    Sigmoid colon superficial fragments of eroded tubulovillous adenoma with at intramucosal adenocarcinoma    Colon polyps     FOLLOWED BY DR. KEVIN LUDWIG    Dilated aortic root     Dyspnea on exertion     ED (erectile dysfunction)     Elevated PSA     Heart murmur     Hemorrhoids     History of colon resection     Hyperlipidemia     Hypermetropia, bilateral     Hypertension     Iliac artery aneurysm 2018    FOLLOWED BY DR. 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    Presbyopia     Regular astigmatism of both eyes     Seborrheic keratosis     Thoracic aortic aneurysm without rupture     Thoracic aortic ectasia 07/2017    Trochanteric bursitis of left hip      Past Surgical History:   Procedure Laterality Date    APPENDECTOMY  10/12/2021    Done as a part of a colon resection    CARDIAC CATHETERIZATION N/A 4/4/2024    Procedure: Right and Left Heart Cath;  Surgeon: Williams Marques MD;  Location: [facility] CATH INVASIVE LOCATION;  Service: Cardiovascular;  Laterality: N/A;    COLON RESECTION N/A 10/12/2021    Procedure: LAPAROSCOPIC LEFT COLECTOMY, APPENDECTOMY;  Surgeon: Cesar Rinaldi MD;  Location: [facility] OR;  Service: General;  Laterality: N/A;    COLONOSCOPY N/A 8/28/2019    5 MM TUBULAR ADENOMA POLYP IN HEPATIC FLEXURE, 6 MM SERRATED ADENOMA POLYP IN TRANSVERSE, MULTIPLE DIVERTICULA IN SIGMOID, RESCOPE IN 2 YRS, DR. KEVIN LUDWIG AT [facility]    COLONOSCOPY N/A 9/29/2021    3 TUBULAR ADENOMA POLYPS IN ASCENDING, AN INFILTRATIVE MASS IN SIGMOID, PATH: TUBULOVILLOUS ADENOMA WITH AT LEAST INTRAMUCOSAL ADENOCARCINOMA, AREA TATTOOED, RESCOPE IN1 YR, DR. KEVIN LUDWIG AT [facility]    COLONOSCOPY N/A 10/19/2022    Procedure: COLONOSCOPY to CECUM WITH COLD BX POLYPECTOMY;  Surgeon: Cesar Rinaldi MD;  Location: [facility] ENDOSCOPY;  Service: General;  Laterality: N/A;  HX COLON CA  --POLYP, DIVERTICULOSIS        COLONOSCOPY W/ POLYPECTOMY N/A 09/03/2004    5 MM ADENOMATOUS POLYP IN SIGMOID, OTHERWISE WNL, RESCOPE IN 3 YRS, DR. ANGI CONLEY AT [facility]    COLONOSCOPY W/ POLYPECTOMY N/A 05/28/2013    3 MM SESSILE HYPERPLASTIC POLYP 50CM FROM ANUS, A FEW SMALL DIVERTICULA IN SIGMOID, NON BLEEDING INTERNAL HEMORRHOIDS GRADE II, DR. ANGI CONLEY AT [facility]    COLONOSCOPY W/  POLYPECTOMY N/A 02/29/2008    5 MM HYPERPLASTIC POLYP AT 60 CM FROM ANUS, RESCOPE IN 5 YRS, DR. ANGI CONLEY AT [facility]    EYE SURGERY  2003    ATYPICAL MOLE EXCSION, DR. SANCHEZ AT [facility] [facility] [place] [facility]    MITRAL VALVE REPAIR/REPLACEMENT N/A 6/11/2024    Procedure: INTRAOPERATIVE UNIQUE; STERNOTOMY; MITRAL VALVE REPAIR AND PRP.;  Surgeon: Olivier Ramsey MD;  Location: [facility];  Service: Cardiothoracic;  Laterality: N/A;    PROSTATE SURGERY N/A 08/15/2018    UROLIFT, PROSTATE VOLUME 48CC, NO ABNORMALITIES, DR. COLLIN GARVIN    TONSILLECTOMY Bilateral 1958    DONE IN [place], [place]      General Information       Row Name 06/14/24 1146          Physical Therapy Time and Intention    Document Type therapy note (daily note)  -     Mode of Treatment physical therapy  -               User Key  (r) = Recorded By, (t) = Taken By, (c) = Cosigned By      Initials Name Provider Type    Rylee Agustin, PARMINDER Physical Therapist                   Mobility       Row Name 06/14/24 1146          Bed Mobility    Comment, (Bed Mobility) UIC  -       Row Name 06/14/24 1146          Sit-Stand Transfer    Sit-Stand Grant (Transfers) independent  -       Row Name 06/14/24 1146          Gait/Stairs (Locomotion)    Grant Level (Gait) standby assist  -     Distance in Feet (Gait) 400  -KH     [place] Level (Stairs) contact guard  -     Handrail Location (Stairs) right side (ascending)  -     Number of Steps (Stairs) 8  -KH     Ascending Technique (Stairs) step-over-step  -     Descending Technique (Stairs) step-to-step  -               User Key  (r) = Recorded By, (t) = Taken By, (c) = Cosigned By      Initials Name Provider Type    Rylee Agustin, PARMINDER Physical Therapist                   Obj/Interventions       Row Name 06/14/24 1146          Motor Skills    Therapeutic Exercise --  performing cardiac exercises independently  -               User Key  (r) = Recorded  By, (t) = Taken By, (c) = Cosigned By      Initials Name Provider Type    Rylee Agustin, PT Physical Therapist                   Goals/Plan    No documentation.                  Clinical Impression       [facility] Name 06/14/24 1147          Pain    Pretreatment Pain Rating 0/10 - no pain  -SHELIA     Posttreatment Pain Rating 0/10 - no pain  -SHELIA       [facility] Name 06/14/24 1147          Plan of Care Review    Plan of Care Reviewed With patient  -SHELIA       [facility] Name 06/14/24 1147          Therapy Assessment/Plan (PT)    Patient/Family Therapy Goals Statement (PT) Pt is progressing well and has met his goals. He ambulated 400 ft and safely navigated a flight of stairs. 2 briefs standing rest breaks secondary to lightheadedness after doing the stairs. Pt is safe to d/c home when medically stable. PT will sign off.  -SHELIA       [facility] Name 06/14/24 1147          Positioning and Restraints    Pre-Treatment Position sitting in chair/recliner  no alarm  -SHELIA     Post Treatment Position chair  -     In Chair reclined;call light within reach;encouraged to call for assist;with family/caregiver  -SHELIA               User Key  (r) = Recorded By, (t) = Taken By, (c) = Cosigned By      Initials Name Provider Type    Rylee Agustin, PT Physical Therapist                   Outcome Measures       [facility] Name 06/14/24 1151 06/14/24 0852       How much help from another person do you currently need...    Turning from your back to your side while in flat bed without using bedrails? 4  -KH 4  -AC    Moving from lying on back to sitting on the side of a flat bed without bedrails? 4  -KH 4  -AC    Moving to and from a bed to a chair (including a wheelchair)? 4  -KH 3  -AC    Standing up from a chair using your arms (e.g., wheelchair, bedside chair)? 4  -KH 3  -AC    Climbing 3-5 steps with a railing? 3  -KH 3  -AC    To walk in hospital room? 4  -KH 3  -AC    AM-PAC 6 Clicks Score (PT) 23  -KH 20  -AC    Highest Level of Mobility Goal 7  --> Walk 25 feet or more  - 6 --> Walk 10 steps or more  -      Row Name 06/14/24 1151          Functional Assessment    Outcome Measure Options AM-PAC 6 Clicks Basic Mobility (PT)  -               User Key  (r) = Recorded By, (t) = Taken By, (c) = Cosigned By      Initials Name Provider Type     Rylee Rodriguez, PT Physical Therapist    Anyi Rubio, RN Registered Nurse                  Physical Therapy Education       Title: PT OT SLP Therapies (In Progress)       Topic: Physical Therapy (In Progress)       Point: Mobility training (In Progress)       Learning Progress Summary             Patient Acceptance, E, NR by AR at 6/12/2024 1016                         Point: Home exercise program (In Progress)       Learning Progress Summary             Patient Acceptance, E, NR by AR at 6/12/2024 1016                         Point: Body mechanics (In Progress)       Learning Progress Summary             Patient Acceptance, E, NR by AR at 6/12/2024 1016                         Point: Precautions (In Progress)       Learning Progress Summary             Patient Acceptance, E, NR by AR at 6/12/2024 1016                                         User Key       Initials Effective Dates Name Provider Type Discipline    AR 06/16/21 -  Luann Reyes, PT Physical Therapist PT                  PT Recommendation and Plan     Plan of Care Reviewed With: patient  Outcome Evaluation: Pt was eager to walk. He ambulated well with SBA. Tolerated increased distance today. educated pt and spouse on cardac exercises. Encouraged pt to ambulate multiple times per day with nursing as well as PT.     Time Calculation:         PT Charges       Row Name 06/14/24 1151             Time Calculation    Start Time 1134  -      Stop Time 1146  -      Time Calculation (min) 12 min  -         Time Calculation- PT    Total Timed Code Minutes- PT 12 minute(s)  -         Timed Charges    68815 - PT Therapeutic Exercise  Minutes 12  -KH         Total Minutes    Timed Charges Total Minutes 12  -KH       Total Minutes 12  -KH                User Key  (r) = Recorded By, (t) = Taken By, (c) = Cosigned By      Initials Name Provider Type    Rylee Agustin, PT Physical Therapist                  Therapy Charges for Today       Code Description Service Date Service Provider Modifiers Qty    [id number]  PT THER PROC EA 15 MIN 6/13/2024 Rylee Rodriguez, PT GP 2    29703118247 HC PT THER PROC EA 15 MIN 6/14/2024 Rylee Rodriguez, PT GP 1            PT G-Codes  Outcome Measure Options: AM-PAC 6 Clicks Basic Mobility (PT)  AM-PAC 6 Clicks Score (PT): 23    PT Discharge Summary  Anticipated Discharge Disposition (PT): home with assist    Rylee Rodriguez, PT  6/14/2024

## 2024-06-14 NOTE — PROGRESS NOTES
" LOS: 3 days   Patient Care Team:  Radha Rollins MD as PCP - General (Internal Medicine)  Radha Rollins MD as Referring Physician (Internal Medicine)  Yazan Peterson Jr., MD as Consulting Physician (Urology)  dAriana Vaughn MD as Consulting Physician (Cardiology)    Chief Complaint: post op follow- up    Subjective:  Symptoms:  He reports weakness.  No shortness of breath or chest pressure.    Diet:  He reports  nausea.  No vomiting.    Activity level: Impaired due to weakness.          Vital Signs  Temp:  [97.9 °F (36.6 °C)-100.1 °F (37.8 °C)] 98 °F (36.7 °C)  Heart Rate:  [80-95] 87  Resp:  [16-18] 18  BP: (113-122)/(63-80) 118/71  Body mass index is 23.42 kg/m².    Intake/Output Summary (Last 24 hours) at 6/14/2024 0721  Last data filed at 6/14/2024 0407  Gross per 24 hour   Intake 240 ml   Output 3295 ml   Net -3055 ml     No intake/output data recorded.    Chest tube drainage last 8 hours 250        06/11/24  0528 06/13/24  0619 06/14/24  0407   Weight: 72.7 kg (160 lb 4.4 oz) 71.9 kg (158 lb 8 oz) 69.9 kg (154 lb)         Objective:  Vital signs: (most recent): Blood pressure 118/71, pulse 87, temperature 98 °F (36.7 °C), temperature source Oral, resp. rate 18, height 172.7 cm (68\"), weight 69.9 kg (154 lb), SpO2 94%.                Results Review:        WBC WBC   Date Value Ref Range Status   06/14/2024 8.43 3.40 - 10.80 10*3/mm3 Final   06/13/2024 9.37 3.40 - 10.80 10*3/mm3 Final   06/12/2024 7.23 3.40 - 10.80 10*3/mm3 Final   06/11/2024 10.12 3.40 - 10.80 10*3/mm3 Final   06/11/2024 10.85 (H) 3.40 - 10.80 10*3/mm3 Final   06/11/2024 7.58 3.40 - 10.80 10*3/mm3 Final      HGB Hemoglobin   Date Value Ref Range Status   06/14/2024 9.3 (L) 13.0 - 17.7 g/dL Final   06/13/2024 9.5 (L) 13.0 - 17.7 g/dL Final   06/12/2024 8.9 (L) 13.0 - 17.7 g/dL Final   06/11/2024 10.8 (L) 13.0 - 17.7 g/dL Final   06/11/2024 11.7 (L) 13.0 - 17.7 g/dL Final   06/11/2024 9.9 (L) 12.0 - 17.0 g/dL Final   06/11/2024 10.0 (L) 13.0 - " 17.7 g/dL Final   06/11/2024 10.9 (L) 12.0 - 17.0 g/dL Final   06/11/2024 11.2 (L) 12.0 - 17.0 g/dL Final   06/11/2024 9.2 (L) 12.0 - 17.0 g/dL Final   06/11/2024 9.2 (L) 12.0 - 17.0 g/dL Final      HCT Hematocrit   Date Value Ref Range Status   06/14/2024 27.5 (L) 37.5 - 51.0 % Final   06/13/2024 28.9 (L) 37.5 - 51.0 % Final   06/12/2024 26.8 (L) 37.5 - 51.0 % Final   06/11/2024 31.8 (L) 37.5 - 51.0 % Final   06/11/2024 35.2 (L) 37.5 - 51.0 % Final   06/11/2024 29 (L) 38 - 51 % Final   06/11/2024 29.8 (L) 37.5 - 51.0 % Final   06/11/2024 32 (L) 38 - 51 % Final   06/11/2024 33 (L) 38 - 51 % Final   06/11/2024 27 (L) 38 - 51 % Final   06/11/2024 27 (L) 38 - 51 % Final      Platelets Platelets   Date Value Ref Range Status   06/14/2024 86 (L) 140 - 450 10*3/mm3 Final   06/13/2024 85 (L) 140 - 450 10*3/mm3 Final   06/12/2024 84 (L) 140 - 450 10*3/mm3 Final   06/11/2024 104 (L) 140 - 450 10*3/mm3 Final   06/11/2024 136 (L) 140 - 450 10*3/mm3 Final   06/11/2024 84 (L) 140 - 450 10*3/mm3 Final        PT/INR:    Protime   Date Value Ref Range Status   06/12/2024 17.3 (H) 11.7 - 14.2 Seconds Final   06/11/2024 16.6 (H) 11.7 - 14.2 Seconds Final   06/11/2024 18.5 (H) 12.8 - 15.2 seconds Final     Comment:     Serial Number: [id number]:  [id number]   06/11/2024 18.7 (H) 11.7 - 14.2 Seconds Final   /  INR   Date Value Ref Range Status   06/12/2024 1.39 (H) 0.90 - 1.10 Final   06/11/2024 1.32 (H) 0.90 - 1.10 Final   06/11/2024 1.6 (H) 0.8 - 1.2 Final   06/11/2024 1.54 (H) 0.90 - 1.10 Final       Sodium Sodium   Date Value Ref Range Status   06/14/2024 139 136 - 145 mmol/L Final   06/13/2024 142 136 - 145 mmol/L Final   06/12/2024 142 136 - 145 mmol/L Final   06/11/2024 143 136 - 145 mmol/L Final   06/11/2024 144 136 - 145 mmol/L Final      Potassium Potassium   Date Value Ref Range Status   06/14/2024 3.6 3.5 - 5.2 mmol/L Final   06/13/2024 4.5 3.5 - 5.2 mmol/L Final   06/12/2024 4.2 3.5 - 5.2 mmol/L Final   06/11/2024 4.3 3.5 -  5.2 mmol/L Final   06/11/2024 4.6 3.5 - 5.2 mmol/L Final      Chloride Chloride   Date Value Ref Range Status   06/14/2024 103 98 - 107 mmol/L Final   06/13/2024 108 (H) 98 - 107 mmol/L Final   06/12/2024 111 (H) 98 - 107 mmol/L Final   06/11/2024 111 (H) 98 - 107 mmol/L Final   06/11/2024 110 (H) 98 - 107 mmol/L Final      Bicarbonate CO2   Date Value Ref Range Status   06/14/2024 27.0 22.0 - 29.0 mmol/L Final   06/13/2024 26.8 22.0 - 29.0 mmol/L Final   06/12/2024 22.2 22.0 - 29.0 mmol/L Final   06/11/2024 21.3 (L) 22.0 - 29.0 mmol/L Final   06/11/2024 24.4 22.0 - 29.0 mmol/L Final      BUN BUN   Date Value Ref Range Status   06/14/2024 21 8 - 23 mg/dL Final   06/13/2024 23 8 - 23 mg/dL Final   06/12/2024 19 8 - 23 mg/dL Final   06/11/2024 18 8 - 23 mg/dL Final   06/11/2024 18 8 - 23 mg/dL Final      Creatinine Creatinine   Date Value Ref Range Status   06/14/2024 0.68 (L) 0.76 - 1.27 mg/dL Final   06/13/2024 0.82 0.76 - 1.27 mg/dL Final   06/12/2024 0.88 0.76 - 1.27 mg/dL Final   06/11/2024 0.93 0.76 - 1.27 mg/dL Final   06/11/2024 0.95 0.76 - 1.27 mg/dL Final      Calcium Calcium   Date Value Ref Range Status   06/14/2024 8.3 (L) 8.6 - 10.5 mg/dL Final   06/13/2024 8.3 (L) 8.6 - 10.5 mg/dL Final   06/12/2024 8.1 (L) 8.6 - 10.5 mg/dL Final   06/11/2024 8.6 8.6 - 10.5 mg/dL Final   06/11/2024 8.7 8.6 - 10.5 mg/dL Final      Magnesium Magnesium   Date Value Ref Range Status   06/12/2024 2.3 1.6 - 2.4 mg/dL Final   06/11/2024 1.8 1.6 - 2.4 mg/dL Final   06/11/2024 1.9 1.6 - 2.4 mg/dL Final          aspirin, 81 mg, Oral, Daily  atorvastatin, 40 mg, Oral, Nightly  colchicine, 0.6 mg, Oral, Daily  enoxaparin, 40 mg, Subcutaneous, Q24H  ferrous sulfate, 325 mg, Oral, Daily With Breakfast  guaiFENesin, 1,200 mg, Oral, Q12H  insulin lispro, 2-7 Units, Subcutaneous, 4x Daily AC & at Bedtime  metoprolol tartrate, 25 mg, Oral, Q12H  multivitamin, 1 tablet, Oral, Daily  senna-docusate sodium, 2 tablet, Oral, BID      sodium  chloride, 30 mL/hr, Last Rate: 30 mL/hr (06/11/24 1343)              Mitral valve disease    Mitral valve disorder    Abnormal findings on diagnostic imaging of heart and coronary circulation      Assessment & Plan    -Severe mitral regurgitation- s/p MV repair, TIKA-Pagni  -Dilatation of aorta  -HTN  -HLD  -Colon cancer  -post op anemia- expected acute blood loss  -TCP- plt 84 count     POD#3  Looks good this morning  Up in the chair  On room air-- tolerating  1L NC overnight-- will check an overnight tonight  Sinus rhythm rate in the 80s-- having frequent PVCs this morning---will replete magnesium  PA and lateral pending  Will switch to PO diuretic  Encourage pulmonary toilet-- continue IS  Mobilize/increase activity  Will leave AV wires one more day with the frequent ectopy  Making good progress, anticipate home with home health tomorrow  Continue routine care      SILVIA Sharma  06/14/24  07:21 EDT

## 2024-06-15 ENCOUNTER — DOCUMENTATION (OUTPATIENT)
Dept: HOME HEALTH SERVICES | Facility: HOME HEALTHCARE | Age: 79
End: 2024-06-15
Payer: MEDICARE

## 2024-06-15 ENCOUNTER — HOME HEALTH ADMISSION (OUTPATIENT)
Dept: HOME HEALTH SERVICES | Facility: HOME HEALTHCARE | Age: 79
End: 2024-06-15
Payer: MEDICARE

## 2024-06-15 ENCOUNTER — READMISSION MANAGEMENT (OUTPATIENT)
Dept: CALL CENTER | Facility: HOSPITAL | Age: 79
End: 2024-06-15
Payer: MEDICARE

## 2024-06-15 VITALS
OXYGEN SATURATION: 93 % | BODY MASS INDEX: 23.31 KG/M2 | HEART RATE: 87 BPM | RESPIRATION RATE: 18 BRPM | HEIGHT: 68 IN | DIASTOLIC BLOOD PRESSURE: 77 MMHG | TEMPERATURE: 97.8 F | WEIGHT: 153.8 LBS | SYSTOLIC BLOOD PRESSURE: 113 MMHG

## 2024-06-15 LAB
ANION GAP SERPL CALCULATED.3IONS-SCNC: 8.3 MMOL/L (ref 5–15)
BUN SERPL-MCNC: 17 MG/DL (ref 8–23)
BUN/CREAT SERPL: 22.1 (ref 7–25)
CALCIUM SPEC-SCNC: 8.1 MG/DL (ref 8.6–10.5)
CHLORIDE SERPL-SCNC: 101 MMOL/L (ref 98–107)
CO2 SERPL-SCNC: 27.7 MMOL/L (ref 22–29)
CREAT SERPL-MCNC: 0.77 MG/DL (ref 0.76–1.27)
DEPRECATED RDW RBC AUTO: 42.7 FL (ref 37–54)
EGFRCR SERPLBLD CKD-EPI 2021: 91.1 ML/MIN/1.73
ERYTHROCYTE [DISTWIDTH] IN BLOOD BY AUTOMATED COUNT: 12.5 % (ref 12.3–15.4)
GLUCOSE SERPL-MCNC: 100 MG/DL (ref 65–99)
HCT VFR BLD AUTO: 28.7 % (ref 37.5–51)
HGB BLD-MCNC: 9.6 G/DL (ref 13–17.7)
MCH RBC QN AUTO: 31.6 PG (ref 26.6–33)
MCHC RBC AUTO-ENTMCNC: 33.4 G/DL (ref 31.5–35.7)
MCV RBC AUTO: 94.4 FL (ref 79–97)
PLATELET # BLD AUTO: 98 10*3/MM3 (ref 140–450)
PMV BLD AUTO: 10.6 FL (ref 6–12)
POTASSIUM SERPL-SCNC: 3.7 MMOL/L (ref 3.5–5.2)
POTASSIUM SERPL-SCNC: 3.8 MMOL/L (ref 3.5–5.2)
RBC # BLD AUTO: 3.04 10*6/MM3 (ref 4.14–5.8)
SODIUM SERPL-SCNC: 137 MMOL/L (ref 136–145)
WBC NRBC COR # BLD AUTO: 6.24 10*3/MM3 (ref 3.4–10.8)

## 2024-06-15 PROCEDURE — 84132 ASSAY OF SERUM POTASSIUM: CPT | Performed by: THORACIC SURGERY (CARDIOTHORACIC VASCULAR SURGERY)

## 2024-06-15 PROCEDURE — 85027 COMPLETE CBC AUTOMATED: CPT | Performed by: THORACIC SURGERY (CARDIOTHORACIC VASCULAR SURGERY)

## 2024-06-15 PROCEDURE — 80048 BASIC METABOLIC PNL TOTAL CA: CPT | Performed by: THORACIC SURGERY (CARDIOTHORACIC VASCULAR SURGERY)

## 2024-06-15 RX ORDER — DIPHENOXYLATE HYDROCHLORIDE AND ATROPINE SULFATE 2.5; .025 MG/1; MG/1
1 TABLET ORAL DAILY
Qty: 30 TABLET | Refills: 0 | Status: SHIPPED | OUTPATIENT
Start: 2024-06-15 | End: 2024-07-15

## 2024-06-15 RX ORDER — COLCHICINE 0.6 MG/1
0.6 TABLET ORAL DAILY
Qty: 7 TABLET | Refills: 0 | Status: SHIPPED | OUTPATIENT
Start: 2024-06-15 | End: 2024-06-22

## 2024-06-15 RX ORDER — POTASSIUM CHLORIDE 750 MG/1
20 TABLET, FILM COATED, EXTENDED RELEASE ORAL ONCE
Status: COMPLETED | OUTPATIENT
Start: 2024-06-15 | End: 2024-06-15

## 2024-06-15 RX ORDER — FERROUS SULFATE 325(65) MG
325 TABLET ORAL
Qty: 30 TABLET | Refills: 1 | Status: SHIPPED | OUTPATIENT
Start: 2024-06-15 | End: 2024-08-14

## 2024-06-15 RX ORDER — FUROSEMIDE 40 MG/1
40 TABLET ORAL DAILY
Qty: 30 TABLET | Refills: 0 | Status: SHIPPED | OUTPATIENT
Start: 2024-06-15 | End: 2024-06-19

## 2024-06-15 RX ORDER — HYDROCODONE BITARTRATE AND ACETAMINOPHEN 5; 325 MG/1; MG/1
1 TABLET ORAL EVERY 4 HOURS PRN
Qty: 42 TABLET | Refills: 0 | Status: SHIPPED | OUTPATIENT
Start: 2024-06-15 | End: 2024-06-22

## 2024-06-15 RX ORDER — GUAIFENESIN 600 MG/1
1200 TABLET, EXTENDED RELEASE ORAL EVERY 12 HOURS SCHEDULED
Qty: 120 TABLET | Refills: 0 | Status: SHIPPED | OUTPATIENT
Start: 2024-06-15 | End: 2024-07-15

## 2024-06-15 RX ORDER — POTASSIUM CHLORIDE 20 MEQ/1
20 TABLET, EXTENDED RELEASE ORAL DAILY
Qty: 30 TABLET | Refills: 0 | Status: SHIPPED | OUTPATIENT
Start: 2024-06-15 | End: 2024-06-19

## 2024-06-15 RX ORDER — CYCLOBENZAPRINE HCL 5 MG
5 TABLET ORAL EVERY 8 HOURS PRN
Qty: 40 TABLET | Refills: 0 | Status: SHIPPED | OUTPATIENT
Start: 2024-06-15 | End: 2024-06-27 | Stop reason: ALTCHOICE

## 2024-06-15 RX ORDER — ASPIRIN 81 MG/1
81 TABLET ORAL DAILY
Qty: 30 TABLET | Refills: 2 | Status: SHIPPED | OUTPATIENT
Start: 2024-06-15 | End: 2024-09-13

## 2024-06-15 RX ADMIN — DOCUSATE SODIUM 100 MG: 100 CAPSULE, LIQUID FILLED ORAL at 09:26

## 2024-06-15 RX ADMIN — GUAIFENESIN 1200 MG: 600 TABLET, EXTENDED RELEASE ORAL at 09:26

## 2024-06-15 RX ADMIN — FUROSEMIDE 40 MG: 40 TABLET ORAL at 09:26

## 2024-06-15 RX ADMIN — POTASSIUM CHLORIDE 20 MEQ: 750 TABLET, EXTENDED RELEASE ORAL at 06:48

## 2024-06-15 RX ADMIN — Medication 1 TABLET: at 09:26

## 2024-06-15 RX ADMIN — SENNOSIDES AND DOCUSATE SODIUM 2 TABLET: 50; 8.6 TABLET ORAL at 09:26

## 2024-06-15 RX ADMIN — FERROUS SULFATE TAB 325 MG (65 MG ELEMENTAL FE) 325 MG: 325 (65 FE) TAB at 06:48

## 2024-06-15 RX ADMIN — COLCHICINE 0.6 MG: 0.6 TABLET, FILM COATED ORAL at 09:26

## 2024-06-15 RX ADMIN — ASPIRIN 81 MG: 81 TABLET, COATED ORAL at 09:26

## 2024-06-15 RX ADMIN — METOPROLOL TARTRATE 25 MG: 25 TABLET, FILM COATED ORAL at 09:26

## 2024-06-15 RX ADMIN — POTASSIUM CHLORIDE 20 MEQ: 750 TABLET, EXTENDED RELEASE ORAL at 09:26

## 2024-06-15 RX ADMIN — POLYETHYLENE GLYCOL 3350 17 G: 17 POWDER, FOR SOLUTION ORAL at 09:26

## 2024-06-15 NOTE — CASE MANAGEMENT/SOCIAL WORK
Continued Stay Note  [facility]     Patient Name: Mirza Zimmer  MRN: [id number]  Today's Date: 6/15/2024    Admit Date: 6/11/2024    Plan: Home with [facility] and O2 thru [facility]   Discharge Plan       Row Name 06/15/24 1537       Plan    Plan Home with [facility] and O2 thru [facility]    Patient/Family in Agreement with Plan yes    Plan Comments Contacted by staff RN who states pt is going to [place] today and will need overnight O2. Chart reviewed. Called in to pt's room. Spoke with pt's spouse Dr. Locke. They would like to use [facility] for DME. In basket referral in [facility] for [facility]. Spoke with Tyron/Katrin and they will be able to deliver O2 this evening. Called back and updated Dr. Locke with the contact information for [facility]. Pt will need to call once home to make arrangements for O2 equipment delivery this evening.......JW                   Discharge Codes    No documentation.                 Expected Discharge Date and Time       Expected Discharge Date Expected Discharge Time    Adarsh 15, 2024               Eladia Dean, RN

## 2024-06-15 NOTE — PROGRESS NOTES
Patient is discharging today .Spoke to wife who is agreeable to home health and has no current home health. Face sheet information is correct and please call wife , Dr.. Locke for visit scheduling- [phone]. Please ask for directions to entry of home . Orders for home health SN in Epic and noted nocturnal O2./2 LPM. Thank you !

## 2024-06-15 NOTE — NURSING NOTE
Left message with [facility] intake notifying of pt discharge. Contact information on AVS for patient & family.

## 2024-06-15 NOTE — DISCHARGE SUMMARY
Date of Admission:   Date of Discharge:  6/15/2024    Discharge Diagnosis:   -Severe mitral regurgitation- s/p MV repair, TIKA-Pagni  -Dilatation of aorta  -HTN  -HLD  -Colon cancer  -post op anemia- expected acute blood loss  -TCP- plt 84 count        Presenting Problem/History of Present Illness  Mitral valve disorder [I05.9]  Abnormal findings on diagnostic imaging of heart and coronary circulation [R93.1]  Mitral valve disease [I05.9]     Hospital Course  Patient is a 79 y.o. male presented for scheduled cardiac surgery.  On 6/11 he underwent Elective mitral valve repair with a 36 mm Medtronic flexible band posterior annuloplasty and chordal repair (4-0 [place] x 3) of P2 and P3 segments and primary scallop closure-left of P2-P3, Endocardial closure of the left atrial appendage (see op report for full details).  Post operatively he did well.  Weaned from ventilator on day of surgery.  Tolerated medication therapy ASA/BB.  Weaned from supplemental oxygen, an overnight revealed 45 minutes of desaturations, he will be sent home with nocturnal oxygen.  All lines tubes and wires removed without issue.  Met PT goals.  Urinating and defecating normally.  On post operative day 4 he was deemed ready for discharge home with home health. Patient was provided with appropriate discharge education. He was instructed to call office with any questions or concerns.      Procedures Performed  Procedure(s):  INTRAOPERATIVE UNIQUE; STERNOTOMY; MITRAL VALVE REPAIR AND PRP.       Consults:   Consults       Date and Time Order Name Status Description    6/11/2024 11:18 AM Inpatient Cardiology Consult              Pertinent Test Results:    Lab Results   Component Value Date    WBC 6.24 06/15/2024    HGB 9.6 (L) 06/15/2024    HCT 28.7 (L) 06/15/2024    MCV 94.4 06/15/2024    PLT 98 (L) 06/15/2024      Lab Results   Component Value Date    GLUCOSE 100 (H) 06/15/2024    CALCIUM 8.1 (L) 06/15/2024     06/15/2024    K 3.8 06/15/2024     CO2 27.7 06/15/2024     06/15/2024    BUN 17 06/15/2024    CREATININE 0.77 06/15/2024    EGFRIFAFRI 108 05/25/2021    EGFRIFNONA 84 10/13/2021    BCR 22.1 06/15/2024    ANIONGAP 8.3 06/15/2024     Lab Results   Component Value Date    INR 1.39 (H) 06/12/2024    PROTIME 17.3 (H) 06/12/2024         Condition on Discharge:  good    Vital Signs  Temp:  [97.9 °F (36.6 °C)-99 °F (37.2 °C)] 98.2 °F (36.8 °C)  Heart Rate:  [81-97] 87  Resp:  [18] 18  BP: (105-126)/(70-83) 123/82      Discharge Disposition  Home-Health Care [facility]    Discharge Medications     Discharge Medications        New Medications        Instructions Start Date   aspirin 81 MG EC tablet   81 mg, Oral, Daily      colchicine 0.6 MG tablet   0.6 mg, Oral, Daily      cyclobenzaprine 5 MG tablet  Commonly known as: FLEXERIL   5 mg, Oral, Every 8 Hours PRN      ferrous sulfate 325 (65 FE) MG tablet   325 mg, Oral, Daily With Breakfast      furosemide 40 MG tablet  Commonly known as: LASIX   40 mg, Oral, Daily      guaiFENesin 600 MG 12 hr tablet  Commonly known as: MUCINEX   1,200 mg, Oral, Every 12 Hours Scheduled      HYDROcodone-acetaminophen 5-325 MG per tablet  Commonly known as: NORCO   1 tablet, Oral, Every 4 Hours PRN      metoprolol tartrate 25 MG tablet  Commonly known as: LOPRESSOR   25 mg, Oral, Every 12 Hours Scheduled      multivitamin tablet tablet   1 tablet, Oral, Daily      potassium chloride 20 MEQ CR tablet  Commonly known as: KLOR-CON M20   20 mEq, Oral, Daily             Continue These Medications        Instructions Start Date   Cetirizine HCl 10 MG capsule   10 mg, Oral, As Needed      glucosamine-chondroitin 500-400 MG capsule capsule   1 capsule, Oral, Daily, HOLD PER MD YE      simvastatin 20 MG tablet  Commonly known as: ZOCOR   TAKE ONE TABLET BY MOUTH ONCE NIGHTLY      tadalafil 5 MG tablet  Commonly known as: CIALIS   5 mg, Oral, Daily      vitamin D3 125 MCG (5000 UT) capsule capsule   5,000 Units, Oral, Daily, HOLD FOR  SURGERY             Stop These Medications      losartan 25 MG tablet  Commonly known as: COZAAR     saccharomyces boulardii 250 MG capsule  Commonly known as: Florastor              Discharge Diet:     Activity at Discharge:   1. No driving for 2 weeks and off narcotic pain medications.  2. Shower daily. Clean incisions with warm water and antibacterial soap only. Do not put any lotion or ointments on incisions.  3. Ambulate for 10 minutes at least 3 times a day.  4. No heavy lifting > 10lbs until seen in office.   5. Take all medications as prescribed.       Follow-up Appointments  Future Appointments   Date Time Provider Department Center   9/10/2024  9:15 AM Radha Rollins MD [facility] [place]   9/30/2024  7:20 AM Adriana Vaughn MD [facility] [place]   10/8/2024 11:00 AM [facility] US NIVAS VASC CART 4 BH [place] NI [facility]   10/9/2024  8:00 AM [facility] OP VAS RM 1 BH [facility]   10/9/2024  8:30 AM Farrah Morales MD [facility]     Additional Instructions for the Follow-ups that You Need to Schedule       Ambulatory Referral to Cardiac Rehab   As directed      Ambulatory Referral to Home Health   As directed      Face to Face Visit Date: 6/15/2024   Follow-up provider for Plan of Care?: I will be treating the patient on an ongoing basis.  Please send me the Plan of Care for signature.   Follow-up provider: LENI LEBLANC [[id number]]   Reason/Clinical Findings: post op open heart   Describe mobility limitations that make leaving home difficult: weakness   Nursing/Therapeutic Services Requested: Skilled Nursing   Skilled nursing orders: Post CABG care   Frequency: 1 Week 1        Call MD With Problems / Concerns   As directed      Instructions:  Call office at [phone] for any drainage, increased redness, or fever over 100.5    Order Comments: Instructions:  Call office at [phone] for any drainage, increased redness, or fever over 100.5         Discharge Follow-up with PCP   As directed       Currently Documented  PCP:    Radha Rollins MD    PCP Phone Number:    [phone]     Follow Up Details: in 1 week        Discharge Follow-up with Specialty: Cardiologist SILVIA/PA; 1 Week   As directed      Specialty: Cardiologist SILVIA/PA   Follow Up: 1 Week   Follow Up Details: bring all prescription bottles to appointment, call for appointment        Discharge Follow-up with Specified Provider: Cardiologist; 1 Month   As directed      To: Cardiologist   Follow Up: 1 Month   Follow Up Details: call for appointment, bring all medication bottles to appointment        Discharge Follow-up with Specified Provider: Rosa Elena VEGA; 1 Month   As directed      To: Rosa Elena VEGA   Follow Up: 1 Month   Follow Up Details: 4-6 weeks, bring all current medications to appointment                Test Results Pending at Discharge       SILVIA Sharma  06/15/24  12:25 EDT

## 2024-06-15 NOTE — OUTREACH NOTE
Prep Survey      Flowsheet Row Responses   [facility] patient discharged from? [place]   Is LACE score < 7 ? No   Eligibility [facility]   Date of Admission 06/11/24   Date of Discharge 06/15/24   Discharge Disposition Home-Health Care Sv   Discharge diagnosis Mitral valve disease   Does the patient have one of the following disease processes/diagnoses(primary or secondary)? Cardiothoracic surgery   Does the patient have Home health ordered? Yes   What is the Home health agency?  [facility]   Is there a DME ordered? Yes   What DME was ordered? Oxygen--[facility]   Comments regarding appointments Ambulatory Referral to Cardiac Rehab   Medication alerts for this patient see AVS   Prep survey completed? Yes            Hawa WARNER - Registered Nurse

## 2024-06-15 NOTE — PLAN OF CARE
Goal Outcome Evaluation:              Outcome Evaluation: Pt off pressors, on room air and sitting in chair this a.m. Pain controlled well. CI >2.5 throughout night. will continue plan of care                               
Goal Outcome Evaluation:              Outcome Evaluation: Pt was eager to walk. He ambulated well with SBA. Tolerated increased distance today. educated pt and spouse on cardac exercises. Encouraged pt to ambulate multiple times per day with nursing as well as PT.                               
Goal Outcome Evaluation:              Outcome Evaluation: VSS; A&Ox4. Pt w/o c/o of pain during the night. Pt remains SR w PVCs on the monitor; rates 90s-80s. Pt completed overnight study last night. SbP 121-105. Pt remains on room air; sat 92-93. Pt continues with pacer wires and external pacer box that is off and secured. Pt anticipating d/c today. Pt expresses no additional needs at this time. Plan of care is ongoing.                               
Goal Outcome Evaluation:         Patient post op day #3 , alert and orient family at bedside, vital signs stable, pt on oxygen nasal cannula , SR on the monitor, chest incision dry and intact, pt walk lap last night, stand by assist, possible home with home health , cont to monitor                                     
Goal Outcome Evaluation:  Plan of Care Reviewed With: patient           Outcome Evaluation: POD #2 s/p MVR. A&Ox4, VSS. placed on 2L NC while sleeping due to oxygen saturation 88%. Ambulates with 1 person assist. Spouse at bedside. tele SR. Chest tubes patent and in place, right IJ in place with KVO, and esquivel in place. Rested well through the night. Pain management per MAR. Plan of care ongoing, will update as needed                               
Goal Outcome Evaluation:  Plan of Care Reviewed With: patient           Outcome Evaluation: POD 1 MVR.  Pt reports normally independent, no use of AD, very active with biking, walking, gardening, golf, yoga etc. Retired  from here.  Pt demonstrates generalized post op weakness but WFL, able to ambulate 130' w/ contact assist, no UE support.   Educated on activity recommendations,but education mostly focused on activities  to NOT do right after surgery like yoga inversions, supine abdominal exercises to avoid valsalva maneuver...etc.  Recommend DC to home.      Anticipated Discharge Disposition (PT): home with assist                        
Goal Outcome Evaluation:  Plan of Care Reviewed With: patient           Outcome Evaluation: Pt was eager to walk. He ambulated well with SBA. Tolerated increased distance today. educated pt and spouse on cardac exercises. Encouraged pt to ambulate multiple times per day with nursing as well as PT.      Anticipated Discharge Disposition (PT): home with assist                        
Goal Outcome Evaluation:  Plan of Care Reviewed With: patient        Progress: improving  Outcome Evaluation: VSS. External pacemaker off but attached, SR with 1st degree AVB. Overnight oximetry ordered. RIJ site redressed d/t bleeding. BM today after bowel regimen. Standby assist.                               
Goal Outcome Evaluation:  Plan of Care Reviewed With: patient, spouse, daughter        Progress: improving   Patient arrived to CVR at 1135 with OR Team. Patient was on precedex at 0.5, propofol at 35 and levophed at 0.02. patient was fluid resuscitated in the CVR with 750cc of albumin. Cardiac index on arrival was 1.88. by the end of my shift cardiac index is 2.7. patient experienced labile pressures while giving albumin and was on and off levophed and then cardene. Patient is ending shift with systolics in the 110s and MAPs in the 70s on no gtts. Patient was extubated to nasal cannula at 1605. At end of shift patient is on 2L  nasal cannula, A&Ox 4, received PRN pain medication per eMAR at patient request. Family at bedside after extubation and all questions answered at that time. Plan of care ongoing.                                 
cardiac precautions/sternal precautions

## 2024-06-16 LAB
BH BB BLOOD EXPIRATION DATE: NORMAL
BH BB BLOOD EXPIRATION DATE: NORMAL
BH BB BLOOD TYPE BARCODE: 5100
BH BB BLOOD TYPE BARCODE: 5100
BH BB DISPENSE STATUS: NORMAL
BH BB DISPENSE STATUS: NORMAL
BH BB PRODUCT CODE: NORMAL
BH BB PRODUCT CODE: NORMAL
BH BB UNIT NUMBER: NORMAL
BH BB UNIT NUMBER: NORMAL
CROSSMATCH INTERPRETATION: NORMAL
CROSSMATCH INTERPRETATION: NORMAL
UNIT  ABO: NORMAL
UNIT  ABO: NORMAL
UNIT  RH: NORMAL
UNIT  RH: NORMAL

## 2024-06-16 NOTE — CASE MANAGEMENT/SOCIAL WORK
Case Management Discharge Note      Final Note: dc'd to home via private auto with H and DME from [place]    Provided Post Acute Provider List?: Yes  Provided Post Acute Provider Quality & Resource List?: Yes  Post Acute Provider Quality and Resource List: Home Health  Delivered To: Patient  Method of Delivery: In person    Selected Continued Care - Discharged on 6/15/2024 Admission date: 6/11/2024 - Discharge disposition: Home-Health Care Svc      Destination    No services have been selected for the patient.                Durable Medical Equipment Coordination complete.      Service Provider Selected Services Address Phone Fax Patient Preferred    [facility] [address], [place] [facility] [address] [phone] --              Dialysis/Infusion    No services have been selected for the patient.                Home Medical Care Coordination complete.      Service Provider Selected Services Address Phone Fax Patient Preferred     [facility] [address] [place] [id number] [id number] [phone] --              Therapy    No services have been selected for the patient.                Community Resources    No services have been selected for the patient.                Community & DME    No services have been selected for the patient.                    Transportation Services  Private: Car    Final Discharge Disposition Code: 06 - home with home health care

## 2024-06-17 ENCOUNTER — HOME CARE VISIT (OUTPATIENT)
Dept: HOME HEALTH SERVICES | Facility: HOME HEALTHCARE | Age: 79
End: 2024-06-17
Payer: MEDICARE

## 2024-06-17 ENCOUNTER — TELEPHONE (OUTPATIENT)
Dept: INTERNAL MEDICINE | Facility: CLINIC | Age: 79
End: 2024-06-17
Payer: MEDICARE

## 2024-06-17 ENCOUNTER — TRANSITIONAL CARE MANAGEMENT TELEPHONE ENCOUNTER (OUTPATIENT)
Dept: CALL CENTER | Facility: HOSPITAL | Age: 79
End: 2024-06-17
Payer: MEDICARE

## 2024-06-17 PROCEDURE — G0299 HHS/HOSPICE OF RN EA 15 MIN: HCPCS

## 2024-06-17 NOTE — TELEPHONE ENCOUNTER
Patient is being discharged from the hospital on 06/15/2024 for MV repair, TIKA. Dr. Rollins has limited availability this week and will be out of town next week. Does Dr. Rollins want to work in patient this week or what does she advise.    Best call back # [phone]

## 2024-06-17 NOTE — HOME HEALTH
SOC Note: Mr. Zimmer is a very pleasant 79 y.o. male who lives at home with his wife, Dr. Locke (local dermatologist). He was discharged home on 6/15 after an elective mitral valve repair by Dr. Ramsey. He looks incredible. He reports walking a total of 2 miles yesterday without SOB. I instructed him to walk 10min 3x/day. He has quite a few new meds from the hospital. Most notable being Lasix and Metoprolol. Educated/Instructed on new med regimen. Instructed on post-op restrictions/instructions/. Patient verbalized understanding. Has referral for cardiac rehab. Considering how good he looks and ahead of the curve he is, I only put in for 2 weeks of nursing visits. 2w2. Patient/wife agreed with visit frequency.     Home Health ordered for: SN    Primary Dx: Encounter for surgical aftercare following surgery on the circulatory system [Z48.812]     Focus of Care: Encounter for surgical aftercare following surgery on the circulatory system [Z48.812]     Code Status: Full code    Educated on Emergency Plan, steps to take prior to going to the ER and when to Call [facility]:  6/17    Plan for next visit: CP assess, incision assessment, med education

## 2024-06-17 NOTE — OUTREACH NOTE
Call Center TCM Note      Flowsheet Row Responses   [facility] patient discharged from? [place]   Does the patient have one of the following disease processes/diagnoses(primary or secondary)? Cardiothoracic surgery   TCM attempt successful? Yes   Call start time 1434   Call end time 1439   Discharge diagnosis Mitral valve disease   Does the patient have an appointment with their PCP within 7-14 days of discharge? No appointments available   Nursing Interventions Routed TCM call to PCP office   What is the Home health agency?  [facility]   Has home health visited the patient within 72 hours of discharge? Yes   What DME was ordered? Oxygen--[facility]   Has all DME been delivered? Yes   DME comments O2 at night 2L   Psychosocial issues? No   Did the patient receive a copy of their discharge instructions? Yes   Nursing interventions Reviewed instructions with patient   What is the patient's perception of their health status since discharge? Improving   Nursing interventions Nurse provided patient education   Nursing interventions Reassured on normal signs of recovery   Is the patient /caregiver able to teach back basic post-op care? No tub bath, swimming, or hot tub until instructed by MD, Practice cough and deep breath every 4 hours while awake, Use a clean wash cloth and antibacterial bar or liquid soap to clean incisions, Lifting as instructed by MD in discharge instructions   Is the patient/caregiver able to teach back signs and symptoms of incisional infection? Increased redness, swelling or pain at the incisonal site, Increased drainage or bleeding, Incisional warmth, Pus or odor from incision, Fever   Is the patient/caregiver able to teach back steps to recovery at home? Set small, achievable goals for return to baseline health, Rest and rebuild strength, gradually increase activity, Eat a well-balance diet   If the patient is a current smoker, are they able to teach back resources for cessation? Not  a smoker   Is the patient/caregiver able to teach back the hierarchy of who to call/visit for symptoms/problems? PCP, Specialist, Home health nurse, Urgent Care, ED, 911 Yes   TCM call completed? Yes   Wrap up additional comments Patient doing well, denies any concerns today.   Call end time [phone]   Would this patient benefit from a Referral to [facility] Social Work? No   Is the patient interested in additional calls from an ambulatory ? No            Celia Maec RN    6/17/2024, 14:39 EDT

## 2024-06-19 ENCOUNTER — TELEPHONE (OUTPATIENT)
Dept: CARDIAC SURGERY | Facility: CLINIC | Age: 79
End: 2024-06-19
Payer: MEDICARE

## 2024-06-19 NOTE — TELEPHONE ENCOUNTER
Returned patient's call. Patient reports that he has lost 4 pounds in 3 days. Patient is currently taking lasix 40 mg and potassium 20 meq daily. Patient states he does not have any shortness of breath or edema in his lower extremities. Discussed with SILVIA [place], who recommends patient stop the lasix and potassium. Advised patient to monitor for weight gain and notify our office if he gains more than 2 pounds in 24 hours or 5 pounds in a week or if he has increased edema or shortness of breath. Patient verbalized understanding and agreed to plan of care.

## 2024-06-21 ENCOUNTER — OFFICE VISIT (OUTPATIENT)
Dept: INTERNAL MEDICINE | Facility: CLINIC | Age: 79
End: 2024-06-21
Payer: MEDICARE

## 2024-06-21 ENCOUNTER — HOME CARE VISIT (OUTPATIENT)
Dept: HOME HEALTH SERVICES | Facility: HOME HEALTHCARE | Age: 79
End: 2024-06-21
Payer: MEDICARE

## 2024-06-21 VITALS
DIASTOLIC BLOOD PRESSURE: 78 MMHG | HEART RATE: 97 BPM | BODY MASS INDEX: 22.88 KG/M2 | SYSTOLIC BLOOD PRESSURE: 120 MMHG | OXYGEN SATURATION: 96 % | HEIGHT: 68 IN | WEIGHT: 151 LBS

## 2024-06-21 DIAGNOSIS — R97.20 ELEVATED PSA: ICD-10-CM

## 2024-06-21 DIAGNOSIS — D64.9 ANEMIA, UNSPECIFIED TYPE: ICD-10-CM

## 2024-06-21 DIAGNOSIS — I05.9 MITRAL VALVE DISEASE: Primary | ICD-10-CM

## 2024-06-21 DIAGNOSIS — I34.1 MVP (MITRAL VALVE PROLAPSE): ICD-10-CM

## 2024-06-21 DIAGNOSIS — I10 HYPERTENSION, ESSENTIAL: ICD-10-CM

## 2024-06-21 NOTE — PROGRESS NOTES
Transitional Care Follow Up Visit  Subjective     Mirza Zimmer is a 79 y.o. male who presents for a transitional care management visit.    Within 48 business hours after discharge our office contacted him via telephone to coordinate his care and needs.      I reviewed and discussed the details of that call along with the discharge summary, hospital problems, inpatient lab results, inpatient diagnostic studies, and consultation reports with Mirza.     Current outpatient and discharge medications have been reconciled for the patient.  Reviewed by: Radha Rollins MD          6/15/2024     3:48 PM   Date of TCM Phone Call   [facility]   Date of Admission 6/11/2024   Date of Discharge 6/15/2024   Discharge Disposition Home-Health Care [facility]     Risk for Readmission (LACE) Score: 10 (6/15/2024  6:00 AM)      History of Present Illness   Course During Hospital Stay:  patient admitted for mitral valve repair on 6/15/24. He is doing well today. Denies any symptoms of shortness of air. Has only required tylenol for pain relief. He is tolerating 30 min walk daily w more on other days. Bp is running 110s-120/ 60-72s. Off losartan now that he is on metoprolol. He is scheduled for cardiac rehab as well. He is using incentive spirometry routinely. Some post operative anemia.          The following portions of the patient's history were reviewed and updated as appropriate: allergies, current medications, past family history, past medical history, past social history, past surgical history, and problem list.    Review of Systems   Constitutional: Negative.    HENT: Negative.     Eyes: Negative.    Respiratory: Negative.     Cardiovascular: Negative.    Gastrointestinal: Negative.    Endocrine: Negative.    Genitourinary: Negative.    Musculoskeletal: Negative.    Skin: Negative.    Allergic/Immunologic: Negative.    Neurological: Negative.    Hematological: Negative.    Psychiatric/Behavioral: Negative.   "       Objective   /78   Pulse 97   Ht 172.7 cm (68\")   Wt 68.5 kg (151 lb)   SpO2 96%   BMI 22.96 kg/m²   Physical Exam  Vitals and nursing note reviewed.   Constitutional:       Appearance: Normal appearance. He is well-developed.   HENT:      Head: Normocephalic and atraumatic.      Right Ear: Tympanic membrane and external ear normal.      Left Ear: Tympanic membrane and external ear normal.      Nose: Nose normal.      Mouth/Throat:      Mouth: Mucous membranes are moist.   Eyes:      Extraocular Movements: Extraocular movements intact.      Pupils: Pupils are equal, round, and reactive to light.   Cardiovascular:      Rate and Rhythm: Normal rate and regular rhythm.      Pulses: Normal pulses.      Heart sounds: Normal heart sounds.   Pulmonary:      Effort: Pulmonary effort is normal. No respiratory distress.      Breath sounds: Normal breath sounds.   Abdominal:      General: Abdomen is flat.      Palpations: Abdomen is soft.   Musculoskeletal:         General: Normal range of motion.      Cervical back: Normal range of motion and neck supple.   Skin:     General: Skin is warm and dry.   Neurological:      General: No focal deficit present.      Mental Status: He is alert and oriented to person, place, and time.   Psychiatric:         Mood and Affect: Mood normal.         Behavior: Behavior normal.         Thought Content: Thought content normal.         Judgment: Judgment normal.         Assessment & Plan   Diagnoses and all orders for this visit:    1. Mitral valve disease (Primary)    2. Hypertension, essential    3. MVP (mitral valve prolapse)    4. Anemia, unspecified type    Patient w Mitral valve prolapse s/p MVR. He is doing well postoperatively. He is to continue to gradually increase activity as tolerated. He is to continue to monitor bp on metoprolol. Advised to make positional changes gradually. He is to increase healthy protein given weight loss. Patient is to start cardiac rehab as " scheduled.                  Answers submitted by the patient for this visit:  Primary Reason for Visit (Submitted on 6/18/2024)  What is the primary reason for your visit?: Other  Other (Submitted on 6/18/2024)  Please describe your symptoms.: Post open heart surgery visit  Have you had these symptoms before?: No  How long have you been having these symptoms?: 1-4 days  Please list any medications you are currently taking for this condition.: aspirin 81 MG EC for 90 days., Cetirizine HCl 10 MG capsule, colchicine 0.6 MG tablet,  Daily for 7 days., cyclobenzaprine 5 MG tablet, : FLEXERIL, Take 1 tablet by mouth Every 8 (Eight) Hours As Needed., ferrous sulfate 325 (65 FE) MG tablet, Take 1 tablet by mouth Daily With Breakfast for 60 days., furosemide 40 MG tablet, known as: LASIX, Take 1 tablet by mouth Daily for 30 days., glucosamine-chondroitin 500-400 MG , guaiFENesin 600 MG 12 hr tablet, Commonly known as: MUCINEX, Take 2 tablets by mouth Every 12 (Twelve) Hours for 30 days., HYDROcodone-acetaminophen 5-325 MG , Not taking, metoprolol tartrate 25 MG tablet, Take 1 tablet by mouth Every 12 (Twelve) Hours for 90 days., multivitamin tablet tablet, ommonly known as: KLOR-CON M20, Take 1 tablet by mouth Daily for 30 days., simvastatin 20 MG tablet, tadalafil 5 MG tablet, Commonly known as: CIALIS, TAKE 1 TABLET BY MOUTH DAILY, According to our records, you may have been taking this medication differently., vitamin D3 125 MCG (5000 UT) capsule capsule  Please describe any probable cause for these symptoms. : Surgery

## 2024-06-22 LAB
ALBUMIN SERPL-MCNC: 4 G/DL (ref 3.5–5.2)
ALBUMIN/GLOB SERPL: 1.9 G/DL
ALP SERPL-CCNC: 148 U/L (ref 39–117)
ALT SERPL-CCNC: 35 U/L (ref 1–41)
AST SERPL-CCNC: 29 U/L (ref 1–40)
BASOPHILS # BLD AUTO: 0.03 10*3/MM3 (ref 0–0.2)
BASOPHILS NFR BLD AUTO: 0.6 % (ref 0–1.5)
BILIRUB SERPL-MCNC: 0.3 MG/DL (ref 0–1.2)
BUN SERPL-MCNC: 21 MG/DL (ref 8–23)
BUN/CREAT SERPL: 23.9 (ref 7–25)
CALCIUM SERPL-MCNC: 9.2 MG/DL (ref 8.6–10.5)
CHLORIDE SERPL-SCNC: 101 MMOL/L (ref 98–107)
CO2 SERPL-SCNC: 28 MMOL/L (ref 22–29)
CREAT SERPL-MCNC: 0.88 MG/DL (ref 0.76–1.27)
EGFRCR SERPLBLD CKD-EPI 2021: 87.5 ML/MIN/1.73
EOSINOPHIL # BLD AUTO: 0.13 10*3/MM3 (ref 0–0.4)
EOSINOPHIL NFR BLD AUTO: 2.4 % (ref 0.3–6.2)
ERYTHROCYTE [DISTWIDTH] IN BLOOD BY AUTOMATED COUNT: 12.5 % (ref 12.3–15.4)
FOLATE SERPL-MCNC: 17.9 NG/ML (ref 4.78–24.2)
GLOBULIN SER CALC-MCNC: 2.1 GM/DL
GLUCOSE SERPL-MCNC: 107 MG/DL (ref 65–99)
HCT VFR BLD AUTO: 33.6 % (ref 37.5–51)
HGB BLD-MCNC: 10.9 G/DL (ref 13–17.7)
IMM GRANULOCYTES # BLD AUTO: 0.02 10*3/MM3 (ref 0–0.05)
IMM GRANULOCYTES NFR BLD AUTO: 0.4 % (ref 0–0.5)
IRON SATN MFR SERPL: 13 % (ref 20–50)
IRON SERPL-MCNC: 37 MCG/DL (ref 59–158)
LYMPHOCYTES # BLD AUTO: 0.86 10*3/MM3 (ref 0.7–3.1)
LYMPHOCYTES NFR BLD AUTO: 16 % (ref 19.6–45.3)
MCH RBC QN AUTO: 31.3 PG (ref 26.6–33)
MCHC RBC AUTO-ENTMCNC: 32.4 G/DL (ref 31.5–35.7)
MCV RBC AUTO: 96.6 FL (ref 79–97)
MONOCYTES # BLD AUTO: 0.68 10*3/MM3 (ref 0.1–0.9)
MONOCYTES NFR BLD AUTO: 12.6 % (ref 5–12)
NEUTROPHILS # BLD AUTO: 3.67 10*3/MM3 (ref 1.7–7)
NEUTROPHILS NFR BLD AUTO: 68 % (ref 42.7–76)
NRBC BLD AUTO-RTO: 0 /100 WBC (ref 0–0.2)
PLATELET # BLD AUTO: 342 10*3/MM3 (ref 140–450)
POTASSIUM SERPL-SCNC: 4.8 MMOL/L (ref 3.5–5.2)
PROT SERPL-MCNC: 6.1 G/DL (ref 6–8.5)
RBC # BLD AUTO: 3.48 10*6/MM3 (ref 4.14–5.8)
SODIUM SERPL-SCNC: 139 MMOL/L (ref 136–145)
TIBC SERPL-MCNC: 274 MCG/DL
TSH SERPL DL<=0.005 MIU/L-ACNC: 1.28 UIU/ML (ref 0.27–4.2)
UIBC SERPL-MCNC: 237 MCG/DL (ref 112–346)
VIT B12 SERPL-MCNC: 521 PG/ML (ref 211–946)
WBC # BLD AUTO: 5.39 10*3/MM3 (ref 3.4–10.8)

## 2024-06-25 ENCOUNTER — READMISSION MANAGEMENT (OUTPATIENT)
Dept: CALL CENTER | Facility: HOSPITAL | Age: 79
End: 2024-06-25
Payer: MEDICARE

## 2024-06-25 ENCOUNTER — HOME CARE VISIT (OUTPATIENT)
Dept: HOME HEALTH SERVICES | Facility: HOME HEALTHCARE | Age: 79
End: 2024-06-25
Payer: MEDICARE

## 2024-06-25 PROCEDURE — G0299 HHS/HOSPICE OF RN EA 15 MIN: HCPCS

## 2024-06-25 NOTE — OUTREACH NOTE
CT Surgery Week 2 Survey      Flowsheet Row Responses   [facility] patient discharged from? [place]   Does the patient have one of the following disease processes/diagnoses(primary or secondary)? Cardiothoracic surgery   Week 2 attempt successful? Yes   Call start time 1324   Call end time 1330   Is patient permission given to speak with other caregiver? Yes   Person spoke with today (if not patient) and relationship Spouse-Selena.   Meds reviewed with patient/caregiver? Yes   Is the patient having any side effects they believe may be caused by any medication additions or changes? No   Does the patient have all medications related to this admission filled (includes all antibiotics, pain medications, cardiac medications, etc.) Yes   Is the patient taking all medications as directed (includes completed medication regime)? Yes   Comments regarding appointments Has already seen his PCP. Cardiology appt 06/27/24. CT surgeon appt in July.   Does the patient have a primary care provider?  Yes   Does the patient have an appointment scheduled with their C/T surgeon? Yes   Has the patient kept scheduled appointments due by today? Yes   Has home health visited the patient within 72 hours of discharge? Yes   Home health comments HH discharged patient today. Will be starting cardiac rehab after cardiology visit.   DME comments O2 at night at 2L. States will be getting a pulse oximeter.   Psychosocial issues? No   What is the patient's perception of their health status since discharge? Improving   Is the patient /caregiver able to teach back the importance of cardiac rehab? Yes   Nursing interventions Provided education on importance of cardiac rehab   Is the patient/caregiver able to teach back the hierarchy of who to call/visit for symptoms/problems? PCP, Specialist, Home health nurse, Urgent Care, ED, 911 Yes   Week 2 call completed? Yes   Graduated Yes   Is the patient interested in additional calls from an  ambulatory ? No   Would this patient benefit from a Referral to [facility] Social Work? No   Wrap up additional comments Spouse states patient is doing very well. Denies any needs or concerns today. States will see cardiologist on Thursday, then will begin cardiac rehab.   Call end time 1330            Luz BOBBY - Registered Nurse

## 2024-06-26 NOTE — PROGRESS NOTES
Date of Office Visit: 2024  Encounter Provider: SILVIA Cruz  Place of Service: [facility] CARDIOLOGY  Patient Name: Mirza Zimmer  :1945    Chief complaint  Hospital follow up    History of Present Illness  Patient is a 79 y.o. year old male  patient of Dr. Vaughn. Past medical history includes hypertension, hyperlipidemia with a mitral valve prolapse mild regurgitation and aortic root dilatation.  In  an echocardiogram showed an ejection fraction of 57% with moderate severe prolapse the posterior leaflet with severe anterior directed mitral regurgitation.  Aortic root measured 4.2 cm.  MR angiogram of the chest also showed a mildly dilated asending aorta measuring 4.1 cm.  Last MR angiogram of the chest dated 2018 showed a mildly dilated aortic arch measuring 4.1 cm clinically he is doing well and we have opted to follow him with serial echocardiographic studies including stress imaging.  Last stress test in 2019 showed an ejection fraction 58% with moderately dilated left ventricle with grade 2 diastolic dysfunction, moderate left atrial margin, mild right atrial enlargement, severe prolapse of the posterior leaflet with severe anterior directed mitral regurgitation at rest.  There is moderate tricuspid regurgitation with an RV systolic pressure 32 mmHg.  Following exercise (12 minutes on the Shahab protocol achieving 13 METS) blood pressure response was appropriate and there was worse regurgitation diastolic function.  Unable to assess RV systolic pressure post exercise.  Ejection fraction did increased to 66%.  On 2021 stress echocardiogram demonstrated ejection fraction of 69% with normal GLS diastolic function was indeterminate.  Severe mitral prolapse with moderate anteriorly directed mitral valve regurgitation was noted it was felt to be underestimated.  There was no ischemia at 12 METS.  There was augmentation of LV function.  A  follow-up echocardiogram 3/2022 showed an ejection fraction 67% with GLS -19.3% (unchanged) with normal diastolic function with mild to moderate mitral valve regurgitation which is eccentric and an RV systolic pressure elevated to 42 mmHg the aortic root was mildly dilated.  Patient had CT angiogram of the chest abdomen pelvis on 10/2021 that showed no pulmonary emboli nodules the aorta was not mentioned.  However a 2.5 sigmoid mass was noted.  Prostate was heterogenous and enlarged.  He subsequently had colectomy and appendectomy.  Patient had a stress echocardiogram 2/2023 that showed normal left ventricular size systolic function ejection fraction 59%, GLS -24.6%, normal left ventricular wall thickness and grade 2 diastolic dysfunction there was severe biatrial enlargement with mitral prolapse and moderate to severe mitral regurgitation, normal right-sided pressures.  There was no ischemia at a good workload with adequate contractile reserve with an ejection fraction 75% post exercise at 14 METS.  Echocardiogram 3/15/2024 showed an ejection fraction 62% normal left ventricular size, normal GLS -27.2%, grade 2 diastolic function, severe left atrial enlargement mitral prolapse with severe anterior directed mitral valve regurgitation, mild tricuspid regurgitation with normal right-sided pressures the aortic root remains dilated.     UNIQUE was completed for 4/4/24 and findings included flail leaflet with severe posterior and moderate to severe anterior mitral valve prolapse.  There was also probable flail P2 scallop.  Surgical intervention was recommended.  On 6/11/2024 he underwent mitral valve repair with left atrial appendage clip with Dr. Ramsey.  He was sent home with nocturnal oxygen.  He was discharged on metoprolol tartrate 25 mg twice daily and furosemide 40 mg daily along with short-term colchicine.  His losartan was discontinued due to hypotension.    Interval history  Patient presents today for hospital  follow-up.  I will visit with him today and have reviewed his medical record.  Since discharge he has improved overall.  He is walking 3 to 4 miles per day now and endurance is improving. He is to start cardiac rehab soon. He denies palpitations, shortness of breath, edema, fatigue, syncope or presyncope.  He does have occasional dizziness as well as surgical site pain that is improving overall.    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 EDGARD LEE    Arthritis     BPH (benign prostatic hyperplasia)     FOLLOWED BY DR. MARK BALL    Cardiomegaly     FOLLOWED BY DR. SAMUEL RIVAS    Cataract     BILATERAL    Chronic prostatitis     FOLLOWED BY DR. MARK BALL    Colon cancer 09/29/2021    Sigmoid colon superficial fragments of eroded tubulovillous adenoma with at intramucosal adenocarcinoma    Colon polyps     FOLLOWED BY DR. 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 EDGARD LEE    Increased prostate specific antigen (PSA) velocity 5/8/2017    Laceration of right index finger 08/01/2019    Lichen simplex chronicus     Mechanical ptosis, left     Mitral regurgitation     Mitral valve insufficiency     Muscular aches     MVP (mitral valve prolapse)     Nocturia 06/2021    Perichondritis of right external ear 07/2021    Presbyopia     Regular astigmatism of both eyes     Seborrheic keratosis     Thoracic aortic aneurysm without rupture     Thoracic aortic ectasia 07/2017    Trochanteric bursitis of left hip      Past Surgical History:   Procedure Laterality Date    APPENDECTOMY  10/12/2021    Done as a part of a colon resection    CARDIAC CATHETERIZATION N/A 4/4/2024    Procedure: Right and Left Heart  Cath;  Surgeon: Williams Marques MD;  Location: [facility] CATH INVASIVE LOCATION;  Service: Cardiovascular;  Laterality: N/A;    COLON RESECTION N/A 10/12/2021    Procedure: LAPAROSCOPIC LEFT COLECTOMY, APPENDECTOMY;  Surgeon: Cesar Rinaldi MD;  Location: [facility] MAIN OR;  Service: General;  Laterality: N/A;    COLONOSCOPY N/A 8/28/2019    5 MM TUBULAR ADENOMA POLYP IN HEPATIC FLEXURE, 6 MM SERRATED ADENOMA POLYP IN TRANSVERSE, MULTIPLE DIVERTICULA IN SIGMOID, RESCOPE IN 2 YRS, DR. KEVIN LUDWIG AT [facility]    COLONOSCOPY N/A 9/29/2021    3 TUBULAR ADENOMA POLYPS IN ASCENDING, AN INFILTRATIVE MASS IN SIGMOID, PATH: TUBULOVILLOUS ADENOMA WITH AT LEAST INTRAMUCOSAL ADENOCARCINOMA, AREA TATTOOED, RESCOPE IN1 YR, DR. KEVIN LUDWIG AT [facility]    COLONOSCOPY N/A 10/19/2022    Procedure: COLONOSCOPY to CECUM WITH COLD BX POLYPECTOMY;  Surgeon: Cesar Rinaldi MD;  Location: [facility] ENDOSCOPY;  Service: General;  Laterality: N/A;  HX COLON CA  --POLYP, DIVERTICULOSIS        COLONOSCOPY W/ POLYPECTOMY N/A 09/03/2004    5 MM ADENOMATOUS POLYP IN SIGMOID, OTHERWISE WNL, RESCOPE IN 3 YRS, DR. ANGI CONLEY AT [facility]    COLONOSCOPY W/ POLYPECTOMY N/A 05/28/2013    3 MM SESSILE HYPERPLASTIC POLYP 50CM FROM ANUS, A FEW SMALL DIVERTICULA IN SIGMOID, NON BLEEDING INTERNAL HEMORRHOIDS GRADE II, DR. ANGI CONLEY AT [facility]    COLONOSCOPY W/ POLYPECTOMY N/A 02/29/2008    5 MM HYPERPLASTIC POLYP AT 60 CM FROM ANUS, RESCOPE IN 5 YRS, DR. ANGI CONLEY AT [facility]    EYE SURGERY  2003    ATYPICAL MOLE EXCSION, DR. SANCHEZ AT [facility]YE [facility]    MITRAL VALVE REPAIR/REPLACEMENT N/A 6/11/2024    Procedure: INTRAOPERATIVE UNIQUE; STERNOTOMY; MITRAL VALVE REPAIR AND PRP.;  Surgeon: Olivier Ramsey MD;  Location: [facility] CVOR;  Service: Cardiothoracic;  Laterality: N/A;    PROSTATE SURGERY N/A 08/15/2018    UROLIFT, PROSTATE VOLUME 48CC, NO ABNORMALITIES, DR. COLLIN GARVIN    TONSILLECTOMY Bilateral 1958    DONE IN [place], [place]     Outpatient  Medications Prior to Visit   Medication Sig Dispense Refill    aspirin 81 MG EC tablet Take 1 tablet by mouth Daily for 90 days. 30 tablet 2    Cetirizine HCl 10 MG capsule Take 10 mg by mouth As Needed.      ferrous sulfate 325 (65 FE) MG tablet Take 1 tablet by mouth Daily With Breakfast for 60 days. 30 tablet 1    glucosamine-chondroitin 500-400 MG capsule capsule Take 1 capsule by mouth Daily. HOLD PER MD INSTR      guaiFENesin (MUCINEX) 600 MG 12 hr tablet Take 2 tablets by mouth Every 12 (Twelve) Hours for 30 days. 120 tablet 0    multivitamin (THERAGRAN) tablet tablet Take 1 tablet by mouth Daily for 30 days. 30 tablet 0    simvastatin (ZOCOR) 20 MG tablet TAKE ONE TABLET BY MOUTH ONCE NIGHTLY 90 tablet 3    tadalafil (CIALIS) 5 MG tablet TAKE 1 TABLET BY MOUTH DAILY (Patient taking differently: Take 1 tablet by mouth Daily. HOLD FOR SURGERY) 90 tablet 3    vitamin D3 125 MCG (5000 UT) capsule capsule Take 1 capsule by mouth Daily. HOLD FOR SURGERY      metoprolol tartrate (LOPRESSOR) 25 MG tablet Take 1 tablet by mouth Every 12 (Twelve) Hours for 90 days. 60 tablet 2    cyclobenzaprine (FLEXERIL) 5 MG tablet Take 1 tablet by mouth Every 8 (Eight) Hours As Needed for Muscle Spasms. 40 tablet 0     Facility-Administered Medications Prior to Visit   Medication Dose Route Frequency Provider Last Rate Last Admin    Chlorhexidine Gluconate Cloth 2 % pads 1 Application  1 Application Topical Q12H PRN Rosa Elena Gary APRN           Allergies as of 06/27/2024 - Reviewed 06/27/2024   Allergen Reaction Noted    Kefzol [cefazolin] Hives 06/11/2024    Sulfa antibiotics Unknown - Low Severity 02/18/2016     Social History     Socioeconomic History    Marital status:    Tobacco Use    Smoking status: Never     Passive exposure: Never    Smokeless tobacco: Never    Tobacco comments:     Caffeine use   Vaping Use    Vaping status: Never Used   Substance and Sexual Activity    Alcohol use: Yes     Alcohol/week: 1.0  "standard drink of alcohol     Types: 1 Glasses of wine per week     Comment: social occasional use    Drug use: Never    Sexual activity: Yes     Partners: Female     Birth control/protection: None     Family History   Problem Relation Age of Onset    Dementia Mother     Arthritis Mother     Diabetes Father     Sudden death Father     Heart attack Father         Probable cause of his death.    Heart disease Father     Breast cancer Sister     Cancer Sister         Breast cancer    Mental illness Maternal Grandmother         Manic depressive    Gallbladder disease Son     Malig Hyperthermia Neg Hx      Review of Systems   Constitutional: Negative for malaise/fatigue.   Cardiovascular:  Negative for chest pain, claudication, dyspnea on exertion, leg swelling, near-syncope, orthopnea, palpitations, paroxysmal nocturnal dyspnea and syncope.        Incisional pain   Respiratory:  Negative for shortness of breath.    Neurological:  Positive for dizziness. Negative for brief paralysis, headaches and light-headedness.   All other systems reviewed and are negative.       Objective:     Vitals:    06/27/24 1432   BP: 122/78   BP Location: Right arm   Patient Position: Sitting   Pulse: 94   SpO2: 98%   Weight: 69 kg (152 lb 3.2 oz)   Height: 172.7 cm (68\")     Body mass index is 23.14 kg/m².    Vitals reviewed.   Constitutional:       General: Not in acute distress.     Appearance: Well-developed and not in distress. Not diaphoretic.   HENT:      Head: Normocephalic.   Pulmonary:      Effort: Pulmonary effort is normal. No respiratory distress.      Breath sounds: Normal breath sounds. No wheezing. No rhonchi. No rales.   Cardiovascular:      Normal rate. Regular rhythm.      Murmurs: There is no murmur.      Comments: Midsternal incision well approximated with no drainage or erythema  Pulses:     Radial: 2+ bilaterally.  Edema:     Peripheral edema absent.   Skin:     General: Skin is warm and dry. There is no cyanosis.      " "Findings: No rash.   Neurological:      Mental Status: Alert and oriented to person, place, and time.   Psychiatric:         Behavior: Behavior normal. Behavior is cooperative.         Thought Content: Thought content normal.         Judgment: Judgment normal.       Lab Review:     Lab Results   Component Value Date     06/21/2024     06/15/2024    K 4.8 06/21/2024    K 3.8 06/15/2024     06/21/2024     06/15/2024    CO2 28.0 06/21/2024    CO2 27.7 06/15/2024    BUN 21 06/21/2024    BUN 17 06/15/2024    CREATININE 0.88 06/21/2024    CREATININE 0.77 06/15/2024    EGFRIFNONA 84 10/13/2021    EGFRIFNONA 98 10/06/2021    EGFRIFAFRI 108 05/25/2021    EGFRIFAFRI 108 05/19/2020    GLUCOSE 107 (H) 06/21/2024    GLUCOSE 100 (H) 06/15/2024    CALCIUM 9.2 06/21/2024    CALCIUM 8.1 (L) 06/15/2024    PROTENTOTREF 6.1 06/21/2024    PROTENTOTREF 6.2 05/14/2024    ALBUMIN 4.0 06/21/2024    ALBUMIN 4.2 06/12/2024    BILITOT 0.3 06/21/2024    BILITOT 0.4 06/10/2024    AST 29 06/21/2024    AST 24 06/10/2024    ALT 35 06/21/2024    ALT 19 06/10/2024     Lab Results   Component Value Date    WBC 5.39 06/21/2024    WBC 6.24 06/15/2024    HGB 10.9 (L) 06/21/2024    HGB 9.6 (L) 06/15/2024    HCT 33.6 (L) 06/21/2024    HCT 28.7 (L) 06/15/2024    MCV 96.6 06/21/2024    MCV 94.4 06/15/2024     06/21/2024    PLT 98 (L) 06/15/2024     Lab Results   Component Value Date    PROBNP 98.2 06/10/2024    PROBNP 118.0 04/01/2024     No results found for: \"CKTOTAL\", \"CKMB\", \"CKMBINDEX\", \"TROPONINI\", \"TROPONINT\"  Lab Results   Component Value Date    TSH 1.280 06/21/2024    TSH 1.470 05/14/2024      Lipid Panel          5/14/2024    15:22 6/10/2024    08:22   Lipid Panel   Total Cholesterol  144    Total Cholesterol 143     Triglycerides 85  86    HDL Cholesterol 37  54    VLDL Cholesterol 16  16    LDL Cholesterol  90  74    LDL/HDL Ratio 2.41  1.35           ECG 12 Lead    Date/Time: 6/27/2024 3:43 PM  Performed by: " Viviane Kumar APRN    Authorized by: Viviane Kumar APRN  Comparison: compared with previous ECG   Similar to previous ECG  Rhythm: sinus rhythm  Rate: normal  BPM: 94  QRS axis: normal  Other findings: T wave abnormality  Comments: Similar to EKG during hospitalization        Assessment:       Diagnosis Plan   1. Mitral valve disorder  Adult Transthoracic Echo Complete w/ Color, Spectral and Contrast if Necessary Per Protocol      2. Nonrheumatic mitral valve regurgitation  Adult Transthoracic Echo Complete w/ Color, Spectral and Contrast if Necessary Per Protocol      3. MVP (mitral valve prolapse)  Adult Transthoracic Echo Complete w/ Color, Spectral and Contrast if Necessary Per Protocol      4. Dilated aortic root  Adult Transthoracic Echo Complete w/ Color, Spectral and Contrast if Necessary Per Protocol      5. Aneurysm of ascending aorta without rupture  Adult Transthoracic Echo Complete w/ Color, Spectral and Contrast if Necessary Per Protocol        Plan:       1.  Mitral prolapse with severe mitral regurgitation.  Now status post mitral valve repair 6/11/2024. Recovering well. To start cardiac rehab soon. He asks about returning to golf and gardening. I asked him to touch base with surgery regarding these activities. Will plan on post op echocardiogram in 3 months.  2.  Dilated aortic root, stable by MR imaging 8/2021 with aortic root measuring 4.5 cm with ascending aorta measuring 3.9 cm. CT angiogram showed stable aortic root.  3.  Dyslipidemia.  Controlled. On statin per PCP  4.  Left iliac artery aneurysm followed by Dr. Yost and deemed stable  5.  Hypertension.  Appears controlled overall. Continue to monitor on metoprolol. Stay off losartan for now.  6.  Pulmonary hypertension.  Resolved by echo 2/2023.        Time Spent: I spent 40 minutes caring for Mirza on this date of service. This time includes time spent by me in the following activities: preparing for the visit, reviewing  tests, performing a medically appropriate examination and/or evaluations, counseling and educating the patient/family/caregiver, ordering medications, tests, or procedures, documenting information in the medical record, independently interpreting results and communicating that information with the patient/family/caregiver, and Obtaining an outside history.   I spent 1 minutes on the separately reported service of ECG. This time is not included in the time used to support the E/M service also reported today.        Your medication list            Accurate as of June 27, 2024  4:12 PM. If you have any questions, ask your nurse or doctor.                CHANGE how you take these medications        Instructions Last Dose Given Next Dose Due   tadalafil 5 MG tablet  Commonly known as: CIALIS  What changed: additional instructions      TAKE 1 TABLET BY MOUTH DAILY              CONTINUE taking these medications        Instructions Last Dose Given Next Dose Due   aspirin 81 MG EC tablet      Take 1 tablet by mouth Daily for 90 days.       Cetirizine HCl 10 MG capsule      Take 10 mg by mouth As Needed.       ferrous sulfate 325 (65 FE) MG tablet      Take 1 tablet by mouth Daily With Breakfast for 60 days.       glucosamine-chondroitin 500-400 MG capsule capsule      Take 1 capsule by mouth Daily. HOLD PER MD YE       guaiFENesin 600 MG 12 hr tablet  Commonly known as: MUCINEX      Take 2 tablets by mouth Every 12 (Twelve) Hours for 30 days.       metoprolol tartrate 25 MG tablet  Commonly known as: LOPRESSOR      Take 1 tablet by mouth Every 12 (Twelve) Hours. Indications: High Blood Pressure Disorder       multivitamin tablet tablet      Take 1 tablet by mouth Daily for 30 days.       simvastatin 20 MG tablet  Commonly known as: ZOCOR      TAKE ONE TABLET BY MOUTH ONCE NIGHTLY       vitamin D3 125 MCG (5000 UT) capsule capsule      Take 1 capsule by mouth Daily. HOLD FOR SURGERY                 Where to Get Your  Medications        These medications were sent to [id number] - [place], [place] - [address] [facility] [address] - [phone]  - [phone]   [address], [facility] [address]      Phone: [phone]   metoprolol tartrate 25 MG tablet         Patient is no longer taking -.  I corrected the med list to reflect this.  I did not stop these medications.    Return for Keep September appointment and add echo same day.      Dictated utilizing Dragon dictation

## 2024-06-27 ENCOUNTER — OFFICE VISIT (OUTPATIENT)
Dept: CARDIOLOGY | Facility: CLINIC | Age: 79
End: 2024-06-27
Payer: MEDICARE

## 2024-06-27 VITALS
HEIGHT: 68 IN | BODY MASS INDEX: 23.07 KG/M2 | DIASTOLIC BLOOD PRESSURE: 78 MMHG | WEIGHT: 152.2 LBS | OXYGEN SATURATION: 98 % | SYSTOLIC BLOOD PRESSURE: 122 MMHG | HEART RATE: 94 BPM

## 2024-06-27 DIAGNOSIS — I71.21 ANEURYSM OF ASCENDING AORTA WITHOUT RUPTURE: ICD-10-CM

## 2024-06-27 DIAGNOSIS — I34.0 NONRHEUMATIC MITRAL VALVE REGURGITATION: ICD-10-CM

## 2024-06-27 DIAGNOSIS — I77.810 DILATED AORTIC ROOT: ICD-10-CM

## 2024-06-27 DIAGNOSIS — I34.1 MVP (MITRAL VALVE PROLAPSE): ICD-10-CM

## 2024-06-27 DIAGNOSIS — I05.9 MITRAL VALVE DISORDER: Primary | ICD-10-CM

## 2024-06-28 ENCOUNTER — TELEPHONE (OUTPATIENT)
Dept: INTERNAL MEDICINE | Facility: CLINIC | Age: 79
End: 2024-06-28
Payer: MEDICARE

## 2024-07-16 ENCOUNTER — OFFICE VISIT (OUTPATIENT)
Dept: CARDIAC SURGERY | Facility: CLINIC | Age: 79
End: 2024-07-16
Payer: MEDICARE

## 2024-07-16 VITALS
WEIGHT: 152.5 LBS | OXYGEN SATURATION: 96 % | TEMPERATURE: 98 F | RESPIRATION RATE: 18 BRPM | BODY MASS INDEX: 23.11 KG/M2 | HEIGHT: 68 IN | DIASTOLIC BLOOD PRESSURE: 76 MMHG | HEART RATE: 77 BPM | SYSTOLIC BLOOD PRESSURE: 118 MMHG

## 2024-07-16 DIAGNOSIS — Z98.890 S/P MVR (MITRAL VALVE REPAIR): Primary | ICD-10-CM

## 2024-07-16 NOTE — LETTER
"July 16, 2024       No Recipients    Patient: Mirza Zimmer   YOB: 1945   Date of Visit: 7/16/2024     Dear Radha Rollins MD:       Thank you for referring Mirza Zimmer to me for evaluation. Below are the relevant portions of my assessment and plan of care.    If you have questions, please do not hesitate to call me. I look forward to following Mirza along with you.         Sincerely,        Rosa Elena SILVIA Barrera        CC:   No Recipients    Rosa Elena BarreraSILVIA  07/16/24 1413  Sign when Signing Visit  CARDIOVASCULAR SURGERY FOLLOW-UP PROGRESS NOTE  Chief Complaint: post op        HPI:   Dear Radha Estrada MD and colleagues:    It was nice to see Mirza Zimmer in follow up 5 weeks after surgery.  As you know, he is a 79 y.o. male with mitral regurgitation who underwent mitral valve repair by Dr. Ramsey on 6/11. He did well postoperatively and continues to do well. He comes in today complaining of nothing. He looks great!  His activity level has been good.   He is walking for 30 or more minutes a day.  His sternum is healing well, denies any popping or clicking with cough or deep inspiration.      Physical Exam:         /76 (BP Location: Left arm, Patient Position: Sitting, Cuff Size: Adult)   Pulse 77   Temp 98 °F (36.7 °C)   Resp 18   Ht 172.7 cm (68\")   Wt 69.2 kg (152 lb 8 oz)   SpO2 96%   BMI 23.19 kg/m²   Heart:  regular rate and rhythm  Lungs:  clear to auscultation bilaterally  Extremities:  no edema  Incision(s):  sternum stable    Assessment/Plan:     S/P mitral valve repair. Overall, he is doing well.    No significant post-op complications    No heavy lifting > 10 pounds for 1 more week, no more than 50lbs for 3 months  OK to drive if not taking narcotic pain medicine  OK to begin cardiac rehab  Follow-up as scheduled with cardiology  Follow-up as scheduled with PCP        Thank you for allowing me to participate in the care of your   " patient.  Regards,  SILVIA Sharma.

## 2024-07-16 NOTE — PROGRESS NOTES
"CARDIOVASCULAR SURGERY FOLLOW-UP PROGRESS NOTE  Chief Complaint: post op        HPI:   Dear Radha Estrada MD and colleagues:    It was nice to see Mirza Zimmer in follow up 5 weeks after surgery.  As you know, he is a 79 y.o. male with mitral regurgitation who underwent mitral valve repair by Dr. Ramsey on 6/11. He did well postoperatively and continues to do well. He comes in today complaining of nothing. He looks great!  His activity level has been good.   He is walking for 30 or more minutes a day.  His sternum is healing well, denies any popping or clicking with cough or deep inspiration.      Physical Exam:         /76 (BP Location: Left arm, Patient Position: Sitting, Cuff Size: Adult)   Pulse 77   Temp 98 °F (36.7 °C)   Resp 18   Ht 172.7 cm (68\")   Wt 69.2 kg (152 lb 8 oz)   SpO2 96%   BMI 23.19 kg/m²   Heart:  regular rate and rhythm  Lungs:  clear to auscultation bilaterally  Extremities:  no edema  Incision(s):  sternum stable    Assessment/Plan:     S/P mitral valve repair. Overall, he is doing well.    No significant post-op complications    No heavy lifting > 10 pounds for 1 more week, no more than 50lbs for 3 months  OK to drive if not taking narcotic pain medicine  OK to begin cardiac rehab  Follow-up as scheduled with cardiology  Follow-up as scheduled with PCP        Thank you for allowing me to participate in the care of your   patient.  Regards,  SILVIA Sharma.  "

## 2024-09-04 ENCOUNTER — TELEPHONE (OUTPATIENT)
Dept: CARDIOLOGY | Facility: CLINIC | Age: 79
End: 2024-09-04
Payer: MEDICARE

## 2024-09-04 NOTE — TELEPHONE ENCOUNTER
Yes, he will need antibiotic prophylaxis prior to dental work.  Usually the dentist will prescribe this, but we can if they are uncomfortable with this.

## 2024-09-04 NOTE — TELEPHONE ENCOUNTER
Pt called re: MVP Repair and dental cleaning.    He wants to know if he needs abx for cleaning.      Please advise.

## 2024-09-10 ENCOUNTER — OFFICE VISIT (OUTPATIENT)
Dept: INTERNAL MEDICINE | Facility: CLINIC | Age: 79
End: 2024-09-10
Payer: MEDICARE

## 2024-09-10 VITALS
WEIGHT: 153 LBS | HEART RATE: 68 BPM | BODY MASS INDEX: 23.19 KG/M2 | OXYGEN SATURATION: 97 % | DIASTOLIC BLOOD PRESSURE: 84 MMHG | HEIGHT: 68 IN | SYSTOLIC BLOOD PRESSURE: 118 MMHG

## 2024-09-10 DIAGNOSIS — I10 HYPERTENSION, ESSENTIAL: ICD-10-CM

## 2024-09-10 DIAGNOSIS — R97.20 ELEVATED PSA: ICD-10-CM

## 2024-09-10 DIAGNOSIS — E78.2 MIXED HYPERLIPIDEMIA: ICD-10-CM

## 2024-09-10 DIAGNOSIS — D64.9 ANEMIA, UNSPECIFIED TYPE: ICD-10-CM

## 2024-09-10 DIAGNOSIS — R73.9 HYPERGLYCEMIA: ICD-10-CM

## 2024-09-10 DIAGNOSIS — Z00.00 HEALTHCARE MAINTENANCE: Primary | ICD-10-CM

## 2024-09-10 DIAGNOSIS — H61.23 BILATERAL IMPACTED CERUMEN: ICD-10-CM

## 2024-09-10 PROCEDURE — 1126F AMNT PAIN NOTED NONE PRSNT: CPT | Performed by: INTERNAL MEDICINE

## 2024-09-10 PROCEDURE — 1159F MED LIST DOCD IN RCRD: CPT | Performed by: INTERNAL MEDICINE

## 2024-09-10 PROCEDURE — 3074F SYST BP LT 130 MM HG: CPT | Performed by: INTERNAL MEDICINE

## 2024-09-10 PROCEDURE — 99213 OFFICE O/P EST LOW 20 MIN: CPT | Performed by: INTERNAL MEDICINE

## 2024-09-10 PROCEDURE — G0439 PPPS, SUBSEQ VISIT: HCPCS | Performed by: INTERNAL MEDICINE

## 2024-09-10 PROCEDURE — 1160F RVW MEDS BY RX/DR IN RCRD: CPT | Performed by: INTERNAL MEDICINE

## 2024-09-10 PROCEDURE — 69210 REMOVE IMPACTED EAR WAX UNI: CPT | Performed by: INTERNAL MEDICINE

## 2024-09-10 PROCEDURE — 3079F DIAST BP 80-89 MM HG: CPT | Performed by: INTERNAL MEDICINE

## 2024-09-10 NOTE — PROGRESS NOTES
Subjective   The ABCs of the Annual Wellness Visit  Medicare Wellness Visit      Mirza Zimmer is a 79 y.o. patient who presents for a Medicare Wellness Visit.    The following portions of the patient's history were reviewed and   updated as appropriate: allergies, current medications, past family history, past medical history, past social history, past surgical history, and problem list.    Compared to one year ago, the patient's physical   health is better.  Compared to one year ago, the patient's mental   health is better.    Recent Hospitalizations:  This patient has had a [facility] admission record on file within the last 365 days.  Current Medical Providers:  Patient Care Team:  Radha Rollins MD as PCP - General (Internal Medicine)  Radha Rollins MD as Referring Physician (Internal Medicine)  Yazan Peterson Jr., MD as Consulting Physician (Urology)  Adriana Vaughn MD as Consulting Physician (Cardiology)    Outpatient Medications Prior to Visit   Medication Sig Dispense Refill    aspirin 81 MG EC tablet Take 1 tablet by mouth Daily for 90 days. 30 tablet 2    Cetirizine HCl 10 MG capsule Take 10 mg by mouth As Needed.      glucosamine-chondroitin 500-400 MG capsule capsule Take 1 capsule by mouth Daily. HOLD PER MD INSTR      metoprolol tartrate (LOPRESSOR) 25 MG tablet Take 1 tablet by mouth Every 12 (Twelve) Hours. Indications: High Blood Pressure Disorder 180 tablet 3    simvastatin (ZOCOR) 20 MG tablet TAKE ONE TABLET BY MOUTH ONCE NIGHTLY 90 tablet 3    tadalafil (CIALIS) 5 MG tablet TAKE 1 TABLET BY MOUTH DAILY (Patient taking differently: Take 1 tablet by mouth Daily. HOLD FOR SURGERY) 90 tablet 3    vitamin D3 125 MCG (5000 UT) capsule capsule Take 1 capsule by mouth Daily. HOLD FOR SURGERY       No facility-administered medications prior to visit.     No opioid medication identified on active medication list. I have reviewed chart for other potential  high risk medication/s and harmful  "drug interactions in the elderly.      Aspirin is on active medication list. Aspirin use is indicated based on review of current medical condition/s. Pros and cons of this therapy have been discussed today. Benefits of this medication outweigh potential harm.  Patient has been encouraged to continue taking this medication.  .      Patient Active Problem List   Diagnosis    Arthritis    Hyperlipidemia    Hypertension, essential    MOFFETT (dyspnea on exertion)    Non-rheumatic mitral regurgitation    MVP (mitral valve prolapse)    Dilated aortic root    Increased prostate specific antigen (PSA) velocity    Other ill-defined heart diseases     History of colon polyps    Thoracic aortic aneurysm without rupture    Malignant neoplasm of sigmoid colon    Cancer of sigmoid colon    Personal history of colon cancer    Nonrheumatic mitral valve regurgitation    Mitral valve disorder    Abnormal findings on diagnostic imaging of heart and coronary circulation    Mitral valve disease     Advance Care Planning Advance Directive is on file.  ACP discussion was held with the patient during this visit. Patient has an advance directive in EMR which is still valid.             Objective   Vitals:    09/10/24 0905   BP: 118/84   Pulse: 68   SpO2: 97%   Weight: 69.4 kg (153 lb)   Height: 172.7 cm (68\")       Estimated body mass index is 23.26 kg/m² as calculated from the following:    Height as of this encounter: 172.7 cm (68\").    Weight as of this encounter: 69.4 kg (153 lb).    BMI is within normal parameters. No other follow-up for BMI required.       Does the patient have evidence of cognitive impairment? No     Ear Cerumen Removal    Date/Time: 9/10/2024 12:49 PM    Performed by: Radha Rollins MD  Authorized by: Radha Rollins MD      Ear Cerumen Removal    Date/Time: 9/10/2024 12:49 PM    Performed by: Radha Rollins MD  Authorized by: Radha Rollins MD  Consent: Verbal consent obtained. Written consent not obtained.  Consent " given by: patient  Patient understanding: patient states understanding of the procedure being performed  Patient identity confirmed: verbally with patient    Anesthesia:  Local Anesthetic: none  Location details: left ear and right ear  Patient tolerance: patient tolerated the procedure well with no immediate complications  Procedure type: instrumentation, curette   Sedation:  Patient sedated: no                                                                                                  Health  Risk Assessment    Smoking Status:  Social History     Tobacco Use   Smoking Status Never    Passive exposure: Never   Smokeless Tobacco Never   Tobacco Comments    Caffeine use     Alcohol Consumption:  Social History     Substance and Sexual Activity   Alcohol Use Yes    Alcohol/week: 1.0 standard drink of alcohol    Types: 1 Glasses of wine per week    Comment: social occasional use       Fall Risk Screen  STEADI Fall Risk Assessment was completed, and patient is at LOW risk for falls.Assessment completed on:9/10/2024    Depression Screenin/10/2024     9:06 AM   PHQ-2/PHQ-9 Depression Screening   Little Interest or Pleasure in Doing Things 0-->not at all   Feeling Down, Depressed or Hopeless 0-->not at all   PHQ-9: Brief Depression Severity Measure Score 0     Health Habits and Functional and Cognitive Screenin/3/2024     9:38 PM   Functional & Cognitive Status   Do you have difficulty preparing food and eating? No   Do you have difficulty bathing yourself, getting dressed or grooming yourself? No   Do you have difficulty using the toilet? No   Do you have difficulty moving around from place to place? No   Do you have trouble with steps or getting out of a bed or a chair? No   Current Diet Well Balanced Diet   Dental Exam Up to date   Eye Exam Up to date   Exercise (times per week) 7 times per week   Current Exercises Include Bicycling Outdoors;Gardening;Home Exercise Program (TV, Computer,  Etc.);Home Fitness Gym;House Cleaning;Light Weights;Stationary Bicycling/Spin Class;Walking;Yard Work   Do you need help using the phone?  No   Are you deaf or do you have serious difficulty hearing?  No   Do you need help to go to places out of walking distance? No   Do you need help shopping? No   Do you need help preparing meals?  No   Do you need help with housework?  No   Do you need help with laundry? No   Do you need help taking your medications? No   Do you need help managing money? No   Do you ever drive or ride in a car without wearing a seat belt? No   Have you felt unusual stress, anger or loneliness in the last month? No   Who do you live with? Spouse   If you need help, do you have trouble finding someone available to you? No   Have you been bothered in the last four weeks by sexual problems? No   Do you have difficulty concentrating, remembering or making decisions? No           Age-appropriate Screening Schedule:  Refer to the list below for future screening recommendations based on patient's age, sex and/or medical conditions. Orders for these recommended tests are listed in the plan section. The patient has been provided with a written plan.    Health Maintenance List  Health Maintenance   Topic Date Due    RSV Vaccine - Adults (1 - 1-dose 60+ series) Never done    INFLUENZA VACCINE  08/01/2024    LIPID PANEL  06/10/2025    PROSTATE CANCER SCREENING  07/02/2025    ANNUAL WELLNESS VISIT  09/10/2025    COLONOSCOPY  10/19/2025    TDAP/TD VACCINES (3 - Td or Tdap) 08/08/2027    HEPATITIS C SCREENING  Completed    COVID-19 Vaccine  Completed    Pneumococcal Vaccine 65+  Completed    ZOSTER VACCINE  Completed                                                                                                                                                CMS Preventative Services Quick Reference  Risk Factors Identified During Encounter  Patient is up to date    The above risks/problems have been discussed  "with the patient.  Pertinent information has been shared with the patient in the After Visit Summary.  An After Visit Summary and PPPS were made available to the patient.    Follow Up:   Next Medicare Wellness visit to be scheduled in 1 year.         Additional E&M Note during same encounter follows:  Patient has additional, significant, and separately identifiable condition(s)/problem(s) that require work above and beyond the Medicare Wellness Visit     Chief Complaint  Annual Exam  S/p mitral valve repair  Hyperlipdiemia  Cerumen impaction    Subjective   HPI  Krzysztof is also being seen today for an annual adult preventative physical exam.  and Krzysztof is also being seen today for additional medical problem/s. Patient is s/p MV replacement w excellent benefit. He notes he walked 9 holes golfing yesterday w excellent exercise tolerance.   Last lab testing with post operative anemia. Took temporary iron.                     Objective   Vital Signs:  /84   Pulse 68   Ht 172.7 cm (68\")   Wt 69.4 kg (153 lb)   SpO2 97%   BMI 23.26 kg/m²   Physical Exam  Vitals and nursing note reviewed.   Constitutional:       Appearance: Normal appearance. He is well-developed.   HENT:      Head: Normocephalic and atraumatic.      Right Ear: Tympanic membrane and external ear normal.      Left Ear: Tympanic membrane and external ear normal.      Ears:      Comments: B cerumen impaction. Visualized w lighted loop and removed large amount cerumen B.      Nose: Nose normal.      Mouth/Throat:      Mouth: Mucous membranes are moist.   Eyes:      Extraocular Movements: Extraocular movements intact.      Pupils: Pupils are equal, round, and reactive to light.   Cardiovascular:      Rate and Rhythm: Normal rate and regular rhythm.      Heart sounds: Normal heart sounds.   Pulmonary:      Effort: Pulmonary effort is normal. No respiratory distress.      Breath sounds: Normal breath sounds.   Abdominal:      General: Abdomen is flat.      " Palpations: Abdomen is soft.   Musculoskeletal:         General: Normal range of motion.      Cervical back: Normal range of motion and neck supple.   Skin:     General: Skin is warm and dry.   Neurological:      General: No focal deficit present.      Mental Status: He is alert and oriented to person, place, and time.   Psychiatric:         Mood and Affect: Mood normal.         Behavior: Behavior normal.         Thought Content: Thought content normal.         Judgment: Judgment normal.       The following data was reviewed by: Radha Rollins MD on 09/10/2024:        Assessment and Plan Additional age appropriate preventative wellness advice topics were discussed during today's preventative wellness exam(some topics already addressed during AWV portion of the note above):    Physical Activity: Advised cardiovascular activity 150 minutes per week as tolerated. (example brisk walk for 30 minutes, 5 days a week).     Nutrition: Discussed nutrition plan with patient. Information shared in after visit summary. Goal is for a well balanced diet to enhance overall health.     Injury Prevention Discussion:  Information shared in after visit summary.                    Anemia, unspecified type  Likely was post op in nature. Will repeat testing and ensure no further iron def.   Mixed hyperlipidemia   Lipid abnormalities are stable    Plan:  Continue same medication/s without change.      Discussed medication dosage, use, side effects, and goals of treatment in detail.    Counseled patient on lifestyle modifications to help control hyperlipidemia.     Patient Treatment Goals:   LDL goal is less than 70    Followup in 1 year.  Hypertension, essential  Hypertension is stable and controlled  Continue current treatment regimen.  Blood pressure will be reassessed in 1 year.  Hyperglycemia  Will test hgba1c. Low carb nutrtion. Healthy movement  Healthcare maintenance    Bilateral impacted cerumen  S/p removal.   Elevated PSA  F/u  urology routinely for this bph and ED      Orders Placed This Encounter   Procedures    Iron Profile     Order Specific Question:   Release to patient     Answer:   Routine Release [[id number]]    Hemoglobin A1c     Order Specific Question:   Release to patient     Answer:   Routine Release [[id number]]    Comprehensive Metabolic Panel     Order Specific Question:   Release to patient     Answer:   Routine Release [[id number]]    Lipid Panel With LDL / HDL Ratio     Order Specific Question:   Release to patient     Answer:   Routine Release [[id number]]    Urinalysis With Culture If Indicated -     Order Specific Question:   Release to patient     Answer:   Routine Release [[id number]]    CBC & Differential     Order Specific Question:   Manual Differential     Answer:   No     Order Specific Question:   Release to patient     Answer:   Routine Release [[id number]]           I spent 54 minutes caring for Mirza on this date of service. This time includes time spent by me in the following activities:preparing for the visit, reviewing tests, obtaining and/or reviewing a separately obtained history, performing a medically appropriate examination and/or evaluation , counseling and educating the patient/family/caregiver, ordering medications, tests, or procedures, referring and communicating with other health care professionals , documenting information in the medical record, and independently interpreting results and communicating that information with the patient/family/caregiver  Follow Up   No follow-ups on file.  Patient was given instructions and counseling regarding his condition or for health maintenance advice. Please see specific information pulled into the AVS if appropriate.

## 2024-09-11 LAB
ALBUMIN SERPL-MCNC: 4.1 G/DL (ref 3.5–5.2)
ALBUMIN/GLOB SERPL: 1.8 G/DL
ALP SERPL-CCNC: 80 U/L (ref 39–117)
ALT SERPL-CCNC: 17 U/L (ref 1–41)
APPEARANCE UR: CLEAR
AST SERPL-CCNC: 22 U/L (ref 1–40)
BACTERIA #/AREA URNS HPF: NORMAL /[HPF]
BASOPHILS # BLD AUTO: 0.03 10*3/MM3 (ref 0–0.2)
BASOPHILS NFR BLD AUTO: 0.8 % (ref 0–1.5)
BILIRUB SERPL-MCNC: 0.3 MG/DL (ref 0–1.2)
BILIRUB UR QL STRIP: NEGATIVE
BUN SERPL-MCNC: 23 MG/DL (ref 8–23)
BUN/CREAT SERPL: 27.1 (ref 7–25)
CALCIUM SERPL-MCNC: 9.2 MG/DL (ref 8.6–10.5)
CASTS URNS QL MICRO: NORMAL /LPF
CHLORIDE SERPL-SCNC: 101 MMOL/L (ref 98–107)
CHOLEST SERPL-MCNC: 171 MG/DL (ref 0–200)
CO2 SERPL-SCNC: 30.1 MMOL/L (ref 22–29)
COLOR UR: YELLOW
CREAT SERPL-MCNC: 0.85 MG/DL (ref 0.76–1.27)
EGFRCR SERPLBLD CKD-EPI 2021: 88.4 ML/MIN/1.73
EOSINOPHIL # BLD AUTO: 0.07 10*3/MM3 (ref 0–0.4)
EOSINOPHIL NFR BLD AUTO: 1.8 % (ref 0.3–6.2)
EPI CELLS #/AREA URNS HPF: NORMAL /HPF (ref 0–10)
ERYTHROCYTE [DISTWIDTH] IN BLOOD BY AUTOMATED COUNT: 12.1 % (ref 12.3–15.4)
GLOBULIN SER CALC-MCNC: 2.3 GM/DL
GLUCOSE SERPL-MCNC: 83 MG/DL (ref 65–99)
GLUCOSE UR QL STRIP: NEGATIVE
HBA1C MFR BLD: 5.7 % (ref 4.8–5.6)
HCT VFR BLD AUTO: 42.2 % (ref 37.5–51)
HDLC SERPL-MCNC: 53 MG/DL (ref 40–60)
HGB BLD-MCNC: 13.8 G/DL (ref 13–17.7)
HGB UR QL STRIP: NEGATIVE
IMM GRANULOCYTES # BLD AUTO: 0.01 10*3/MM3 (ref 0–0.05)
IMM GRANULOCYTES NFR BLD AUTO: 0.3 % (ref 0–0.5)
IRON SATN MFR SERPL: 13 % (ref 20–50)
IRON SERPL-MCNC: 48 MCG/DL (ref 59–158)
KETONES UR QL STRIP: NEGATIVE
LDLC SERPL CALC-MCNC: 105 MG/DL (ref 0–100)
LDLC/HDLC SERPL: 1.97 {RATIO}
LEUKOCYTE ESTERASE UR QL STRIP: NEGATIVE
LYMPHOCYTES # BLD AUTO: 0.89 10*3/MM3 (ref 0.7–3.1)
LYMPHOCYTES NFR BLD AUTO: 22.8 % (ref 19.6–45.3)
MCH RBC QN AUTO: 29.9 PG (ref 26.6–33)
MCHC RBC AUTO-ENTMCNC: 32.7 G/DL (ref 31.5–35.7)
MCV RBC AUTO: 91.3 FL (ref 79–97)
MICRO URNS: NORMAL
MICRO URNS: NORMAL
MONOCYTES # BLD AUTO: 0.56 10*3/MM3 (ref 0.1–0.9)
MONOCYTES NFR BLD AUTO: 14.4 % (ref 5–12)
NEUTROPHILS # BLD AUTO: 2.34 10*3/MM3 (ref 1.7–7)
NEUTROPHILS NFR BLD AUTO: 59.9 % (ref 42.7–76)
NITRITE UR QL STRIP: NEGATIVE
NRBC BLD AUTO-RTO: 0 /100 WBC (ref 0–0.2)
PH UR STRIP: 6 [PH] (ref 5–7.5)
PLATELET # BLD AUTO: 184 10*3/MM3 (ref 140–450)
POTASSIUM SERPL-SCNC: 4.7 MMOL/L (ref 3.5–5.2)
PROT SERPL-MCNC: 6.4 G/DL (ref 6–8.5)
PROT UR QL STRIP: NEGATIVE
RBC # BLD AUTO: 4.62 10*6/MM3 (ref 4.14–5.8)
RBC #/AREA URNS HPF: NORMAL /HPF (ref 0–2)
SODIUM SERPL-SCNC: 138 MMOL/L (ref 136–145)
SP GR UR STRIP: 1.03 (ref 1–1.03)
TIBC SERPL-MCNC: 359 MCG/DL
TRIGL SERPL-MCNC: 69 MG/DL (ref 0–150)
UIBC SERPL-MCNC: 311 MCG/DL (ref 112–346)
URINALYSIS REFLEX: NORMAL
UROBILINOGEN UR STRIP-MCNC: 0.2 MG/DL (ref 0.2–1)
VLDLC SERPL CALC-MCNC: 13 MG/DL (ref 5–40)
WBC # BLD AUTO: 3.9 10*3/MM3 (ref 3.4–10.8)
WBC #/AREA URNS HPF: NORMAL /HPF (ref 0–5)

## 2024-09-29 NOTE — PROGRESS NOTES
Date of Office Visit: 2024  Encounter Provider: Adriana Rivas MD  Place of Service: [facility] CARDIOLOGY  Patient Name: Mirza Zimmer  :1945    Chief complaint  Mitral valve disease, dilated aortic root, hypertension    History of Present Illness  Patient is a 79-year-old gentleman with history of hypertension, hyperlipidemia, dilated aortic root, mitral valve prolapse with mild regurgitation noted to be severe in .  He was treated medically until 3/2024 with slight worsening diastolic function and pulmonary hypertension.  Subsequently on 2024 underwent mitral valve repair with left atrial appendage clip.  CT angiogram of the chest showed an aortic root measuring 4.2 cm and ascending aorta measuring 3.8 cm.  Cardiac cath showed mild disease.    Since last visit patient has had no palpitation shortness of breath edema or chest pain.  He has had dizziness with standing suddenly.  Blood pressure is borderline low.  He is walking 30 minutes a day.  He feels his dyspnea has improved and feels well.      Past Medical History:   Diagnosis Date    Actinic keratosis     Aneurysm of iliac artery 2019    FOLLOWED BY DR. EDGARD LEE    Arthritis     BPH (benign prostatic hyperplasia)     FOLLOWED BY DR. MARK BALL    Cardiomegaly     FOLLOWED BY DR. ADRIANA RIVAS    Cataract     BILATERAL    Chronic prostatitis     FOLLOWED BY DR. MARK BALL    Colon cancer 2021    Sigmoid colon superficial fragments of eroded tubulovillous adenoma with at intramucosal adenocarcinoma    Colon polyps     FOLLOWED BY DR. KEVIN LUDWIG    Dilated aortic root     Dyspnea on exertion     ED (erectile dysfunction)     Elevated PSA     Heart murmur     Hemorrhoids     History of colon resection     Hyperlipidemia     Hypermetropia, bilateral     Hypertension     Iliac artery aneurysm 2018    FOLLOWED BY DR. EDGARD LEE    Increased prostate specific antigen (PSA) velocity 2017     Laceration of right index finger 08/01/2019    Lichen simplex chronicus     Mechanical ptosis, left     Mitral regurgitation     Mitral valve insufficiency     Muscular aches     MVP (mitral valve prolapse)     Nocturia 06/2021    Perichondritis of right external ear 07/2021    Presbyopia     Regular astigmatism of both eyes     Seborrheic keratosis     Thoracic aortic aneurysm without rupture     Thoracic aortic ectasia 07/2017    Trochanteric bursitis of left hip      Past Surgical History:   Procedure Laterality Date    APPENDECTOMY  10/12/2021    Done as a part of a colon resection    CARDIAC CATHETERIZATION N/A 4/4/2024    Procedure: Right and Left Heart Cath;  Surgeon: Williams Marques MD;  Location: [facility] CATH INVASIVE LOCATION;  Service: Cardiovascular;  Laterality: N/A;    COLON RESECTION N/A 10/12/2021    Procedure: LAPAROSCOPIC LEFT COLECTOMY, APPENDECTOMY;  Surgeon: Cesar Rinaldi MD;  Location: [facility] OR;  Service: General;  Laterality: N/A;    COLONOSCOPY N/A 8/28/2019    5 MM TUBULAR ADENOMA POLYP IN HEPATIC FLEXURE, 6 MM SERRATED ADENOMA POLYP IN TRANSVERSE, MULTIPLE DIVERTICULA IN SIGMOID, RESCOPE IN 2 YRS, DR. KEVIN LUDWIG AT [facility]    COLONOSCOPY N/A 9/29/2021    3 TUBULAR ADENOMA POLYPS IN ASCENDING, AN INFILTRATIVE MASS IN SIGMOID, PATH: TUBULOVILLOUS ADENOMA WITH AT LEAST INTRAMUCOSAL ADENOCARCINOMA, AREA TATTOOED, RESCOPE IN1 YR, DR. KEVIN LUDWIG AT [facility]    COLONOSCOPY N/A 10/19/2022    Procedure: COLONOSCOPY to CECUM WITH COLD BX POLYPECTOMY;  Surgeon: Cesar Rinaldi MD;  Location: [facility] ENDOSCOPY;  Service: General;  Laterality: N/A;  HX COLON CA  --POLYP, DIVERTICULOSIS        COLONOSCOPY W/ POLYPECTOMY N/A 09/03/2004    5 MM ADENOMATOUS POLYP IN SIGMOID, OTHERWISE WNL, RESCOPE IN 3 YRS, DR. ANGI CONLEY AT [facility]    COLONOSCOPY W/ POLYPECTOMY N/A 05/28/2013    3 MM SESSILE HYPERPLASTIC POLYP 50CM FROM ANUS, A FEW SMALL DIVERTICULA IN SIGMOID, NON BLEEDING INTERNAL HEMORRHOIDS GRADE  II, DR. ANGI CONLEY AT [facility]    COLONOSCOPY W/ POLYPECTOMY N/A 02/29/2008    5 MM HYPERPLASTIC POLYP AT 60 CM FROM ANUS, RESCOPE IN 5 YRS, DR. ANGI CONLEY AT [facility]    EYE SURGERY  2003    ATYPICAL MOLE EXCSION, DR. SANCHEZ AT [facility] IN [facility]    MITRAL VALVE REPAIR/REPLACEMENT N/A 6/11/2024    Procedure: INTRAOPERATIVE UNIQUE; STERNOTOMY; MITRAL VALVE REPAIR AND PRP.;  Surgeon: Olivier Ramsey MD;  Location: [facility];  Service: Cardiothoracic;  Laterality: N/A;    PROSTATE SURGERY N/A 08/15/2018    UROLIFT, PROSTATE VOLUME 48CC, NO ABNORMALITIES, DR. COLLIN GARVIN    TONSILLECTOMY Bilateral 1958    DONE IN [place]     Outpatient Medications Prior to Visit   Medication Sig Dispense Refill    Cetirizine HCl 10 MG capsule Take 10 mg by mouth As Needed.      glucosamine-chondroitin 500-400 MG capsule capsule Take 1 capsule by mouth Daily. HOLD PER MD YE      metoprolol tartrate (LOPRESSOR) 25 MG tablet Take 1 tablet by mouth Every 12 (Twelve) Hours. Indications: High Blood Pressure Disorder 180 tablet 3    simvastatin (ZOCOR) 20 MG tablet TAKE ONE TABLET BY MOUTH ONCE NIGHTLY 90 tablet 3    tadalafil (CIALIS) 5 MG tablet TAKE 1 TABLET BY MOUTH DAILY (Patient taking differently: Take 1 tablet by mouth Daily. HOLD FOR SURGERY) 90 tablet 3    vitamin D3 125 MCG (5000 UT) capsule capsule Take 1 capsule by mouth Daily. HOLD FOR SURGERY       No facility-administered medications prior to visit.       Allergies as of 09/30/2024 - Reviewed 09/30/2024   Allergen Reaction Noted    Kefzol [cefazolin] Hives 06/11/2024    Sulfa antibiotics Unknown - Low Severity 02/18/2016     Social History     Socioeconomic History    Marital status:    Tobacco Use    Smoking status: Never     Passive exposure: Never    Smokeless tobacco: Never    Tobacco comments:     Caffeine use   Vaping Use    Vaping status: Never Used   Substance and Sexual Activity    Alcohol use: Yes     Alcohol/week: 1.0 standard drink of  "alcohol     Types: 1 Glasses of wine per week     Comment: social occasional use    Drug use: Never    Sexual activity: Yes     Partners: Female     Birth control/protection: None     Family History   Problem Relation Age of Onset    Dementia Mother     Arthritis Mother     Diabetes Father     Sudden death Father     Heart attack Father         Probable cause of his death.    Heart disease Father     Breast cancer Sister     Cancer Sister         Breast cancer    Mental illness Maternal Grandmother         Manic depressive    Gallbladder disease Son     [place]cherrie Hyperthermia Neg Hx      Review of Systems   Constitutional: Negative for chills, fever, weight gain and weight loss.   Cardiovascular:  Negative for leg swelling.   Respiratory:  Negative for cough, snoring and wheezing.    Hematologic/Lymphatic: Negative for bleeding problem. Does not bruise/bleed easily.   Skin:  Negative for color change.   Musculoskeletal:  Negative for falls, joint pain and myalgias.   Gastrointestinal:  Negative for melena.   Genitourinary:  Negative for hematuria.   Neurological:  Negative for excessive daytime sleepiness.   Psychiatric/Behavioral:  Negative for depression. The patient is not nervous/anxious.         Objective:     Vitals:    09/30/24 0742   BP: 108/64   Pulse: 62   Weight: 72.6 kg (160 lb)   Height: 172.7 cm (68\")     Body mass index is 24.33 kg/m².    Vitals reviewed.   Constitutional:       Appearance: Well-developed.   Eyes:      General: No scleral icterus.        Right eye: No discharge.      Conjunctiva/sclera: Conjunctivae normal.      Pupils: Pupils are equal, round, and reactive to light.   HENT:      Head: Normocephalic.      Nose: Nose normal.   Neck:      Thyroid: No thyromegaly.      Vascular: No JVD.   Pulmonary:      Effort: Pulmonary effort is normal. No respiratory distress.      Breath sounds: Normal breath sounds. No wheezing. No rales.   Cardiovascular:      Normal rate. Regular rhythm. Normal S1. " Normal S2.       Murmurs: There is no murmur.      No gallop.    Pulses:     Intact distal pulses.      Carotid: 2+ bilaterally.     Radial: 2+ bilaterally.     Femoral: 2+ bilaterally.     Popliteal: 2+ bilaterally.     Dorsalis pedis: 2+ bilaterally.     Posterior tibial: 2+ bilaterally.  Edema:     Peripheral edema absent.   Abdominal:      General: Bowel sounds are normal. There is no distension.      Palpations: Abdomen is soft.      Tenderness: There is no abdominal tenderness. There is no rebound.   Musculoskeletal: Normal range of motion.         General: No tenderness.      Cervical back: Normal range of motion and neck supple. Skin:     General: Skin is warm and dry.      Findings: No erythema or rash.   Neurological:      Mental Status: Alert and oriented to person, place, and time.   Psychiatric:         Behavior: Behavior normal.         Thought Content: Thought content normal.         Judgment: Judgment normal.       Lab Review:   Lab Results - Last 18 Months   Lab Units 09/10/24  1013 06/21/24  1501 06/15/24  0240 06/14/24  0559   WBC 10*3/mm3 3.90 5.39 6.24 8.43   RBC 10*6/mm3 4.62 3.48* 3.04* 2.89*   HEMOGLOBIN g/dL 13.8 10.9* 9.6* 9.3*   HEMATOCRIT % 42.2 33.6* 28.7* 27.5*   MCV fL 91.3 96.6 94.4 95.2   MCH pg 29.9 31.3 31.6 32.2   MCHC g/dL 32.7 32.4 33.4 33.8   RDW % 12.1* 12.5 12.5 12.5   PLATELETS 10*3/mm3 184 342 98* 86*   NEUTROPHIL % % 59.9 68.0  --   --    LYMPHOCYTE % % 22.8 16.0*  --   --    MONOCYTES % % 14.4* 12.6*  --   --    EOSINOPHIL % % 1.8 2.4  --   --    BASOPHIL % % 0.8 0.6  --   --    NEUTROS ABS 10*3/mm3 2.34 3.67  --   --    LYMPHS ABS 10*3/mm3 0.89 0.86  --   --    MONOS ABS 10*3/mm3 0.56 0.68  --   --    EOS ABS 10*3/mm3 0.07 0.13  --   --    BASOS ABS 10*3/mm3 0.03 0.03  --   --    IMM GRAN % % 0.3 0.4  --   --    IMMATURE GRANS (ABS) 10*3/mm3 0.01 0.02  --   --    RDW-SD fl  --   --  42.7 43.8   MPV fL  --   --  10.6 10.4       Lab Results - Last 18 Months   Lab Units  "09/10/24  1013 06/21/24  1501 06/15/24  1023 06/15/24  0240 06/14/24  1718 06/14/24  0559 06/11/24  1136 06/10/24  0911   GLUCOSE mg/dL 83 107*  --  100*  --  107*   < > 108*   BUN mg/dL 23 21  --  17  --  21   < > 15   CREATININE mg/dL 0.85 0.88  --  0.77  --  0.68*   < > 0.93   SODIUM mmol/L 138 139  --  137  --  139   < > 139   POTASSIUM mmol/L 4.7 4.8   < > 3.7   < > 3.6   < > 4.2   CHLORIDE mmol/L 101 101  --  101  --  103   < > 103   CO2 mmol/L 30.1* 28.0  --  27.7  --  27.0   < > 26.4   CALCIUM mg/dL 9.2 9.2  --  8.1*  --  8.3*   < > 9.1   TOTAL PROTEIN g/dL  --   --   --   --   --   --   --  6.4   ALBUMIN g/dL 4.1 4.0  --   --   --   --    < > 4.0   ALT (SGPT) U/L 17 35  --   --   --   --   --  19   AST (SGOT) U/L 22 29  --   --   --   --   --  24   ALK PHOS U/L 80 148*  --   --   --   --   --  73   BILIRUBIN mg/dL 0.3 0.3  --   --   --   --   --  0.4   GLOBULIN gm/dL  --   --   --   --   --   --   --  2.4   A/G RATIO g/dL  --   --   --   --   --   --   --  1.7   BUN / CREAT RATIO  27.1* 23.9  --  22.1  --  30.9*   < > 16.1   ANION GAP mmol/L  --   --   --  8.3  --  9.0   < > 9.6   EGFR mL/min/1.73  --   --   --  91.1  --  94.6   < > 83.5    < > = values in this interval not displayed.     Lab Results - Last 18 Months   Lab Units 09/10/24  1013 06/10/24  0822   CHOLESTEROL mg/dL  --  144   TRIGLYCERIDES mg/dL 69 86   HDL CHOL mg/dL 53 54   LDL CHOL mg/dL 105* 74   VLDL CHOL mg/dL  --  16   VLDL CHOLESTEROL GRICELDA mg/dL 13  --    LDL/HDL RATIO  1.97 1.35     Lab Results - Last 18 Months   Lab Units 06/10/24  0822 04/01/24  1214   PROBNP pg/mL 98.2 118.0     No results for input(s): \"TROPONINT\" in the last 50191 hours.  Lab Results - Last 18 Months   Lab Units 06/21/24  1501 05/14/24  1522   TSH uIU/mL 1.280 1.470     Lab Results - Last 18 Months   Lab Units 06/12/24  0356 06/11/24  1136 06/11/24  0956 06/11/24  0951   PROTIME Seconds 17.3* 16.6*   < > 18.7*   APTT seconds  --  28.0  --  29.4    < > = values in " this interval not displayed.         ECG 12 Lead    Date/Time: 9/30/2024 7:52 AM  Performed by: Adriana Vaughn MD    Authorized by: Adriana Vaughn MD  Comparison: compared with previous ECG   Comparison to previous ECG: Nonspecific latera ST-T wave changes have improved.  Borderline inferior Q waves are present though this may be due to lead placement differences  Other findings comments: Possible prior inferior wall infarction    Clinical impression: abnormal EKG            Diagnosis Plan   1. Nonrheumatic mitral valve regurgitation  ECG 12 Lead    Adult Transthoracic Echo Complete W/ Cont if Necessary Per Protocol      2. Dilated aortic root  ECG 12 Lead    Adult Transthoracic Echo Complete W/ Cont if Necessary Per Protocol      3. Mixed hyperlipidemia        4. Hypertension, essential        5. MVP (mitral valve prolapse)          Plan:       1.  Mitral prolapse, s/p repair 6/2024.  Clinically doing well with minimal regurgitation postprocedure.  Continue with SBE prophylaxis.  Follow-up in 6 months.  2.  S/p left atrial clip placement  3.  RV enlargement.  This would be relatively new but I suspect may be due to recent upper respiratory symptoms.  Clinically he is doing well.  Will repeat echo in 6 months  4.  Mild coronary disease.  No anginal symptoms.  Continue risk factor modification.  5.  Dilated aortic root measuring 4.5 cm by MR imaging 2021 and 4.2 cm by CT imaging 4/2024.  Plan on noncontrast CT imaging in 4/2025  6.  Left iliac artery aneurysm followed by Dr. Yost  7.  Hypertension: Slightly low.  He will watch this closely and call if it falls further.  8.  Hyperlipidemia  9.  Pulmonary hypertension.  Resolved following mitral valve repair with RVSP 18 mmHg on echo 9/30/2024      Time Spent: I spent 35 minutes caring for Mirza on this date of service. This time includes time spent by me in the following activities: preparing for the visit, reviewing tests, obtaining and/or reviewing a separately  obtained history, performing a medically appropriate examination and/or evaluation, counseling and educating the patient/family/caregiver, ordering medications, tests, or procedures, documenting information in the medical record, and independently interpreting results and communicating that information with the patient/family/caregiver.   I spent 3 minutes on the separately reported service of ECG and Echo. This time is not included in the time used to support the E/M service also reported today.        Your medication list            Accurate as of September 30, 2024 11:59 PM. If you have any questions, ask your nurse or doctor.                CHANGE how you take these medications        Instructions Last Dose Given Next Dose Due   tadalafil 5 MG tablet  Commonly known as: CIALIS  What changed: additional instructions      TAKE 1 TABLET BY MOUTH DAILY              CONTINUE taking these medications        Instructions Last Dose Given Next Dose Due   Cetirizine HCl 10 MG capsule      Take 10 mg by mouth As Needed.       glucosamine-chondroitin 500-400 MG capsule capsule      Take 1 capsule by mouth Daily. HOLD PER MD YE       metoprolol tartrate 25 MG tablet  Commonly known as: LOPRESSOR      Take 1 tablet by mouth Every 12 (Twelve) Hours. Indications: High Blood Pressure Disorder       simvastatin 20 MG tablet  Commonly known as: ZOCOR      TAKE ONE TABLET BY MOUTH ONCE NIGHTLY       vitamin D3 125 MCG (5000 UT) capsule capsule      Take 1 capsule by mouth Daily. HOLD FOR SURGERY                Patient is no longer taking -.  I corrected the med list to reflect this.  I did not stop these medications.      Dictated utilizing Dragon dictation

## 2024-09-30 ENCOUNTER — OFFICE VISIT (OUTPATIENT)
Dept: CARDIOLOGY | Facility: CLINIC | Age: 79
End: 2024-09-30
Payer: MEDICARE

## 2024-09-30 ENCOUNTER — HOSPITAL ENCOUNTER (OUTPATIENT)
Dept: CARDIOLOGY | Facility: HOSPITAL | Age: 79
Discharge: HOME | End: 2024-09-30
Admitting: NURSE PRACTITIONER
Payer: MEDICARE

## 2024-09-30 VITALS
HEIGHT: 68 IN | SYSTOLIC BLOOD PRESSURE: 108 MMHG | DIASTOLIC BLOOD PRESSURE: 64 MMHG | BODY MASS INDEX: 24.25 KG/M2 | HEART RATE: 62 BPM | WEIGHT: 160 LBS

## 2024-09-30 VITALS
HEIGHT: 68 IN | SYSTOLIC BLOOD PRESSURE: 100 MMHG | DIASTOLIC BLOOD PRESSURE: 60 MMHG | HEART RATE: 70 BPM | WEIGHT: 153 LBS | BODY MASS INDEX: 23.19 KG/M2

## 2024-09-30 DIAGNOSIS — E78.2 MIXED HYPERLIPIDEMIA: ICD-10-CM

## 2024-09-30 DIAGNOSIS — I71.21 ANEURYSM OF ASCENDING AORTA WITHOUT RUPTURE: ICD-10-CM

## 2024-09-30 DIAGNOSIS — I77.810 DILATED AORTIC ROOT: ICD-10-CM

## 2024-09-30 DIAGNOSIS — I34.0 NONRHEUMATIC MITRAL VALVE REGURGITATION: ICD-10-CM

## 2024-09-30 DIAGNOSIS — I34.1 MVP (MITRAL VALVE PROLAPSE): ICD-10-CM

## 2024-09-30 DIAGNOSIS — I34.0 NONRHEUMATIC MITRAL VALVE REGURGITATION: Primary | ICD-10-CM

## 2024-09-30 DIAGNOSIS — I10 HYPERTENSION, ESSENTIAL: ICD-10-CM

## 2024-09-30 DIAGNOSIS — I05.9 MITRAL VALVE DISORDER: ICD-10-CM

## 2024-09-30 LAB
AORTIC ARCH: 2.9 CM
ASCENDING AORTA: 3.5 CM
BH CV ECHO MEAS - ACS: 2.19 CM
BH CV ECHO MEAS - AO MAX PG: 6.8 MMHG
BH CV ECHO MEAS - AO MEAN PG: 4 MMHG
BH CV ECHO MEAS - AO ROOT AREA (BSA CORRECTED): 2.2 CM2
BH CV ECHO MEAS - AO ROOT DIAM: 4 CM
BH CV ECHO MEAS - AO V2 MAX: 130 CM/SEC
BH CV ECHO MEAS - AO V2 VTI: 30.3 CM
BH CV ECHO MEAS - AVA(I,D): 3 CM2
BH CV ECHO MEAS - EDV(CUBED): 117.6 ML
BH CV ECHO MEAS - EDV(MOD-SP2): 73 ML
BH CV ECHO MEAS - EDV(MOD-SP4): 73 ML
BH CV ECHO MEAS - EF(MOD-BP): 59.3 %
BH CV ECHO MEAS - EF(MOD-SP2): 58.9 %
BH CV ECHO MEAS - EF(MOD-SP4): 58.9 %
BH CV ECHO MEAS - ESV(CUBED): 39.1 ML
BH CV ECHO MEAS - ESV(MOD-SP2): 30 ML
BH CV ECHO MEAS - ESV(MOD-SP4): 30 ML
BH CV ECHO MEAS - FS: 30.7 %
BH CV ECHO MEAS - IVS/LVPW: 1 CM
BH CV ECHO MEAS - IVSD: 1.2 CM
BH CV ECHO MEAS - LAT PEAK E' VEL: 7.1 CM/SEC
BH CV ECHO MEAS - LV DIASTOLIC VOL/BSA (35-75): 40 CM2
BH CV ECHO MEAS - LV MASS(C)D: 226.4 GRAMS
BH CV ECHO MEAS - LV MAX PG: 5.5 MMHG
BH CV ECHO MEAS - LV MEAN PG: 3 MMHG
BH CV ECHO MEAS - LV SYSTOLIC VOL/BSA (12-30): 16.4 CM2
BH CV ECHO MEAS - LV V1 MAX: 117 CM/SEC
BH CV ECHO MEAS - LV V1 VTI: 27.5 CM
BH CV ECHO MEAS - LVIDD: 4.9 CM
BH CV ECHO MEAS - LVIDS: 3.4 CM
BH CV ECHO MEAS - LVOT AREA: 3.3 CM2
BH CV ECHO MEAS - LVOT DIAM: 2.04 CM
BH CV ECHO MEAS - LVPWD: 1.2 CM
BH CV ECHO MEAS - MED PEAK E' VEL: 4.5 CM/SEC
BH CV ECHO MEAS - MV A DUR: 0.13 SEC
BH CV ECHO MEAS - MV A MAX VEL: 90 CM/SEC
BH CV ECHO MEAS - MV DEC SLOPE: 562.7 CM/SEC2
BH CV ECHO MEAS - MV DEC TIME: 0.25 SEC
BH CV ECHO MEAS - MV E MAX VEL: 103 CM/SEC
BH CV ECHO MEAS - MV E/A: 1.14
BH CV ECHO MEAS - MV MAX PG: 7.4 MMHG
BH CV ECHO MEAS - MV MEAN PG: 3.3 MMHG
BH CV ECHO MEAS - MV P1/2T: 74.2 MSEC
BH CV ECHO MEAS - MV V2 VTI: 42.3 CM
BH CV ECHO MEAS - MVA(P1/2T): 3 CM2
BH CV ECHO MEAS - MVA(VTI): 2.13 CM2
BH CV ECHO MEAS - PA V2 MAX: 130.3 CM/SEC
BH CV ECHO MEAS - PI END-D VEL: 105.9 CM/SEC
BH CV ECHO MEAS - PULM DIAS VEL: 74 CM/SEC
BH CV ECHO MEAS - PULM S/D: 0.48
BH CV ECHO MEAS - PULM SYS VEL: 35.2 CM/SEC
BH CV ECHO MEAS - RAP SYSTOLE: 3 MMHG
BH CV ECHO MEAS - RV MAX PG: 0.37 MMHG
BH CV ECHO MEAS - RV V1 MAX: 30.4 CM/SEC
BH CV ECHO MEAS - RV V1 VTI: 5.6 CM
BH CV ECHO MEAS - RVSP: 18 MMHG
BH CV ECHO MEAS - SUP REN AO DIAM: 1.8 CM
BH CV ECHO MEAS - SV(LVOT): 90.1 ML
BH CV ECHO MEAS - SV(MOD-SP2): 43 ML
BH CV ECHO MEAS - SV(MOD-SP4): 43 ML
BH CV ECHO MEAS - SVI(LVOT): 49.4 ML/M2
BH CV ECHO MEAS - SVI(MOD-SP2): 23.6 ML/M2
BH CV ECHO MEAS - SVI(MOD-SP4): 23.6 ML/M2
BH CV ECHO MEAS - TAPSE (>1.6): 1.39 CM
BH CV ECHO MEAS - TR MAX PG: 15.1 MMHG
BH CV ECHO MEAS - TR MAX VEL: 194.1 CM/SEC
BH CV ECHO MEASUREMENTS AVERAGE E/E' RATIO: 17.76
BH CV XLRA - RV BASE: 4.5 CM
BH CV XLRA - RV LENGTH: 7.8 CM
BH CV XLRA - RV MID: 4.3 CM
BH CV XLRA - TDI S': 8.7 CM/SEC
LEFT ATRIUM VOLUME INDEX: 25 ML/M2
LEFT ATRIUM VOLUME: 45 ML
SINUS: 3.7 CM
STJ: 3.2 CM

## 2024-09-30 PROCEDURE — 3074F SYST BP LT 130 MM HG: CPT | Performed by: INTERNAL MEDICINE

## 2024-09-30 PROCEDURE — 1160F RVW MEDS BY RX/DR IN RCRD: CPT | Performed by: INTERNAL MEDICINE

## 2024-09-30 PROCEDURE — 1159F MED LIST DOCD IN RCRD: CPT | Performed by: INTERNAL MEDICINE

## 2024-09-30 PROCEDURE — 3078F DIAST BP <80 MM HG: CPT | Performed by: INTERNAL MEDICINE

## 2024-09-30 PROCEDURE — 99214 OFFICE O/P EST MOD 30 MIN: CPT | Performed by: INTERNAL MEDICINE

## 2024-09-30 PROCEDURE — 93306 TTE W/DOPPLER COMPLETE: CPT

## 2024-09-30 PROCEDURE — 93000 ELECTROCARDIOGRAM COMPLETE: CPT | Performed by: INTERNAL MEDICINE

## 2024-09-30 PROCEDURE — 93306 TTE W/DOPPLER COMPLETE: CPT | Performed by: INTERNAL MEDICINE

## 2024-10-07 DIAGNOSIS — I72.3 ILIAC ARTERY ANEURYSM: ICD-10-CM

## 2024-10-07 DIAGNOSIS — I77.810 DILATED AORTIC ROOT: Primary | ICD-10-CM

## 2024-11-26 NOTE — TELEPHONE ENCOUNTER
Patient notified of preoperative labs and CT. Stated his wife was a DR. And had seen them already but, was thankful for the call.    Secondary to rapid atrial fibrillation but also possibly pleural effusion  Give 1 dose of IV furosemide.  Give additional Lasix  Echo pending    Results from last 7 days   Lab Units 11/25/24  1555   BNP pg/mL 571*

## 2025-03-27 NOTE — Clinical Note
Recurrent aspiration pneumonia.  Complete two rounds of antibiotics.     The left coronary artery was selectively engaged, injected and visualized.

## 2025-03-28 ENCOUNTER — HOSPITAL ENCOUNTER (OUTPATIENT)
Dept: CARDIOLOGY | Facility: HOSPITAL | Age: 80
Discharge: HOME | End: 2025-03-28
Payer: MEDICARE

## 2025-03-28 ENCOUNTER — RESULTS FOLLOW-UP (OUTPATIENT)
Dept: CARDIOLOGY | Age: 80
End: 2025-03-28
Payer: MEDICARE

## 2025-03-28 VITALS
WEIGHT: 160 LBS | HEART RATE: 70 BPM | DIASTOLIC BLOOD PRESSURE: 64 MMHG | BODY MASS INDEX: 24.25 KG/M2 | HEIGHT: 68 IN | SYSTOLIC BLOOD PRESSURE: 124 MMHG

## 2025-03-28 DIAGNOSIS — I34.0 NONRHEUMATIC MITRAL VALVE REGURGITATION: ICD-10-CM

## 2025-03-28 DIAGNOSIS — I77.810 DILATED AORTIC ROOT: ICD-10-CM

## 2025-03-28 LAB
AORTIC DIMENSIONLESS INDEX: 0.8 (DI)
ASCENDING AORTA: 3.5 CM
AV MEAN PRESS GRAD SYS DOP V1V2: 3 MMHG
AV VMAX SYS DOP: 119 CM/SEC
BH CV ECHO LEFT VENTRICLE GLOBAL LONGITUDINAL STRAIN: -23.2 %
BH CV ECHO MEAS - ACS: 2.07 CM
BH CV ECHO MEAS - AI P1/2T: 1022 MSEC
BH CV ECHO MEAS - AO MAX PG: 5.7 MMHG
BH CV ECHO MEAS - AO ROOT AREA (BSA CORRECTED): 1.9 CM2
BH CV ECHO MEAS - AO ROOT DIAM: 4.1 CM
BH CV ECHO MEAS - AO V2 VTI: 25 CM
BH CV ECHO MEAS - AVA(I,D): 2.5 CM2
BH CV ECHO MEAS - EDV(CUBED): 103.8 ML
BH CV ECHO MEAS - EDV(MOD-SP2): 79 ML
BH CV ECHO MEAS - EDV(MOD-SP4): 81 ML
BH CV ECHO MEAS - EF(MOD-SP2): 50.6 %
BH CV ECHO MEAS - EF(MOD-SP4): 60.5 %
BH CV ECHO MEAS - ESV(MOD-SP2): 39 ML
BH CV ECHO MEAS - ESV(MOD-SP4): 32 ML
BH CV ECHO MEAS - FS: 43 %
BH CV ECHO MEAS - IVS/LVPW: 1 CM
BH CV ECHO MEAS - IVSD: 1 CM
BH CV ECHO MEAS - LAT PEAK E' VEL: 6.6 CM/SEC
BH CV ECHO MEAS - LV DIASTOLIC VOL/BSA (35-75): 43.6 CM2
BH CV ECHO MEAS - LV MASS(C)D: 164.5 GRAMS
BH CV ECHO MEAS - LV MAX PG: 3.3 MMHG
BH CV ECHO MEAS - LV MEAN PG: 2 MMHG
BH CV ECHO MEAS - LV SYSTOLIC VOL/BSA (12-30): 17.2 CM2
BH CV ECHO MEAS - LV V1 MAX: 90.7 CM/SEC
BH CV ECHO MEAS - LV V1 VTI: 20 CM
BH CV ECHO MEAS - LVIDD: 4.7 CM
BH CV ECHO MEAS - LVIDS: 2.7 CM
BH CV ECHO MEAS - LVOT AREA: 3.2 CM2
BH CV ECHO MEAS - LVOT DIAM: 2 CM
BH CV ECHO MEAS - LVPWD: 1 CM
BH CV ECHO MEAS - MED PEAK E' VEL: 4.4 CM/SEC
BH CV ECHO MEAS - MR MAX PG: 28.5 MMHG
BH CV ECHO MEAS - MR MAX VEL: 267 CM/SEC
BH CV ECHO MEAS - MV A MAX VEL: 99.8 CM/SEC
BH CV ECHO MEAS - MV DEC SLOPE: 383.7 CM/SEC2
BH CV ECHO MEAS - MV DEC TIME: 0.26 SEC
BH CV ECHO MEAS - MV E MAX VEL: 94.9 CM/SEC
BH CV ECHO MEAS - MV E/A: 0.95
BH CV ECHO MEAS - MV MAX PG: 6.4 MMHG
BH CV ECHO MEAS - MV MEAN PG: 3.6 MMHG
BH CV ECHO MEAS - MV P1/2T: 49.1 MSEC
BH CV ECHO MEAS - MV V2 VTI: 37.6 CM
BH CV ECHO MEAS - MVA(P1/2T): 4.5 CM2
BH CV ECHO MEAS - MVA(VTI): 1.68 CM2
BH CV ECHO MEAS - PA V2 MAX: 99.5 CM/SEC
BH CV ECHO MEAS - PI END-D VEL: 122.2 CM/SEC
BH CV ECHO MEAS - PULM A REVS DUR: 0.11 SEC
BH CV ECHO MEAS - PULM A REVS VEL: 20.2 CM/SEC
BH CV ECHO MEAS - PULM DIAS VEL: 23 CM/SEC
BH CV ECHO MEAS - PULM S/D: 1.12
BH CV ECHO MEAS - PULM SYS VEL: 25.7 CM/SEC
BH CV ECHO MEAS - QP/QS: 0.68
BH CV ECHO MEAS - RAP SYSTOLE: 3 MMHG
BH CV ECHO MEAS - RV MAX PG: 0.84 MMHG
BH CV ECHO MEAS - RV V1 MAX: 45.8 CM/SEC
BH CV ECHO MEAS - RV V1 VTI: 8.5 CM
BH CV ECHO MEAS - RVOT DIAM: 2.5 CM
BH CV ECHO MEAS - RVSP: 22.7 MMHG
BH CV ECHO MEAS - SV(LVOT): 63 ML
BH CV ECHO MEAS - SV(MOD-SP2): 40 ML
BH CV ECHO MEAS - SV(MOD-SP4): 49 ML
BH CV ECHO MEAS - SV(RVOT): 42.6 ML
BH CV ECHO MEAS - SVI(LVOT): 33.9 ML/M2
BH CV ECHO MEAS - SVI(MOD-SP2): 21.5 ML/M2
BH CV ECHO MEAS - SVI(MOD-SP4): 26.4 ML/M2
BH CV ECHO MEAS - TAPSE (>1.6): 1.03 CM
BH CV ECHO MEAS - TR MAX PG: 19.7 MMHG
BH CV ECHO MEAS - TR MAX VEL: 221.7 CM/SEC
BH CV ECHO MEASUREMENTS AVERAGE E/E' RATIO: 17.25
BH CV XLRA - RV BASE: 3.8 CM
BH CV XLRA - RV LENGTH: 6.2 CM
BH CV XLRA - RV MID: 2.7 CM
BH CV XLRA - TDI S': 7.1 CM/SEC
LEFT ATRIUM VOLUME INDEX: 20.3 ML/M2
LV EF BIPLANE MOD: 53.9 %
SINUS: 4 CM
STJ: 3.3 CM

## 2025-03-28 PROCEDURE — 93306 TTE W/DOPPLER COMPLETE: CPT

## 2025-03-28 PROCEDURE — 93356 MYOCRD STRAIN IMG SPCKL TRCK: CPT

## 2025-03-28 NOTE — TELEPHONE ENCOUNTER
Please let him know echo looks good overall.  His heart is strong and functioning well.  Mitral valve is functioning normally status postrepair.  There is still mild dilation of the aortic root similar to what was seen on CT scan in April 2024.  Would continue current medications and keep currently scheduled follow-up appointment

## 2025-05-23 NOTE — TELEPHONE ENCOUNTER
Called patient backx 2. No answer x2. Left Vm that refill sent   I called an abnormal because leads are placed in different positions (alternate placement) than last time.Please let tomasa know. nette

## 2025-05-28 ENCOUNTER — HOSPITAL ENCOUNTER (OUTPATIENT)
Facility: HOSPITAL | Age: 80
Discharge: HOME | End: 2025-05-28
Payer: MEDICARE

## 2025-05-28 ENCOUNTER — OFFICE VISIT (OUTPATIENT)
Age: 80
End: 2025-05-28
Payer: MEDICARE

## 2025-05-28 VITALS
WEIGHT: 157.7 LBS | DIASTOLIC BLOOD PRESSURE: 60 MMHG | SYSTOLIC BLOOD PRESSURE: 120 MMHG | HEIGHT: 68 IN | BODY MASS INDEX: 23.9 KG/M2

## 2025-05-28 DIAGNOSIS — I72.3 ILIAC ARTERY ANEURYSM: ICD-10-CM

## 2025-05-28 DIAGNOSIS — I71.43 INFRARENAL ABDOMINAL AORTIC ANEURYSM (AAA) WITHOUT RUPTURE: Primary | ICD-10-CM

## 2025-05-28 DIAGNOSIS — I65.23 BILATERAL CAROTID ARTERY STENOSIS: ICD-10-CM

## 2025-05-28 DIAGNOSIS — I77.810 DILATED AORTIC ROOT: ICD-10-CM

## 2025-05-28 LAB
ABDOMINAL DIST AORTA AP: 1.9 CM
ABDOMINAL DIST AORTA TRANS: 2 CM
ABDOMINAL DIST AORTA VEL: 67.8 CM/S
ABDOMINAL LT COM ILIAC AP: 2.2 CM
ABDOMINAL LT COM ILIAC TRANS: 1.9 CM
ABDOMINAL LT COM ILIAC VEL: 78.8 CM/S
ABDOMINAL LT EXT ILIAC VEL: 65.4 CM/S
ABDOMINAL MID AORTA AP: 2.2 CM
ABDOMINAL MID AORTA TRANS: 2.2 CM
ABDOMINAL MID AORTA VEL: 64.2 CM/S
ABDOMINAL PROX AORTA AP: 2.7 CM
ABDOMINAL PROX AORTA TRANS: 2.6 CM
ABDOMINAL PROX AORTA VEL: 73.9 CM/S
ABDOMINAL RT COM ILIAC AP: 1.6 CM
ABDOMINAL RT COM ILIAC TRANS: 1.5 CM
ABDOMINAL RT COM ILIAC VEL: 73 CM/S
ABDOMINAL RT EXT ILIAC VEL: 71.5 CM/S
BH CV VAS ABD AO LT EXTERNAL ILIAC AP: 1.3 CM
BH CV VAS ABD AO RT EXTERNAL ILIAC AP: 1.1 CM
BH CV XLRA MEAS LEFT CAROTID BULB EDV: -11.9 CM/SEC
BH CV XLRA MEAS LEFT CAROTID BULB PSV: -46 CM/SEC
BH CV XLRA MEAS LEFT DIST CCA EDV: -27.6 CM/SEC
BH CV XLRA MEAS LEFT DIST CCA PSV: -80 CM/SEC
BH CV XLRA MEAS LEFT DIST ICA EDV: -35.1 CM/SEC
BH CV XLRA MEAS LEFT DIST ICA PSV: -77.8 CM/SEC
BH CV XLRA MEAS LEFT ICA/CCA RATIO: 0.97
BH CV XLRA MEAS LEFT MID CCA EDV: -20.5 CM/SEC
BH CV XLRA MEAS LEFT MID CCA PSV: -78.5 CM/SEC
BH CV XLRA MEAS LEFT MID ICA EDV: -29.9 CM/SEC
BH CV XLRA MEAS LEFT MID ICA PSV: -66.1 CM/SEC
BH CV XLRA MEAS LEFT PROX CCA EDV: 23.4 CM/SEC
BH CV XLRA MEAS LEFT PROX CCA PSV: 89.2 CM/SEC
BH CV XLRA MEAS LEFT PROX ECA EDV: -8.5 CM/SEC
BH CV XLRA MEAS LEFT PROX ECA PSV: -65 CM/SEC
BH CV XLRA MEAS LEFT PROX ICA EDV: -26.1 CM/SEC
BH CV XLRA MEAS LEFT PROX ICA PSV: -63.5 CM/SEC
BH CV XLRA MEAS LEFT PROX SCLA PSV: 92.7 CM/SEC
BH CV XLRA MEAS LEFT VERTEBRAL A EDV: 15.6 CM/SEC
BH CV XLRA MEAS LEFT VERTEBRAL A PSV: 45 CM/SEC
BH CV XLRA MEAS RIGHT CAROTID BULB EDV: -9.5 CM/SEC
BH CV XLRA MEAS RIGHT CAROTID BULB PSV: -38.9 CM/SEC
BH CV XLRA MEAS RIGHT DIST CCA EDV: 24.1 CM/SEC
BH CV XLRA MEAS RIGHT DIST CCA PSV: 89.9 CM/SEC
BH CV XLRA MEAS RIGHT DIST ICA EDV: -26.6 CM/SEC
BH CV XLRA MEAS RIGHT DIST ICA PSV: -61.6 CM/SEC
BH CV XLRA MEAS RIGHT ICA/CCA RATIO: -0.84
BH CV XLRA MEAS RIGHT MID CCA EDV: -23.7 CM/SEC
BH CV XLRA MEAS RIGHT MID CCA PSV: -81.6 CM/SEC
BH CV XLRA MEAS RIGHT MID ICA EDV: -29 CM/SEC
BH CV XLRA MEAS RIGHT MID ICA PSV: -70.8 CM/SEC
BH CV XLRA MEAS RIGHT PROX CCA EDV: -16.6 CM/SEC
BH CV XLRA MEAS RIGHT PROX CCA PSV: -68.3 CM/SEC
BH CV XLRA MEAS RIGHT PROX ECA EDV: -14.9 CM/SEC
BH CV XLRA MEAS RIGHT PROX ECA PSV: -87 CM/SEC
BH CV XLRA MEAS RIGHT PROX ICA EDV: -33.3 CM/SEC
BH CV XLRA MEAS RIGHT PROX ICA PSV: -75.7 CM/SEC
BH CV XLRA MEAS RIGHT PROX SCLA PSV: 80 CM/SEC
BH CV XLRA MEAS RIGHT VERTEBRAL A EDV: 16.4 CM/SEC
BH CV XLRA MEAS RIGHT VERTEBRAL A PSV: 50.6 CM/SEC
LEFT ARM BP: NORMAL MMHG
RIGHT ARM BP: NORMAL MMHG

## 2025-05-28 PROCEDURE — 93978 VASCULAR STUDY: CPT

## 2025-05-28 PROCEDURE — 93880 EXTRACRANIAL BILAT STUDY: CPT

## 2025-05-28 NOTE — PROGRESS NOTES
Chief Complaint  Iliac artery aneurysm     Subjective        Mirzakecia Zimmer presents to [facility] VASCULAR SURGERY  History of Present Illness  80 y.o. male returns for follow up of AAA and carotid stenosis.  He remains asymptomatic with no abdominal or lower back pain and no symptoms of stroke or TIA..  He is not taking aspirin.  He is not a cigarette smoker.    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 EDGARD LEE    Arthritis     BPH (benign prostatic hyperplasia)     FOLLOWED BY DR. MARK BALL    Cardiomegaly     FOLLOWED BY DR. SAMUEL RIVAS    Cataract     BILATERAL    Chronic prostatitis     FOLLOWED BY DR. MARK BALL    Colon cancer 09/29/2021    Sigmoid colon superficial fragments of eroded tubulovillous adenoma with at intramucosal adenocarcinoma    Colon polyps     FOLLOWED BY DR. 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 EDGARD LEE    Increased prostate specific antigen (PSA) velocity 5/8/2017    Laceration of right index finger 08/01/2019    Lichen simplex chronicus     Mechanical ptosis, left     Mitral regurgitation     Mitral valve insufficiency     Muscular aches     MVP (mitral valve prolapse)     Nocturia 06/2021    Perichondritis of right external ear 07/2021    Presbyopia     Regular astigmatism of both eyes     Seborrheic keratosis     Thoracic aortic aneurysm without rupture     Thoracic aortic ectasia 07/2017    Trochanteric bursitis of left hip        Past Surgical History:   Procedure Laterality Date    APPENDECTOMY  10/12/2021    Done as a part of a colon resection    CARDIAC CATHETERIZATION N/A 04/04/2024    Procedure: Right and Left Heart Cath;   Surgeon: Williams Marques MD;  Location: [facility] CATH INVASIVE LOCATION;  Service: Cardiovascular;  Laterality: N/A;    CARDIAC SURGERY  6/11/24    Mitral valve repair    COLON RESECTION N/A 10/12/2021    Procedure: LAPAROSCOPIC LEFT COLECTOMY, APPENDECTOMY;  Surgeon: Cesar Rinaldi MD;  Location: [facility] MAIN OR;  Service: General;  Laterality: N/A;    COLONOSCOPY N/A 08/28/2019    5 MM TUBULAR ADENOMA POLYP IN HEPATIC FLEXURE, 6 MM SERRATED ADENOMA POLYP IN TRANSVERSE, MULTIPLE DIVERTICULA IN SIGMOID, RESCOPE IN 2 YRS, DR. KEVIN LUDWIG AT [facility]    COLONOSCOPY N/A 09/29/2021    3 TUBULAR ADENOMA POLYPS IN ASCENDING, AN INFILTRATIVE MASS IN SIGMOID, PATH: TUBULOVILLOUS ADENOMA WITH AT LEAST INTRAMUCOSAL ADENOCARCINOMA, AREA TATTOOED, RESCOPE IN1 YR, DR. KEVIN LUDWIG AT [facility]    COLONOSCOPY N/A 10/19/2022    Procedure: COLONOSCOPY to CECUM WITH COLD BX POLYPECTOMY;  Surgeon: Cesar Rinaldi MD;  Location: [facility] ENDOSCOPY;  Service: General;  Laterality: N/A;  HX COLON CA  --POLYP, DIVERTICULOSIS        COLONOSCOPY W/ POLYPECTOMY N/A 09/03/2004    5 MM ADENOMATOUS POLYP IN SIGMOID, OTHERWISE WNL, RESCOPE IN 3 YRS, DR. ANGI CONLEY AT [facility]    COLONOSCOPY W/ POLYPECTOMY N/A 05/28/2013    3 MM SESSILE HYPERPLASTIC POLYP 50CM FROM ANUS, A FEW SMALL DIVERTICULA IN SIGMOID, NON BLEEDING INTERNAL HEMORRHOIDS GRADE II, DR. ANGI CONLEY AT [facility]    COLONOSCOPY W/ POLYPECTOMY N/A 02/29/2008    5 MM HYPERPLASTIC POLYP AT 60 CM FROM ANUS, RESCOPE IN 5 YRS, DR. ANGI CONLEY AT [facility]    EYE SURGERY  2003    ATYPICAL MOLE EXCSION, DR. SANCHEZ AT [facility] EYE [facility]    MITRAL VALVE REPAIR/REPLACEMENT N/A 06/11/2024    Procedure: INTRAOPERATIVE UNIQUE; STERNOTOMY; MITRAL VALVE REPAIR AND PRP.;  Surgeon: Olivier Ramsey MD;  Location: [facility] CVOR;  Service: Cardiothoracic;  Laterality: N/A;    MITRAL VALVE REPAIR/REPLACEMENT  6/11/25    PROSTATE SURGERY N/A 08/15/2018    UROLIFT, PROSTATE VOLUME 48CC, NO ABNORMALITIES,   "COLLIN GARVIN    TONSILLECTOMY Bilateral 1958    DONE IN [place], [place]       Family History   Problem Relation Age of Onset    Dementia Mother     Arthritis Mother     Diabetes Mother     Diabetes Father     Sudden death Father     Heart attack Father         Probable cause of his death.    Heart disease Father     Cancer Father     Breast cancer Sister     Cancer Sister         Breast cancer    Mental illness Maternal Grandmother         Manic depressive    Gallbladder disease Son     [place] Hyperthermia Neg Hx          Social History     Tobacco Use    Smoking status: Never     Passive exposure: Never    Smokeless tobacco: Never    Tobacco comments:     Caffeine use   Vaping Use    Vaping status: Never Used   Substance Use Topics    Alcohol use: Yes     Alcohol/week: 1.0 standard drink of alcohol     Types: 1 Glasses of wine per week     Comment: social occasional use    Drug use: Never       Allergies: Kefzol [cefazolin] and Sulfa antibiotics    Prior to Admission medications    Medication Sig Start Date End Date Taking? Authorizing Provider   Cetirizine HCl 10 MG capsule Take 10 mg by mouth As Needed.   Yes Oscar Can MD   glucosamine-chondroitin 500-400 MG capsule capsule Take 1 capsule by mouth Daily. HOLD PER MD INSTR   Yes Oscar Can MD   metoprolol tartrate (LOPRESSOR) 25 MG tablet Take 1 tablet by mouth Every 12 (Twelve) Hours. Indications: High Blood Pressure Disorder 6/27/24  Yes Viviane Kumar APRN   simvastatin (ZOCOR) 20 MG tablet TAKE ONE TABLET BY MOUTH ONCE NIGHTLY 6/10/24  Yes Radha Rollins MD   tadalafil (CIALIS) 5 MG tablet TAKE 1 TABLET BY MOUTH DAILY  Patient taking differently: Take 1 tablet by mouth Daily. HOLD FOR SURGERY 6/10/24  Yes Radha Rollins MD   vitamin D3 125 MCG (5000 UT) capsule capsule Take 1 capsule by mouth Daily. HOLD FOR SURGERY   Yes Oscar Can MD         Objective   Vital Signs:  /60   Ht 172.7 cm (68\")   Wt 71.5 kg (157 lb " "11.2 oz)   BMI 23.98 kg/m²   Estimated body mass index is 23.98 kg/m² as calculated from the following:    Height as of this encounter: 172.7 cm (68\").    Weight as of this encounter: 71.5 kg (157 lb 11.2 oz).       BMI is within normal parameters. No other follow-up for BMI required.       Physical Exam  Vitals reviewed.   Constitutional:       General: He is not in acute distress.     Appearance: Normal appearance.   HENT:      Head: Normocephalic and atraumatic.      Right Ear: External ear normal.      Left Ear: External ear normal.      Nose: Nose normal.      Mouth/Throat:      Mouth: Mucous membranes are moist.      Pharynx: Oropharynx is clear.   Eyes:      Extraocular Movements: Extraocular movements intact.      Conjunctiva/sclera: Conjunctivae normal.      Pupils: Pupils are equal, round, and reactive to light.   Cardiovascular:      Rate and Rhythm: Normal rate and regular rhythm.      Pulses:           Carotid pulses are 2+ on the right side and 2+ on the left side.       Radial pulses are 2+ on the right side and 2+ on the left side.   Pulmonary:      Comments: Non labored respirations on room air, equal chest rise  Abdominal:      General: Abdomen is flat. There is no distension.      Palpations: Abdomen is soft.   Musculoskeletal:      Cervical back: Normal range of motion. No rigidity.      Right lower leg: No edema.      Left lower leg: No edema.   Skin:     General: Skin is warm and dry.      Capillary Refill: Capillary refill takes less than 2 seconds.   Neurological:      General: No focal deficit present.      Mental Status: He is alert and oriented to person, place, and time.   Psychiatric:         Mood and Affect: Mood normal.         Behavior: Behavior normal.        Result Review :    The following data was reviewed by: Farrah Morales MD on 05/28/2025:  Common labs          6/21/2024    15:01 7/2/2024    11:08 9/10/2024    10:13   Common Labs   Glucose 107   83    BUN 21   23  "   Creatinine 0.88   0.85    Sodium 139   138    Potassium 4.8   4.7    Chloride 101   101    Calcium 9.2   9.2    Albumin 4.0   4.1    Total Bilirubin 0.3   0.3    Alkaline Phosphatase 148   80    AST (SGOT) 29   22    ALT (SGPT) 35   17    WBC 5.39   3.90    Hemoglobin 10.9   13.8    Hematocrit 33.6   42.2    Platelets 342   184    Total Cholesterol   171    Triglycerides   69    HDL Cholesterol   53    LDL Cholesterol    105    Hemoglobin A1C   5.70    PSA  4.5       CMP          6/15/2024    02:40 6/15/2024    10:23 6/21/2024    15:01 9/10/2024    10:13   CMP   Glucose 100   107  83    BUN 17   21  23    Creatinine 0.77   0.88  0.85    EGFR 91.1   87.5  88.4    Sodium 137   139  138    Potassium 3.7  3.8  4.8  4.7    Chloride 101   101  101    Calcium 8.1   9.2  9.2    Total Protein   6.1  6.4    Albumin   4.0  4.1    Globulin   2.1  2.3    Total Bilirubin   0.3  0.3    Alkaline Phosphatase   148  80    AST (SGOT)   29  22    ALT (SGPT)   35  17    Albumin/Globulin Ratio   1.9  1.8    BUN/Creatinine Ratio 22.1   23.9  27.1    Anion Gap 8.3         CBC          6/15/2024    02:40 6/21/2024    15:01 9/10/2024    10:13   CBC   WBC 6.24  5.39  3.90    RBC 3.04  3.48  4.62    Hemoglobin 9.6  10.9  13.8    Hematocrit 28.7  33.6  42.2    MCV 94.4  96.6  91.3    MCH 31.6  31.3  29.9    MCHC 33.4  32.4  32.7    RDW 12.5  12.5  12.1    Platelets 98  342  184      BMP          6/15/2024    02:40 6/15/2024    10:23 6/21/2024    15:01 9/10/2024    10:13   BMP   BUN 17   21  23    Creatinine 0.77   0.88  0.85    Sodium 137   139  138    Potassium 3.7  3.8  4.8  4.7    Chloride 101   101  101    CO2 27.7   28.0  30.1    Calcium 8.1   9.2  9.2          Last Echo  Results for orders placed during the hospital encounter of 03/28/25    Adult Transthoracic Echo Complete W/ Cont if Necessary Per Protocol    Interpretation Summary    Left ventricular systolic function is normal. Calculated left ventricular EF = 53.9%    Left  ventricular diastolic function is consistent with (grade II w/high LAP) pseudonormalization.    There is a mitral valve ring present. 36mm medtronic. No significant mitral valve regurgitation is present. No significant mitral valve stenosis is present.    Mild dilation of the aortic root is present. The aortic root measures 4.1 cm.      Results for orders placed during the hospital encounter of 09/30/24    Adult Transthoracic Echo Complete w/ Color, Spectral and Contrast if Necessary Per Protocol    Interpretation Summary    Left ventricular systolic function is normal. Calculated left ventricular EF = 59.3% Normal left ventricular cavity size noted. Left ventricular wall thickness is consistent with mild concentric hypertrophy. All left ventricular wall segments contract normally. Left ventricular diastolic function was indeterminate. Elevated left atrial pressure.    The right ventricular cavity is mild to moderately dilated. Mildly reduced right ventricular systolic function noted.    Trace mitral valve regurgitation is present. No significant mitral valve stenosis is present. There is a mitral valve ring present. Mitral valve repair with a 36 mm Medtronic flexible band posterior annuloplasty and chordal repair (4-0 Garrard-Martin x 3) of P2 and P3 segments and primary scallop closure-left of P2-P3    Mild tricuspid valve regurgitation is present. Estimated right ventricular systolic pressure from tricuspid regurgitation is normal (<35 mmHg). Calculated right ventricular systolic pressure from tricuspid regurgitation is 18 mmHg.       Last Stress  Results for orders placed during the hospital encounter of 06/27/16    SCANNED - STRESS TEST      Results for orders placed in visit on 12/15/15    SCANNED - STRESS TEST       Last Cath  Results for orders placed during the hospital encounter of 04/04/24    Cardiac Catheterization/Vascular Study    Conclusion  CARDIAC CATHETERIZATION REPORT    Procedure:Left and Right heart  catheterization    DATE OF PROCEDURE: 04/04/24    PROCEDURE PERFORMED BY: Williams Marques MD, [facility]    INDICATION FOR PROCEDURE: Mitral valve disease    DESCRIPTION OF PROCEDURE: After consent was obtained, an IV had been placed in the R basilic vein.  Over a wire we exchanged out the IV and placed a 5 Fr sheath in the basilic vein.  Right heart catheterization was performed in the usual fashion.  Access was gained in his right radial artery using a micropuncture technique. A 5-Georgian short sheath was placed without difficulty.  Intraarterial cocktail was given.  Left ventriculography and selective injection of the left and right coronary arteries were performed using a JL-3.5 and JR-4 diagnostic catheter. Patient tolerated the procedure well without early complication and EBL was minimal.  At the conclusion, the sheath was removed and hemostasis was obtained. The patient went to the prep holding area in stable condition.    FINDINGS:  RIGHT HEART CATHETERIZATION:  Pressures:  Right Atrial:  2  Right Ventricle : 22/2  Pulmonary Artery: 22/6 mean 12  PCWP:  6  Cardiac output  4.69  Cardiac index  2.50    LEFT HEART CATHETERIZATION:  LEFT VENTRICULOGRAPHY: The LV pressure was 112/16 .  There was normal LV systolic function without segmental wall motion abnormality.  There was severe mitral insufficiency or no gradient across the aortic valve on pullback.    CORONARY ANGIOGRAPHY:  Left main: Normal  Left anterior descending: 10% luminal irregularities in the midportion there is a long myocardial bridge in the midportion and normal distally  Ramus intermedius:Not present  Circumflex: Large eccentric calcified plaque in proximal circumflex with about a 30% lesion in it  RCA: Is a dominant vessel.  Normal throughout    SUMMARY: Severe mitral insufficiency with left atrial enlargement.  Normal LV size and function.  Normal LVEDP.  Normal right heart cath and interestingly normal wedge pressure with a normal V  wave      RECOMMENDATIONS: Mitral insufficiency likely mitral valve repair      Williams Marques MD  04/04/24  15:21 EDT                Assessment and Plan     Diagnoses and all orders for this visit:    1. Infrarenal abdominal aortic aneurysm (AAA) without rupture (Primary)    2. Bilateral carotid artery stenosis    Mirza Zimmer returns for follow-up for carotid artery stenosis and abdominal aortic aneurysm.  He remains asymptomatic.  Noninvasive testing today that showed mild, less than 50% stenosis of bilateral internal carotid arteries and a 2.7 cm abdominal aortic aneurysm.  Bilateral iliac arteries were aneurysmal, the left common iliac artery measured 2.2 cm and the right common iliac artery measured 1.6 cm.  I discussed these results as well as the indications for carotid and aortic aneurysm and iliac aneurysm intervention.  I have recommended that we continue medical management.  He is to continue his statin but he will add a low-dose, 81 mg aspirin daily.  He will follow-up in 2 years with a repeat abdominal aortic aneurysm duplex and carotid duplex for surveillance.         Follow Up     No follow-ups on file.  Patient was given instructions and counseling regarding his condition or for health maintenance advice. Please see specific information pulled into the AVS if appropriate.   Any information in this note that has been copied and pasted was reviewed and edited to reflect today's exam.  This note was created using Dragon dictation software.  I have reviewed the note for accuracy and made corrections as needed.  Please note that some errors may still exist, please contact me with any questions or concerns.

## 2025-06-13 RX ORDER — SIMVASTATIN 20 MG
20 TABLET ORAL NIGHTLY
Qty: 90 TABLET | Refills: 3 | Status: SHIPPED | OUTPATIENT
Start: 2025-06-13

## 2025-07-11 ENCOUNTER — PREP FOR SURGERY (OUTPATIENT)
Dept: OTHER | Facility: HOSPITAL | Age: 80
End: 2025-07-11
Payer: MEDICARE

## 2025-07-11 DIAGNOSIS — Z85.038 PERSONAL HISTORY OF COLON CANCER: Primary | ICD-10-CM

## 2025-08-12 RX ORDER — METOPROLOL TARTRATE 25 MG/1
25 TABLET, FILM COATED ORAL EVERY 12 HOURS SCHEDULED
Qty: 60 TABLET | OUTPATIENT
Start: 2025-08-12

## (undated) DEVICE — PK CATH CARD 40

## (undated) DEVICE — SUT SILK 3/0 TIES 18IN A184H

## (undated) DEVICE — DECANTER BAG 9": Brand: MEDLINE INDUSTRIES, INC.

## (undated) DEVICE — LN SMPL CO2 SHTRM SD STREAM W/M LUER

## (undated) DEVICE — PK PERFUS CUST W/CARDIOPLEGIA

## (undated) DEVICE — ST. SORBAVIEW ULTIMATE IJ SYSTEM A,C: Brand: CENTURION

## (undated) DEVICE — SENSR O2 OXIMAX FNGR A/ 18IN NONSTR

## (undated) DEVICE — SOL IRR H2O BTL 1000ML STRL

## (undated) DEVICE — SUT ETHIBOND 2/0 CV V5  30IN PXX52

## (undated) DEVICE — TUBING, SUCTION, 1/4" X 10', STRAIGHT: Brand: MEDLINE

## (undated) DEVICE — TR BAND RADIAL ARTERY COMPRESSION DEVICE: Brand: TR BAND

## (undated) DEVICE — CATH DIAG IMPULSE FR4 5F 100CM

## (undated) DEVICE — 3M™ TEGADERM™ CHG DRESSING 25/CARTON 4 CARTONS/CASE 1658: Brand: TEGADERM™

## (undated) DEVICE — PK HEART OPN 40

## (undated) DEVICE — BALN PRESS WEDGE 5F 110CM

## (undated) DEVICE — Device

## (undated) DEVICE — TBG ART PRESS 60 IN

## (undated) DEVICE — CANN O2 ETCO2 FITS ALL CONN CO2 SMPL A/ 7IN DISP LF

## (undated) DEVICE — BG TRANSF W/COUPLER SPK 600ML

## (undated) DEVICE — OASIS DRAIN, SINGLE, INLINE & ATS COMPATIBLE: Brand: OASIS

## (undated) DEVICE — GLV SURG BIOGEL LTX PF 6

## (undated) DEVICE — WOUND RETRACTOR AND PROTECTOR: Brand: ALEXIS WOUND PROTECTOR-RETRACTOR

## (undated) DEVICE — SPNG GZ WOVN 4X4IN 12PLY 10/BX STRL

## (undated) DEVICE — SINGLE-USE BIOPSY FORCEPS: Brand: RADIAL JAW 4

## (undated) DEVICE — ECHELON FLEX 60 ARTICULATING ENDOSCOPIC LINEAR CUTTER (NO CARTRIDGE): Brand: ECHELON FLEX ENDOPATH

## (undated) DEVICE — CVR PROB 96IN LF STRL

## (undated) DEVICE — PAD GRND REM POLYHESIVE A/ DISP

## (undated) DEVICE — HARMONIC ACE +7 LAPAROSCOPIC SHEARS ADVANCED HEMOSTASIS 5MM DIAMETER 36CM SHAFT LENGTH  FOR USE WITH GRAY HAND PIECE ONLY: Brand: HARMONIC ACE

## (undated) DEVICE — Device: Brand: SPOT EX ENDOSCOPIC TATTOO

## (undated) DEVICE — CATH VENT MIV RADL PIG ST TIP 4F 110CM

## (undated) DEVICE — THE CARR-LOCKE INJECTION NEEDLE IS A SINGLE USE, DISPOSABLE, FLEXIBLE SHEATH INJECTION NEEDLE USED FOR THE INJECTION OF VARIOUS TYPES OF MEDIA THROUGH FLEXIBLE ENDOSCOPES.

## (undated) DEVICE — SKIN PREP TRAY W/CHG: Brand: MEDLINE INDUSTRIES, INC.

## (undated) DEVICE — SUT SILK 2/0 TIES 18IN A185H

## (undated) DEVICE — SYS PERFUS SEP PLATLT W TIPS CUST

## (undated) DEVICE — GLIDESHEATH BASIC HYDROPHILIC COATED INTRODUCER SHEATH: Brand: GLIDESHEATH

## (undated) DEVICE — PATIENT RETURN ELECTRODE, SINGLE-USE, CONTACT QUALITY MONITORING, ADULT, WITH 9FT CORD, FOR PATIENTS WEIGING OVER 33LBS. (15KG): Brand: MEGADYNE

## (undated) DEVICE — ENDOPOUCH RETRIEVER SPECIMEN RETRIEVAL BAGS: Brand: ENDOPOUCH RETRIEVER

## (undated) DEVICE — ENDOPATH XCEL UNIVERSAL TROCAR STABLILITY SLEEVES: Brand: ENDOPATH XCEL

## (undated) DEVICE — THE TORRENT IRRIGATION SCOPE CONNECTOR IS USED WITH THE TORRENT IRRIGATION TUBING TO PROVIDE IRRIGATION FLUIDS SUCH AS STERILE WATER DURING GASTROINTESTINAL ENDOSCOPIC PROCEDURES WHEN USED IN CONJUNCTION WITH AN IRRIGATION PUMP (OR ELECTROSURGICAL UNIT).: Brand: TORRENT

## (undated) DEVICE — DRSNG WND GZ PAD BORDERED 4X8IN STRL

## (undated) DEVICE — GW HITORQUE/BAL MID/WT J W/HCOAT .014 3X190CM

## (undated) DEVICE — CANN VENI DLP SGL/STAGE RT/ANGL MTL/TP 28F

## (undated) DEVICE — LOU LAP SIGMOID COLON: Brand: MEDLINE INDUSTRIES, INC.

## (undated) DEVICE — ENDOPATH PNEUMONEEDLE INSUFFLATION NEEDLES WITH LUER LOCK CONNECTORS 120MM: Brand: ENDOPATH

## (undated) DEVICE — ELECTRD BLD EZ CLN MOD XLNG 2.75IN

## (undated) DEVICE — ADAPT ANTEGRADE RETRGR

## (undated) DEVICE — GOWN SURG AERO CHROME XL

## (undated) DEVICE — LAPAROSCOPIC SMOKE ELIMINATION DEVICE: Brand: PNEUVIEW XE

## (undated) DEVICE — SOL IRR NACL 0.9PCT BT 1000ML

## (undated) DEVICE — ORGANIZER SUT SHELIGH 3T 213013

## (undated) DEVICE — LABEL SHEET CUSTOM 2X2 YELLOW: Brand: MEDLINE INDUSTRIES, INC.

## (undated) DEVICE — SOL NACL 0.9PCT 1000ML

## (undated) DEVICE — GLV SURG PREMIERPRO ORTHO LTX PF SZ7.5 BRN

## (undated) DEVICE — GLV SURG BIOGEL LTX PF 7 1/2

## (undated) DEVICE — SNAR POLYP SENSATION STDOVL 27 240 BX40

## (undated) DEVICE — ADHS SKIN SURG TISS VISC PREMIERPRO EXOFIN HI/VISC FAST/DRY

## (undated) DEVICE — KT ORCA ORCAPOD DISP STRL

## (undated) DEVICE — 28 FR STRAIGHT – SOFT PVC CATHETER: Brand: PVC THORACIC CATHETERS

## (undated) DEVICE — DISPOSABLE GRASPER CARTRIDGE: Brand: DIRECT DRIVE REPOSABLE GRASPERS

## (undated) DEVICE — SUT PDS 0 CT1 36IN Z346H

## (undated) DEVICE — MARKER,SKIN,WI/RULER AND LABELS: Brand: MEDLINE

## (undated) DEVICE — THE SINGLE USE ETRAP – POLYP TRAP IS USED FOR SUCTION RETRIEVAL OF ENDOSCOPICALLY REMOVED POLYPS.: Brand: ETRAP

## (undated) DEVICE — CLIP LIGAT VASC HORIZON TI LG ORNG 6CT: Type: IMPLANTABLE DEVICE | Site: ABDOMEN | Status: NON-FUNCTIONAL

## (undated) DEVICE — 28 FR RIGHT ANGLE – SOFT PVC CATHETER: Brand: PVC THORACIC CATHETERS

## (undated) DEVICE — ENDOPATH XCEL BLADELESS TROCARS WITH STABILITY SLEEVES: Brand: ENDOPATH XCEL

## (undated) DEVICE — DEV COND GAS LAP INSUFLOW W/LUER CONN

## (undated) DEVICE — DGW .035 FC J3MM 260CM TEF: Brand: EMERALD

## (undated) DEVICE — Device: Brand: DEFENDO AIR/WATER/SUCTION AND BIOPSY VALVE

## (undated) DEVICE — 8 FOOT DISPOSABLE EXTENSION CABLE WITH SAFE CONNECT / ALLIGATOR CLIP

## (undated) DEVICE — OPTIFOAM GENTLE SA, POSTOP, 4X12: Brand: MEDLINE

## (undated) DEVICE — SUT VIC 0 TN 27IN DYED JTN0G

## (undated) DEVICE — CONN REDUCING CANN/PUMP 1/2X3/8X3/8

## (undated) DEVICE — ENDOCUT SCISSOR TIP, DISPOSABLE: Brand: RENEW

## (undated) DEVICE — DRP SLUSH WARMR MACH RECTG 66X44IN

## (undated) DEVICE — SUT SILK 2/0 SH CR8 18IN CR8 C012D

## (undated) DEVICE — PENCL ES MEGADINE EZ/CLEAN BUTN W/HOLSTR 10FT

## (undated) DEVICE — CANN VENI DLP SGL/STAGE RT/ANGL 22F 30.5TO38.1CM

## (undated) DEVICE — CANN RETRGR STYLET RSCP 15F

## (undated) DEVICE — VISUALIZATION SYSTEM: Brand: CLEARIFY

## (undated) DEVICE — CANN ART EOPA 3D NV W/CONN 20F

## (undated) DEVICE — PK ATS CUST W CARDIOTOMY RESEVOIR

## (undated) DEVICE — SENSR CERBRL O2 PK/2

## (undated) DEVICE — CATHETER,URETHRAL,REDRUBBER,STERILE,20FR: Brand: MEDLINE

## (undated) DEVICE — SUT SILK 0 TIES 18IN SA66G

## (undated) DEVICE — GOWN ,SIRUS,NONREINFORCED SMALL: Brand: MEDLINE

## (undated) DEVICE — HEMOCONCENTRATOR PERFUS LPS06

## (undated) DEVICE — ADAPT CLN BIOGUARD AIR/H2O DISP

## (undated) DEVICE — TTL1LYR 16FR10ML 100%SIL TMPST TR: Brand: MEDLINE

## (undated) DEVICE — DRSNG SURESITE WNDW 2.38X2.75

## (undated) DEVICE — ST TOURNI COMPL A/ 7IN

## (undated) DEVICE — CANN NASL CO2 TRULINK W/O2 A/

## (undated) DEVICE — CATH DIAG IMPULSE FL3.5 5F 100CM

## (undated) DEVICE — SUT PROLN 2/0 SH 36IN 8523H

## (undated) DEVICE — KT MANIFLD CARDIAC

## (undated) DEVICE — SOL ISO/ALC 70PCT 4OZ

## (undated) DEVICE — SUT VIC 5/0 PS2 18IN J495H

## (undated) DEVICE — SUT POLY BR TP 2STRND 1/8X30IN

## (undated) DEVICE — CLAMP INSERT: Brand: STEALTH® CLAMP INSERT

## (undated) DEVICE — SPONGE,DISSECTOR,K,XRAY,9/16"X1/4",STRL: Brand: MEDLINE

## (undated) DEVICE — CANN AORT ROOT DLP VNT 14G 7F